# Patient Record
Sex: MALE | Race: WHITE
[De-identification: names, ages, dates, MRNs, and addresses within clinical notes are randomized per-mention and may not be internally consistent; named-entity substitution may affect disease eponyms.]

---

## 2020-03-13 NOTE — NUR
Pt admitted to room 109 from the ED via stretcher on bipap. PT is A/Ox3, he transferred self 
to the ICU bed. Placed on monitor, wife at the bedside.

## 2020-03-13 NOTE — CARD
MR#: [id number]

Account#: [id number]

Accession#: [id number]

Date of Study: 03/13/2020

Ordering Physician: PRIYANKA MCRAE, 

Referring Physician: PRIYANKA MCRAE, 

Tech: Merly Jacobs





--------------- APPROVED REPORT --------------





EXAM: Two-dimensional and M-mode echocardiogram with Doppler and color Doppler.



Other Information 

Quality : AverageHR: 70bpm

Technically limited study due to  body habitus & bypap



INDICATION

Dyspnea 

Congestive Heart Failure 



RISK FACTORS

Hypertension 

Hyperlipidemia

Diabetes



2D DIMENSIONS 

RVDd3.7 (2.9-3.5cm)Left Atrium(2D)5.4 (1.6-4.0cm)

IVSd1.4 (0.7-1.1cm)Aortic Root(2D)3.6 (2.0-3.7cm)

LVDd6.3 (3.9-5.9cm)LVOT Diameter2.2 (1.8-2.4cm)

PWd1.5 (0.7-1.1cm)LVDs3.8 (2.5-4.0cm)

FS (%) 40.3 %.7 ml



Aortic Valve

AoV Peak [place].170.4cm/sAoV VTI39.6cm

AO Peak GR.11.6mmHgLVOT  VTI 22.32cm

AO Mean GR.8mmHgAI P 1/2 [id number]



Mitral Valve

MV E Xlzezhwt10.4cm/sMV E Peak Gr.3mmHg

MV DECEL [id number] A Hjmszwze46.1cm/s

MV E Mean Gr.2mmHgE/A  Ratio0.9



TDI

Lateral E' P. V3.99cm/sMedial E' P. V5.28cm/s

E/Lateral E'15.9E/Medial E'12.0



Tricuspid Valve

TR P. Htclawzm820bo/sRAP [id number]

TR Peak Gr.10kcRlATAI94csGb



 LEFT VENTRICLE 

The left ventricle is normal size. There is mild concentric left ventricular hypertrophy. The left ve
ntricular systolic function is normal. The Ejection Fraction is 50-55%. There is normal LV segmental 
wall motion. Transmitral Doppler flow pattern is Grade I-abnormal relaxation pattern.



 RIGHT VENTRICLE 

The right ventricle is normal size. There is normal right ventricular wall thickness. The right ventr
icular systolic function is normal.



 ATRIA 

The left atrium is mildly dilated. The right atrium is mildly dilated. The interatrial septum is inta
ct with no evidence for an atrial septal defect or patent foramen ovale as noted on 2-D or Doppler im
aging.



 AORTIC VALVE 

The aortic valve is thickened but opens well. Doppler and Color Flow revealed trace aortic regurgitat
ion. There is no significant aortic valvular stenosis.



 MITRAL VALVE 

The mitral valve is normal in structure and function. There is no evidence of mitral valve prolapse. 
There is no mitral valve stenosis. Doppler and Color-flow revealed trace mitral regurgitation.



 TRICUSPID VALVE 

The tricuspid valve is normal in structure and function. Doppler and Color Flow revealed trace tricus
pid regurgitation with an estimated PAP of 28 mmHg. There is no tricuspid valve stenosis.



 PULMONIC VALVE 

The pulmonic valve is not well visualized. Doppler and Color Flow revealed no pulmonic valvular regur
gitation.



 GREAT VESSELS 

The aortic root is normal in size. The IVC was not visualized.



 PERICARDIAL EFFUSION 

There is no evidence of significant pericardial effusion.



Critical Notification

Critical Value: No



<Conclusion>

The left ventricle is normal size.

The left ventricular systolic function is normal.

The Ejection Fraction is 50-55%.

There is mild concentric left ventricular hypertrophy.

Doppler and Color Flow revealed trace aortic regurgitation.

There is no significant aortic valvular stenosis.

Doppler and Color-flow revealed trace mitral regurgitation.

Doppler and Color Flow revealed trace tricuspid regurgitation with an estimated PAP of 28 mmHg.



Signed by : Serge Silva MD

Electronically Approved : 03/13/2020 18:18:00

## 2020-03-13 NOTE — PDOC1
History and Physical


Date of Admission


Date of Admission


DATE: 3/13/20 


TIME: 13:58





Source


Source:  Caregiver, Chart review, Patient





History of Present Illness


History of Present Illness


MR. Vila   [facility] 75  [facility]  [facility] of 

breath. 


2 wweks of intermittent shortness of breath,    much worse today,   


he has not traveled, he has no sick contacts,     


marked hypoxia noted by EMS,  70% RA in the field reportec. 


  CPAP started in ER and he feels much improved,  


[facility],  [facility],  waiting for his wife to procure,





Past Medical History


Past Medical History


 hypertension, dyslipidemia, diabetes mellitus


Cardiovascular:  HTN


Endocrine:  Diabetes





Social History


Smoke:  No


ALCOHOL:  rare





Current Problem List


Problem List


Problems


Medical Problems:


(1) Acute respiratory distress


Status: Acute  





(2) CAP (community acquired pneumonia)


Status: Acute  





(3) CHF (congestive heart failure)


Status: Acute  





(4) Hypoxia


Status: Acute  





(5) SIRS (systemic inflammatory response syndrome)


Status: Acute  











Current Medications


Current Medications





Current Medications


Albuterol/ Ipratropium (Duoneb) 3 ml 1X  ONCE NEB  Last administered on 

3/13/20at 09:07;  Start 3/13/20 at 09:15;  Stop 3/13/20 at 09:16;  Status DC


Methylprednisolone Sodium Succinate (SOLU-Medrol 125MG VIAL) 125 mg 1X  ONCE IV 

Last administered on 3/13/20at 09:27;  Start 3/13/20 at 09:15;  Stop 3/13/20 at 

09:16;  Status DC


Labetalol HCl (Normodyne Iv Push) 10 mg 1X  ONCE IVP ;  Start 3/13/20 at 09:15; 

Stop 3/13/20 at 09:12;  Status DC


Piperacillin Sod/ Tazobactam Sod 3.375 gm/Sodium Chloride 50 ml @  100 mls/hr 1X

 ONCE IV  Last administered on 3/13/20at 10:23;  Start 3/13/20 at 09:30;  Stop 

3/13/20 at 09:59;  Status DC


Vancomycin HCl 250 ml @  250 mls/hr 1X  ONCE IV ;  Start 3/13/20 at 09:30;  Stop

3/13/20 at 10:29;  Status UNV


Vancomycin HCl 2 gm/Sodium Chloride 500 ml @  250 mls/hr 1X  ONCE IV  Last 

administered on 3/13/20at 09:56;  Start 3/13/20 at 09:45;  Stop 3/13/20 at 

11:44;  Status DC


Sodium Chloride 1,000 ml @  100 mls/hr Q10H IV  Last administered on 3/13/20at 

11:53;  Start 3/13/20 at 10:51;  Stop 3/14/20 at 10:50


Furosemide (Lasix) 40 mg 1X  ONCE IVP ;  Start 3/13/20 at 14:00;  Stop 3/13/20 

at 14:01


Piperacillin Sod/ Tazobactam Sod 3.375 gm/Sodium Chloride 50 ml @  100 mls/hr 

Q6HRS IV ;  Start 3/13/20 at 18:00





Allergies


Allergies:  


Coded Allergies:  


     chlorothiazide (Verified  Allergy, Intermediate, 3/13/20)


     metoprolol (Verified  Allergy, Intermediate, 3/13/20)





ROS


General:  No: Chills, Night Sweats, Fatigue, Malaise, Appetite, Other


PSYCHOLOGICAL ROS:  No: Anxiety, Behavioral Disorder, Concentration difficultie,

Decreased libido, Depression, Disorientation, Hallucinations, Hostility, 

Irritablity, Memory difficulties, Mood Swings, Obsessive thoughts, Physical 

abuse, Sexual abuse, Sleep disturbances, Suicidal ideation, Other


Eyes:  No Blurry vision, No Decreased vision, No Double vision, No Dry eyes, No 

Excessive tearing, No Eye Pain, No Itchy Eyes, No Loss of vision, No 

Photophobia, No Scotomata, No Uses contacts, No Uses glasses, No Other


HEENT:  No: Heacaches, Visual Changes, Hearing change, Nasal congestion, Nasal 

discharge, Oral lesions, Sinus pain, Sore Throat, Epistaxis, Sneezing, Snoring, 

Tinnitus, Vertigo, Vocal changes, Other


Respiratory:  YES: Orthopnea, SOB with excertion; 


   No: Cough, Hemoptysis, Pleuritic Pain, Shortness of breath, Sputum Changes, 

Stridor, Tachypnea, Wheezing, Other


Cardiovascular:  No Chest Pain, No Palpitations, No Orthopnea, No Paroxysmal 

Noc. Dyspnea, No Edema, No Lt Headedness, No Other


Gastrointestinal:  Yes Nausea, Yes Diarrhea; 


   No Vomiting, No Abdominal Pain, No Constipation, No Melena, No Hematochezia, 

No Other


Genitourinary:  No Dysuria, No Frequency, No Incontinence, No Hematuria, No 

Retention, No Discharge, No Urgency, No Pain, No Flank Pain, No Other, No , No ,

No , No , No , No , No 


Musculoskeletal:  No Gait Disturbance, No Joint Pain, No Joint Stiffness, No 

Joint Swelling, No Muscle Pain, No Muscular Weakness, No Pain In:, No Swelling 

In:, No Other


Neurological:  No Behavorial Changes, No Bowel/Bladder ControlChng, No 

Confusion, No Dizziness, No Gait Disturbance, No Headaches, No Impaired 

Coord/balance, No Memory Loss, No Numbness/Tingling, No Seizures, No Speech 

Problems, No Tremors, No Visual Changes, No Weakness, No Other


Skin:  No Dry Skin, No Eczema, No Hair Changes, No Lumps, No Mole Changes, No 

Mottling, No Nail Changes, No Pruritus, No Rash, No Skin Lesion Changes, No 

Other, No Acne





Physical Exam


General:  Alert, Oriented X3, Cooperative, mild distress, moderate distress (on 

admit, better, )


HEENT:  PERRLA, EOMI


Heart:  no gallops, no murmurs


Abdomen:  Normal bowel sounds, Soft


Rectal Exam:  not examined


Extremities:  No clubbing, No edema, Normal pulses


Skin:  No breakdown


Neuro:  Normal gait, Normal speech, Normal tone, Sensation intact


Psych/Mental Status:  Mood NL





Vitals


Vitals





Vital Signs








  Date Time  Temp Pulse Resp B/P (MAP) Pulse Ox O2 Delivery O2 Flow Rate FiO2


 


3/13/20 12:59     93 BiPAP/CPAP  


 


3/13/20 12:23  60  129/63 (85)    


 


3/13/20 08:51 98.4  28     





 98.4       











Labs


Labs





Laboratory Tests








Test


 3/13/20


08:56 3/13/20


09:09 3/13/20


09:10 3/13/20


09:30


 


Glucose (Fingerstick)


 228 mg/dL


(70-99) 


 


 





 


O2 Saturation  99 % (92-99)   


 


Arterial Blood pH


 


 7.27


(7.35-7.45) 


 





 


Arterial Blood pCO2 at


Patient Temp 


 65 mmHg


(35-46) 


 





 


Arterial Blood pO2 at Patient


Temp 


 420 mmHg


() 


 





 


Arterial Blood HCO3


 


 29 mmol/L


(21-28) 


 





 


Arterial Blood Base Excess


 


 0 mmol/L


(-3-3) 


 





 


FiO2  100   


 


White Blood Count


 


 


 21.3 x10^3/uL


(4.0-11.0) 





 


Red Blood Count


 


 


 6.22 x10^6/uL


(4.30-5.70) 





 


Hemoglobin


 


 


 17.4 g/dL


(13.0-17.5) 





 


Hematocrit


 


 


 53.1 %


(39.0-53.0) 





 


Mean Corpuscular Volume   85 fL ()  


 


Mean Corpuscular Hemoglobin   28 pg (25-35)  


 


Mean Corpuscular Hemoglobin


Concent 


 


 33 g/dL


(31-37) 





 


Red Cell Distribution Width


 


 


 14.9 %


(11.5-14.5) 





 


Platelet Count


 


 


 245 x10^3/uL


(140-400) 





 


Neutrophils (%) (Auto)   71 % (31-73)  


 


Lymphocytes (%) (Auto)   20 % (24-48)  


 


Monocytes (%) (Auto)   6 % (0-9)  


 


Eosinophils (%) (Auto)   2 % (0-3)  


 


Basophils (%) (Auto)   1 % (0-3)  


 


Neutrophils # (Auto)


 


 


 15.2 x10^3/uL


(1.8-7.7) 





 


Lymphocytes # (Auto)


 


 


 4.3 x10^3/uL


(1.0-4.8) 





 


Monocytes # (Auto)


 


 


 1.3 x10^3/uL


(0.0-1.1) 





 


Eosinophils # (Auto)


 


 


 0.3 x10^3/uL


(0.0-0.7) 





 


Basophils # (Auto)


 


 


 0.2 x10^3/uL


(0.0-0.2) 





 


Segmented Neutrophils %   62 % (35-66)  


 


Band Neutrophils %   12 % (0-9)  


 


Lymphocytes %   9 % (24-48)  


 


Atypical Lymphocytes %


(Manual) 


 


 6 % (0-0) 


 





 


Monocytes %   8 % (0-10)  


 


Eosinophils %   2 % (0-5)  


 


Myelocytes %   1 % (0-0)  


 


Platelet Estimate


 


 


 Adequate


(ADEQUATE) 





 


Prothrombin Time


 


 


 13.3 SEC


(11.7-14.0) 





 


Prothromb Time International


Ratio 


 


 1.1 (0.8-1.1) 


 





 


D-Dimer (Nory)


 


 


 0.48 ug/mlFEU


(0.00-0.50) 





 


Sodium Level


 


 


 143 mmol/L


(136-145) 





 


Potassium Level


 


 


 3.7 mmol/L


(3.5-5.1) 





 


Chloride Level


 


 


 103 mmol/L


() 





 


Carbon Dioxide Level


 


 


 30 mmol/L


(21-32) 





 


Anion Gap   10 (6-14)  


 


Blood Urea Nitrogen


 


 


 16 mg/dL


(8-26) 





 


Creatinine


 


 


 1.0 mg/dL


(0.7-1.3) 





 


Estimated GFR


(Cockcroft-Gault) 


 


 73.2 


 





 


BUN/Creatinine Ratio   16 (6-20)  


 


Glucose Level


 


 


 217 mg/dL


(70-99) 





 


Lactic Acid Level


 


 


 1.8 mmol/L


(0.4-2.0) 





 


Calcium Level


 


 


 9.1 mg/dL


(8.5-10.1) 





 


Total Bilirubin


 


 


 0.7 mg/dL


(0.2-1.0) 





 


Aspartate Amino Transf


(AST/SGOT) 


 


 25 U/L (15-37) 


 





 


Alanine Aminotransferase


(ALT/SGPT) 


 


 53 U/L (16-63) 


 





 


Alkaline Phosphatase


 


 


 101 U/L


() 





 


Creatine Kinase


 


 


 92 U/L


() 





 


Troponin I Quantitative


 


 


 < 0.017 ng/mL


(0.000-0.055) 





 


NT-Pro-B-Type Natriuretic


Peptide 


 


 2328 pg/mL


(0-124) 





 


Total Protein


 


 


 7.6 g/dL


(6.4-8.2) 





 


Albumin


 


 


 3.5 g/dL


(3.4-5.0) 





 


Albumin/Globulin Ratio   0.9 (1.0-1.7)  


 


Influenza Type A Antigen


 


 


 


 Negative


(NEGATIVE)


 


Influenza Type B Antigen


 


 


 


 Negative


(NEGATIVE)








Laboratory Tests








Test


 3/13/20


08:56 3/13/20


09:09 3/13/20


09:10 3/13/20


09:30


 


Glucose (Fingerstick)


 228 mg/dL


(70-99) 


 


 





 


O2 Saturation  99 % (92-99)   


 


Arterial Blood pH


 


 7.27


(7.35-7.45) 


 





 


Arterial Blood pCO2 at


Patient Temp 


 65 mmHg


(35-46) 


 





 


Arterial Blood pO2 at Patient


Temp 


 420 mmHg


() 


 





 


Arterial Blood HCO3


 


 29 mmol/L


(21-28) 


 





 


Arterial Blood Base Excess


 


 0 mmol/L


(-3-3) 


 





 


FiO2  100   


 


White Blood Count


 


 


 21.3 x10^3/uL


(4.0-11.0) 





 


Red Blood Count


 


 


 6.22 x10^6/uL


(4.30-5.70) 





 


Hemoglobin


 


 


 17.4 g/dL


(13.0-17.5) 





 


Hematocrit


 


 


 53.1 %


(39.0-53.0) 





 


Mean Corpuscular Volume   85 fL ()  


 


Mean Corpuscular Hemoglobin   28 pg (25-35)  


 


Mean Corpuscular Hemoglobin


Concent 


 


 33 g/dL


(31-37) 





 


Red Cell Distribution Width


 


 


 14.9 %


(11.5-14.5) 





 


Platelet Count


 


 


 245 x10^3/uL


(140-400) 





 


Neutrophils (%) (Auto)   71 % (31-73)  


 


Lymphocytes (%) (Auto)   20 % (24-48)  


 


Monocytes (%) (Auto)   6 % (0-9)  


 


Eosinophils (%) (Auto)   2 % (0-3)  


 


Basophils (%) (Auto)   1 % (0-3)  


 


Neutrophils # (Auto)


 


 


 15.2 x10^3/uL


(1.8-7.7) 





 


Lymphocytes # (Auto)


 


 


 4.3 x10^3/uL


(1.0-4.8) 





 


Monocytes # (Auto)


 


 


 1.3 x10^3/uL


(0.0-1.1) 





 


Eosinophils # (Auto)


 


 


 0.3 x10^3/uL


(0.0-0.7) 





 


Basophils # (Auto)


 


 


 0.2 x10^3/uL


(0.0-0.2) 





 


Segmented Neutrophils %   62 % (35-66)  


 


Band Neutrophils %   12 % (0-9)  


 


Lymphocytes %   9 % (24-48)  


 


Atypical Lymphocytes %


(Manual) 


 


 6 % (0-0) 


 





 


Monocytes %   8 % (0-10)  


 


Eosinophils %   2 % (0-5)  


 


Myelocytes %   1 % (0-0)  


 


Platelet Estimate


 


 


 Adequate


(ADEQUATE) 





 


Prothrombin Time


 


 


 13.3 SEC


(11.7-14.0) 





 


Prothromb Time International


Ratio 


 


 1.1 (0.8-1.1) 


 





 


D-Dimer (Nory)


 


 


 0.48 ug/mlFEU


(0.00-0.50) 





 


Sodium Level


 


 


 143 mmol/L


(136-145) 





 


Potassium Level


 


 


 3.7 mmol/L


(3.5-5.1) 





 


Chloride Level


 


 


 103 mmol/L


() 





 


Carbon Dioxide Level


 


 


 30 mmol/L


(21-32) 





 


Anion Gap   10 (6-14)  


 


Blood Urea Nitrogen


 


 


 16 mg/dL


(8-26) 





 


Creatinine


 


 


 1.0 mg/dL


(0.7-1.3) 





 


Estimated GFR


(Cockcroft-Gault) 


 


 73.2 


 





 


BUN/Creatinine Ratio   16 (6-20)  


 


Glucose Level


 


 


 217 mg/dL


(70-99) 





 


Lactic Acid Level


 


 


 1.8 mmol/L


(0.4-2.0) 





 


Calcium Level


 


 


 9.1 mg/dL


(8.5-10.1) 





 


Total Bilirubin


 


 


 0.7 mg/dL


(0.2-1.0) 





 


Aspartate Amino Transf


(AST/SGOT) 


 


 25 U/L (15-37) 


 





 


Alanine Aminotransferase


(ALT/SGPT) 


 


 53 U/L (16-63) 


 





 


Alkaline Phosphatase


 


 


 101 U/L


() 





 


Creatine Kinase


 


 


 92 U/L


() 





 


Troponin I Quantitative


 


 


 < 0.017 ng/mL


(0.000-0.055) 





 


NT-Pro-B-Type Natriuretic


Peptide 


 


 2328 pg/mL


(0-124) 





 


Total Protein


 


 


 7.6 g/dL


(6.4-8.2) 





 


Albumin


 


 


 3.5 g/dL


(3.4-5.0) 





 


Albumin/Globulin Ratio   0.9 (1.0-1.7)  


 


Influenza Type A Antigen


 


 


 


 Negative


(NEGATIVE)


 


Influenza Type B Antigen


 


 


 


 Negative


(NEGATIVE)











VTE Prophylaxis Ordered


VTE Prophylaxis Devices:  No


VTE Pharmacological Prophylaxi:  Yes





Assessment/Plan


Assessment/Plan


acute hypoxic respiratory failure


CHF, acute systolic exacerbation on chronic failure


obesity, BMI 39











CORRINA KAY MD                 Mar 13, 2020 14:03

## 2020-03-13 NOTE — CONS
DATE OF CONSULTATION:  03/13/2020



REQUESTING PHYSICIAN:  Malissa Doshi DO



REASON FOR CONSULTATION:  Leukocytosis and pneumonia.



HISTORY OF PRESENT ILLNESS:  This is a 75-year-old  gentleman with a

history of congestive heart failure, also has multiple medical problems,

hypertension, hyperlipidemia, who came in with shortness of breath.  The patient

states this progressively was getting worse in the last 2 weeks and he was [facility]

[facility] way [facility] and he just cannot breathe, hence called 911.  The patient was

hypoxic, brought in on a BiPAP.  The patient denies any fever, denies any

nausea, vomiting, diarrhea.  Denies any chest pain.  Denies any headache or

visual symptoms.



PAST MEDICAL HISTORY:  Positive for partial thyroidectomy, congestive heart

failure, hyperlipidemia, hypertension, gastroesophageal reflux disease,

abdominal surgery done in the past, ventral hernia repair done, obesity, kidney

cancer, had partial left nephrectomy, joint replacement, right adrenalectomy,

depression and anxiety.



SOCIAL HISTORY:  Negative for smoking, alcohol, illicit drug use.



ALLERGIES:  LISTED AS ALLERGIES TO CHLOROTHIAZIDE AND METOPROLOL.



REVIEW OF SYSTEMS:  As per HPI, all other systems reviewed and are negative.



CURRENT MEDICATIONS:  The patient is on vancomycin and Zosyn, was given one

dose.



PHYSICAL EXAMINATION:

VITAL SIGNS:  Stable, afebrile.

HEENT:  NAD.

NECK:  Supple, no JVP, no lymphadenopathy.

LUNGS:  Decreased breath sounds with crackles at the bases.

HEART:  S1, S2, regular.  No gallop or murmur.

ABDOMEN:  Soft, nontender, no organomegaly.

EXTREMITIES:  1-2+ pitting edema present.

NEUROLOGIC:  The patient is alert, awake and appropriate.  No focal neurologic

deficit.

SKIN:  Unremarkable.



LABORATORY DATA:  White count is 21,000.  BUN and creatinine is normal.  Lactic

acid 1.8.  BNP is 2328.  Influenza screen is negative.  Chest x-ray showed

perihilar and bibasilar airspace opacity with possible infiltrate.



IMPRESSION:

1.  Pulmonary infiltrate, most likely to be congestive heart failure, although

the patient has leukocytosis, could be respiratory infection like pneumonia.

2.  Leukocytosis.

3.  Congestive heart failure.

4.  Hypertension.

5.  Partial nephrectomy.



RECOMMENDATIONS:  We will not give vancomycin anymore.  He got one dose.  We

will continue Zosyn, supportive care and we will continue to follow.



Thank you very much, Dr. Fabian Hollingsworth, for giving me the opportunity to

participate in this patient's care.

 



______________________________

CHRISTOPHER RICHARDSON MD



DR:  EARLINE/marnie  JOB#:  [id number] / [id number]

DD:  03/13/2020 13:53  DT:  03/13/2020 21:16

## 2020-03-13 NOTE — CONS
DATE OF CONSULTATION:  



PULMONARY CONSULTATION



ATTENDING PHYSICIAN:  Jinny Crockett MD.



REASON FOR CONSULTATION:  Respiratory failure.



HISTORY OF PRESENT ILLNESS:  The patient is a 75-year-old male who has a history

of hypertension, hyperlipidemia, morbid obesity with a BMI of 38.  No

significant tobacco use.  He presented to the hospital with progressive

shortness of breath for the last 10 days and also has lower extremity edema.  No

fever, no cough, no chills, no headaches.  No nausea, vomiting or diarrhea.  His

saturation was 70% on room air and was started on CPAP and then later placed on

BiPAP.  ABGs showed a pH of 7.27, pCO2 of 65 and a pO2 of 420 on 100% FiO2.  He

is currently down to 40% FiO2.  Influenza screen was negative.



PAST MEDICAL HISTORY:  Significant for history of hypertension, dyslipidemia,

morbid obesity, diabetes.



PAST SURGICAL HISTORY:  No recent surgery.



ALLERGIES:  CHLOROTHIAZIDE AND METOPROLOL.



MEDICATIONS:  Reviewed as listed in the MRAD.



REVIEW OF SYSTEMS:  Twelve-point system obtained.  Pertinent positives discussed

in my history of present illness, otherwise noncontributory.  All systems that

were negative were reviewed as well.



FAMILY HISTORY:  Noncontributory to lungs.



SOCIAL HISTORY:  Nonsmoker.



PHYSICAL EXAMINATION:

VITAL SIGNS:  Reviewed, pulse ox 93% on 40% FiO2 with BiPAP.  Afebrile.

HEENT:  Sclerae nonicteric.

NECK:  Supple.

LUNGS:  With diminished breath sounds.

CARDIOVASCULAR:  With a regular rate.

ABDOMEN:  Soft, obese.

EXTREMITIES:  Bilateral 2+ pitting edema.



LABORATORY DATA:  Reviewed.  Influenza screen negative.  ABGs as discussed in my

history of present illness.  BUN 16, creatinine 1.0.  INR 1.1.  D-dimer 0.4. 

White cell count 21.3.



IMPRESSION:

1.  Acute hypercapnic and hypoxic respiratory failure secondary to likely

interstitial edema.

2.  Abnormal chest x-ray with bilateral interstitial infiltrates suggesting

interstitial edema.  Clinically, less likely pneumonia.

3.  Leukocytosis, could be reactive.  No fever.  Reasonable to cover for

atypical coverage and follow ID recommendation.

4.  No significant tobacco history.

5.  Suspected obesity hypoventilation syndrome and obstructive sleep apnea,

never been tested.



RECOMMENDATIONS:

1.  We will continue with BiPAP and follow ABGs and make necessary adjustment.

2.  Avoid hyperoxia.

3.  Diuresis.

4.  Empiric antibiotic per Infectious Disease.

5.  Obtain echocardiogram.

6.  We will eventually benefit from sleep study as an outpatient.

7.  Discussed with RN and RT and Dr. Nathna Stevens.



Critical care time 37 minutes.

 



______________________________

PRIYANKA MCRAE MD



DR:  FIDE/marnie  JOB#:  [id number] / [id number]

DD:  03/13/2020 13:53  DT:  03/13/2020 14:38

## 2020-03-13 NOTE — PDOC2
YANA CHA APRN 3/13/20 1408:


CARDIAC CONSULT


DATE OF CONSULT


Date of Consult


DATE: 3/13/20 


TIME: 13:59





REASON FOR CONSULT


Reason for Consult:


CHF





REFERRING PHYSICIAN


Referring Physician:


Dede





SOURCE


Source:  Chart review, Patient





HISTORY OF PRESENT ILLNESS


HISTORY OF PRESENT ILLNESS


This is a pleasant 74 yo male admitted for complains of shortness of breath.  

His wife was going to take him to [facility] but he could not 

breath well hence EMS was called and brought to [facility].  He has been having SOA in 

the last 2 weeks. His AUGUSTINE has been getting worse to a point that he could only 

walk 5 ft before getting SOA.  His breathing got worse this morning.  No chest 

pain but positive for nausea, increasing leg edema, urinary frequency, and 

positive orthopnea.  His lasix was increased from 20 to 40 mg daily 5 days ago 

but no effect.  His BP has been controlled for the most part at home so as his 

BG.  He does not follow any [facility] and has had sleep 

study a while back but inconclusive as he could not tolerate the test plus he 

had problems sleeping.  Denies any shoulder or jaw tightness or arm discomfort. 

Denies any CAD, CTE or arrhythmias.  No hx of falls or injury. He is still 

needing bipap. No significant hx of smoking or recreational drugs. No fever or 

chills.





PAST MEDICAL HISTORY


Cardiovascular:  HTN, Hyperlipidemia


Pulmonary:  No pertinent hx


CENTRAL NERVOUS SYSTEM:  Other (No pertinet history)


GI:  GERD


Heme/Onc:  Cancer (renal CA)


Hepatobiliary:  No pertinent hx


Psych:  Anxiety


Musculoskeletal:  Osteoarthritis


Rheumatologic:  No pertinent hx


Infectious disease:  No pertinent hx


ENT:  Other (cataract)


Renal/:  Chronic renal insuff


Endocrine:  Diabetes (2)


Dermatology:  No pertinent hx





PAST SURGICAL HISTORY


Past Surgical History:  Cataract Removal, Total knee replacement (left), Other 

(right adrenalectomy, partial left nephrectomy, partial thyroidectomy 

(noncancerous tumor))





FAMILY HISTORY


Family History:  Coronary Artery Disease (mother and father)





SOCIAL HISTORY


Smoke:  No


ALCOHOL:  none


Drugs:  None


Lives:  with Family





CURRENT MEDICATIONS


CURRENT MEDICATIONS





Current Medications








 Medications


  (Trade)  Dose


 Ordered  Sig/Elisa


 Route


 PRN Reason  Start Time


 Stop Time Status Last Admin


Dose Admin


 


 Albuterol/


 Ipratropium


  (Duoneb)  3 ml  1X  ONCE


 NEB


   3/13/20 09:15


 3/13/20 09:16 DC 3/13/20 09:07





 


 Methylprednisolone


 Sodium Succinate


  (SOLU-Medrol


 125MG VIAL)  125 mg  1X  ONCE


 IV


   3/13/20 09:15


 3/13/20 09:16 DC 3/13/20 09:27





 


 Piperacillin Sod/


 Tazobactam Sod


 3.375 gm/Sodium


 Chloride  50 ml @ 


 100 mls/hr  1X  ONCE


 IV


   3/13/20 09:30


 3/13/20 09:59 DC 3/13/20 10:23





 


 Vancomycin HCl 2


 gm/Sodium Chloride  500 ml @ 


 250 mls/hr  1X  ONCE


 IV


   3/13/20 09:45


 3/13/20 11:44 DC 3/13/20 09:56





 


 Sodium Chloride  1,000 ml @ 


 100 mls/hr  Q10H


 IV


   3/13/20 10:51


 3/14/20 10:50  3/13/20 11:53














ALLERGIES


ALLERGIES:  


Coded Allergies:  


     chlorothiazide (Verified  Allergy, Intermediate, 3/13/20)


     metoprolol (Verified  Allergy, Intermediate, 3/13/20)





ROS


Review of System


14 point ROS evaluated with pertient positives noted per HPI





PHYSICAL EXAM


General:  Alert, Oriented X3, Cooperative, mild distress


HEENT:  Atraumatic, Mucous membr. moist/pink


Lungs:  Other (basilar crackles; bipap)


Heart:  Regular rate (SR with PVCs), Other (distant heart sounds)


Abdomen:  Soft, No tenderness, Other (obese)


Extremities:  No cyanosis, Other (3+ bilateral LE pitting edema)


Skin:  No breakdown, No significant lesion


Neuro:  Normal speech, Sensation intact


Psych/Mental Status:  Mental status NL, Mood NL


MUSCULOSKELETAL:  Osteoarthritic changes both hands





VITALS/I&O


VITALS/I&O:





                                   Vital Signs








  Date Time  Temp Pulse Resp B/P (MAP) Pulse Ox O2 Delivery O2 Flow Rate FiO2


 


3/13/20 12:59     93 BiPAP/CPAP  


 


3/13/20 12:23  60  129/63 (85)    


 


3/13/20 08:51 98.4  28     





 98.4       











LABS


Lab:





                                Laboratory Tests








Test


 3/13/20


08:56 3/13/20


09:09 3/13/20


09:10 3/13/20


09:30


 


Glucose (Fingerstick)


 228 mg/dL


(70-99)  H 


 


 





 


O2 Saturation  99 % (92-99)    


 


Arterial Blood pH


 


 7.27


(7.35-7.45)  L 


 





 


Arterial Blood pCO2 at


Patient Temp 


 65 mmHg


(35-46)  *H 


 





 


Arterial Blood pO2 at Patient


Temp 


 420 mmHg


()  H 


 





 


Arterial Blood HCO3


 


 29 mmol/L


(21-28)  H 


 





 


Arterial Blood Base Excess


 


 0 mmol/L


(-3-3) 


 





 


FiO2  100    


 


White Blood Count


 


 


 21.3 x10^3/uL


(4.0-11.0)  H 





 


Red Blood Count


 


 


 6.22 x10^6/uL


(4.30-5.70)  H 





 


Hemoglobin


 


 


 17.4 g/dL


(13.0-17.5) 





 


Hematocrit


 


 


 53.1 %


(39.0-53.0)  H 





 


Mean Corpuscular Volume


 


 


 85 fL ()


 





 


Mean Corpuscular Hemoglobin   28 pg (25-35)   


 


Mean Corpuscular Hemoglobin


Concent 


 


 33 g/dL


(31-37) 





 


Red Cell Distribution Width


 


 


 14.9 %


(11.5-14.5)  H 





 


Platelet Count


 


 


 245 x10^3/uL


(140-400) 





 


Neutrophils (%) (Auto)   71 % (31-73)   


 


Lymphocytes (%) (Auto)   20 % (24-48)  L 


 


Monocytes (%) (Auto)   6 % (0-9)   


 


Eosinophils (%) (Auto)   2 % (0-3)   


 


Basophils (%) (Auto)   1 % (0-3)   


 


Neutrophils # (Auto)


 


 


 15.2 x10^3/uL


(1.8-7.7)  H 





 


Lymphocytes # (Auto)


 


 


 4.3 x10^3/uL


(1.0-4.8) 





 


Monocytes # (Auto)


 


 


 1.3 x10^3/uL


(0.0-1.1)  H 





 


Eosinophils # (Auto)


 


 


 0.3 x10^3/uL


(0.0-0.7) 





 


Basophils # (Auto)


 


 


 0.2 x10^3/uL


(0.0-0.2) 





 


Segmented Neutrophils %   62 % (35-66)   


 


Band Neutrophils %   12 % (0-9)  H 


 


Lymphocytes %   9 % (24-48)  L 


 


Atypical Lymphocytes %


(Manual) 


 


 6 % (0-0)  H


 





 


Monocytes %   8 % (0-10)   


 


Eosinophils %   2 % (0-5)   


 


Myelocytes %   1 % (0-0)  H 


 


Platelet Estimate


 


 


 Adequate


(ADEQUATE) 





 


Prothrombin Time


 


 


 13.3 SEC


(11.7-14.0) 





 


Prothrombin Time INR   1.1 (0.8-1.1)   


 


D-Dimer (Nory)


 


 


 0.48 ug/mlFEU


(0.00-0.50) 





 


Sodium Level


 


 


 143 mmol/L


(136-145) 





 


Potassium Level


 


 


 3.7 mmol/L


(3.5-5.1) 





 


Chloride Level


 


 


 103 mmol/L


() 





 


Carbon Dioxide Level


 


 


 30 mmol/L


(21-32) 





 


Anion Gap   10 (6-14)   


 


Blood Urea Nitrogen


 


 


 16 mg/dL


(8-26) 





 


Creatinine


 


 


 1.0 mg/dL


(0.7-1.3) 





 


Estimated GFR


(Cockcroft-Gault) 


 


 73.2  


 





 


BUN/Creatinine Ratio   16 (6-20)   


 


Glucose Level


 


 


 217 mg/dL


(70-99)  H 





 


Lactic Acid Level


 


 


 1.8 mmol/L


(0.4-2.0) 





 


Calcium Level


 


 


 9.1 mg/dL


(8.5-10.1) 





 


Total Bilirubin


 


 


 0.7 mg/dL


(0.2-1.0) 





 


Aspartate Amino Transferase


(AST) 


 


 25 U/L (15-37)


 





 


Alanine Aminotransferase (ALT)


 


 


 53 U/L (16-63)


 





 


Alkaline Phosphatase


 


 


 101 U/L


() 





 


Creatine Kinase


 


 


 92 U/L


() 





 


Troponin I Quantitative


 


 


 < 0.017 ng/mL


(0.000-0.055) 





 


NT-Pro-B-Type Natriuretic


Peptide 


 


 2328 pg/mL


(0-124)  H 





 


Total Protein


 


 


 7.6 g/dL


(6.4-8.2) 





 


Albumin


 


 


 3.5 g/dL


(3.4-5.0) 





 


Albumin/Globulin Ratio


 


 


 0.9 (1.0-1.7)


L 





 


Influenza Type A Antigen


 


 


 


 Negative


(NEGATIVE)


 


Influenza Type B Antigen


 


 


 


 Negative


(NEGATIVE)





                                Laboratory Tests


3/13/20 09:10








                                Laboratory Tests


3/13/20 09:10











ASSESSMENT/PLAN


ASSESSMENT/PLAN


1. Acute CHF with possible combined systolic/diastolic dysfunction


2. NSTEMI: no CP but suspecting ischemia as culprit. EKG LBBB no prior for 

comparison


2. Acute respiratory failure with CHF and possibly ongoing RAVINDER


3. DM2: insulin dependent


4. Accelerated HTN: better


5. Morbid obesity


6. Leukocytosis with UTI: on antibiotics per PCP





Recommendations


1. TTE TSH lipids, trend troponin, Mg. Avoid QT prolonging agents. 


2. ASA start on heparin drip


3. Will plan for LHC on Monday. Optimize CHF treatment over the weekend


4. Restart home statin


5. Hydralazine IV PRN. Metoprolol and lisinopril


6. Continue with bipap





BRANDIN GIBBONS MD 3/13/20 7504:


CARDIAC CONSULT


ASSESSMENT/PLAN


ASSESSMENT/PLAN


Patient seen and examined.  Agree with NP's assessment and plan.


Continue diuresis for acute on chr diastolic HF


Agree with cardiac cath on monday to evaluate his NSTEMI


2D echo showed normal LV systolic function


Agree with heparin gtt per protocol


Thank you for your consultation











YANA CHA          Mar 13, 2020 14:08


BRANDIN GIBBONS MD           Mar 13, 2020 18:54

## 2020-03-13 NOTE — PDOC
Infectious Disease Note


Vital Sign


Vital Signs





Vital Signs








  Date Time  Temp Pulse Resp B/P (MAP) Pulse Ox O2 Delivery O2 Flow Rate FiO2


 


3/13/20 12:59     93 BiPAP/CPAP  


 


3/13/20 12:23  60  129/63 (85)    


 


3/13/20 08:51 98.4  28     





 98.4       











Labs


Lab





Laboratory Tests








Test


 3/13/20


08:56 3/13/20


09:09 3/13/20


09:10 3/13/20


09:30


 


Glucose (Fingerstick)


 228 mg/dL


(70-99) 


 


 





 


O2 Saturation  99 % (92-99)   


 


Arterial Blood pH


 


 7.27


(7.35-7.45) 


 





 


Arterial Blood pCO2 at


Patient Temp 


 65 mmHg


(35-46) 


 





 


Arterial Blood pO2 at Patient


Temp 


 420 mmHg


() 


 





 


Arterial Blood HCO3


 


 29 mmol/L


(21-28) 


 





 


Arterial Blood Base Excess


 


 0 mmol/L


(-3-3) 


 





 


FiO2  100   


 


White Blood Count


 


 


 21.3 x10^3/uL


(4.0-11.0) 





 


Red Blood Count


 


 


 6.22 x10^6/uL


(4.30-5.70) 





 


Hemoglobin


 


 


 17.4 g/dL


(13.0-17.5) 





 


Hematocrit


 


 


 53.1 %


(39.0-53.0) 





 


Mean Corpuscular Volume   85 fL ()  


 


Mean Corpuscular Hemoglobin   28 pg (25-35)  


 


Mean Corpuscular Hemoglobin


Concent 


 


 33 g/dL


(31-37) 





 


Red Cell Distribution Width


 


 


 14.9 %


(11.5-14.5) 





 


Platelet Count


 


 


 245 x10^3/uL


(140-400) 





 


Neutrophils (%) (Auto)   71 % (31-73)  


 


Lymphocytes (%) (Auto)   20 % (24-48)  


 


Monocytes (%) (Auto)   6 % (0-9)  


 


Eosinophils (%) (Auto)   2 % (0-3)  


 


Basophils (%) (Auto)   1 % (0-3)  


 


Neutrophils # (Auto)


 


 


 15.2 x10^3/uL


(1.8-7.7) 





 


Lymphocytes # (Auto)


 


 


 4.3 x10^3/uL


(1.0-4.8) 





 


Monocytes # (Auto)


 


 


 1.3 x10^3/uL


(0.0-1.1) 





 


Eosinophils # (Auto)


 


 


 0.3 x10^3/uL


(0.0-0.7) 





 


Basophils # (Auto)


 


 


 0.2 x10^3/uL


(0.0-0.2) 





 


Segmented Neutrophils %   62 % (35-66)  


 


Band Neutrophils %   12 % (0-9)  


 


Lymphocytes %   9 % (24-48)  


 


Atypical Lymphocytes %


(Manual) 


 


 6 % (0-0) 


 





 


Monocytes %   8 % (0-10)  


 


Eosinophils %   2 % (0-5)  


 


Myelocytes %   1 % (0-0)  


 


Platelet Estimate


 


 


 Adequate


(ADEQUATE) 





 


Prothrombin Time


 


 


 13.3 SEC


(11.7-14.0) 





 


Prothromb Time International


Ratio 


 


 1.1 (0.8-1.1) 


 





 


D-Dimer (Nory)


 


 


 0.48 ug/mlFEU


(0.00-0.50) 





 


Sodium Level


 


 


 143 mmol/L


(136-145) 





 


Potassium Level


 


 


 3.7 mmol/L


(3.5-5.1) 





 


Chloride Level


 


 


 103 mmol/L


() 





 


Carbon Dioxide Level


 


 


 30 mmol/L


(21-32) 





 


Anion Gap   10 (6-14)  


 


Blood Urea Nitrogen


 


 


 16 mg/dL


(8-26) 





 


Creatinine


 


 


 1.0 mg/dL


(0.7-1.3) 





 


Estimated GFR


(Cockcroft-Gault) 


 


 73.2 


 





 


BUN/Creatinine Ratio   16 (6-20)  


 


Glucose Level


 


 


 217 mg/dL


(70-99) 





 


Lactic Acid Level


 


 


 1.8 mmol/L


(0.4-2.0) 





 


Calcium Level


 


 


 9.1 mg/dL


(8.5-10.1) 





 


Total Bilirubin


 


 


 0.7 mg/dL


(0.2-1.0) 





 


Aspartate Amino Transf


(AST/SGOT) 


 


 25 U/L (15-37) 


 





 


Alanine Aminotransferase


(ALT/SGPT) 


 


 53 U/L (16-63) 


 





 


Alkaline Phosphatase


 


 


 101 U/L


() 





 


Creatine Kinase


 


 


 92 U/L


() 





 


Troponin I Quantitative


 


 


 < 0.017 ng/mL


(0.000-0.055) 





 


NT-Pro-B-Type Natriuretic


Peptide 


 


 2328 pg/mL


(0-124) 





 


Total Protein


 


 


 7.6 g/dL


(6.4-8.2) 





 


Albumin


 


 


 3.5 g/dL


(3.4-5.0) 





 


Albumin/Globulin Ratio   0.9 (1.0-1.7)  


 


Influenza Type A Antigen


 


 


 


 Negative


(NEGATIVE)


 


Influenza Type B Antigen


 


 


 


 Negative


(NEGATIVE)











Objective


Assessment


pt seen, consult dictated





Plan


Plan of Care


/











CHRISTOPHER RICHARDSON MD               Mar 13, 2020 13:50

## 2020-03-13 NOTE — PHYS DOC
Adult General


Chief Complaint


Chief Complaint:  SHORTNESS OF BREATH





HPI


HPI





Patient is a 75  year old male with history of hypertension, dyslipidemia, 

diabetes mellitus who presents via EMS with complaint of shortness of breath.  

Patient complaining of intermittent episodes of exertional and supine shortness 

of breath for the last 2 weeks that getting worse today.  Patient denies cough 

and fever and chest pain and sick contact.  Patient was on his way to go to [facility] and because of severe shortness of breath has to call 911.  Patient had

O2 sat of 70s at room air and started on CPAP but did not tolerate  CPAP and 

brought in at room air with O2 sat of 62% that improved with starting BiPAP to 

100% in a very short time.





Review of Systems


Review of Systems





Constitutional: Denies fever or chills []


Eyes: Denies change in visual acuity, redness, or eye pain []


HENT: Denies nasal congestion or sore throat []


Respiratory: Denies cough, reports shortness of breath []


Cardiovascular: No additional information not addressed in HPI []


GI: Denies abdominal pain, nausea, vomiting, bloody stools or diarrhea []


: Denies dysuria or hematuria []


Musculoskeletal: Denies back pain or joint pain []


Integument: Denies rash or skin lesions []


Neurologic: Denies headache, focal weakness or sensory changes []


Endocrine: Denies polyuria or polydipsia []





All other systems were reviewed and found to be within normal limits, except as 

documented in this note.





Current Medications


Current Medications





Current Medications








 Medications


  (Trade)  Dose


 Ordered  Sig/Elisa  Start Time


 Stop Time Status Last Admin


Dose Admin


 


 Albuterol/


 Ipratropium


  (Duoneb)  3 ml  1X  ONCE  3/13/20 09:15


 3/13/20 09:16 DC 3/13/20 09:07


3 ML


 


 Labetalol HCl


  (Normodyne Iv


 Push)  10 mg  1X  ONCE  3/13/20 09:15


 3/13/20 09:12 DC  





 


 Methylprednisolone


 Sodium Succinate


  (SOLU-Medrol


 125MG VIAL)  125 mg  1X  ONCE  3/13/20 09:15


 3/13/20 09:16 DC 3/13/20 09:27


125 MG


 


 Piperacillin Sod/


 Tazobactam Sod


 3.375 gm/Sodium


 Chloride  50 ml @ 


 100 mls/hr  1X  ONCE  3/13/20 09:30


 3/13/20 09:59 DC 3/13/20 10:23


100 MLS/HR


 


 Sodium Chloride  1,000 ml @ 


 100 mls/hr  Q10H  3/13/20 10:51


 3/14/20 10:50  3/13/20 11:53


100 MLS/HR


 


 Vancomycin HCl  250 ml @ 


 250 mls/hr  1X  ONCE  3/13/20 09:30


 3/13/20 10:29 UNV  





 


 Vancomycin HCl 2


 gm/Sodium Chloride  500 ml @ 


 250 mls/hr  1X  ONCE  3/13/20 09:45


 3/13/20 11:44 DC 3/13/20 09:56


250 MLS/HR











Allergies


Allergies





Allergies








Coded Allergies Type Severity Reaction Last Updated Verified


 


  chlorothiazide Allergy Intermediate  3/13/20 Yes


 


  metoprolol Allergy Intermediate  3/13/20 Yes











Physical Exam


Physical Exam





Constitutional: Well developed, well nourished, moderate distress, non-toxic 

appearance. []


HENT: Normocephalic, atraumatic, bilateral external ears normal, oropharynx 

moist, no oral exudates, nose normal. []


Eyes: PERRLA, EOMI, conjunctiva normal, no discharge. [] 


Neck: Normal range of motion, no tenderness, supple, no stridor. [] 


Cardiovascular:Heart rate regular rhythm, no murmur []


Lungs & Thorax: Mild respiratory distress with bibasilar rales.


Abdomen: Bowel sounds normal, soft, no tenderness, no masses, no pulsatile 

masses. [] 


Skin: Warm, dry, no erythema, no rash. [] 


Back: No tenderness, no CVA tenderness. [] 


Extremities: No tenderness, no cyanosis, no clubbing, ROM intact, 2+ lower 

extremity edema. [] 


Neurologic: Alert and oriented X 3, normal motor function, normal sensory 

function, no focal deficits noted. []


Psychologic: Affect normal, judgement normal, mood normal. []





Current Patient Data


Vital Signs





                                   Vital Signs








  Date Time  Temp Pulse Resp B/P (MAP) Pulse Ox O2 Delivery O2 Flow Rate FiO2


 


3/13/20 11:04     95 BiPAP/CPAP  


 


3/13/20 09:59  74  134/66 (88)    


 


3/13/20 08:51 98.4  28     





 98.4       








Lab Values





                                Laboratory Tests








Test


 3/13/20


08:56 3/13/20


09:09 3/13/20


09:10 3/13/20


09:30


 


Glucose (Fingerstick)


 228 mg/dL


(70-99)  H 


 


 





 


O2 Saturation  99 % (92-99)    


 


Arterial Blood pH


 


 7.27


(7.35-7.45)  L 


 





 


Arterial Blood pCO2 at


Patient Temp 


 65 mmHg


(35-46)  *H 


 





 


Arterial Blood pO2 at Patient


Temp 


 420 mmHg


()  H 


 





 


Arterial Blood HCO3


 


 29 mmol/L


(21-28)  H 


 





 


Arterial Blood Base Excess


 


 0 mmol/L


(-3-3) 


 





 


FiO2  100    


 


White Blood Count


 


 


 21.3 x10^3/uL


(4.0-11.0)  H 





 


Red Blood Count


 


 


 6.22 x10^6/uL


(4.30-5.70)  H 





 


Hemoglobin


 


 


 17.4 g/dL


(13.0-17.5) 





 


Hematocrit


 


 


 53.1 %


(39.0-53.0)  H 





 


Mean Corpuscular Volume


 


 


 85 fL ()


 





 


Mean Corpuscular Hemoglobin   28 pg (25-35)   


 


Mean Corpuscular Hemoglobin


Concent 


 


 33 g/dL


(31-37) 





 


Red Cell Distribution Width


 


 


 14.9 %


(11.5-14.5)  H 





 


Platelet Count


 


 


 245 x10^3/uL


(140-400) 





 


Neutrophils (%) (Auto)   71 % (31-73)   


 


Lymphocytes (%) (Auto)   20 % (24-48)  L 


 


Monocytes (%) (Auto)   6 % (0-9)   


 


Eosinophils (%) (Auto)   2 % (0-3)   


 


Basophils (%) (Auto)   1 % (0-3)   


 


Neutrophils # (Auto)


 


 


 15.2 x10^3/uL


(1.8-7.7)  H 





 


Lymphocytes # (Auto)


 


 


 4.3 x10^3/uL


(1.0-4.8) 





 


Monocytes # (Auto)


 


 


 1.3 x10^3/uL


(0.0-1.1)  H 





 


Eosinophils # (Auto)


 


 


 0.3 x10^3/uL


(0.0-0.7) 





 


Basophils # (Auto)


 


 


 0.2 x10^3/uL


(0.0-0.2) 





 


Platelet Estimate   Pending   


 


Prothrombin Time


 


 


 13.3 SEC


(11.7-14.0) 





 


Prothrombin Time INR   1.1 (0.8-1.1)   


 


D-Dimer (Nory)


 


 


 0.48 ug/mlFEU


(0.00-0.50) 





 


Sodium Level


 


 


 143 mmol/L


(136-145) 





 


Potassium Level


 


 


 3.7 mmol/L


(3.5-5.1) 





 


Chloride Level


 


 


 103 mmol/L


() 





 


Carbon Dioxide Level


 


 


 30 mmol/L


(21-32) 





 


Anion Gap   10 (6-14)   


 


Blood Urea Nitrogen


 


 


 16 mg/dL


(8-26) 





 


Creatinine


 


 


 1.0 mg/dL


(0.7-1.3) 





 


Estimated GFR


(Cockcroft-Gault) 


 


 73.2  


 





 


BUN/Creatinine Ratio   16 (6-20)   


 


Glucose Level


 


 


 217 mg/dL


(70-99)  H 





 


Lactic Acid Level


 


 


 1.8 mmol/L


(0.4-2.0) 





 


Calcium Level


 


 


 9.1 mg/dL


(8.5-10.1) 





 


Total Bilirubin


 


 


 0.7 mg/dL


(0.2-1.0) 





 


Aspartate Amino Transferase


(AST) 


 


 25 U/L (15-37)


 





 


Alanine Aminotransferase (ALT)


 


 


 53 U/L (16-63)


 





 


Alkaline Phosphatase


 


 


 101 U/L


() 





 


Creatine Kinase


 


 


 92 U/L


() 





 


Troponin I Quantitative


 


 


 < 0.017 ng/mL


(0.000-0.055) 





 


NT-Pro-B-Type Natriuretic


Peptide 


 


 2328 pg/mL


(0-124)  H 





 


Total Protein


 


 


 7.6 g/dL


(6.4-8.2) 





 


Albumin


 


 


 3.5 g/dL


(3.4-5.0) 





 


Albumin/Globulin Ratio


 


 


 0.9 (1.0-1.7)


L 





 


Influenza Type A Antigen


 


 


 


 Negative


(NEGATIVE)


 


Influenza Type B Antigen


 


 


 


 Negative


(NEGATIVE)





                                Laboratory Tests


3/13/20 09:10








                                Laboratory Tests


3/13/20 09:10














EKG


EKG


EKG interpreted by me.  EKG at 0902 showed normal sinus rhythm at rate of 91, 

PVCs, leftward axis, nonspecific intraventricular block, no acute ST and T wave 

evaluation, Q waves in inferior leads.





Radiology/Procedures


Radiology/Procedures


                            [facility]


                    [address] [place] [address] [phone]


                                        


                                 IMAGING REPORT





                                     Signed





PATIENT: RYAN PADILLA   ACCOUNT: [id number]     MRN#: [id number]


: 1945           LOCATION: ER              AGE: 75


SEX: M                    EXAM DT: 20         ACCESSION#: [id number]


STATUS: REG ER            ORD. PHYSICIAN: HARRISON JULIO MD


REASON: Shortness of breath


PROCEDURE: PORTABLE CHEST 1V





PORTABLE CHEST 1V


 


History: Shortness of breath


 


Comparison: None.


 


Findings:


Single view of the chest is submitted. 


Pericardial cardiac silhouette is probably borderline enlarged, not fully 


evaluated inferiorly. There is perihilar airspace opacity bilaterally, 


also bibasilar airspace opacity. There is no dependent pleural fluid or 


pneumothorax. There is atherosclerotic calcification near aortic arch.


 


Impression: 


 


1.  There is perihilar and bibasilar airspace opacity which may be due to 


edema although infiltrates not excluded.


 


Electronically signed by: Darrick Watson MD (3/13/2020 9:24 AM) [id number]














DICTATED and SIGNED BY:     DARRICK WATSON MD


DATE:     20





Course & Med Decision Making


Course & Med Decision Making


Pertinent Labs and Imaging studies reviewed. (See chart for details)





Evaluation of patient in ER showed 75-year-old male patient without history of 

cardiac or pulmonary problem brought in by EMS because of hypoxia.  Patient had 

O2 sat of 62% that improved with BiPAP to 100% without problem.  Patient had 

lower extremity edema without history of CHF.  Patient was afebrile with 

elevation of blood pressure and heart rate that improved with oxygenation.  

Chest x-ray showed pulmonary edema versus infiltration.  Patient did not have 

hypotension or elevation of lactic acid and IV fluid was not given nor Lasix was

 given for CHF.  Patient treated with antibiotic.  Patient requested transfer to

 [facility] and Laila Bryan transfer nurse at [facility] was contacted at 

[phone] and  stated that patient has to pay for ambulance by himself but she put 

the patient on admission asked and patient decided to stay at this hospital.  

There was a concern for test for coronavirus for this patient and [facility] was 

contacted and waiting for respond and deciding about testing for coronavirus or 

not.





Charito from [facility] responded at [phone] and stated patient does not have criteria 

for testing for coronavirus.





Dragon Disclaimer


Dragon Disclaimer


This electronic medical record was generated, in whole or in part, using a voice

 recognition dictation system.





Departure


Departure


Impression:  


   Primary Impression:  


   Acute respiratory distress


   Additional Impressions:  


   Hypoxia


   CAP (community acquired pneumonia)


   CHF (congestive heart failure)


   SIRS (systemic inflammatory response syndrome)


Disposition:   ADMITTED AS INPATIENT (Dr. Crockett accepted admission at 1042)


Admitting Physician:  TERRANCE (Dr. crockett admitted)


Condition:  GUARDED





Critical Care Time


 Critical care time was 50 minutes exclusive of procedures.





Problem Qualifiers








   Additional Impressions:  


   CAP (community acquired pneumonia)


   Laterality:  unspecified laterality  Qualified Codes:  J18.9 - Pneumonia, 

   unspecified organism


   CHF (congestive heart failure)


   Heart failure type:  unspecified  Heart failure chronicity:  unspecified  

   Qualified Codes:  I50.9 - Heart failure, unspecified








HARRISON JULIO MD             Mar 13, 2020 10:34

## 2020-03-13 NOTE — RAD
PORTABLE CHEST 1V

 

History: Shortness of breath

 

Comparison: None.

 

Findings:

Single view of the chest is submitted. 

Pericardial cardiac silhouette is probably borderline enlarged, not fully 

evaluated inferiorly. There is perihilar airspace opacity bilaterally, 

also bibasilar airspace opacity. There is no dependent pleural fluid or 

pneumothorax. There is atherosclerotic calcification near aortic arch.

 

Impression: 

 

1.  There is perihilar and bibasilar airspace opacity which may be due to 

edema although infiltrates not excluded.

 

Electronically signed by: Oscar Watson MD (3/13/2020 9:24 AM) [id number]

## 2020-03-14 NOTE — EKG
[facility]

              [address] [place] [address]

Test Date:    2020               Test Time:    09:02:00

Pat Name:     RYAN PADILLA           Department:   

Patient ID:   [id number]           Room:          

Gender:       M                        Technician:   

:          [id number]               Requested By: HARRISON JULIO

Order Number: [id number]           Reading MD:     

                                 Measurements

Intervals                              Axis          

Rate:         91                       P:            116

MD:           190                      QRS:          -23

QRSD:         168                      T:            142

QT:           414                                    

QTc:          511                                    

                           Interpretive Statements

SINUS RHYTHM

VENTRICULAR PREMATURE COMPLEX(ES)

LEFTWARD AXIS

NON SPECIFIC INTRAVENTRICULAR BLOCK

QRS(T) CONTOUR ABNORMALITY

CONSISTENT WITH INFERIOR INFARCT

POSSIBLY RECENT

ABNORMAL ECG

RI6.01

No previous ECG available for comparison

## 2020-03-14 NOTE — PN
DATE:  03/14/2020



CHIEF COMPLAINT:  Respiratory failure, CHF, community-acquired pneumonia.



HISTORY OF PRESENT ILLNESS:  The patient is a pleasant 75-year-old male who has

been here for the past 2 days.  He is currently being examined on the telemetry

floor where he has just moved out from the ICU.  I discussed the case with the

nurse and my partner, Dr. Crockett and the patient's wife.



OBJECTIVE:

VITAL SIGNS:  Stable.

GENERAL:  He is up in the chair.  States he feels a little better.

HEART:  Distant S1, S2.

LUNGS:  Slight bibasilar crackles.

ABDOMEN:  Soft.  Decreased bowel sounds.

EXTREMITIES:  1+ edema.

PSYCHIATRIC:  He is little depressed.



ASSESSMENT AND PLAN:  Resolving acute on chronic systolic and diastolic heart

failure and pneumonia.  We have him on heparin drip for now until Cardiology

clears us to stop that.  Serial enzymes, serial EKGs, cardiac monitoring, home

meds, deep venous thrombosis prophylaxis.  Full code.  IV antibiotics, DuoNebs,

oxygen.



PROGNOSIS:  Guarded.

 



______________________________

MARY LARA DO



DR:  SEMAJ/marnie  JOB#:  [id number] / [id number]

DD:  03/14/2020 12:20  DT:  03/14/2020 13:24

## 2020-03-14 NOTE — PDOC
PULMONARY PROGRESS NOTES


Subjective


on 02, didnt use bipap, sob better, no cough, wants to go home, didnt sleep 

during his psg


Vitals





Vital Signs








  Date Time  Temp Pulse Resp B/P (MAP) Pulse Ox O2 Delivery O2 Flow Rate FiO2


 


3/14/20 11:00 97.5 62 18 174/86 (115) 98 Nasal Cannula 4.0 





 97.5       








ROS:  No Nausea, No Chest Pain


General:  Alert, Oriented X4


HEENT:  Other (nc at perrl )


Lungs:  Crackles


Cardiovascular:  S1, S2


Abdomen:  Soft, Non-tender


Neuro Exam:  Alert


Extremities:  No Edema


Skin:  Warm


Labs





Laboratory Tests








Test


 3/13/20


08:56 3/13/20


09:09 3/13/20


09:10 3/13/20


09:30


 


Glucose (Fingerstick)


 228 mg/dL


(70-99) 


 


 





 


O2 Saturation  99 % (92-99)   


 


Arterial Blood pH


 


 7.27


(7.35-7.45) 


 





 


Arterial Blood pCO2 at


Patient Temp 


 65 mmHg


(35-46) 


 





 


Arterial Blood pO2 at Patient


Temp 


 420 mmHg


() 


 





 


Arterial Blood HCO3


 


 29 mmol/L


(21-28) 


 





 


Arterial Blood Base Excess


 


 0 mmol/L


(-3-3) 


 





 


FiO2  100   


 


White Blood Count


 


 


 21.3 x10^3/uL


(4.0-11.0) 





 


Red Blood Count


 


 


 6.22 x10^6/uL


(4.30-5.70) 





 


Hemoglobin


 


 


 17.4 g/dL


(13.0-17.5) 





 


Hematocrit


 


 


 53.1 %


(39.0-53.0) 





 


Mean Corpuscular Volume   85 fL ()  


 


Mean Corpuscular Hemoglobin   28 pg (25-35)  


 


Mean Corpuscular Hemoglobin


Concent 


 


 33 g/dL


(31-37) 





 


Red Cell Distribution Width


 


 


 14.9 %


(11.5-14.5) 





 


Platelet Count


 


 


 245 x10^3/uL


(140-400) 





 


Neutrophils (%) (Auto)   71 % (31-73)  


 


Lymphocytes (%) (Auto)   20 % (24-48)  


 


Monocytes (%) (Auto)   6 % (0-9)  


 


Eosinophils (%) (Auto)   2 % (0-3)  


 


Basophils (%) (Auto)   1 % (0-3)  


 


Neutrophils # (Auto)


 


 


 15.2 x10^3/uL


(1.8-7.7) 





 


Lymphocytes # (Auto)


 


 


 4.3 x10^3/uL


(1.0-4.8) 





 


Monocytes # (Auto)


 


 


 1.3 x10^3/uL


(0.0-1.1) 





 


Eosinophils # (Auto)


 


 


 0.3 x10^3/uL


(0.0-0.7) 





 


Basophils # (Auto)


 


 


 0.2 x10^3/uL


(0.0-0.2) 





 


Segmented Neutrophils %   62 % (35-66)  


 


Band Neutrophils %   12 % (0-9)  


 


Lymphocytes %   9 % (24-48)  


 


Atypical Lymphocytes %


(Manual) 


 


 6 % (0-0) 


 





 


Monocytes %   8 % (0-10)  


 


Eosinophils %   2 % (0-5)  


 


Myelocytes %   1 % (0-0)  


 


Platelet Estimate


 


 


 Adequate


(ADEQUATE) 





 


Prothrombin Time


 


 


 13.3 SEC


(11.7-14.0) 





 


Prothromb Time International


Ratio 


 


 1.1 (0.8-1.1) 


 





 


D-Dimer (Nory)


 


 


 0.48 ug/mlFEU


(0.00-0.50) 





 


Sodium Level


 


 


 143 mmol/L


(136-145) 





 


Potassium Level


 


 


 3.7 mmol/L


(3.5-5.1) 





 


Chloride Level


 


 


 103 mmol/L


() 





 


Carbon Dioxide Level


 


 


 30 mmol/L


(21-32) 





 


Anion Gap   10 (6-14)  


 


Blood Urea Nitrogen


 


 


 16 mg/dL


(8-26) 





 


Creatinine


 


 


 1.0 mg/dL


(0.7-1.3) 





 


Estimated GFR


(Cockcroft-Gault) 


 


 73.2 


 





 


BUN/Creatinine Ratio   16 (6-20)  


 


Glucose Level


 


 


 217 mg/dL


(70-99) 





 


Lactic Acid Level


 


 


 1.8 mmol/L


(0.4-2.0) 





 


Calcium Level


 


 


 9.1 mg/dL


(8.5-10.1) 





 


Total Bilirubin


 


 


 0.7 mg/dL


(0.2-1.0) 





 


Aspartate Amino Transf


(AST/SGOT) 


 


 25 U/L (15-37) 


 





 


Alanine Aminotransferase


(ALT/SGPT) 


 


 53 U/L (16-63) 


 





 


Alkaline Phosphatase


 


 


 101 U/L


() 





 


Creatine Kinase


 


 


 92 U/L


() 





 


Troponin I Quantitative


 


 


 < 0.017 ng/mL


(0.000-0.055) 





 


NT-Pro-B-Type Natriuretic


Peptide 


 


 2328 pg/mL


(0-124) 





 


Total Protein


 


 


 7.6 g/dL


(6.4-8.2) 





 


Albumin


 


 


 3.5 g/dL


(3.4-5.0) 





 


Albumin/Globulin Ratio   0.9 (1.0-1.7)  


 


Triglycerides Level


 


 


 110 mg/dL


(0-150) 





 


Cholesterol Level


 


 


 181 mg/dL


(0-200) 





 


LDL Cholesterol, Calculated


 


 


 125 mg/dL


(0-100) 





 


VLDL Cholesterol, Calculated


 


 


 22 mg/dL


(0-40) 





 


Non-HDL Cholesterol Calculated


 


 


 147 mg/dL


(0-129) 





 


HDL Cholesterol


 


 


 34 mg/dL


(40-60) 





 


Cholesterol/HDL Ratio   5.3  


 


Thyroid Stimulating Hormone


(TSH) 


 


 1.599 uIU/mL


(0.358-3.74) 





 


Influenza Type A Antigen


 


 


 


 Negative


(NEGATIVE)


 


Influenza Type B Antigen


 


 


 


 Negative


(NEGATIVE)


 


Test


 3/13/20


14:22 3/13/20


14:30 3/13/20


16:55 3/13/20


17:05


 


Magnesium Level


 1.8 mg/dL


(1.8-2.4) 


 


 





 


Troponin I Quantitative


 0.564 ng/mL


(0.000-0.055) 


 0.729 ng/mL


(0.000-0.055) 





 


Urine Collection Type  Unknown   


 


Urine Color  Yellow   


 


Urine Clarity  Clear   


 


Urine pH  5.5 (<5.0-8.0)   


 


Urine Specific Gravity


 


 1.025


(1.000-1.030) 


 





 


Urine Protein


 


 >=300 mg/dL


(NEG-TRACE) 


 





 


Urine Glucose (UA)


 


 Negative mg/dL


(NEG) 


 





 


Urine Ketones (Stick)


 


 Negative mg/dL


(NEG) 


 





 


Urine Blood  Trace (NEG)   


 


Urine Nitrite  Negative (NEG)   


 


Urine Bilirubin  Negative (NEG)   


 


Urine Urobilinogen Dipstick


 


 0.2 mg/dL (0.2


mg/dL) 


 





 


Urine Leukocyte Esterase  Large (NEG)   


 


Urine RBC  1-2 /HPF (0-2)   


 


Urine WBC  >40 /HPF (0-4)   


 


Urine Bacteria


 


 Moderate /HPF


(0-FEW) 


 





 


Urine Hyaline Casts  Moderate /HPF   


 


Urine Mucus  Mod /LPF   


 


O2 Saturation    96 % (92-99) 


 


Arterial Blood pH


 


 


 


 7.41


(7.35-7.45)


 


Arterial Blood pCO2 at


Patient Temp 


 


 


 44 mmHg


(35-46)


 


Arterial Blood pO2 at Patient


Temp 


 


 


 79 mmHg


()


 


Arterial Blood HCO3


 


 


 


 27 mmol/L


(21-28)


 


Arterial Blood Base Excess


 


 


 


 2 mmol/L


(-3-3)


 


FiO2    40 


 


Test


 3/13/20


17:11 3/13/20


21:27 3/13/20


23:00 3/14/20


08:11


 


Glucose (Fingerstick)


 211 mg/dL


(70-99) 268 mg/dL


(70-99) 


 188 mg/dL


(70-99)


 


Heparin Anti-Xa Act,


Unfractionated 


 


 0.14 IU/mL


(0.30-0.70) 





 


Potassium Level


 


 


 3.7 mmol/L


(3.5-5.1) 





 


Troponin I Quantitative


 


 


 0.668 ng/mL


(0.000-0.055) 





 


Test


 3/14/20


11:56 


 


 





 


Glucose (Fingerstick)


 212 mg/dL


(70-99) 


 


 











Laboratory Tests








Test


 3/13/20


14:22 3/13/20


14:30 3/13/20


16:55 3/13/20


17:05


 


Magnesium Level


 1.8 mg/dL


(1.8-2.4) 


 


 





 


Troponin I Quantitative


 0.564 ng/mL


(0.000-0.055) 


 0.729 ng/mL


(0.000-0.055) 





 


Urine Collection Type  Unknown   


 


Urine Color  Yellow   


 


Urine Clarity  Clear   


 


Urine pH  5.5 (<5.0-8.0)   


 


Urine Specific Gravity


 


 1.025


(1.000-1.030) 


 





 


Urine Protein


 


 >=300 mg/dL


(NEG-TRACE) 


 





 


Urine Glucose (UA)


 


 Negative mg/dL


(NEG) 


 





 


Urine Ketones (Stick)


 


 Negative mg/dL


(NEG) 


 





 


Urine Blood  Trace (NEG)   


 


Urine Nitrite  Negative (NEG)   


 


Urine Bilirubin  Negative (NEG)   


 


Urine Urobilinogen Dipstick


 


 0.2 mg/dL (0.2


mg/dL) 


 





 


Urine Leukocyte Esterase  Large (NEG)   


 


Urine RBC  1-2 /HPF (0-2)   


 


Urine WBC  >40 /HPF (0-4)   


 


Urine Bacteria


 


 Moderate /HPF


(0-FEW) 


 





 


Urine Hyaline Casts  Moderate /HPF   


 


Urine Mucus  Mod /LPF   


 


O2 Saturation    96 % (92-99) 


 


Arterial Blood pH


 


 


 


 7.41


(7.35-7.45)


 


Arterial Blood pCO2 at


Patient Temp 


 


 


 44 mmHg


(35-46)


 


Arterial Blood pO2 at Patient


Temp 


 


 


 79 mmHg


()


 


Arterial Blood HCO3


 


 


 


 27 mmol/L


(21-28)


 


Arterial Blood Base Excess


 


 


 


 2 mmol/L


(-3-3)


 


FiO2    40 


 


Test


 3/13/20


17:11 3/13/20


21:27 3/13/20


23:00 3/14/20


08:11


 


Glucose (Fingerstick)


 211 mg/dL


(70-99) 268 mg/dL


(70-99) 


 188 mg/dL


(70-99)


 


Heparin Anti-Xa Act,


Unfractionated 


 


 0.14 IU/mL


(0.30-0.70) 





 


Potassium Level


 


 


 3.7 mmol/L


(3.5-5.1) 





 


Troponin I Quantitative


 


 


 0.668 ng/mL


(0.000-0.055) 





 


Test


 3/14/20


11:56 


 


 





 


Glucose (Fingerstick)


 212 mg/dL


(70-99) 


 


 











Medications





Active Scripts








 Medications  Dose


 Route/Sig


 Max Daily Dose Days Date Category


 


 Saw [place] (Saw


 Palmetto Fruit)


 450 Mg Capsule  450 Mg


 PO DAILY


   3/13/20 Reported


 


 D3-50


  (Cholecalciferol


  (Vitamin D3))


 50,000 Unit


 Capsule  1,000 Unit


 PO DAILY


   3/13/20 Reported


 


 Emergen-C 500 mg


 Chewable Tab (Vit


 C/Ascorb


 Sod/Multivit-Min)


 500 Mg Tab.chew  500 Mg


 PO DAILY


   3/13/20 Reported


 


 Zoloft


  (Sertraline Hcl)


 50 Mg Tablet  50 Mg


 PO DAILY


   3/13/20 Reported


 


 Trazodone Hcl 50


 Mg Tablet  1 Tab


 PO QHS


   3/13/20 Reported


 


 Trulicity


  (Dulaglutide)


 0.75 Mg/0.5 Ml


 Pen.injctr  0.75 Mg


 SQ WEEKLY


   3/13/20 Reported


 


 Insulin Aspart


 100 Unit/1 Ml Vial  25 Unit


 SQ TIDWMEALS


   3/13/20 Reported


 


 Levemir (Insulin


 Detemir) 100


 Unit/1 Ml Vial  80 Unit


 SQ DAILY


   3/13/20 Reported


 


 Lasix


  (Furosemide) 20


 Mg Tablet  20 Mg


 PO BID


   3/13/20 Reported


 


 Flomax


  (Tamsulosin Hcl)


 0.4 Mg Cap.er.24h  0.4 Mg


 PO HS


   3/13/20 Reported


 


 Omeprazole 20 Mg


 Capsule.dr  20 Mg


 PO BID


   3/13/20 Reported


 


 Lisinopril 40 Mg


 Tablet  1 Tab


 PO DAILY


   3/13/20 Reported


 


 Atenolol 100 Mg


 Tablet  100 Mg


 PO BID


   3/13/20 Reported











Impression


.


IMPRESSION:


1.  Acute hypercapnic and hypoxic respiratory failure secondary to likely


interstitial edema.


2.  Abnormal chest x-ray with bilateral interstitial infiltrates suggesting


interstitial edema.  Clinically, less likely pneumonia.


3.  Leukocytosis, could be reactive.  No fever.  Reasonable to cover for


atypical coverage and follow ID recommendation.


4.  No significant tobacco history.


5.  Suspected obesity hypoventilation syndrome and obstructive sleep apnea,


never been tested.





Plan


.





RECOMMENDATIONS:


1.  bipap prn during day, cont at night  the importance of use discussed


2.  Avoid hyperoxia.


3.  Diuresis. monitor k, cr


4.  Empiric antibiotic per Infectious Disease.


5.  Obtain echocardiogram.


6.  sleep study as an outpatient. meka the importance of diagnosis and tx 

discussed 


7.  Discussed with RN and RT and pt and his wife











JOEL KYLE MD               Mar 14, 2020 12:45

## 2020-03-15 NOTE — PDOC
PULMONARY PROGRESS NOTES


Subjective


on 02, didnt use bipap, sob better, no cough, no cp, is on hep gtt  [facility] on 

monday, didnt sleep during his psg


Vitals





Vital Signs








  Date Time  Temp Pulse Resp B/P (MAP) Pulse Ox O2 Delivery O2 Flow Rate FiO2


 


3/15/20 08:47  59  207/91    


 


3/15/20 07:00 97.3  20  95 Nasal Cannula 4.0 





 97.3       








ROS:  No Nausea, No Chest Pain


General:  Alert, Oriented X4


HEENT:  Other (nc at perrl )


Lungs:  Crackles


Cardiovascular:  S1, S2


Abdomen:  Soft, Non-tender


Neuro Exam:  Alert


Extremities:  No Edema


Skin:  Warm


Labs





Laboratory Tests








Test


 3/13/20


14:22 3/13/20


14:30 3/13/20


16:55 3/13/20


17:05


 


Magnesium Level


 1.8 mg/dL


(1.8-2.4) 


 


 





 


Troponin I Quantitative


 0.564 ng/mL


(0.000-0.055) 


 0.729 ng/mL


(0.000-0.055) 





 


Urine Collection Type  Unknown   


 


Urine Color  Yellow   


 


Urine Clarity  Clear   


 


Urine pH  5.5 (<5.0-8.0)   


 


Urine Specific Gravity


 


 1.025


(1.000-1.030) 


 





 


Urine Protein


 


 >=300 mg/dL


(NEG-TRACE) 


 





 


Urine Glucose (UA)


 


 Negative mg/dL


(NEG) 


 





 


Urine Ketones (Stick)


 


 Negative mg/dL


(NEG) 


 





 


Urine Blood  Trace (NEG)   


 


Urine Nitrite  Negative (NEG)   


 


Urine Bilirubin  Negative (NEG)   


 


Urine Urobilinogen Dipstick


 


 0.2 mg/dL (0.2


mg/dL) 


 





 


Urine Leukocyte Esterase  Large (NEG)   


 


Urine RBC  1-2 /HPF (0-2)   


 


Urine WBC  >40 /HPF (0-4)   


 


Urine Bacteria


 


 Moderate /HPF


(0-FEW) 


 





 


Urine Hyaline Casts  Moderate /HPF   


 


Urine Mucus  Mod /LPF   


 


O2 Saturation    96 % (92-99) 


 


Arterial Blood pH


 


 


 


 7.41


(7.35-7.45)


 


Arterial Blood pCO2 at


Patient Temp 


 


 


 44 mmHg


(35-46)


 


Arterial Blood pO2 at Patient


Temp 


 


 


 79 mmHg


()


 


Arterial Blood HCO3


 


 


 


 27 mmol/L


(21-28)


 


Arterial Blood Base Excess


 


 


 


 2 mmol/L


(-3-3)


 


FiO2    40 


 


Test


 3/13/20


17:11 3/13/20


21:27 3/13/20


23:00 3/14/20


05:00


 


Glucose (Fingerstick)


 211 mg/dL


(70-99) 268 mg/dL


(70-99) 


 





 


Heparin Anti-Xa Act,


Unfractionated 


 


 0.14 IU/mL


(0.30-0.70) 





 


Potassium Level


 


 


 3.7 mmol/L


(3.5-5.1) 





 


Troponin I Quantitative


 


 


 0.668 ng/mL


(0.000-0.055) 





 


Hemoglobin A1c


 


 


 


 7.4 %


(4.8-5.6)


 


Test


 3/14/20


07:10 3/14/20


08:11 3/14/20


09:00 3/14/20


11:56


 


White Blood Count


 15.9 x10^3/uL


(4.0-11.0) 


 


 





 


Red Blood Count


 5.21 x10^6/uL


(4.30-5.70) 


 


 





 


Hemoglobin


 14.7 g/dL


(13.0-17.5) 


 


 





 


Hematocrit


 44.7 %


(39.0-53.0) 


 


 





 


Mean Corpuscular Volume 86 fL ()    


 


Mean Corpuscular Hemoglobin 28 pg (25-35)    


 


Mean Corpuscular Hemoglobin


Concent 33 g/dL


(31-37) 


 


 





 


Red Cell Distribution Width


 14.7 %


(11.5-14.5) 


 


 





 


Platelet Count


 194 x10^3/uL


(140-400) 


 


 





 


Neutrophils (%) (Auto) 77 % (31-73)    


 


Lymphocytes (%) (Auto) 14 % (24-48)    


 


Monocytes (%) (Auto) 9 % (0-9)    


 


Eosinophils (%) (Auto) 0 % (0-3)    


 


Basophils (%) (Auto) 0 % (0-3)    


 


Neutrophils # (Auto)


 12.2 x10^3/uL


(1.8-7.7) 


 


 





 


Lymphocytes # (Auto)


 2.2 x10^3/uL


(1.0-4.8) 


 


 





 


Monocytes # (Auto)


 1.4 x10^3/uL


(0.0-1.1) 


 


 





 


Eosinophils # (Auto)


 0.0 x10^3/uL


(0.0-0.7) 


 


 





 


Basophils # (Auto)


 0.0 x10^3/uL


(0.0-0.2) 


 


 





 


Heparin Anti-Xa Act,


Unfractionated 0.54 IU/mL


(0.30-0.70) 


 


 





 


Glucose (Fingerstick)


 


 188 mg/dL


(70-99) 


 212 mg/dL


(70-99)


 


Sodium Level


 


 


 142 mmol/L


(136-145) 





 


Potassium Level


 


 


 3.9 mmol/L


(3.5-5.1) 





 


Chloride Level


 


 


 105 mmol/L


() 





 


Carbon Dioxide Level


 


 


 31 mmol/L


(21-32) 





 


Anion Gap   6 (6-14)  


 


Blood Urea Nitrogen


 


 


 28 mg/dL


(8-26) 





 


Creatinine


 


 


 1.0 mg/dL


(0.7-1.3) 





 


Estimated GFR


(Cockcroft-Gault) 


 


 73.2 


 





 


BUN/Creatinine Ratio   28 (6-20)  


 


Glucose Level


 


 


 189 mg/dL


(70-99) 





 


Calcium Level


 


 


 8.7 mg/dL


(8.5-10.1) 





 


Total Bilirubin


 


 


 0.6 mg/dL


(0.2-1.0) 





 


Aspartate Amino Transf


(AST/SGOT) 


 


 20 U/L (15-37) 


 





 


Alanine Aminotransferase


(ALT/SGPT) 


 


 34 U/L (16-63) 


 





 


Alkaline Phosphatase


 


 


 72 U/L


() 





 


Total Protein


 


 


 6.4 g/dL


(6.4-8.2) 





 


Albumin


 


 


 3.0 g/dL


(3.4-5.0) 





 


Albumin/Globulin Ratio   0.9 (1.0-1.7)  


 


Test


 3/14/20


13:10 3/14/20


16:50 3/14/20


20:15 3/15/20


04:20


 


Heparin Anti-Xa Act,


Unfractionated 0.43 IU/mL


(0.30-0.70) 


 


 0.54 IU/mL


(0.30-0.70)


 


Glucose (Fingerstick)


 


 180 mg/dL


(70-99) 171 mg/dL


(70-99) 





 


Test


 3/15/20


07:53 


 


 





 


Glucose (Fingerstick)


 168 mg/dL


(70-99) 


 


 











Laboratory Tests








Test


 3/14/20


11:56 3/14/20


13:10 3/14/20


16:50 3/14/20


20:15


 


Glucose (Fingerstick)


 212 mg/dL


(70-99) 


 180 mg/dL


(70-99) 171 mg/dL


(70-99)


 


Heparin Anti-Xa Act,


Unfractionated 


 0.43 IU/mL


(0.30-0.70) 


 





 


Test


 3/15/20


04:20 3/15/20


07:53 


 





 


Heparin Anti-Xa Act,


Unfractionated 0.54 IU/mL


(0.30-0.70) 


 


 





 


Glucose (Fingerstick)


 


 168 mg/dL


(70-99) 


 











Medications





Active Scripts








 Medications  Dose


 Route/Sig


 Max Daily Dose Days Date Category


 


 Saw [place] (Saw


 Palmetto Fruit)


 450 Mg Capsule  450 Mg


 PO DAILY


   3/13/20 Reported


 


 D3-50


  (Cholecalciferol


  (Vitamin D3))


 50,000 Unit


 Capsule  1,000 Unit


 PO DAILY


   3/13/20 Reported


 


 Emergen-C 500 mg


 Chewable Tab (Vit


 C/Ascorb


 Sod/Multivit-Min)


 500 Mg Tab.chew  500 Mg


 PO DAILY


   3/13/20 Reported


 


 Zoloft


  (Sertraline Hcl)


 50 Mg Tablet  50 Mg


 PO DAILY


   3/13/20 Reported


 


 Trazodone Hcl 50


 Mg Tablet  1 Tab


 PO QHS


   3/13/20 Reported


 


 Trulicity


  (Dulaglutide)


 0.75 Mg/0.5 Ml


 Pen.injctr  0.75 Mg


 SQ WEEKLY


   3/13/20 Reported


 


 Insulin Aspart


 100 Unit/1 Ml Vial  25 Unit


 SQ TIDWMEALS


   3/13/20 Reported


 


 Levemir (Insulin


 Detemir) 100


 Unit/1 Ml Vial  80 Unit


 SQ DAILY


   3/13/20 Reported


 


 Lasix


  (Furosemide) 20


 Mg Tablet  20 Mg


 PO BID


   3/13/20 Reported


 


 Flomax


  (Tamsulosin Hcl)


 0.4 Mg Cap.er.24h  0.4 Mg


 PO HS


   3/13/20 Reported


 


 Omeprazole 20 Mg


 Capsule.dr  20 Mg


 PO BID


   3/13/20 Reported


 


 Lisinopril 40 Mg


 Tablet  1 Tab


 PO DAILY


   3/13/20 Reported


 


 Atenolol 100 Mg


 Tablet  100 Mg


 PO BID


   3/13/20 Reported








Comments


echo





The left ventricle is normal size.


The left ventricular systolic function is normal.


The Ejection Fraction is 50-55%.


There is mild concentric left ventricular hypertrophy.


Doppler and Color Flow revealed trace aortic regurgitation.


There is no significant aortic valvular stenosis.


Doppler and Color-flow revealed trace mitral regurgitation.


Doppler and Color Flow revealed trace tricuspid regurgitation with an estimated 

PAP of 28 mmHg.





Impression


.


IMPRESSION:


1.  Acute hypercapnic and hypoxic respiratory failure secondary to likely


interstitial edema.


2.  Abnormal chest x-ray with bilateral interstitial infiltrates suggesting


interstitial edema.  Clinically, less likely pneumonia.


3.  Leukocytosis, could be reactive.  No fever.  Reasonable to cover for


atypical coverage and follow ID recommendation.


4.  No significant tobacco history.


5.  Suspected obesity hypoventilation syndrome and obstructive sleep apnea,


never been tested.





Plan


.





RECOMMENDATIONS:


1.  bipap prn during day, cont at night  the importance of use discussed


2.  Avoid hyperoxia.


3.  Diuresis. monitor k, cr. cxr in am


4.  Empiric antibiotic per Infectious Disease.


5.  echocardiogram reviewed.


6.  sleep study as an outpatient. meka the importance of diagnosis and tx 

discussed 


7.   on hep gtt  LHC on monday,


Discussed with RN and RT and pt and his wife











JOEL KYLE MD               Mar 15, 2020 10:51

## 2020-03-15 NOTE — PDOC
PROGRESS NOTES


Subjective


Subjective


Patient seen and examined





Objective


Objective





Vital Signs








  Date Time  Temp Pulse Resp B/P (MAP) Pulse Ox O2 Delivery O2 Flow Rate FiO2


 


3/15/20 08:47  59  207/91    


 


3/15/20 07:00 97.3  20  95 Nasal Cannula 4.0 





 97.3       














Intake and Output 


 


 3/15/20





 07:00


 


Intake Total 1400 ml


 


Output Total 3050 ml


 


Balance -1650 ml


 


 


 


Intake Oral 1300 ml


 


IV Total 100 ml


 


Output Urine Total 3050 ml











Physical Exam


Abdomen:  Normal bowel sounds


Heart:  Regular rate


General:  No acute distress


Lungs:  Other (mildly decreased breath sounds)





Assessment


Assessment


Problems


Medical Problems:


(1) Acute respiratory distress


Status: Acute  





(2) CAP (community acquired pneumonia)


Status: Acute  





(3) CHF (congestive heart failure)


Status: Acute  





(4) Hypoxia


Status: Acute  





(5) SIRS (systemic inflammatory response syndrome)


Status: Acute  





1. Acute CHF with possible combined systolic/diastolic dysfunction. 

Significantly improved. Continuing present treatment.


2. NSTEMI: no CP but suspecting ischemia as culprit. EKG LBBB no prior for 

comparison. Tentatively plan for heart catheterization tomorrow. Discussed with 

the patient.


2. Acute respiratory failure with CHF and possibly ongoing RAVINDER


3. DM2: insulin dependent


4. Accelerated HTN: better


5. Morbid obesity


6. Leukocytosis with UTI: on antibiotics per PCP





Comment


Review of Relevant


I have reviewed the following items nicole (where applicable) has been applied.


Labs





Laboratory Tests








Test


 3/13/20


14:22 3/13/20


14:30 3/13/20


16:55 3/13/20


17:05


 


Magnesium Level


 1.8 mg/dL


(1.8-2.4) 


 


 





 


Troponin I Quantitative


 0.564 ng/mL


(0.000-0.055) 


 0.729 ng/mL


(0.000-0.055) 





 


Urine Collection Type  Unknown   


 


Urine Color  Yellow   


 


Urine Clarity  Clear   


 


Urine pH  5.5 (<5.0-8.0)   


 


Urine Specific Gravity


 


 1.025


(1.000-1.030) 


 





 


Urine Protein


 


 >=300 mg/dL


(NEG-TRACE) 


 





 


Urine Glucose (UA)


 


 Negative mg/dL


(NEG) 


 





 


Urine Ketones (Stick)


 


 Negative mg/dL


(NEG) 


 





 


Urine Blood  Trace (NEG)   


 


Urine Nitrite  Negative (NEG)   


 


Urine Bilirubin  Negative (NEG)   


 


Urine Urobilinogen Dipstick


 


 0.2 mg/dL (0.2


mg/dL) 


 





 


Urine Leukocyte Esterase  Large (NEG)   


 


Urine RBC  1-2 /HPF (0-2)   


 


Urine WBC  >40 /HPF (0-4)   


 


Urine Bacteria


 


 Moderate /HPF


(0-FEW) 


 





 


Urine Hyaline Casts  Moderate /HPF   


 


Urine Mucus  Mod /LPF   


 


O2 Saturation    96 % (92-99) 


 


Arterial Blood pH


 


 


 


 7.41


(7.35-7.45)


 


Arterial Blood pCO2 at


Patient Temp 


 


 


 44 mmHg


(35-46)


 


Arterial Blood pO2 at Patient


Temp 


 


 


 79 mmHg


()


 


Arterial Blood HCO3


 


 


 


 27 mmol/L


(21-28)


 


Arterial Blood Base Excess


 


 


 


 2 mmol/L


(-3-3)


 


FiO2    40 


 


Test


 3/13/20


17:11 3/13/20


21:27 3/13/20


23:00 3/14/20


05:00


 


Glucose (Fingerstick)


 211 mg/dL


(70-99) 268 mg/dL


(70-99) 


 





 


Heparin Anti-Xa Act,


Unfractionated 


 


 0.14 IU/mL


(0.30-0.70) 





 


Potassium Level


 


 


 3.7 mmol/L


(3.5-5.1) 





 


Troponin I Quantitative


 


 


 0.668 ng/mL


(0.000-0.055) 





 


Hemoglobin A1c


 


 


 


 7.4 %


(4.8-5.6)


 


Test


 3/14/20


07:10 3/14/20


08:11 3/14/20


09:00 3/14/20


11:56


 


White Blood Count


 15.9 x10^3/uL


(4.0-11.0) 


 


 





 


Red Blood Count


 5.21 x10^6/uL


(4.30-5.70) 


 


 





 


Hemoglobin


 14.7 g/dL


(13.0-17.5) 


 


 





 


Hematocrit


 44.7 %


(39.0-53.0) 


 


 





 


Mean Corpuscular Volume 86 fL ()    


 


Mean Corpuscular Hemoglobin 28 pg (25-35)    


 


Mean Corpuscular Hemoglobin


Concent 33 g/dL


(31-37) 


 


 





 


Red Cell Distribution Width


 14.7 %


(11.5-14.5) 


 


 





 


Platelet Count


 194 x10^3/uL


(140-400) 


 


 





 


Neutrophils (%) (Auto) 77 % (31-73)    


 


Lymphocytes (%) (Auto) 14 % (24-48)    


 


Monocytes (%) (Auto) 9 % (0-9)    


 


Eosinophils (%) (Auto) 0 % (0-3)    


 


Basophils (%) (Auto) 0 % (0-3)    


 


Neutrophils # (Auto)


 12.2 x10^3/uL


(1.8-7.7) 


 


 





 


Lymphocytes # (Auto)


 2.2 x10^3/uL


(1.0-4.8) 


 


 





 


Monocytes # (Auto)


 1.4 x10^3/uL


(0.0-1.1) 


 


 





 


Eosinophils # (Auto)


 0.0 x10^3/uL


(0.0-0.7) 


 


 





 


Basophils # (Auto)


 0.0 x10^3/uL


(0.0-0.2) 


 


 





 


Heparin Anti-Xa Act,


Unfractionated 0.54 IU/mL


(0.30-0.70) 


 


 





 


Glucose (Fingerstick)


 


 188 mg/dL


(70-99) 


 212 mg/dL


(70-99)


 


Sodium Level


 


 


 142 mmol/L


(136-145) 





 


Potassium Level


 


 


 3.9 mmol/L


(3.5-5.1) 





 


Chloride Level


 


 


 105 mmol/L


() 





 


Carbon Dioxide Level


 


 


 31 mmol/L


(21-32) 





 


Anion Gap   6 (6-14)  


 


Blood Urea Nitrogen


 


 


 28 mg/dL


(8-26) 





 


Creatinine


 


 


 1.0 mg/dL


(0.7-1.3) 





 


Estimated GFR


(Cockcroft-Gault) 


 


 73.2 


 





 


BUN/Creatinine Ratio   28 (6-20)  


 


Glucose Level


 


 


 189 mg/dL


(70-99) 





 


Calcium Level


 


 


 8.7 mg/dL


(8.5-10.1) 





 


Total Bilirubin


 


 


 0.6 mg/dL


(0.2-1.0) 





 


Aspartate Amino Transf


(AST/SGOT) 


 


 20 U/L (15-37) 


 





 


Alanine Aminotransferase


(ALT/SGPT) 


 


 34 U/L (16-63) 


 





 


Alkaline Phosphatase


 


 


 72 U/L


() 





 


Total Protein


 


 


 6.4 g/dL


(6.4-8.2) 





 


Albumin


 


 


 3.0 g/dL


(3.4-5.0) 





 


Albumin/Globulin Ratio   0.9 (1.0-1.7)  


 


Test


 3/14/20


13:10 3/14/20


16:50 3/14/20


20:15 3/15/20


04:20


 


Heparin Anti-Xa Act,


Unfractionated 0.43 IU/mL


(0.30-0.70) 


 


 0.54 IU/mL


(0.30-0.70)


 


Glucose (Fingerstick)


 


 180 mg/dL


(70-99) 171 mg/dL


(70-99) 





 


Test


 3/15/20


07:53 3/15/20


12:07 


 





 


Glucose (Fingerstick)


 168 mg/dL


(70-99) 193 mg/dL


(70-99) 


 











Laboratory Tests








Test


 3/14/20


13:10 3/14/20


16:50 3/14/20


20:15 3/15/20


04:20


 


Heparin Anti-Xa Act,


Unfractionated 0.43 IU/mL


(0.30-0.70) 


 


 0.54 IU/mL


(0.30-0.70)


 


Glucose (Fingerstick)


 


 180 mg/dL


(70-99) 171 mg/dL


(70-99) 





 


Test


 3/15/20


07:53 3/15/20


12:07 


 





 


Glucose (Fingerstick)


 168 mg/dL


(70-99) 193 mg/dL


(70-99) 


 











Microbiology


3/13/20 Blood Culture - Preliminary, Resulted


          NO GROWTH AFTER 2 DAYS


Medications





Current Medications


Albuterol/ Ipratropium (Duoneb) 3 ml 1X  ONCE NEB  Last administered on 

3/13/20at 09:07;  Start 3/13/20 at 09:15;  Stop 3/13/20 at 09:16;  Status DC


Methylprednisolone Sodium Succinate (SOLU-Medrol 125MG VIAL) 125 mg 1X  ONCE IV 

Last administered on 3/13/20at 09:27;  Start 3/13/20 at 09:15;  Stop 3/13/20 at 

09:16;  Status DC


Labetalol HCl (Normodyne Iv Push) 10 mg 1X  ONCE IVP ;  Start 3/13/20 at 09:15; 

Stop 3/13/20 at 09:12;  Status DC


Piperacillin Sod/ Tazobactam Sod 3.375 gm/Sodium Chloride 50 ml @  100 mls/hr 1X

 ONCE IV  Last administered on 3/13/20at 10:23;  Start 3/13/20 at 09:30;  Stop 

3/13/20 at 09:59;  Status DC


Vancomycin HCl 250 ml @  250 mls/hr 1X  ONCE IV ;  Start 3/13/20 at 09:30;  Stop

3/13/20 at 10:29;  Status UNV


Vancomycin HCl 2 gm/Sodium Chloride 500 ml @  250 mls/hr 1X  ONCE IV  Last 

administered on 3/13/20at 09:56;  Start 3/13/20 at 09:45;  Stop 3/13/20 at 

11:44;  Status DC


Sodium Chloride 1,000 ml @  100 mls/hr Q10H IV  Last administered on 3/13/20at 

11:53;  Start 3/13/20 at 10:51;  Stop 3/14/20 at 10:50;  Status DC


Furosemide (Lasix) 40 mg 1X  ONCE IVP  Last administered on 3/13/20at 14:08;  

Start 3/13/20 at 14:00;  Stop 3/13/20 at 14:01;  Status DC


Piperacillin Sod/ Tazobactam Sod 3.375 gm/Sodium Chloride 50 ml @  100 mls/hr 

Q6HRS IV  Last administered on 3/15/20at 05:38;  Start 3/13/20 at 18:00


Insulin Human Lispro (HumaLOG) 0-9 UNITS TIDWMEALS SQ  Last administered on 

3/15/20at 09:00;  Start 3/13/20 at 17:00


Dextrose (Dextrose 50%-Water Syringe) 12.5 gm PRN Q15MIN  PRN IV SEE COMMENTS;  

Start 3/13/20 at 14:00


Atorvastatin Calcium (Lipitor) 40 mg QHS PO  Last administered on 3/14/20at 20:1

9;  Start 3/13/20 at 21:00


Aspirin (Ecotrin) 81 mg DAILYWBKFT PO  Last administered on 3/15/20at 08:47;  

Start 3/14/20 at 08:00


Aspirin (Ecotrin) 325 mg 1X  ONCE PO  Last administered on 3/13/20at 16:24;  

Start 3/13/20 at 16:00;  Stop 3/13/20 at 16:01;  Status DC


Furosemide (Lasix) 40 mg BID92 IVP  Last administered on 3/15/20at 08:47;  Start

3/14/20 at 09:00


Heparin Sodium/ Dextrose 250 ml @ 0 mls/hr CONT  PRN IV PER PROTOCOL Last 

administered on 3/15/20at 09:01;  Start 3/13/20 at 16:15


Heparin Sodium (Porcine) (Heparin Sodium) 3,450 unit PRN Q6HRS  PRN IV FOR UFH 

LEVEL LESS THAN 0.2 Last administered on 3/13/20at 23:59;  Start 3/13/20 at 

16:15


Info (Anti-Coagulation Monitoring By Pharmacy) 1 each PRN DAILY  PRN [facility] SEE 

COMMENTS;  Start 3/13/20 at 16:15


Lisinopril (Prinivil) 40 mg DAILY PO  Last administered on 3/15/20at 08:46;  

Start 3/14/20 at 09:00


Metoprolol Tartrate (Lopressor) 50 mg BID PO ;  Start 3/13/20 at 21:00;  Status 

UNV


Atenolol (Tenormin) 100 mg DAILY PO  Last administered on 3/15/20at 08:47;  

Start 3/14/20 at 09:00


Insulin Human Lispro (HumaLOG) 5 units 1X  ONCE SQ  Last administered on 

3/13/20at 21:46;  Start 3/13/20 at 22:00;  Stop 3/13/20 at 22:01;  Status DC


Insulin Glargine (Lantus Syringe) 80 unit QHS SQ ;  Start 3/13/20 at 22:00;  

Stop 3/13/20 at 21:56;  Status DC


Insulin Glargine (Lantus Syringe) 80 unit DAILY SQ ;  Start 3/14/20 at 09:00;  

Status Cancel


Insulin Glargine (Lantus Syringe) 80 unit DAILY08 SQ  Last administered on 

3/15/20at 09:00;  Start 3/14/20 at 08:00


Lactobacillus Rhamnosus (Culturelle) 1 cap BID PO  Last administered on 

3/15/20at 08:47;  Start 3/14/20 at 21:00





Active Scripts


Active


Reported


Saw Sheridan (Saw Sheridan Fruit) 450 Mg Capsule 450 Mg PO DAILY


D3-50 (Cholecalciferol (Vitamin D3)) 50,000 Unit Capsule 1,000 Unit PO DAILY


Emergen-C 500 mg Chewable Tab (Vit C/Ascorb Sod/Multivit-Min) 500 Mg Tab.chew 

500 Mg PO DAILY


Zoloft (Sertraline Hcl) 50 Mg Tablet 50 Mg PO DAILY


Trazodone Hcl 50 Mg Tablet 1 Tab PO QHS


Trulicity (Dulaglutide) 0.75 Mg/0.5 Ml Pen.injctr 0.75 Mg SQ WEEKLY


Insulin Aspart 100 Unit/1 Ml Vial 25 Unit SQ TIDWMEALS


Levemir (Insulin Detemir) 100 Unit/1 Ml Vial 80 Unit SQ DAILY


Lasix (Furosemide) 20 Mg Tablet 20 Mg PO BID


Flomax (Tamsulosin Hcl) 0.4 Mg Cap.er.24h 0.4 Mg PO HS


Omeprazole 20 Mg Capsule.dr 20 Mg PO BID


Lisinopril 40 Mg Tablet 1 Tab PO DAILY


Atenolol 100 Mg Tablet 100 Mg PO BID


Vitals/I & O





Vital Sign - Last 24 Hours








 3/14/20 3/14/20 3/14/20 3/14/20





 15:00 19:47 19:58 23:20


 


Temp 97.5  97.8 97.7





 97.5  97.8 97.7


 


Pulse 61  57 56


 


Resp 18  16 16


 


B/P (MAP) 165/79 (107)  154/77 (102) 179/88 (118)


 


Pulse Ox 97  97 100


 


O2 Delivery Nasal Cannula Nasal Cannula Nasal Cannula Nasal Cannula


 


O2 Flow Rate 4.0 4.0 4.0 4.0


 


    





    





 3/15/20 3/15/20 3/15/20 3/15/20





 03:59 07:00 08:46 08:47


 


Temp 97.7 97.3  





 97.7 97.3  


 


Pulse 58 57 59 59


 


Resp 16 20  


 


B/P (MAP) 139/74 (95) 207/91 (129) 207/91 207/91


 


Pulse Ox 97 95  


 


O2 Delivery Nasal Cannula Nasal Cannula  


 


O2 Flow Rate 4.0 4.0  














Intake and Output   


 


 3/14/20 3/14/20 3/15/20





 15:00 23:00 07:00


 


Intake Total 1100 ml  300 ml


 


Output Total 700 ml 1450 ml 900 ml


 


Balance 400 ml -1450 ml -600 ml

















MARCELA ZARCO MD            Mar 15, 2020 12:19

## 2020-03-15 NOTE — PDOC
Infectious Disease Note


Subjective


Subjective


Tired today


Says didn't sleep well last night d/t frequent interruptions


Otherwise doing alright


Denies CP/SOA/F/C/N/V





ROS


ROS


per HPI





Vital Sign


Vital Signs





Vital Signs








  Date Time  Temp Pulse Resp B/P (MAP) Pulse Ox O2 Delivery O2 Flow Rate FiO2


 


3/15/20 08:47  59  207/91    


 


3/15/20 07:00 97.3  20  95 Nasal Cannula 4.0 





 97.3       











Physical Exam


PHYSICAL EXAM


GENERAL: Sitting in the chair, alert in NAD 


LUNGS:  Decreased breath sounds bases, nonlabered 


HEART:  S1, S2, regular.  No gallop or murmur.


ABDOMEN: Obese, soft, nontender


EXTREMITIES:  2+ pitting edema present. No cyanosis 


SKIN: Warm to touch. No signs of rash


NEUROLOGIC:  Alert, awake and appropriate.  No focal neurologic deficit.





Labs


Lab





Laboratory Tests








Test


 3/14/20


11:56 3/14/20


13:10 3/14/20


16:50 3/14/20


20:15


 


Glucose (Fingerstick)


 212 mg/dL


(70-99) 


 180 mg/dL


(70-99) 171 mg/dL


(70-99)


 


Heparin Anti-Xa Act,


Unfractionated 


 0.43 IU/mL


(0.30-0.70) 


 





 


Test


 3/15/20


04:20 3/15/20


07:53 


 





 


Heparin Anti-Xa Act,


Unfractionated 0.54 IU/mL


(0.30-0.70) 


 


 





 


Glucose (Fingerstick)


 


 168 mg/dL


(70-99) 


 











Micro





Microbiology


3/13/20 Blood Culture - Preliminary, Resulted


          NO GROWTH AFTER 2 DAYS





Objective


Assessment


Pulmonary infiltrate, most likely to be congestive heart failure, although


   the patient has leukocytosis, could be respiratory infection like pneumonia.


Leukocytosis.


Congestive heart failure.


Hypertension.


Partial nephrectomy.





Plan


Plan of Care


Continue Zosyn


Repeat CBC in am


Heart cath planned for Monday per cardiology note 


Supportive care 





D/w wife at bedside





 


Patient seen and examined. Chart reviewed in detail. Case discussed with NP 

agree with above plan.





Patients case also reviewed in the same manner 3/14/2020- No computer access on 

that date











CAROLYN KIM        Mar 15, 2020 11:05


DAWOOD CADENA MD             Mar 15, 2020 21:41

## 2020-03-15 NOTE — RAD
Chest AP portable 3/15/2020.

 

Reason for exam: CHF. Comparison is made with a study of 3/13/2020.

 

Areas of infiltrate or edema have partly cleared. No new infiltrate is 

seen. There is probably a small amount of pleural fluid bilaterally. The 

heart remains enlarged.

 

IMPRESSION: There is been some clearing of the lungs. There is probably a 

small amount of pleural fluid now present.

 

Electronically signed by: Serge Grover Jr., MD (3/15/2020 1:41 PM) 

[id number]

## 2020-03-15 NOTE — PDOC
PROGRESS NOTES


Chief Complaint


Chief Complaint


1. Acute CHF with possible combined systolic/diastolic dysfunction. 

Significantly improved. Continuing present treatment.


2. NSTEMI: no CP but suspecting ischemia as culprit. EKG LBBB no prior for 

comparison. Tentatively plan for heart catheterization tomorrow. Discussed with 

the patient.


2. Acute respiratory failure with CHF and possibly ongoing RAVINDER


3. DM2: insulin dependent


4. Accelerated HTN: better


5. Morbid obesity


6. Leukocytosis with UTI: on antibiotics per PCP





History of Present Illness


History of Present Illness


chest xray today


taper oxygen as able





Vitals


Vitals





Vital Signs








  Date Time  Temp Pulse Resp B/P (MAP) Pulse Ox O2 Delivery O2 Flow Rate FiO2


 


3/15/20 11:00 97.8 55 20 182/88 (119) 96 Nasal Cannula 4.0 





 97.8       











Physical Exam


Physical Exam


GENERAL: Sitting in the chair, alert in NAD 


LUNGS:  Decreased breath sounds bases, nonlabered 


HEART:  S1, S2, regular.  No gallop or murmur.


ABDOMEN: Obese, soft, nontender


EXTREMITIES:  2+ pitting edema present. No cyanosis 


SKIN: Warm to touch. No signs of rash


NEUROLOGIC:  Alert, awake and appropriate.  No focal neurologic deficit.


General:  No acute distress


Heart:  Regular rate, No murmurs


Lungs:  Crackles


Abdomen:  Normal bowel sounds


Extremities:  No cyanosis, Other (3+ bilateral LE pitting edema)


Skin:  No breakdown, No significant lesion





Labs


LABS





Laboratory Tests








Test


 3/14/20


13:10 3/14/20


16:50 3/14/20


20:15 3/15/20


04:20


 


Heparin Anti-Xa Act,


Unfractionated 0.43 IU/mL


(0.30-0.70) 


 


 0.54 IU/mL


(0.30-0.70)


 


Glucose (Fingerstick)


 


 180 mg/dL


(70-99) 171 mg/dL


(70-99) 





 


Test


 3/15/20


07:53 3/15/20


12:07 


 





 


Glucose (Fingerstick)


 168 mg/dL


(70-99) 193 mg/dL


(70-99) 


 














Assessment and Plan


Assessmemt and Plan


Problems


Medical Problems:


(1) Acute respiratory distress


Status: Acute  





(2) CAP (community acquired pneumonia)


Status: Acute  





(3) CHF (congestive heart failure)


Status: Acute  





(4) Hypoxia


Status: Acute  





(5) SIRS (systemic inflammatory response syndrome)


Status: Acute  











Comment


Review of Relevant


I have reviewed the following items nicole (where applicable) has been applied.


Labs





Laboratory Tests








Test


 3/13/20


14:22 3/13/20


14:30 3/13/20


16:55 3/13/20


17:05


 


Magnesium Level


 1.8 mg/dL


(1.8-2.4) 


 


 





 


Troponin I Quantitative


 0.564 ng/mL


(0.000-0.055) 


 0.729 ng/mL


(0.000-0.055) 





 


Urine Collection Type  Unknown   


 


Urine Color  Yellow   


 


Urine Clarity  Clear   


 


Urine pH  5.5 (<5.0-8.0)   


 


Urine Specific Gravity


 


 1.025


(1.000-1.030) 


 





 


Urine Protein


 


 >=300 mg/dL


(NEG-TRACE) 


 





 


Urine Glucose (UA)


 


 Negative mg/dL


(NEG) 


 





 


Urine Ketones (Stick)


 


 Negative mg/dL


(NEG) 


 





 


Urine Blood  Trace (NEG)   


 


Urine Nitrite  Negative (NEG)   


 


Urine Bilirubin  Negative (NEG)   


 


Urine Urobilinogen Dipstick


 


 0.2 mg/dL (0.2


mg/dL) 


 





 


Urine Leukocyte Esterase  Large (NEG)   


 


Urine RBC  1-2 /HPF (0-2)   


 


Urine WBC  >40 /HPF (0-4)   


 


Urine Bacteria


 


 Moderate /HPF


(0-FEW) 


 





 


Urine Hyaline Casts  Moderate /HPF   


 


Urine Mucus  Mod /LPF   


 


O2 Saturation    96 % (92-99) 


 


Arterial Blood pH


 


 


 


 7.41


(7.35-7.45)


 


Arterial Blood pCO2 at


Patient Temp 


 


 


 44 mmHg


(35-46)


 


Arterial Blood pO2 at Patient


Temp 


 


 


 79 mmHg


()


 


Arterial Blood HCO3


 


 


 


 27 mmol/L


(21-28)


 


Arterial Blood Base Excess


 


 


 


 2 mmol/L


(-3-3)


 


FiO2    40 


 


Test


 3/13/20


17:11 3/13/20


21:27 3/13/20


23:00 3/14/20


05:00


 


Glucose (Fingerstick)


 211 mg/dL


(70-99) 268 mg/dL


(70-99) 


 





 


Heparin Anti-Xa Act,


Unfractionated 


 


 0.14 IU/mL


(0.30-0.70) 





 


Potassium Level


 


 


 3.7 mmol/L


(3.5-5.1) 





 


Troponin I Quantitative


 


 


 0.668 ng/mL


(0.000-0.055) 





 


Hemoglobin A1c


 


 


 


 7.4 %


(4.8-5.6)


 


Test


 3/14/20


07:10 3/14/20


08:11 3/14/20


09:00 3/14/20


11:56


 


White Blood Count


 15.9 x10^3/uL


(4.0-11.0) 


 


 





 


Red Blood Count


 5.21 x10^6/uL


(4.30-5.70) 


 


 





 


Hemoglobin


 14.7 g/dL


(13.0-17.5) 


 


 





 


Hematocrit


 44.7 %


(39.0-53.0) 


 


 





 


Mean Corpuscular Volume 86 fL ()    


 


Mean Corpuscular Hemoglobin 28 pg (25-35)    


 


Mean Corpuscular Hemoglobin


Concent 33 g/dL


(31-37) 


 


 





 


Red Cell Distribution Width


 14.7 %


(11.5-14.5) 


 


 





 


Platelet Count


 194 x10^3/uL


(140-400) 


 


 





 


Neutrophils (%) (Auto) 77 % (31-73)    


 


Lymphocytes (%) (Auto) 14 % (24-48)    


 


Monocytes (%) (Auto) 9 % (0-9)    


 


Eosinophils (%) (Auto) 0 % (0-3)    


 


Basophils (%) (Auto) 0 % (0-3)    


 


Neutrophils # (Auto)


 12.2 x10^3/uL


(1.8-7.7) 


 


 





 


Lymphocytes # (Auto)


 2.2 x10^3/uL


(1.0-4.8) 


 


 





 


Monocytes # (Auto)


 1.4 x10^3/uL


(0.0-1.1) 


 


 





 


Eosinophils # (Auto)


 0.0 x10^3/uL


(0.0-0.7) 


 


 





 


Basophils # (Auto)


 0.0 x10^3/uL


(0.0-0.2) 


 


 





 


Heparin Anti-Xa Act,


Unfractionated 0.54 IU/mL


(0.30-0.70) 


 


 





 


Glucose (Fingerstick)


 


 188 mg/dL


(70-99) 


 212 mg/dL


(70-99)


 


Sodium Level


 


 


 142 mmol/L


(136-145) 





 


Potassium Level


 


 


 3.9 mmol/L


(3.5-5.1) 





 


Chloride Level


 


 


 105 mmol/L


() 





 


Carbon Dioxide Level


 


 


 31 mmol/L


(21-32) 





 


Anion Gap   6 (6-14)  


 


Blood Urea Nitrogen


 


 


 28 mg/dL


(8-26) 





 


Creatinine


 


 


 1.0 mg/dL


(0.7-1.3) 





 


Estimated GFR


(Cockcroft-Gault) 


 


 73.2 


 





 


BUN/Creatinine Ratio   28 (6-20)  


 


Glucose Level


 


 


 189 mg/dL


(70-99) 





 


Calcium Level


 


 


 8.7 mg/dL


(8.5-10.1) 





 


Total Bilirubin


 


 


 0.6 mg/dL


(0.2-1.0) 





 


Aspartate Amino Transf


(AST/SGOT) 


 


 20 U/L (15-37) 


 





 


Alanine Aminotransferase


(ALT/SGPT) 


 


 34 U/L (16-63) 


 





 


Alkaline Phosphatase


 


 


 72 U/L


() 





 


Total Protein


 


 


 6.4 g/dL


(6.4-8.2) 





 


Albumin


 


 


 3.0 g/dL


(3.4-5.0) 





 


Albumin/Globulin Ratio   0.9 (1.0-1.7)  


 


Test


 3/14/20


13:10 3/14/20


16:50 3/14/20


20:15 3/15/20


04:20


 


Heparin Anti-Xa Act,


Unfractionated 0.43 IU/mL


(0.30-0.70) 


 


 0.54 IU/mL


(0.30-0.70)


 


Glucose (Fingerstick)


 


 180 mg/dL


(70-99) 171 mg/dL


(70-99) 





 


Test


 3/15/20


07:53 3/15/20


12:07 


 





 


Glucose (Fingerstick)


 168 mg/dL


(70-99) 193 mg/dL


(70-99) 


 











Laboratory Tests








Test


 3/14/20


13:10 3/14/20


16:50 3/14/20


20:15 3/15/20


04:20


 


Heparin Anti-Xa Act,


Unfractionated 0.43 IU/mL


(0.30-0.70) 


 


 0.54 IU/mL


(0.30-0.70)


 


Glucose (Fingerstick)


 


 180 mg/dL


(70-99) 171 mg/dL


(70-99) 





 


Test


 3/15/20


07:53 3/15/20


12:07 


 





 


Glucose (Fingerstick)


 168 mg/dL


(70-99) 193 mg/dL


(70-99) 


 











Microbiology


3/13/20 Blood Culture - Preliminary, Resulted


          NO GROWTH AFTER 2 DAYS


Medications





Current Medications


Albuterol/ Ipratropium (Duoneb) 3 ml 1X  ONCE NEB  Last administered on 

3/13/20at 09:07;  Start 3/13/20 at 09:15;  Stop 3/13/20 at 09:16;  Status DC


Methylprednisolone Sodium Succinate (SOLU-Medrol 125MG VIAL) 125 mg 1X  ONCE IV 

Last administered on 3/13/20at 09:27;  Start 3/13/20 at 09:15;  Stop 3/13/20 at 

09:16;  Status DC


Labetalol HCl (Normodyne Iv Push) 10 mg 1X  ONCE IVP ;  Start 3/13/20 at 09:15; 

Stop 3/13/20 at 09:12;  Status DC


Piperacillin Sod/ Tazobactam Sod 3.375 gm/Sodium Chloride 50 ml @  100 mls/hr 1X

 ONCE IV  Last administered on 3/13/20at 10:23;  Start 3/13/20 at 09:30;  Stop 

3/13/20 at 09:59;  Status DC


Vancomycin HCl 250 ml @  250 mls/hr 1X  ONCE IV ;  Start 3/13/20 at 09:30;  Stop

3/13/20 at 10:29;  Status UNV


Vancomycin HCl 2 gm/Sodium Chloride 500 ml @  250 mls/hr 1X  ONCE IV  Last 

administered on 3/13/20at 09:56;  Start 3/13/20 at 09:45;  Stop 3/13/20 at 

11:44;  Status DC


Sodium Chloride 1,000 ml @  100 mls/hr Q10H IV  Last administered on 3/13/20at 

11:53;  Start 3/13/20 at 10:51;  Stop 3/14/20 at 10:50;  Status DC


Furosemide (Lasix) 40 mg 1X  ONCE IVP  Last administered on 3/13/20at 14:08;  

Start 3/13/20 at 14:00;  Stop 3/13/20 at 14:01;  Status DC


Piperacillin Sod/ Tazobactam Sod 3.375 gm/Sodium Chloride 50 ml @  100 mls/hr 

Q6HRS IV  Last administered on 3/15/20at 05:38;  Start 3/13/20 at 18:00


Insulin Human Lispro (HumaLOG) 0-9 UNITS TIDWMEALS SQ  Last administered on 

3/15/20at 12:20;  Start 3/13/20 at 17:00


Dextrose (Dextrose 50%-Water Syringe) 12.5 gm PRN Q15MIN  PRN IV SEE COMMENTS;  

Start 3/13/20 at 14:00


Atorvastatin Calcium (Lipitor) 40 mg QHS PO  Last administered on 3/14/20at 

20:19;  Start 3/13/20 at 21:00


Aspirin (Ecotrin) 81 mg DAILYWBKFT PO  Last administered on 3/15/20at 08:47;  S

tart 3/14/20 at 08:00


Aspirin (Ecotrin) 325 mg 1X  ONCE PO  Last administered on 3/13/20at 16:24;  

Start 3/13/20 at 16:00;  Stop 3/13/20 at 16:01;  Status DC


Furosemide (Lasix) 40 mg [id number] IVP  Last administered on 3/15/20at 08:47;  Start

3/14/20 at 09:00


Heparin Sodium/ Dextrose 250 ml @ 0 mls/hr CONT  PRN IV PER PROTOCOL Last 

administered on 3/15/20at 09:01;  Start 3/13/20 at 16:15


Heparin Sodium (Porcine) (Heparin Sodium) 3,450 unit PRN Q6HRS  PRN IV FOR UFH 

LEVEL LESS THAN 0.2 Last administered on 3/13/20at 23:59;  Start 3/13/20 at 

16:15


Info (Anti-Coagulation Monitoring By Pharmacy) 1 each PRN DAILY  PRN [facility] SEE 

COMMENTS;  Start 3/13/20 at 16:15


Lisinopril (Prinivil) 40 mg DAILY PO  Last administered on 3/15/20at 08:46;  

Start 3/14/20 at 09:00


Metoprolol Tartrate (Lopressor) 50 mg BID PO ;  Start 3/13/20 at 21:00;  Status 

UNV


Atenolol (Tenormin) 100 mg DAILY PO  Last administered on 3/15/20at 08:47;  

Start 3/14/20 at 09:00


Insulin Human Lispro (HumaLOG) 5 units 1X  ONCE SQ  Last administered on 

3/13/20at 21:46;  Start 3/13/20 at 22:00;  Stop 3/13/20 at 22:01;  Status DC


Insulin Glargine (Lantus Syringe) 80 unit QHS SQ ;  Start 3/13/20 at 22:00;  

Stop 3/13/20 at 21:56;  Status DC


Insulin Glargine (Lantus Syringe) 80 unit DAILY SQ ;  Start 3/14/20 at 09:00;  

Status Cancel


Insulin Glargine (Lantus Syringe) 80 unit DAILY08 SQ  Last administered on 

3/15/20at 09:00;  Start 3/14/20 at 08:00


Lactobacillus Rhamnosus (Culturelle) 1 cap BID PO  Last administered on 

3/15/20at 08:47;  Start 3/14/20 at 21:00


Sodium Chloride 1,000 ml @  75 mls/hr [id number] IV ;  Start 3/16/20 at 06:00





Active Scripts


Active


Reported


[place] (Saw [place] Fruit) 450 Mg Capsule 450 Mg PO DAILY


D3-50 (Cholecalciferol (Vitamin D3)) 50,000 Unit Capsule 1,000 Unit PO DAILY


Emergen-C 500 mg Chewable Tab (Vit C/Ascorb Sod/Multivit-Min) 500 Mg Tab.chew 

500 Mg PO DAILY


Zoloft (Sertraline Hcl) 50 Mg Tablet 50 Mg PO DAILY


Trazodone Hcl 50 Mg Tablet 1 Tab PO QHS


Trulicity (Dulaglutide) 0.75 Mg/0.5 Ml Pen.injctr 0.75 Mg SQ WEEKLY


Insulin Aspart 100 Unit/1 Ml Vial 25 Unit SQ TIDWMEALS


Levemir (Insulin Detemir) 100 Unit/1 Ml Vial 80 Unit SQ DAILY


Lasix (Furosemide) 20 Mg Tablet 20 Mg PO BID


Flomax (Tamsulosin Hcl) 0.4 Mg Cap.er.24h 0.4 Mg PO HS


Omeprazole 20 Mg Capsule.dr 20 Mg PO BID


Lisinopril 40 Mg Tablet 1 Tab PO DAILY


Atenolol 100 Mg Tablet 100 Mg PO BID


Vitals/I & O





Vital Sign - Last 24 Hours








 3/14/20 3/14/20 3/14/20 3/14/20





 15:00 19:47 19:58 23:20


 


Temp 97.5  97.8 97.7





 97.5  97.8 97.7


 


Pulse 61  57 56


 


Resp 18  16 16


 


B/P (MAP) 165/79 (107)  154/77 (102) 179/88 (118)


 


Pulse Ox 97  97 100


 


O2 Delivery Nasal Cannula Nasal Cannula Nasal Cannula Nasal Cannula


 


O2 Flow Rate 4.0 4.0 4.0 4.0


 


    





    





 3/15/20 3/15/20 3/15/20 3/15/20





 03:59 07:00 08:46 08:47


 


Temp 97.7 97.3  





 97.7 97.3  


 


Pulse 58 57 59 59


 


Resp 16 20  


 


B/P (MAP) 139/74 (95) 207/91 (129) 207/91 207/91


 


Pulse Ox 97 95  


 


O2 Delivery Nasal Cannula Nasal Cannula  


 


O2 Flow Rate 4.0 4.0  


 


    





    





 3/15/20   





 11:00   


 


Temp 97.8   





 97.8   


 


Pulse 55   


 


Resp 20   


 


B/P (MAP) 182/88 (119)   


 


Pulse Ox 96   


 


O2 Delivery Nasal Cannula   


 


O2 Flow Rate 4.0   














Intake and Output   


 


 3/14/20 3/14/20 3/15/20





 15:00 23:00 07:00


 


Intake Total 1100 ml  300 ml


 


Output Total 700 ml 1450 ml 900 ml


 


Balance 400 ml -1450 ml -600 ml

















CORRINA KAY MD                 Mar 15, 2020 12:36

## 2020-03-16 NOTE — NUR
Cath lab code:



1012am Patient moved to ICU bed.



1016 Heart rate 121, Sat 80% while being bagged.



1018 Etomidate 20, Succinylcholine 120 given IVP, /134.



1020 Intubated 7.0 22 at right teeth, color change good.



1022 /123 02 sat 94%, heart rate 114.



1024 Patient transferred to ICU.

## 2020-03-16 NOTE — PDOC
Date and Time


Called to code blue Cath lab. When I arrived patient was awake with BP and 

pulse. SpO2 mid 80s with 100% oxygen by non rebreather.


N95 and gloves worn. Given 20mg Etomidate, 120mg succinylcholine. 7.5 oral ETT 

placed direct viz cords. +ETCO2. Breath sounds very distant.


In ICU sterile tech- R Rad arterial line. MAP was 25 more than cuff pressure on 

opposite arm.


3 lumen subclavian central line placed, 


All lines biopatched and sutured in,sterile dressing.


CXR pendin. I wore an N95 mask and protective gown for all procedures in ICU


Current Medications





Current Medications


Albuterol/ Ipratropium (Duoneb) 3 ml 1X  ONCE NEB  Last administered on 

3/13/20at 09:07;  Start 3/13/20 at 09:15;  Stop 3/13/20 at 09:16;  Status DC


Methylprednisolone Sodium Succinate (SOLU-Medrol 125MG VIAL) 125 mg 1X  ONCE IV 

Last administered on 3/13/20at 09:27;  Start 3/13/20 at 09:15;  Stop 3/13/20 at 

09:16;  Status DC


Labetalol HCl (Normodyne Iv Push) 10 mg 1X  ONCE IVP ;  Start 3/13/20 at 09:15; 

Stop 3/13/20 at 09:12;  Status DC


Piperacillin Sod/ Tazobactam Sod 3.375 gm/Sodium Chloride 50 ml @  100 mls/hr 1X

 ONCE IV  Last administered on 3/13/20at 10:23;  Start 3/13/20 at 09:30;  Stop 

3/13/20 at 09:59;  Status DC


Vancomycin HCl 250 ml @  250 mls/hr 1X  ONCE IV ;  Start 3/13/20 at 09:30;  Stop

3/13/20 at 10:29;  Status UNV


Vancomycin HCl 2 gm/Sodium Chloride 500 ml @  250 mls/hr 1X  ONCE IV  Last 

administered on 3/13/20at 09:56;  Start 3/13/20 at 09:45;  Stop 3/13/20 at 

11:44;  Status DC


Sodium Chloride 1,000 ml @  100 mls/hr Q10H IV  Last administered on 3/13/20at 

11:53;  Start 3/13/20 at 10:51;  Stop 3/14/20 at 10:50;  Status DC


Furosemide (Lasix) 40 mg 1X  ONCE IVP  Last administered on 3/13/20at 14:08;  St

art 3/13/20 at 14:00;  Stop 3/13/20 at 14:01;  Status DC


Piperacillin Sod/ Tazobactam Sod 3.375 gm/Sodium Chloride 50 ml @  100 mls/hr 

Q6HRS IV  Last administered on 3/16/20at 00:24;  Start 3/13/20 at 18:00


Insulin Human Lispro (HumaLOG) 0-9 UNITS TIDWMEALS SQ  Last administered on 

3/15/20at 17:51;  Start 3/13/20 at 17:00


Dextrose (Dextrose 50%-Water Syringe) 12.5 gm PRN Q15MIN  PRN IV SEE COMMENTS;  

Start 3/13/20 at 14:00


Atorvastatin Calcium (Lipitor) 40 mg QHS PO  Last administered on 3/15/20at 

20:23;  Start 3/13/20 at 21:00


Aspirin (Ecotrin) 81 mg DAILYWBKFT PO  Last administered on 3/16/20at 08:13;  

Start 3/14/20 at 08:00


Aspirin (Ecotrin) 325 mg 1X  ONCE PO  Last administered on 3/13/20at 16:24;  

Start 3/13/20 at 16:00;  Stop 3/13/20 at 16:01;  Status DC


Furosemide (Lasix) 40 mg BID92 IVP  Last administered on 3/15/20at 14:29;  Start

3/14/20 at 09:00


Heparin Sodium/ Dextrose 250 ml @ 0 mls/hr CONT  PRN IV PER PROTOCOL Last 

administered on 3/15/20at 20:31;  Start 3/13/20 at 16:15


Heparin Sodium (Porcine) (Heparin Sodium) 3,450 unit PRN Q6HRS  PRN IV FOR UFH 

LEVEL LESS THAN 0.2 Last administered on 3/13/20at 23:59;  Start 3/13/20 at 

16:15


Info (Anti-Coagulation Monitoring By Pharmacy) 1 each PRN DAILY  PRN MC SEE 

COMMENTS;  Start 3/13/20 at 16:15


Lisinopril (Prinivil) 40 mg DAILY PO  Last administered on 3/16/20at 08:12;  

Start 3/14/20 at 09:00


Metoprolol Tartrate (Lopressor) 50 mg BID PO ;  Start 3/13/20 at 21:00;  Status 

UNV


Atenolol (Tenormin) 100 mg DAILY PO  Last administered on 3/16/20at 08:15;  

Start 3/14/20 at 09:00


Insulin Human Lispro (HumaLOG) 5 units 1X  ONCE SQ  Last administered on 

3/13/20at 21:46;  Start 3/13/20 at 22:00;  Stop 3/13/20 at 22:01;  Status DC


Insulin Glargine (Lantus Syringe) 80 unit QHS SQ ;  Start 3/13/20 at 22:00;  

Stop 3/13/20 at 21:56;  Status DC


Insulin Glargine (Lantus Syringe) 80 unit DAILY SQ ;  Start 3/14/20 at 09:00;  

Status Cancel


Insulin Glargine (Lantus Syringe) 80 unit DAILY08 SQ  Last administered on 

3/15/20at 09:00;  Start 3/14/20 at 08:00


Lactobacillus Rhamnosus (Culturelle) 1 cap BID PO  Last administered on 

3/16/20at 08:13;  Start 3/14/20 at 21:00


Sodium Chloride 1,000 ml @  75 mls/hr [id number] IV ;  Start 3/16/20 at 06:00


Amlodipine Besylate (Norvasc) 5 mg DAILY PO  Last administered on 3/16/20at 

08:13;  Start 3/15/20 at 16:00


Iodixanol (Visipaque 320) 100 ml STK-MED ONCE .ROUTE ;  Start 3/16/20 at 07:56; 

Stop 3/16/20 at 07:56;  Status DC


Lidocaine HCl (Lidocaine 1% 20ml Vial) 20 ml STK-MED ONCE .ROUTE ;  Start 

3/16/20 at 07:56;  Stop 3/16/20 at 07:56;  Status DC


Heparin Sodium/ Sodium Chloride 1,500 ml @  As Directed STK-MED ONCE .ROUTE ;  

Start 3/16/20 at 07:56;  Stop 3/16/20 at 07:56;  Status DC


Fentanyl Citrate (Fentanyl 2ml Vial) 100 mcg STK-MED ONCE .ROUTE ;  Start 

3/16/20 at 09:03;  Stop 3/16/20 at 09:03;  Status DC


Midazolam HCl (Versed) 2 mg STK-MED ONCE .ROUTE ;  Start 3/16/20 at 09:03;  Stop

3/16/20 at 09:03;  Status DC


Heparin Sodium (Porcine) (Heparin Sodium) 10,000 unit STK-MED ONCE .ROUTE ;  

Start 3/16/20 at 09:39;  Stop 3/16/20 at 09:39;  Status DC


Verapamil HCl (Verapamil) 5 mg STK-MED ONCE .ROUTE ;  Start 3/16/20 at 09:39;  

Stop 3/16/20 at 09:39;  Status DC


Nitroglycerin/ Dextrose 250 ml @  As Directed STK-MED ONCE IV ;  Start 3/16/20 

at 09:39;  Stop 3/16/20 at 09:39;  Status DC


Nitroglycerin (Nitroglycerin) 200 mcg STK-MED ONCE .ROUTE ;  Start 3/16/20 at 

09:39;  Stop 3/16/20 at 09:39;  Status DC


Albuterol/ Ipratropium (Duoneb) 3 ml 1X  ONCE NEB  Last administered on 

3/16/20at 10:00;  Start 3/16/20 at 10:00;  Stop 3/16/20 at 10:01;  Status DC


Etomidate (Amidate) 20 mg STK-MED ONCE IV ;  Start 3/16/20 at 10:23;  Stop 

3/16/20 at 10:23;  Status DC


Succinylcholine Chloride (Anectine) 200 mg STK-MED ONCE .ROUTE ;  Start 3/16/20 

at 10:23;  Stop 3/16/20 at 10:23;  Status DC


Propofol 100 ml @  As Directed STK-MED ONCE IV ;  Start 3/16/20 at 10:35;  Stop 

3/16/20 at 10:35;  Status DC


Nitroglycerin (Nitroglycerin) 200 mcg 1X  ONCE IART  Last administered on 

3/16/20at 11:00;  Start 3/16/20 at 11:00;  Stop 3/16/20 at 11:01;  Status DC


Verapamil HCl (Verapamil) 2.5 mg 1X  ONCE IART ;  Start 3/16/20 at 11:00;  Stop 

3/16/20 at 11:01;  Status DC


Heparin Sodium (Porcine) (Heparin Sodium) 2,500 unit 1X  ONCE IART ;  Start 

3/16/20 at 11:00;  Stop 3/16/20 at 11:01;  Status DC


Heparin Sodium/ Sodium Chloride (HEPARIN for ARTERIAL LINE FLUSH) 1,000 unit 1X 

ONCE IART  Last administered on 3/16/20at 11:00;  Start 3/16/20 at 11:00;  Stop 

3/16/20 at 11:01;  Status DC


Heparin Sodium/ Sodium Chloride (HEPARIN for ARTERIAL LINE FLUSH) 1,000 unit 1X 

ONCE IART  Last administered on 3/16/20at 11:00;  Start 3/16/20 at 11:00;  Stop 

3/16/20 at 11:01;  Status DC


Midazolam HCl (Versed) 2 mg 1X  ONCE IV  Last administered on 3/16/20at 11:00;  

Start 3/16/20 at 11:00;  Stop 3/16/20 at 11:01;  Status DC


Fentanyl Citrate (Fentanyl 2ml Vial) 100 mcg 1X  ONCE IV  Last administered on 

3/16/20at 11:00;  Start 3/16/20 at 11:00;  Stop 3/16/20 at 11:01;  Status DC


Iodixanol (Visipaque 320) 100 ml 1X  ONCE IART ;  Start 3/16/20 at 11:00;  Stop 

3/16/20 at 11:01;  Status DC


Lidocaine HCl (Lidocaine 1% 20ml Vial) 20 ml 1X  ONCE INJ ;  Start 3/16/20 at 

11:00;  Stop 3/16/20 at 11:01;  Status DC


Norepinephrine Bitartrate 8 mg/ Dextrose 258 ml @  27.09 mls/ hr CONT  PRN IV 

PER PROTOCOL;  Start 3/16/20 at 11:15





Active Scripts


Active


Reported


[place] (Saw [place] Fruit) 450 Mg Capsule 450 Mg PO DAILY


D3-50 (Cholecalciferol (Vitamin D3)) 50,000 Unit Capsule 1,000 Unit PO DAILY


Emergen-C 500 mg Chewable Tab (Vit C/Ascorb Sod/Multivit-Min) 500 Mg Tab.chew 

500 Mg PO DAILY


Zoloft (Sertraline Hcl) 50 Mg Tablet 50 Mg PO DAILY


Trazodone Hcl 50 Mg Tablet 1 Tab PO QHS


Trulicity (Dulaglutide) 0.75 Mg/0.5 Ml Pen.injctr 0.75 Mg SQ WEEKLY


Insulin Aspart 100 Unit/1 Ml Vial 25 Unit SQ TIDWMEALS


Levemir (Insulin Detemir) 100 Unit/1 Ml Vial 80 Unit SQ DAILY


Lasix (Furosemide) 20 Mg Tablet 20 Mg PO BID


Flomax (Tamsulosin Hcl) 0.4 Mg Cap.er.24h 0.4 Mg PO HS


Omeprazole 20 Mg Capsule.dr 20 Mg PO BID


Lisinopril 40 Mg Tablet 1 Tab PO DAILY


Atenolol 100 Mg Tablet 100 Mg PO BID


Pertinent Labs/Test





Laboratory Tests








Test


 3/14/20


13:10 3/14/20


16:50 3/14/20


20:15 3/15/20


04:20


 


Heparin Anti-Xa Act,


Unfractionated 0.43 IU/mL


(0.30-0.70) 


 


 0.54 IU/mL


(0.30-0.70)


 


Glucose (Fingerstick)


 


 180 mg/dL


(70-99) 171 mg/dL


(70-99) 





 


Test


 3/15/20


07:53 3/15/20


12:07 3/15/20


17:07 3/15/20


20:18


 


Glucose (Fingerstick)


 168 mg/dL


(70-99) 193 mg/dL


(70-99) 168 mg/dL


(70-99) 190 mg/dL


(70-99)


 


Test


 3/16/20


04:55 


 


 





 


White Blood Count


 10.0 x10^3/uL


(4.0-11.0) 


 


 





 


Red Blood Count


 5.11 x10^6/uL


(4.30-5.70) 


 


 





 


Hemoglobin


 14.4 g/dL


(13.0-17.5) 


 


 





 


Hematocrit


 43.5 %


(39.0-53.0) 


 


 





 


Mean Corpuscular Volume 85 fL ()    


 


Mean Corpuscular Hemoglobin 28 pg (25-35)    


 


Mean Corpuscular Hemoglobin


Concent 33 g/dL


(31-37) 


 


 





 


Red Cell Distribution Width


 14.5 %


(11.5-14.5) 


 


 





 


Platelet Count


 181 x10^3/uL


(140-400) 


 


 





 


Neutrophils (%) (Auto) 62 % (31-73)    


 


Lymphocytes (%) (Auto) 23 % (24-48)    


 


Monocytes (%) (Auto) 10 % (0-9)    


 


Eosinophils (%) (Auto) 4 % (0-3)    


 


Basophils (%) (Auto) 1 % (0-3)    


 


Neutrophils # (Auto)


 6.2 x10^3/uL


(1.8-7.7) 


 


 





 


Lymphocytes # (Auto)


 2.3 x10^3/uL


(1.0-4.8) 


 


 





 


Monocytes # (Auto)


 1.0 x10^3/uL


(0.0-1.1) 


 


 





 


Eosinophils # (Auto)


 0.4 x10^3/uL


(0.0-0.7) 


 


 





 


Basophils # (Auto)


 0.1 x10^3/uL


(0.0-0.2) 


 


 





 


Heparin Anti-Xa Act,


Unfractionated 0.32 IU/mL


(0.30-0.70) 


 


 





 


Sodium Level


 146 mmol/L


(136-145) 


 


 





 


Potassium Level


 3.5 mmol/L


(3.5-5.1) 


 


 





 


Chloride Level


 104 mmol/L


() 


 


 





 


Carbon Dioxide Level


 36 mmol/L


(21-32) 


 


 





 


Anion Gap 6 (6-14)    


 


Blood Urea Nitrogen


 23 mg/dL


(8-26) 


 


 





 


Creatinine


 1.0 mg/dL


(0.7-1.3) 


 


 





 


Estimated GFR


(Cockcroft-Gault) 73.2 


 


 


 





 


Glucose Level


 169 mg/dL


(70-99) 


 


 





 


Calcium Level


 9.0 mg/dL


(8.5-10.1) 


 


 





 


Magnesium Level


 1.7 mg/dL


(1.8-2.4) 


 


 











Laboratory Tests








Test


 3/15/20


17:07 3/15/20


20:18 3/16/20


04:55


 


Glucose (Fingerstick)


 168 mg/dL


(70-99) 190 mg/dL


(70-99) 





 


White Blood Count


 


 


 10.0 x10^3/uL


(4.0-11.0)


 


Red Blood Count


 


 


 5.11 x10^6/uL


(4.30-5.70)


 


Hemoglobin


 


 


 14.4 g/dL


(13.0-17.5)


 


Hematocrit


 


 


 43.5 %


(39.0-53.0)


 


Mean Corpuscular Volume   85 fL () 


 


Mean Corpuscular Hemoglobin   28 pg (25-35) 


 


Mean Corpuscular Hemoglobin


Concent 


 


 33 g/dL


(31-37)


 


Red Cell Distribution Width


 


 


 14.5 %


(11.5-14.5)


 


Platelet Count


 


 


 181 x10^3/uL


(140-400)


 


Neutrophils (%) (Auto)   62 % (31-73) 


 


Lymphocytes (%) (Auto)   23 % (24-48) 


 


Monocytes (%) (Auto)   10 % (0-9) 


 


Eosinophils (%) (Auto)   4 % (0-3) 


 


Basophils (%) (Auto)   1 % (0-3) 


 


Neutrophils # (Auto)


 


 


 6.2 x10^3/uL


(1.8-7.7)


 


Lymphocytes # (Auto)


 


 


 2.3 x10^3/uL


(1.0-4.8)


 


Monocytes # (Auto)


 


 


 1.0 x10^3/uL


(0.0-1.1)


 


Eosinophils # (Auto)


 


 


 0.4 x10^3/uL


(0.0-0.7)


 


Basophils # (Auto)


 


 


 0.1 x10^3/uL


(0.0-0.2)


 


Heparin Anti-Xa Act,


Unfractionated 


 


 0.32 IU/mL


(0.30-0.70)


 


Sodium Level


 


 


 146 mmol/L


(136-145)


 


Potassium Level


 


 


 3.5 mmol/L


(3.5-5.1)


 


Chloride Level


 


 


 104 mmol/L


()


 


Carbon Dioxide Level


 


 


 36 mmol/L


(21-32)


 


Anion Gap   6 (6-14) 


 


Blood Urea Nitrogen


 


 


 23 mg/dL


(8-26)


 


Creatinine


 


 


 1.0 mg/dL


(0.7-1.3)


 


Estimated GFR


(Cockcroft-Gault) 


 


 73.2 





 


Glucose Level


 


 


 169 mg/dL


(70-99)


 


Calcium Level


 


 


 9.0 mg/dL


(8.5-10.1)


 


Magnesium Level


 


 


 1.7 mg/dL


(1.8-2.4)








LAST VITALS





Vital Signs








  Date Time  Temp Pulse Resp B/P (MAP) Pulse Ox O2 Delivery O2 Flow Rate FiO2


 


3/16/20 11:00   20   NonRebreather Mask  


 


3/16/20 10:30     93   


 


3/16/20 08:15  61  137/73    


 


3/16/20 08:00       4.0 


 


3/16/20 07:00 98.5       





 98.5       

















MARY GRACE HARRIS MD                Mar 16, 2020 12:15

## 2020-03-16 NOTE — PDOC
Infectious Disease Note


Subjective:


Subjective


Pt tx to ICU s/p code in cath lab


intubated


sedated


on FiO2 100%





Vital Signs:


Vital Signs





Vital Signs








  Date Time  Temp Pulse Resp B/P (MAP) Pulse Ox O2 Delivery O2 Flow Rate FiO2


 


3/16/20 08:15  61  137/73    


 


3/16/20 08:00       4.0 


 


3/16/20 08:00      Room Air  


 


3/16/20 07:00 98.5  18  92   





 98.5       











Physical Exam:


PHYSICAL EXAM


GENERAL: intubated


HEENT  intubated, ETT+,NGT +,no icterus


Neck supple , central line present


LUNGS:  Decreased breath sounds bases,  


HEART:  S1, S2 


ABDOMEN: Obese, soft, nontender


EXTREMITIES:  2+ pitting edema present. No cyanosis 


SKIN: Warm to touch. No signs of rash


NEURO:intubated





Medications:


Inpatient Meds:





Current Medications








 Medications


  (Trade)  Dose


 Ordered  Sig/Elisa  Start Time


 Stop Time Status Last Admin


Dose Admin


 


 Albuterol/


 Ipratropium


  (Duoneb)  3 ml  1X  ONCE  3/16/20 10:00


 3/16/20 10:01 DC  





 


 Amlodipine


 Besylate


  (Norvasc)  5 mg  DAILY  3/15/20 16:00


    3/16/20 08:13


5 MG


 


 Aspirin


  (Ecotrin)  325 mg  1X  ONCE  3/13/20 16:00


 3/13/20 16:01 DC 3/13/20 16:24


325 MG


 


 Atenolol


  (Tenormin)  100 mg  DAILY  3/14/20 09:00


    3/16/20 08:15


100 MG


 


 Atorvastatin


 Calcium


  (Lipitor)  40 mg  QHS  3/13/20 21:00


    3/15/20 20:23


40 MG


 


 Dextrose


  (Dextrose


 50%-Water Syringe)  12.5 gm  PRN Q15MIN  PRN  3/13/20 14:00


     





 


 Fentanyl Citrate


  (Fentanyl 2ml


 Vial)  100 mcg  STK-MED ONCE  3/16/20 09:03


 3/16/20 09:03 DC  





 


 Furosemide


  (Lasix)  40 mg  BID92  3/14/20 09:00


    3/15/20 14:29


40 MG


 


 Heparin Sodium


  (Porcine)


  (Heparin Sodium)  10,000 unit  STK-MED ONCE  3/16/20 09:39


 3/16/20 09:39 DC  





 


 Heparin Sodium/


 Dextrose  250 ml @ 0


 mls/hr  CONT  PRN  3/13/20 16:15


    3/15/20 20:31


20.6 MLS/HR


 


 Heparin Sodium/


 Sodium Chloride  1,500 ml @ 


 As Directed  STK-MED ONCE  3/16/20 07:56


 3/16/20 07:56 DC  





 


 Info


  (Anti-Coagulation


 Monitoring By


 Pharmacy)  1 each  PRN DAILY  PRN  3/13/20 16:15


     





 


 Insulin Glargine


  (Lantus Syringe)  80 unit  DAILY08  3/14/20 08:00


    3/15/20 09:00


80 UNIT


 


 Insulin Human


 Lispro


  (HumaLOG)  5 units  1X  ONCE  3/13/20 22:00


 3/13/20 22:01 DC 3/13/20 21:46


5 UNITS


 


 Iodixanol


  (Visipaque 320)  100 ml  STK-MED ONCE  3/16/20 07:56


 3/16/20 07:56 DC  





 


 Labetalol HCl


  (Normodyne Iv


 Push)  10 mg  1X  ONCE  3/13/20 09:15


 3/13/20 09:12 DC  





 


 Lactobacillus


 Rhamnosus


  (Culturelle)  1 cap  BID  3/14/20 21:00


    3/16/20 08:13


1 CAP


 


 Lidocaine HCl


  (Lidocaine 1%


 20ml Vial)  20 ml  STK-MED ONCE  3/16/20 07:56


 3/16/20 07:56 DC  





 


 Lisinopril


  (Prinivil)  40 mg  DAILY  3/14/20 09:00


    3/16/20 08:12


40 MG


 


 Methylprednisolone


 Sodium Succinate


  (SOLU-Medrol


 125MG VIAL)  125 mg  1X  ONCE  3/13/20 09:15


 3/13/20 09:16 DC 3/13/20 09:27


125 MG


 


 Metoprolol


 Tartrate


  (Lopressor)  50 mg  BID  3/13/20 21:00


   UNV  





 


 Midazolam HCl


  (Versed)  2 mg  STK-MED ONCE  3/16/20 09:03


 3/16/20 09:03 DC  





 


 Nitroglycerin


  (Nitroglycerin)  200 mcg  STK-MED ONCE  3/16/20 09:39


 3/16/20 09:39 DC  





 


 Nitroglycerin/


 Dextrose  250 ml @ 


 As Directed  STK-MED ONCE  3/16/20 09:39


 3/16/20 09:39 DC  





 


 Piperacillin Sod/


 Tazobactam Sod


 3.375 gm/Sodium


 Chloride  50 ml @ 


 100 mls/hr  Q6HRS  3/13/20 18:00


    3/16/20 00:24


100 MLS/HR


 


 Sodium Chloride  1,000 ml @ 


 75 mls/hr  [id number]  3/16/20 06:00


     





 


 Vancomycin HCl  250 ml @ 


 250 mls/hr  1X  ONCE  3/13/20 09:30


 3/13/20 10:29 UNV  





 


 Vancomycin HCl 2


 gm/Sodium Chloride  500 ml @ 


 250 mls/hr  1X  ONCE  3/13/20 09:45


 3/13/20 11:44 DC 3/13/20 09:56


250 MLS/HR


 


 Verapamil HCl


  (Verapamil)  5 mg  STK-MED ONCE  3/16/20 09:39


 3/16/20 09:39 DC  














Labs:


Lab





Laboratory Tests








Test


 3/15/20


12:07 3/15/20


17:07 3/15/20


20:18 3/16/20


04:55


 


Glucose (Fingerstick)


 193 mg/dL


(70-99) 168 mg/dL


(70-99) 190 mg/dL


(70-99) 





 


White Blood Count


 


 


 


 10.0 x10^3/uL


(4.0-11.0)


 


Red Blood Count


 


 


 


 5.11 x10^6/uL


(4.30-5.70)


 


Hemoglobin


 


 


 


 14.4 g/dL


(13.0-17.5)


 


Hematocrit


 


 


 


 43.5 %


(39.0-53.0)


 


Mean Corpuscular Volume    85 fL () 


 


Mean Corpuscular Hemoglobin    28 pg (25-35) 


 


Mean Corpuscular Hemoglobin


Concent 


 


 


 33 g/dL


(31-37)


 


Red Cell Distribution Width


 


 


 


 14.5 %


(11.5-14.5)


 


Platelet Count


 


 


 


 181 x10^3/uL


(140-400)


 


Neutrophils (%) (Auto)    62 % (31-73) 


 


Lymphocytes (%) (Auto)    23 % (24-48) 


 


Monocytes (%) (Auto)    10 % (0-9) 


 


Eosinophils (%) (Auto)    4 % (0-3) 


 


Basophils (%) (Auto)    1 % (0-3) 


 


Neutrophils # (Auto)


 


 


 


 6.2 x10^3/uL


(1.8-7.7)


 


Lymphocytes # (Auto)


 


 


 


 2.3 x10^3/uL


(1.0-4.8)


 


Monocytes # (Auto)


 


 


 


 1.0 x10^3/uL


(0.0-1.1)


 


Eosinophils # (Auto)


 


 


 


 0.4 x10^3/uL


(0.0-0.7)


 


Basophils # (Auto)


 


 


 


 0.1 x10^3/uL


(0.0-0.2)


 


Heparin Anti-Xa Act,


Unfractionated 


 


 


 0.32 IU/mL


(0.30-0.70)


 


Sodium Level


 


 


 


 146 mmol/L


(136-145)


 


Potassium Level


 


 


 


 3.5 mmol/L


(3.5-5.1)


 


Chloride Level


 


 


 


 104 mmol/L


()


 


Carbon Dioxide Level


 


 


 


 36 mmol/L


(21-32)


 


Anion Gap    6 (6-14) 


 


Blood Urea Nitrogen


 


 


 


 23 mg/dL


(8-26)


 


Creatinine


 


 


 


 1.0 mg/dL


(0.7-1.3)


 


Estimated GFR


(Cockcroft-Gault) 


 


 


 73.2 





 


Glucose Level


 


 


 


 169 mg/dL


(70-99)


 


Calcium Level


 


 


 


 9.0 mg/dL


(8.5-10.1)


 


Magnesium Level


 


 


 


 1.7 mg/dL


(1.8-2.4)











Objective:


Assessment:


S/P Code 3/16


acute resp failure s/p intubation


Pulmonary infiltrate likely chf 


Leukocytosis. 


Congestive heart failure.


Hypertension.


Partial nephrectomy.





Plan:


Plan of Care


Continue Zosyn


Supportive care 


f/u cult and labs


d/w TRISTAN AGUIRRE MD           Mar 16, 2020 10:17

## 2020-03-16 NOTE — PDOC
PULMONARY PROGRESS NOTES


Subjective


PT HAD A RESP DISTRESS IN CATH LAB INTUBATED DID NOT LOOSE PULSE


Vitals





Vital Signs








  Date Time  Temp Pulse Resp B/P (MAP) Pulse Ox O2 Delivery O2 Flow Rate FiO2


 


3/16/20 08:15  61  137/73    


 


3/16/20 07:00 98.5  18  92 Room Air  





 98.5       


 


3/15/20 22:13       4.0 








Lungs:  Crackles


Cardiovascular:  S1, S2


Abdomen:  Soft, Non-tender


Extremities:  No Edema


Skin:  Warm


Labs





Laboratory Tests








Test


 3/14/20


09:00 3/14/20


11:56 3/14/20


13:10 3/14/20


16:50


 


Sodium Level


 142 mmol/L


(136-145) 


 


 





 


Potassium Level


 3.9 mmol/L


(3.5-5.1) 


 


 





 


Chloride Level


 105 mmol/L


() 


 


 





 


Carbon Dioxide Level


 31 mmol/L


(21-32) 


 


 





 


Anion Gap 6 (6-14)    


 


Blood Urea Nitrogen


 28 mg/dL


(8-26) 


 


 





 


Creatinine


 1.0 mg/dL


(0.7-1.3) 


 


 





 


Estimated GFR


(Cockcroft-Gault) 73.2 


 


 


 





 


BUN/Creatinine Ratio 28 (6-20)    


 


Glucose Level


 189 mg/dL


(70-99) 


 


 





 


Calcium Level


 8.7 mg/dL


(8.5-10.1) 


 


 





 


Total Bilirubin


 0.6 mg/dL


(0.2-1.0) 


 


 





 


Aspartate Amino Transf


(AST/SGOT) 20 U/L (15-37) 


 


 


 





 


Alanine Aminotransferase


(ALT/SGPT) 34 U/L (16-63) 


 


 


 





 


Alkaline Phosphatase


 72 U/L


() 


 


 





 


Total Protein


 6.4 g/dL


(6.4-8.2) 


 


 





 


Albumin


 3.0 g/dL


(3.4-5.0) 


 


 





 


Albumin/Globulin Ratio 0.9 (1.0-1.7)    


 


Glucose (Fingerstick)


 


 212 mg/dL


(70-99) 


 180 mg/dL


(70-99)


 


Heparin Anti-Xa Act,


Unfractionated 


 


 0.43 IU/mL


(0.30-0.70) 





 


Test


 3/14/20


20:15 3/15/20


04:20 3/15/20


07:53 3/15/20


12:07


 


Glucose (Fingerstick)


 171 mg/dL


(70-99) 


 168 mg/dL


(70-99) 193 mg/dL


(70-99)


 


Heparin Anti-Xa Act,


Unfractionated 


 0.54 IU/mL


(0.30-0.70) 


 





 


Test


 3/15/20


17:07 3/15/20


20:18 3/16/20


04:55 





 


Glucose (Fingerstick)


 168 mg/dL


(70-99) 190 mg/dL


(70-99) 


 





 


White Blood Count


 


 


 10.0 x10^3/uL


(4.0-11.0) 





 


Red Blood Count


 


 


 5.11 x10^6/uL


(4.30-5.70) 





 


Hemoglobin


 


 


 14.4 g/dL


(13.0-17.5) 





 


Hematocrit


 


 


 43.5 %


(39.0-53.0) 





 


Mean Corpuscular Volume   85 fL ()  


 


Mean Corpuscular Hemoglobin   28 pg (25-35)  


 


Mean Corpuscular Hemoglobin


Concent 


 


 33 g/dL


(31-37) 





 


Red Cell Distribution Width


 


 


 14.5 %


(11.5-14.5) 





 


Platelet Count


 


 


 181 x10^3/uL


(140-400) 





 


Neutrophils (%) (Auto)   62 % (31-73)  


 


Lymphocytes (%) (Auto)   23 % (24-48)  


 


Monocytes (%) (Auto)   10 % (0-9)  


 


Eosinophils (%) (Auto)   4 % (0-3)  


 


Basophils (%) (Auto)   1 % (0-3)  


 


Neutrophils # (Auto)


 


 


 6.2 x10^3/uL


(1.8-7.7) 





 


Lymphocytes # (Auto)


 


 


 2.3 x10^3/uL


(1.0-4.8) 





 


Monocytes # (Auto)


 


 


 1.0 x10^3/uL


(0.0-1.1) 





 


Eosinophils # (Auto)


 


 


 0.4 x10^3/uL


(0.0-0.7) 





 


Basophils # (Auto)


 


 


 0.1 x10^3/uL


(0.0-0.2) 





 


Heparin Anti-Xa Act,


Unfractionated 


 


 0.32 IU/mL


(0.30-0.70) 





 


Sodium Level


 


 


 146 mmol/L


(136-145) 





 


Potassium Level


 


 


 3.5 mmol/L


(3.5-5.1) 





 


Chloride Level


 


 


 104 mmol/L


() 





 


Carbon Dioxide Level


 


 


 36 mmol/L


(21-32) 





 


Anion Gap   6 (6-14)  


 


Blood Urea Nitrogen


 


 


 23 mg/dL


(8-26) 





 


Creatinine


 


 


 1.0 mg/dL


(0.7-1.3) 





 


Estimated GFR


(Cockcroft-Gault) 


 


 73.2 


 





 


Glucose Level


 


 


 169 mg/dL


(70-99) 





 


Calcium Level


 


 


 9.0 mg/dL


(8.5-10.1) 





 


Magnesium Level


 


 


 1.7 mg/dL


(1.8-2.4) 











Laboratory Tests








Test


 3/15/20


12:07 3/15/20


17:07 3/15/20


20:18 3/16/20


04:55


 


Glucose (Fingerstick)


 193 mg/dL


(70-99) 168 mg/dL


(70-99) 190 mg/dL


(70-99) 





 


White Blood Count


 


 


 


 10.0 x10^3/uL


(4.0-11.0)


 


Red Blood Count


 


 


 


 5.11 x10^6/uL


(4.30-5.70)


 


Hemoglobin


 


 


 


 14.4 g/dL


(13.0-17.5)


 


Hematocrit


 


 


 


 43.5 %


(39.0-53.0)


 


Mean Corpuscular Volume    85 fL () 


 


Mean Corpuscular Hemoglobin    28 pg (25-35) 


 


Mean Corpuscular Hemoglobin


Concent 


 


 


 33 g/dL


(31-37)


 


Red Cell Distribution Width


 


 


 


 14.5 %


(11.5-14.5)


 


Platelet Count


 


 


 


 181 x10^3/uL


(140-400)


 


Neutrophils (%) (Auto)    62 % (31-73) 


 


Lymphocytes (%) (Auto)    23 % (24-48) 


 


Monocytes (%) (Auto)    10 % (0-9) 


 


Eosinophils (%) (Auto)    4 % (0-3) 


 


Basophils (%) (Auto)    1 % (0-3) 


 


Neutrophils # (Auto)


 


 


 


 6.2 x10^3/uL


(1.8-7.7)


 


Lymphocytes # (Auto)


 


 


 


 2.3 x10^3/uL


(1.0-4.8)


 


Monocytes # (Auto)


 


 


 


 1.0 x10^3/uL


(0.0-1.1)


 


Eosinophils # (Auto)


 


 


 


 0.4 x10^3/uL


(0.0-0.7)


 


Basophils # (Auto)


 


 


 


 0.1 x10^3/uL


(0.0-0.2)


 


Heparin Anti-Xa Act,


Unfractionated 


 


 


 0.32 IU/mL


(0.30-0.70)


 


Sodium Level


 


 


 


 146 mmol/L


(136-145)


 


Potassium Level


 


 


 


 3.5 mmol/L


(3.5-5.1)


 


Chloride Level


 


 


 


 104 mmol/L


()


 


Carbon Dioxide Level


 


 


 


 36 mmol/L


(21-32)


 


Anion Gap    6 (6-14) 


 


Blood Urea Nitrogen


 


 


 


 23 mg/dL


(8-26)


 


Creatinine


 


 


 


 1.0 mg/dL


(0.7-1.3)


 


Estimated GFR


(Cockcroft-Gault) 


 


 


 73.2 





 


Glucose Level


 


 


 


 169 mg/dL


(70-99)


 


Calcium Level


 


 


 


 9.0 mg/dL


(8.5-10.1)


 


Magnesium Level


 


 


 


 1.7 mg/dL


(1.8-2.4)








Medications





Active Scripts








 Medications  Dose


 Route/Sig


 Max Daily Dose Days Date Category


 


 Saw [place] (Saw


 Palmet[place] Fruit)


 450 Mg Capsule  450 Mg


 PO DAILY


   3/13/20 Reported


 


 D3-50


  (Cholecalciferol


  (Vitamin D3))


 50,000 Unit


 Capsule  1,000 Unit


 PO DAILY


   3/13/20 Reported


 


 Emergen-C 500 mg


 Chewable Tab (Vit


 C/Ascorb


 Sod/Multivit-Min)


 500 Mg Tab.chew  500 Mg


 PO DAILY


   3/13/20 Reported


 


 Zoloft


  (Sertraline Hcl)


 50 Mg Tablet  50 Mg


 PO DAILY


   3/13/20 Reported


 


 Trazodone Hcl 50


 Mg Tablet  1 Tab


 PO QHS


   3/13/20 Reported


 


 Trulicity


  (Dulaglutide)


 0.75 Mg/0.5 Ml


 Pen.injctr  0.75 Mg


 SQ WEEKLY


   3/13/20 Reported


 


 Insulin Aspart


 100 Unit/1 Ml Vial  25 Unit


 SQ TIDWMEALS


   3/13/20 Reported


 


 Levemir (Insulin


 Detemir) 100


 Unit/1 Ml Vial  80 Unit


 SQ DAILY


   3/13/20 Reported


 


 Lasix


  (Furosemide) 20


 Mg Tablet  20 Mg


 PO BID


   3/13/20 Reported


 


 Flomax


  (Tamsulosin Hcl)


 0.4 Mg Cap.er.24h  0.4 Mg


 PO HS


   3/13/20 Reported


 


 Omeprazole 20 Mg


 Capsule.dr  20 Mg


 PO BID


   3/13/20 Reported


 


 Lisinopril 40 Mg


 Tablet  1 Tab


 PO DAILY


   3/13/20 Reported


 


 Atenolol 100 Mg


 Tablet  100 Mg


 PO BID


   3/13/20 Reported








Comments


echo





The left ventricle is normal size.


The left ventricular systolic function is normal.


The Ejection Fraction is 50-55%.


There is mild concentric left ventricular hypertrophy.


Doppler and Color Flow revealed trace aortic regurgitation.


There is no significant aortic valvular stenosis.


Doppler and Color-flow revealed trace mitral regurgitation.


Doppler and Color Flow revealed trace tricuspid regurgitation with an estimated 

PAP of 28 mmHg.





Impression


.


IMPRESSION:


1.  Acute hypercapnic and hypoxic respiratory failure sec to flash pulmonary 

edema


2.  Abnormal chest x-ray with bilateral interstitial infiltrates suggesting


interstitial edema


3.  Leukocytosis, could be reactive.  No fever.  Reasonable to cover for


atypical coverage and follow ID recommendation.


4.  No significant tobacco history.


5.  Suspected obesity hypoventilation syndrome and obstructive sleep apnea,


never been tested.


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


7.  HYPOTENSION





Plan


.


AC MODE


REPEAT ABG


SPOKE WITH CARDIOLOGY


SULEMA DOWNEYPHED FOR HYPOTENSION


RECOMMENDATIONS:


ANTI ABX PER ID





CCT 30 MINUTES D/W CARDIOLOGY/ REVIEWING LABS/DATA/CXR











ADALBERTO GAXIOLA MD              Mar 16, 2020 08:36

## 2020-03-16 NOTE — PDOC
CASH DELAROSA APRN 3/16/20 1328:


CARDIO Progress Notes


Date and Time


Date of Service


3/16/20


Time of Evaluation


1315





Subjective


Subjective:  Other (intubated/sedated)





Vitals


Vitals





Vital Signs








  Date Time  Temp Pulse Resp B/P (MAP) Pulse Ox O2 Delivery O2 Flow Rate FiO2


 


3/16/20 11:00   20   NonRebreather Mask  


 


3/16/20 10:30     93   


 


3/16/20 08:15  61  137/73    


 


3/16/20 08:00       4.0 


 


3/16/20 07:00 98.5       





 98.5       








Weight


Weight [ ]





Input and Output


Intake and Output











Intake and Output 


 


 3/16/20





 07:00


 


Intake Total 1510 ml


 


Output Total 4530 ml


 


Balance -3020 ml


 


 


 


Intake Oral 1510 ml


 


Output Urine Total 4530 ml











Laboratory


Labs





Laboratory Tests








Test


 3/15/20


17:07 3/15/20


20:18 3/16/20


04:55 3/16/20


11:50


 


Glucose (Fingerstick)


 168 mg/dL


(70-99) 190 mg/dL


(70-99) 


 





 


White Blood Count


 


 


 10.0 x10^3/uL


(4.0-11.0) 





 


Red Blood Count


 


 


 5.11 x10^6/uL


(4.30-5.70) 





 


Hemoglobin


 


 


 14.4 g/dL


(13.0-17.5) 





 


Hematocrit


 


 


 43.5 %


(39.0-53.0) 





 


Mean Corpuscular Volume   85 fL ()  


 


Mean Corpuscular Hemoglobin   28 pg (25-35)  


 


Mean Corpuscular Hemoglobin


Concent 


 


 33 g/dL


(31-37) 





 


Red Cell Distribution Width


 


 


 14.5 %


(11.5-14.5) 





 


Platelet Count


 


 


 181 x10^3/uL


(140-400) 





 


Neutrophils (%) (Auto)   62 % (31-73)  


 


Lymphocytes (%) (Auto)   23 % (24-48)  


 


Monocytes (%) (Auto)   10 % (0-9)  


 


Eosinophils (%) (Auto)   4 % (0-3)  


 


Basophils (%) (Auto)   1 % (0-3)  


 


Neutrophils # (Auto)


 


 


 6.2 x10^3/uL


(1.8-7.7) 





 


Lymphocytes # (Auto)


 


 


 2.3 x10^3/uL


(1.0-4.8) 





 


Monocytes # (Auto)


 


 


 1.0 x10^3/uL


(0.0-1.1) 





 


Eosinophils # (Auto)


 


 


 0.4 x10^3/uL


(0.0-0.7) 





 


Basophils # (Auto)


 


 


 0.1 x10^3/uL


(0.0-0.2) 





 


Heparin Anti-Xa Act,


Unfractionated 


 


 0.32 IU/mL


(0.30-0.70) 





 


Sodium Level


 


 


 146 mmol/L


(136-145) 





 


Potassium Level


 


 


 3.5 mmol/L


(3.5-5.1) 





 


Chloride Level


 


 


 104 mmol/L


() 





 


Carbon Dioxide Level


 


 


 36 mmol/L


(21-32) 





 


Anion Gap   6 (6-14)  


 


Blood Urea Nitrogen


 


 


 23 mg/dL


(8-26) 





 


Creatinine


 


 


 1.0 mg/dL


(0.7-1.3) 





 


Estimated GFR


(Cockcroft-Gault) 


 


 73.2 


 





 


Glucose Level


 


 


 169 mg/dL


(70-99) 





 


Calcium Level


 


 


 9.0 mg/dL


(8.5-10.1) 





 


Magnesium Level


 


 


 1.7 mg/dL


(1.8-2.4) 





 


O2 Saturation    91 % (92-99) 


 


Arterial Blood pH


 


 


 


 7.29


(7.35-7.45)


 


Arterial Blood pCO2 at


Patient Temp 


 


 


 60 mmHg


(35-46)


 


Arterial Blood pO2 at Patient


Temp 


 


 


 67 mmHg


()


 


Arterial Blood HCO3


 


 


 


 28 mmol/L


(21-28)


 


Arterial Blood Base Excess


 


 


 


 0 mmol/L


(-3-3)


 


FiO2    100 


 


Test


 3/16/20


12:00 3/16/20


12:08 


 





 


Prothrombin Time


 13.2 SEC


(11.7-14.0) 


 


 





 


Prothromb Time International


Ratio 1.0 (0.8-1.1) 


 


 


 





 


Sodium Level


 142 mmol/L


(136-145) 


 


 





 


Potassium Level


 3.6 mmol/L


(3.5-5.1) 


 


 





 


Chloride Level


 104 mmol/L


() 


 


 





 


Carbon Dioxide Level


 35 mmol/L


(21-32) 


 


 





 


Anion Gap 3 (6-14)    


 


Blood Urea Nitrogen


 26 mg/dL


(8-26) 


 


 





 


Creatinine


 1.2 mg/dL


(0.7-1.3) 


 


 





 


Estimated GFR


(Cockcroft-Gault) 59.3 


 


 


 





 


Glucose Level


 276 mg/dL


(70-99) 


 


 





 


Lactic Acid Level


 1.5 mmol/L


(0.4-2.0) 


 


 





 


Calcium Level


 8.6 mg/dL


(8.5-10.1) 


 


 





 


Glucose (Fingerstick)


 


 282 mg/dL


(70-99) 


 














Microbiology


Micro





Microbiology


3/13/20 Blood Culture - Preliminary, Resulted


          NO GROWTH AFTER 3 DAYS





Physical Exam


HEENT:  Neck Supple W Full Motion


Chest:  Symmetric


LUNGS:  Other (mechanical ventilation )


Heart:  S1S2, RRR (SB/SR with PVCs), other (distant heart tones)


Abdomen:  Soft N/T


Extremities:  Other (1+ bilateral LE edema )


Neurology:  other (sedated)





Assessment


Assessment


1. Acute on chronic diastolic CHF; Echo with preserved LV systolic function .


2. NSTEMI: no CP but suspecting ischemia as culprit. EKG LBBB no prior for 

comparison. Patient was brought to the cath lab for LHC, but was unable to 

proceed due to respiratory distress. Was eventually taken to the unit and 

intubated. Pulmonary following 


2. Acute respiratory failure with CHF 


3. DM2: insulin dependent


4. Accelerated HTN: controlled


5. Morbid obesity


6. Leukocytosis with UTI: on antibiotics per PCP


7. Hypokalemia


8. Bradycardia, sinus. No pauses. Combination of Atenolol and propofol 








Recommendations





Ongoing diuresis


Replace Mg, K


Will decrease BB therapy. Hold as warranted for bradycardia


Supportive care


Further ischemic workup when stable. 


Supportive care





MARCELA ZARCO MD 3/16/20 6619:


CARDIO Progress Notes


Assessment


Assessment


Patient seen and examined


1. Acute on chronic diastolic CHF; Echo with preserved LV systolic function. 

Probable diastolic failure.


2. NSTEMI: no CP but suspecting ischemia as culprit. EKG LBBB no prior for comp

arison. The patient was brought to the cath lab this morning for a cath but was 

too SOB while on the table to proceed.  Cath cancelled but the patient had 

progressively increasing SOB despite BIPAP and pulmonary treatments.  He became 

less responsive and an urgent intubation was performed by the anesth. service.  

The patient was then taken to the ICU.  The patient's family was kept informed 

during this process.


2. Acute respiratory failure with CHF 


3. DM2: insulin dependent


4. Accelerated HTN: controlled


5. Morbid obesity


6. Leukocytosis with UTI: on antibiotics per PCP


7. Hypokalemia


8. Bradycardia, sinus. No pauses. Combination of Atenolol and propofol. Holding 

beta blockers at present.











CASH DELAROSA           Mar 16, 2020 13:28


MARCELA ZARCO MD            Mar 16, 2020 17:07

## 2020-03-16 NOTE — RAD
AP chest x-ray

 

HISTORY: Status post intubation and Cath Lab.

 

FINDINGS: The left lateral lung base and diaphragm are partially outside 

the field-of-view. Endotracheal tube tip 8 cm above the fredy at the 

upper thoracic trachea silhouette. Mild cardiomegaly. No pneumothorax. No 

pleural effusions. Dense perihilar bilateral upper lobe and right middle 

lobe alveolar opacities likely flash pulmonary edema. Sequela of 

aspiration in a supine position patient would be a secondary 

consideration. These are new from the x-rays earlier today.

 

IMPRESSION: Endotracheal intubation. Bilateral perihilar dense upper lobe 

and right middle lobe opacities likely flash pulmonary edema as described 

above.

 

Electronically signed by: Bryant Sewell MD (3/16/2020 11:30 AM) 

[id number]

## 2020-03-16 NOTE — EKG
[facility]

               [address] [place] [address]

Test Date:    2020               Test Time:    14:34:51

Pat Name:     RYAN SHARP            Department:   

Patient ID:   [id number]           Room:         106 1

Gender:       M                        Technician:   CA

:          1945               Requested By: CASH DELAROSA

Order Number: [id number]           Reading MD:     

                                 Measurements

Intervals                              Axis          

Rate:         57                       P:            59

TX:           198                      QRS:          -33

QRSD:         158                      T:            134

QT:           468                                    

QTc:          459                                    

                           Interpretive Statements

SINUS RHYTHM

COMPLEX(ES) WITH ABERRANT INTRAVENTRICULAR CONDUCTION

ABNORMAL LEFT AXIS DEVIATION

NON SPECIFIC INTRAVENTRICULAR BLOCK

QRS(T) CONTOUR ABNORMALITY

CONSIDER ANTEROSEPTAL MYOCARDIAL DAMAGE

ABNORMAL ECG

RI6.01

No previous ECG available for comparison

## 2020-03-16 NOTE — NUR
RN NOTE

patient taken down to cath lab for LHC. patient then transferred to ICU r/t respiratory 
status.

## 2020-03-16 NOTE — NUR
1130 Received pt from cath lab, Fi02 100% on ventilator, pink frothy sputum in ET tube. HR 
50, trigeminy, pvc, pacs, BP stable at time. sedated pt on versed, tolerating vent well, 
updated wife and family regarding isloation and plan of care. all tubes and lines in place. 

1220 McNitt at bedside placing A-line and R SCL TL, tolerated well. 

1300 Levophed started, BP 70/40's immediately /72 and climbing, able to wean Levophed 
completely off by 1730, BP stable 110/72.

## 2020-03-16 NOTE — NUR
Patient in cath lab for left heart cath, unable to complete. Patient was placed on cath lab 
table with wedge under head due to shortness of air, patient alert & oriented x4, talking, 
asking questions, patient states "I'm nervous about this procedure". Patient educated on 
procedure. Blood pressure elevated and 02 sat 99% on non-rebreather. Patient placed on 
non-rebreather due to patient complaining of nose stuffiness and being a mouth breather. 
After timeout completed patient given Versed 1mg and fentanyl 25mcg IVP X 1. Patient's 
respiration rate increased to 30's and became diaphoretic. Dr Solan decided to cancel 
procedure and proceed to a later date. Respiratory therapy called STAT for Bipap and Duoneb 
treatment. Patient heart rhythm changed to SVT while on Bipap, Dr. Sloan called to 
bedside. Dr Sloan requested patient be moved to ICU. Patient diaphoretic and became 
unresponsive with heart rate/rhythm, Code Blue called for intubation per Dr. Sloan. 
Patient intubated in cath lab and transferred to ICU . Report given to JEFFERY Rodriguez 
at bedside.

## 2020-03-16 NOTE — PDOC
[facility] PROGRESS NOTE


Chief Complaint


Chief Complaint


0. Attempted cardiac catheter this morning with status post CODE BLUE 


1. Acute CHF with possible combined systolic/diastolic dysfunction. 

Significantly improved. Continuing present treatment.


2. NSTEMI: no CP but suspecting ischemia as culprit. EKG LBBB no prior for 

comparison. Tentatively plan for heart catheterization tomorrow. Discussed with 

the patient.


2. Acute respiratory failure with CHF and possibly ongoing RAVINDER


3. DM2: insulin dependent


4. Accelerated HTN: better


5. Morbid obesity


6. Leukocytosis with UTI: on antibiotics per PCP





History of Present Illness


History of Present Illness


[id number]


Patient seen and examined


He is currently getting an central line with Dr. Wright


He is now on the ICU after having a CODE BLUE this morning while in the cardiac 

catheter lab


I discussed the case with cardiologist


Patient probably got very short of air and eventually had to be intubated in the

Cath Lab


He is critically ill


It should be noted that there was no actual cardiac catheter done due to the 

severe shortness of breath and CODE BLUE





Vitals/I&O


Vitals/I&O:





                                   Vital Signs








  Date Time  Temp Pulse Resp B/P (MAP) Pulse Ox O2 Delivery O2 Flow Rate FiO2


 


3/16/20 11:00   20   NonRebreather Mask  


 


3/16/20 10:30     93   


 


3/16/20 08:15  61  137/73    


 


3/16/20 08:00       4.0 


 


3/16/20 07:00 98.5       





 98.5       














                                    I & O   


 


 3/15/20 3/15/20 3/16/20





 15:00 23:00 07:00


 


Intake Total 1310 ml  200 ml


 


Output Total 1900 ml 1700 ml 930 ml


 


Balance -590 ml -1700 ml -730 ml











Physical Exam


General:  Other


Heart:  Regular rate, No murmurs


Lungs:  Crackles


Abdomen:  Normal bowel sounds


Extremities:  No cyanosis, Other (3+ bilateral LE pitting edema)


Skin:  No breakdown, No significant lesion





Labs


Labs:





Laboratory Tests








Test


 3/15/20


17:07 3/15/20


20:18 3/16/20


04:55 3/16/20


11:50


 


Glucose (Fingerstick)


 168 mg/dL


(70-99) 190 mg/dL


(70-99) 


 





 


White Blood Count


 


 


 10.0 x10^3/uL


(4.0-11.0) 





 


Red Blood Count


 


 


 5.11 x10^6/uL


(4.30-5.70) 





 


Hemoglobin


 


 


 14.4 g/dL


(13.0-17.5) 





 


Hematocrit


 


 


 43.5 %


(39.0-53.0) 





 


Mean Corpuscular Volume   85 fL ()  


 


Mean Corpuscular Hemoglobin   28 pg (25-35)  


 


Mean Corpuscular Hemoglobin


Concent 


 


 33 g/dL


(31-37) 





 


Red Cell Distribution Width


 


 


 14.5 %


(11.5-14.5) 





 


Platelet Count


 


 


 181 x10^3/uL


(140-400) 





 


Neutrophils (%) (Auto)   62 % (31-73)  


 


Lymphocytes (%) (Auto)   23 % (24-48)  


 


Monocytes (%) (Auto)   10 % (0-9)  


 


Eosinophils (%) (Auto)   4 % (0-3)  


 


Basophils (%) (Auto)   1 % (0-3)  


 


Neutrophils # (Auto)


 


 


 6.2 x10^3/uL


(1.8-7.7) 





 


Lymphocytes # (Auto)


 


 


 2.3 x10^3/uL


(1.0-4.8) 





 


Monocytes # (Auto)


 


 


 1.0 x10^3/uL


(0.0-1.1) 





 


Eosinophils # (Auto)


 


 


 0.4 x10^3/uL


(0.0-0.7) 





 


Basophils # (Auto)


 


 


 0.1 x10^3/uL


(0.0-0.2) 





 


Heparin Anti-Xa Act,


Unfractionated 


 


 0.32 IU/mL


(0.30-0.70) 





 


Sodium Level


 


 


 146 mmol/L


(136-145) 





 


Potassium Level


 


 


 3.5 mmol/L


(3.5-5.1) 





 


Chloride Level


 


 


 104 mmol/L


() 





 


Carbon Dioxide Level


 


 


 36 mmol/L


(21-32) 





 


Anion Gap   6 (6-14)  


 


Blood Urea Nitrogen


 


 


 23 mg/dL


(8-26) 





 


Creatinine


 


 


 1.0 mg/dL


(0.7-1.3) 





 


Estimated GFR


(Cockcroft-Gault) 


 


 73.2 


 





 


Glucose Level


 


 


 169 mg/dL


(70-99) 





 


Calcium Level


 


 


 9.0 mg/dL


(8.5-10.1) 





 


Magnesium Level


 


 


 1.7 mg/dL


(1.8-2.4) 





 


O2 Saturation    91 % (92-99) 


 


Arterial Blood pH


 


 


 


 7.29


(7.35-7.45)


 


Arterial Blood pCO2 at


Patient Temp 


 


 


 60 mmHg


(35-46)


 


Arterial Blood pO2 at Patient


Temp 


 


 


 67 mmHg


()


 


Arterial Blood HCO3


 


 


 


 28 mmol/L


(21-28)


 


Arterial Blood Base Excess


 


 


 


 0 mmol/L


(-3-3)


 


FiO2    100 


 


Test


 3/16/20


12:00 3/16/20


12:08 


 





 


Prothrombin Time


 13.2 SEC


(11.7-14.0) 


 


 





 


Prothromb Time International


Ratio 1.0 (0.8-1.1) 


 


 


 





 


Sodium Level


 142 mmol/L


(136-145) 


 


 





 


Potassium Level


 3.6 mmol/L


(3.5-5.1) 


 


 





 


Chloride Level


 104 mmol/L


() 


 


 





 


Carbon Dioxide Level


 35 mmol/L


(21-32) 


 


 





 


Anion Gap 3 (6-14)    


 


Blood Urea Nitrogen


 26 mg/dL


(8-26) 


 


 





 


Creatinine


 1.2 mg/dL


(0.7-1.3) 


 


 





 


Estimated GFR


(Cockcroft-Gault) 59.3 


 


 


 





 


Glucose Level


 276 mg/dL


(70-99) 


 


 





 


Lactic Acid Level


 1.5 mmol/L


(0.4-2.0) 


 


 





 


Calcium Level


 8.6 mg/dL


(8.5-10.1) 


 


 





 


Glucose (Fingerstick)


 


 282 mg/dL


(70-99) 


 














Review of Systems


Review of Systems:


Unable to obtain





Assessment and Plan


Assessmemt and Plan


Problems


Medical Problems:


(1) Acute respiratory distress


Status: Acute  





(2) CAP (community acquired pneumonia)


Status: Acute  





(3) CHF (congestive heart failure)


Status: Acute  





(4) Hypoxia


Status: Acute  





(5) SIRS (systemic inflammatory response syndrome)


Status: Acute  


0. Attempted cardiac catheter this morning with status post CODE BLUE 


1. Acute CHF with possible combined systolic/diastolic dysfunction. 

Significantly improved. Continuing present treatment.


2. NSTEMI: no CP but suspecting ischemia as culprit. EKG LBBB no prior for 

comparison. Tentatively plan for heart catheterization tomorrow. Discussed with 

the patient.


2. Acute respiratory failure with CHF and possibly ongoing RAVINDER


3. DM2: insulin dependent


4. Accelerated HTN: better


5. Morbid obesity


6. Leukocytosis with UTI: on antibiotics per PCP








Plan


ICU monitoring


Propofol for sedation


Vent weaning


IV antibiotics


Duo nebs


Home meds


DVT prophylaxis


Full code


Prognosis guarded


We consider checking for coronavirus but he was turned down by the [facility]


Discussed with RNs


Discussed with anesthesia


He is critically ill


Total time 32 minutes





Comment


Review of Relevant


I have reviewed the following items nicole (where applicable) has been applied.


Medications:





Current Medications








 Medications


  (Trade)  Dose


 Ordered  Sig/Elisa


 Route


 PRN Reason  Start Time


 Stop Time Status Last Admin


Dose Admin


 


 Amlodipine


 Besylate


  (Norvasc)  5 mg  DAILY


 PO


   3/15/20 16:00


    3/16/20 08:13





 


 Albuterol/


 Ipratropium


  (Duoneb)  3 ml  1X  ONCE


 NEB


   3/16/20 10:00


 3/16/20 10:01 DC 3/16/20 10:00





 


 Nitroglycerin


  (Nitroglycerin)  200 mcg  1X  ONCE


 IART


   3/16/20 11:00


 3/16/20 11:01 DC 3/16/20 11:00





 


 Heparin Sodium/


 Sodium Chloride


  (HEPARIN for


 ARTERIAL LINE


 FLUSH)  1,000 unit  1X  ONCE


 IART


   3/16/20 11:00


 3/16/20 11:01 DC 3/16/20 11:00





 


 Heparin Sodium/


 Sodium Chloride


  (HEPARIN for


 ARTERIAL LINE


 FLUSH)  1,000 unit  1X  ONCE


 IART


   3/16/20 11:00


 3/16/20 11:01 DC 3/16/20 11:00





 


 Midazolam HCl


  (Versed)  2 mg  1X  ONCE


 IV


   3/16/20 11:00


 3/16/20 11:01 DC 3/16/20 11:00





 


 Fentanyl Citrate


  (Fentanyl 2ml


 Vial)  100 mcg  1X  ONCE


 IV


   3/16/20 11:00


 3/16/20 11:01 DC 3/16/20 11:00




















MARY LARA III DO           Mar 16, 2020 12:56

## 2020-03-16 NOTE — RAD
INDICATION: Enteric tube placement

 

COMPARISON: None.

 

IMPRESSION: 

 

Abdomen: Single view obtained. Single enteric tube is identified coiled in

the left upper quadrant of the abdomen at expected location of the 

stomach. Surgical clips right side of the abdomen. Degenerative changes 

the spine with osteophyte formation. Partially visualized enlarged cardiac

silhouette.

 

Electronically signed by: Westley Benz MD (3/16/2020 1:08 PM) 

[id number]

## 2020-03-16 NOTE — RAD
PORTABLE CHEST 1V 12:16 PM

 

Clinical indications: Central line and ET tube positioning. 

 

COMPARISON: Same day performed at 10:32 AM

 

Findings: ET tube tip is located 5.5 cm above the level of the fredy. 

Right subclavian central line tip is located within the mid superior vena 

cava above the level of the right atrium. NG tube is in place and the tube

is seen extending into at least the proximal body of the stomach. 

Bilateral perihilar lung infiltrates or pulmonary edema are again evident 

without significant change. No pneumothorax or pleural effusion is seen. 

The heart size and mediastinum are stable.

 

Impression: No change in bilateral perihilar lung infiltrates or pulmonary

edema.

 

Electronically signed by: Damon Redding MD (3/16/2020 12:45 PM) [facility] [place]

## 2020-03-17 NOTE — PDOC
CASH DELAROSA APRN 3/17/20 1118:


CARDIO Progress Notes


Date and Time


Date of Service


3/17/20


Time of Evaluation


1110





Subjective


Subjective:  Other (intubated/sedated)





Vitals


Vitals





Vital Signs








  Date Time  Temp Pulse Resp B/P (MAP) Pulse Ox O2 Delivery O2 Flow Rate FiO2


 


3/17/20 11:00        


 


3/17/20 10:00  51 18  97 Ventilator  


 


3/17/20 08:00 98.9       





 98.9       


 


3/16/20 08:00       4.0 








Weight


Weight [ ]





Input and Output


Intake and Output











Intake and Output 


 


 3/17/20





 07:00


 


Intake Total 148 ml


 


Output Total 3210 ml


 


Balance -3062 ml


 


 


 


Intake Oral 50 ml


 


IV Total 98 ml


 


Output Urine Total 3010 ml


 


Gastric Drainage Total 200 ml











Laboratory


Labs





Laboratory Tests








Test


 3/16/20


11:50 3/16/20


12:00 3/16/20


12:08 3/16/20


13:45


 


O2 Saturation 91 % (92-99)    


 


Arterial Blood pH


 7.29


(7.35-7.45) 


 


 





 


Arterial Blood pCO2 at


Patient Temp 60 mmHg


(35-46) 


 


 





 


Arterial Blood pO2 at Patient


Temp 67 mmHg


() 


 


 





 


Arterial Blood HCO3


 28 mmol/L


(21-28) 


 


 





 


Arterial Blood Base Excess


 0 mmol/L


(-3-3) 


 


 





 


FiO2 100    


 


Prothrombin Time


 


 13.2 SEC


(11.7-14.0) 


 





 


Prothromb Time International


Ratio 


 1.0 (0.8-1.1) 


 


 





 


Sodium Level


 


 142 mmol/L


(136-145) 


 





 


Potassium Level


 


 3.6 mmol/L


(3.5-5.1) 


 





 


Chloride Level


 


 104 mmol/L


() 


 





 


Carbon Dioxide Level


 


 35 mmol/L


(21-32) 


 





 


Anion Gap  3 (6-14)   


 


Blood Urea Nitrogen


 


 26 mg/dL


(8-26) 


 





 


Creatinine


 


 1.2 mg/dL


(0.7-1.3) 


 





 


Estimated GFR


(Cockcroft-Gault) 


 59.3 


 


 





 


Glucose Level


 


 276 mg/dL


(70-99) 


 





 


Lactic Acid Level


 


 1.5 mmol/L


(0.4-2.0) 


 





 


Calcium Level


 


 8.6 mg/dL


(8.5-10.1) 


 





 


Glucose (Fingerstick)


 


 


 282 mg/dL


(70-99) 





 


Urine Collection Type    Unknown 


 


Urine Color    Yellow 


 


Urine Clarity    Clear 


 


Urine pH    6.0 (<5.0-8.0) 


 


Urine Specific Gravity


 


 


 


 1.020


(1.000-1.030)


 


Urine Protein


 


 


 


 >=300 mg/dL


(NEG-TRACE)


 


Urine Glucose (UA)


 


 


 


 Negative mg/dL


(NEG)


 


Urine Ketones (Stick)


 


 


 


 Negative mg/dL


(NEG)


 


Urine Blood    Negative (NEG) 


 


Urine Nitrite    Negative (NEG) 


 


Urine Bilirubin    Negative (NEG) 


 


Urine Urobilinogen Dipstick


 


 


 


 0.2 mg/dL (0.2


mg/dL)


 


Urine Leukocyte Esterase    Negative (NEG) 


 


Urine RBC    Occ /HPF (0-2) 


 


Urine WBC    1-4 /HPF (0-4) 


 


Urine Bacteria    0 /HPF (0-FEW) 


 


Urine Hyaline Casts    Few /HPF 


 


Urine Mucus    Marked /LPF 


 


Test


 3/16/20


16:19 3/16/20


20:55 3/16/20


23:42 3/17/20


03:50


 


Glucose (Fingerstick)


 210 mg/dL


(70-99) 


 126 mg/dL


(70-99) 





 


O2 Saturation  95 % (92-99)   93 % (92-99) 


 


Arterial Blood pH


 


 7.57


(7.35-7.45) 


 7.41


(7.35-7.45)


 


Arterial Blood pCO2 at


Patient Temp 


 33 mmHg


(35-46) 


 52 mmHg


(35-46)


 


Arterial Blood pO2 at Patient


Temp 


 67 mmHg


() 


 68 mmHg


()


 


Arterial Blood HCO3


 


 29 mmol/L


(21-28) 


 32 mmol/L


(21-28)


 


Arterial Blood Base Excess


 


 7 mmol/L


(-3-3) 


 6 mmol/L


(-3-3)


 


FiO2  80   100 


 


Test


 3/17/20


05:40 3/17/20


05:42 3/17/20


08:00 





 


White Blood Count


 13.9 x10^3/uL


(4.0-11.0) 


 


 





 


Red Blood Count


 5.03 x10^6/uL


(4.30-5.70) 


 


 





 


Hemoglobin


 14.1 g/dL


(13.0-17.5) 


 


 





 


Hematocrit


 42.4 %


(39.0-53.0) 


 


 





 


Mean Corpuscular Volume 84 fL ()    


 


Mean Corpuscular Hemoglobin 28 pg (25-35)    


 


Mean Corpuscular Hemoglobin


Concent 33 g/dL


(31-37) 


 


 





 


Red Cell Distribution Width


 14.8 %


(11.5-14.5) 


 


 





 


Platelet Count


 205 x10^3/uL


(140-400) 


 


 





 


Neutrophils (%) (Auto) 74 % (31-73)    


 


Lymphocytes (%) (Auto) 15 % (24-48)    


 


Monocytes (%) (Auto) 10 % (0-9)    


 


Eosinophils (%) (Auto) 1 % (0-3)    


 


Basophils (%) (Auto) 1 % (0-3)    


 


Neutrophils # (Auto)


 10.3 x10^3/uL


(1.8-7.7) 


 


 





 


Lymphocytes # (Auto)


 2.0 x10^3/uL


(1.0-4.8) 


 


 





 


Monocytes # (Auto)


 1.3 x10^3/uL


(0.0-1.1) 


 


 





 


Eosinophils # (Auto)


 0.2 x10^3/uL


(0.0-0.7) 


 


 





 


Basophils # (Auto)


 0.1 x10^3/uL


(0.0-0.2) 


 


 





 


Sodium Level


 146 mmol/L


(136-145) 


 


 





 


Potassium Level


 3.5 mmol/L


(3.5-5.1) 


 


 





 


Chloride Level


 106 mmol/L


() 


 


 





 


Carbon Dioxide Level


 34 mmol/L


(21-32) 


 


 





 


Anion Gap 6 (6-14)    


 


Blood Urea Nitrogen


 32 mg/dL


(8-26) 


 


 





 


Creatinine


 1.2 mg/dL


(0.7-1.3) 


 


 





 


Estimated GFR


(Cockcroft-Gault) 59.3 


 


 


 





 


Glucose Level


 122 mg/dL


(70-99) 


 


 





 


Calcium Level


 8.1 mg/dL


(8.5-10.1) 


 


 





 


Glucose (Fingerstick)


 


 124 mg/dL


(70-99) 


 





 


O2 Saturation   96 % (92-99)  


 


Arterial Blood pH


 


 


 7.43


(7.35-7.45) 





 


Arterial Blood pCO2 at


Patient Temp 


 


 50 mmHg


(35-46) 





 


Arterial Blood pO2 at Patient


Temp 


 


 89 mmHg


() 





 


Arterial Blood HCO3


 


 


 32 mmol/L


(21-28) 





 


Arterial Blood Base Excess


 


 


 7 mmol/L


(-3-3) 





 


FiO2   80  











Microbiology


Micro





Microbiology


3/13/20 Urine Culture - Preliminary, Resulted


          


3/13/20 Urine Culture Result 1 (CAR) - Preliminary, Resulted


          


3/13/20 Blood Culture - Preliminary, Resulted


          NO GROWTH AFTER 4 DAYS





Physical Exam


HEENT:  Neck Supple W Full Motion


Chest:  Symmetric


LUNGS:  Other (mechanical ventilation )


Heart:  S1S2, RRR (SB/SR with PVCs), other (distant heart tones)


Abdomen:  Other (soft, obese)


Extremities:  Other (1-2+ bilateral LE edema )


Neurology:  other (sedated)





Assessment


Assessment


1. Acute on chronic diastolic CHF; Echo with preserved LV systolic function .


2. NSTEMI: EKG LBBB no prior for comparison.


2. Acute respiratory failure with CHF; s/p intubation 


3. DM2: insulin dependent


4. Accelerated HTN: controlled


5. Morbid obesity


6. Leukocytosis with UTI: on antibiotics per PCP


7. Hypokalemia, hypomagnesemia 


8. Bradycardia, sinus. lowest 48 overnight. No pauses. 








Recommendations





Needs further diuresis; start Lasix gtt 


Limited echo to assess LV systolic function 


Will await covid 19 results; if negative, will plan for left heart cath prior to

extubation. Likely Thursday


Ongoing supportive care





MAURICIO FRANCISCO MD 3/17/20 1758:


CARDIO Progress Notes


Plan


Plan


Pt. seen and examined. Agree with above NP note. 


Plan for IV lasix gtt. Consider cath after diuresis and await COVID rule out. 


Thanks











CASH DELAROSA           Mar 17, 2020 11:18


MAURICIO FRANCISCO MD       Mar 17, 2020 17:58

## 2020-03-17 NOTE — NUR
Patient saturation slowly declined to 85% after being turned to right side. After 
repositioning patient back on his back, suctioning, and giving 100%FiO2 for 2mins-patient 
saturation stayed the same. RT paged and arrived- settings on ventilator adjusted with FiO2 
to 100% from 80% and ABG drawn-with no critical results. Patient saturation slowly improved 
with adjusted vent settings. Once patient reached 98%O2 sat this RN spoke with RT- vent 
setting FiO2 back to 80%. CXR for this morning.Patient tolerating and remained on back side 
during this time.

## 2020-03-17 NOTE — NUR
IP: Pt was denied COVID-19 testing by [facility],. Physicians still want pt tested so sample will 
be sent to [facility] today with a 3-4 day turn around for results. Pt to be in droplet 
precautions.

## 2020-03-17 NOTE — NUR
SS following for discharge planning. SS reviewed pt chart. Per pt's RN, pt is from home with 
spouse and is currently on the vent. Pt is post code. SS will continue to follow for 
discharge planning.

## 2020-03-17 NOTE — PDOC
Infectious Disease Note


Subjective:


Subjective





Pt intubated/sedated


on FiO2 100%


no fevers


weaning off pressors





Vital Signs:


Vital Signs





Vital Signs








  Date Time  Temp Pulse Resp B/P (MAP) Pulse Ox O2 Delivery O2 Flow Rate FiO2


 


3/17/20 07:00  50 18 118/43 (68) 97 Ventilator  


 


3/17/20 04:00 98.6       





 98.6       


 


3/16/20 08:00       4.0 











Physical Exam:


PHYSICAL EXAM


GENERAL: intubated/sedated


HEENT  intubated, ETT+,NGT +,no icterus


Neck supple , central line present


LUNGS:  Decreased breath sounds bases,  


HEART:  S1, S2 


ABDOMEN: Obese, soft, nontender


 mansfield in place 3/16


EXTREMITIES:  2+ pitting edema present. No cyanosis 


SKIN: Warm to touch. No signs of rash


NEURO:intubated





Medications:


Inpatient Meds:





Current Medications








 Medications


  (Trade)  Dose


 Ordered  Sig/Elisa  Start Time


 Stop Time Status Last Admin


Dose Admin


 


 Albuterol/


 Ipratropium


  (Duoneb)  3 ml  1X  ONCE  3/16/20 10:00


 3/16/20 10:01 DC 3/16/20 10:00


3 ML


 


 Amlodipine


 Besylate


  (Norvasc)  5 mg  DAILY  3/15/20 16:00


    3/16/20 08:13


5 MG


 


 Aspirin


  (Ecotrin)  325 mg  1X  ONCE  3/13/20 16:00


 3/13/20 16:01 DC 3/13/20 16:24


325 MG


 


 Atenolol


  (Tenormin)  25 mg  DAILY  3/17/20 09:00


     





 


 Atorvastatin


 Calcium


  (Lipitor)  40 mg  QHS  3/13/20 21:00


    3/16/20 21:04


40 MG


 


 Dextrose


  (Dextrose


 50%-Water Syringe)  12.5 gm  PRN Q15MIN  PRN  3/13/20 14:00


     





 


 Dobutamine HCl/


 Dextrose  250 ml @ 


 8.43 mls/hr  CONT  PRN  3/16/20 13:30


     





 


 Etomidate


  (Amidate)  20 mg  STK-MED ONCE  3/16/20 10:23


 3/16/20 10:23 DC  





 


 Fentanyl Citrate  30 ml @ 0


 mls/hr  CONT  PRN  3/16/20 22:30


    3/16/20 22:54


1.25 MLS/HR


 


 Fentanyl Citrate


  (Fentanyl 2ml


 Vial)  100 mcg  1X  ONCE  3/16/20 11:00


 3/16/20 11:01 DC 3/16/20 11:00


25 MCG


 


 Furosemide


  (Lasix)  40 mg  BID92  3/14/20 09:00


    3/16/20 14:28


40 MG


 


 Heparin Sodium


  (Porcine)


  (Heparin Sodium)  2,500 unit  1X  ONCE  3/16/20 11:00


 3/16/20 11:01 DC  





 


 Heparin Sodium/


 Dextrose  250 ml @ 0


 mls/hr  CONT  PRN  3/13/20 16:15


    3/15/20 20:31


20.6 MLS/HR


 


 Heparin Sodium/


 Sodium Chloride


  (HEPARIN for


 ARTERIAL LINE


 FLUSH)  1,000 unit  1X  ONCE  3/16/20 11:00


 3/16/20 11:01 DC 3/16/20 11:00


1,000 UNIT


 


 Info


  (Anti-Coagulation


 Monitoring By


 Pharmacy)  1 each  PRN DAILY  PRN  3/13/20 16:15


    3/16/20 13:49


1 EACH


 


 Insulin Glargine


  (Lantus Syringe)  80 unit  DAILY08  3/14/20 08:00


    3/16/20 08:00


80 UNIT


 


 Insulin Human


 Lispro


  (HumaLOG)  0-9 UNITS  Q6HRS  3/17/20 00:00


     





 


 Iodixanol


  (Visipaque 320)  100 ml  1X  ONCE  3/16/20 11:00


 3/16/20 11:01 DC  





 


 Labetalol HCl


  (Normodyne Iv


 Push)  10 mg  1X  ONCE  3/13/20 09:15


 3/13/20 09:12 DC  





 


 Lactobacillus


 Rhamnosus


  (Culturelle)  1 cap  BID  3/14/20 21:00


    3/16/20 21:03


1 CAP


 


 Lidocaine HCl


  (Lidocaine 1%


 20ml Vial)  20 ml  1X  ONCE  3/16/20 11:00


 3/16/20 11:01 DC  





 


 Lisinopril


  (Prinivil)  40 mg  DAILY  3/14/20 09:00


    3/16/20 08:12


40 MG


 


 Magnesium Sulfate  50 ml @ 25


 mls/hr  1X  ONCE  3/16/20 13:00


 3/16/20 14:59 DC 3/16/20 14:28


25 MLS/HR


 


 Methylprednisolone


 Sodium Succinate


  (SOLU-Medrol


 125MG VIAL)  125 mg  1X  ONCE  3/13/20 09:15


 3/13/20 09:16 DC 3/13/20 09:27


125 MG


 


 Metolazone


  (Zaroxolyn)  2.5 mg  DAILY  3/17/20 09:00


     





 


 Metoprolol


 Tartrate


  (Lopressor)  50 mg  BID  3/13/20 21:00


   UNV  





 


 Midazolam HCl


  (Versed)  2 mg  1X  ONCE  3/16/20 11:00


 3/16/20 11:01 DC 3/16/20 11:00


1 MG


 


 Midazolam HCl 50


 mg/Sodium Chloride  50 ml @ 1


 mls/hr  CONT  PRN  3/16/20 13:00


   UNV  





 


 Nitroglycerin


  (Nitroglycerin)  200 mcg  1X  ONCE  3/16/20 11:00


 3/16/20 11:01 DC 3/16/20 11:00


200 MCG


 


 Nitroglycerin/


 Dextrose  250 ml @ 


 As Directed  STK-MED ONCE  3/16/20 09:39


 3/16/20 09:39 DC  





 


 Norepinephrine


 Bitartrate 8 mg/


 Dextrose  258 ml @ 


 27.09 mls/


 hr  CONT  PRN  3/16/20 11:15


    3/16/20 12:55


27.09 MLS/HR


 


 Piperacillin Sod/


 Tazobactam Sod


 3.375 gm/Sodium


 Chloride  50 ml @ 


 100 mls/hr  Q6HRS  3/13/20 18:00


    3/17/20 05:43


100 MLS/HR


 


 Potassium


 Chloride/Water  100 ml @ 


 100 mls/hr  1X  ONCE  3/16/20 13:00


 3/16/20 13:59 DC 3/16/20 13:06


100 MLS/HR


 


 Propofol  100 ml @ 


 As Directed  STK-MED ONCE  3/16/20 12:39


 3/16/20 12:40 DC  





 


 Sodium Chloride  1,000 ml @ 


 75 mls/hr  [id number]  3/16/20 06:00


     





 


 Succinylcholine


 Chloride


  (Anectine)  200 mg  STK-MED ONCE  3/16/20 10:23


 3/16/20 10:23 DC  





 


 Vancomycin HCl  250 ml @ 


 250 mls/hr  1X  ONCE  3/13/20 09:30


 3/13/20 10:29 UNV  





 


 Vancomycin HCl 2


 gm/Sodium Chloride  500 ml @ 


 250 mls/hr  1X  ONCE  3/13/20 09:45


 3/13/20 11:44 DC 3/13/20 09:56


250 MLS/HR


 


 Verapamil HCl


  (Verapamil)  2.5 mg  1X  ONCE  3/16/20 11:00


 3/16/20 11:01 DC  














Labs:


Lab





Laboratory Tests








Test


 3/16/20


11:50 3/16/20


12:00 3/16/20


12:08 3/16/20


13:45


 


O2 Saturation 91 % (92-99)    


 


Arterial Blood pH


 7.29


(7.35-7.45) 


 


 





 


Arterial Blood pCO2 at


Patient Temp 60 mmHg


(35-46) 


 


 





 


Arterial Blood pO2 at Patient


Temp 67 mmHg


() 


 


 





 


Arterial Blood HCO3


 28 mmol/L


(21-28) 


 


 





 


Arterial Blood Base Excess


 0 mmol/L


(-3-3) 


 


 





 


FiO2 100    


 


Prothrombin Time


 


 13.2 SEC


(11.7-14.0) 


 





 


Prothromb Time International


Ratio 


 1.0 (0.8-1.1) 


 


 





 


Sodium Level


 


 142 mmol/L


(136-145) 


 





 


Potassium Level


 


 3.6 mmol/L


(3.5-5.1) 


 





 


Chloride Level


 


 104 mmol/L


() 


 





 


Carbon Dioxide Level


 


 35 mmol/L


(21-32) 


 





 


Anion Gap  3 (6-14)   


 


Blood Urea Nitrogen


 


 26 mg/dL


(8-26) 


 





 


Creatinine


 


 1.2 mg/dL


(0.7-1.3) 


 





 


Estimated GFR


(Cockcroft-Gault) 


 59.3 


 


 





 


Glucose Level


 


 276 mg/dL


(70-99) 


 





 


Lactic Acid Level


 


 1.5 mmol/L


(0.4-2.0) 


 





 


Calcium Level


 


 8.6 mg/dL


(8.5-10.1) 


 





 


Glucose (Fingerstick)


 


 


 282 mg/dL


(70-99) 





 


Urine Collection Type    Unknown 


 


Urine Color    Yellow 


 


Urine Clarity    Clear 


 


Urine pH    6.0 (<5.0-8.0) 


 


Urine Specific Gravity


 


 


 


 1.020


(1.000-1.030)


 


Urine Protein


 


 


 


 >=300 mg/dL


(NEG-TRACE)


 


Urine Glucose (UA)


 


 


 


 Negative mg/dL


(NEG)


 


Urine Ketones (Stick)


 


 


 


 Negative mg/dL


(NEG)


 


Urine Blood    Negative (NEG) 


 


Urine Nitrite    Negative (NEG) 


 


Urine Bilirubin    Negative (NEG) 


 


Urine Urobilinogen Dipstick


 


 


 


 0.2 mg/dL (0.2


mg/dL)


 


Urine Leukocyte Esterase    Negative (NEG) 


 


Urine RBC    Occ /HPF (0-2) 


 


Urine WBC    1-4 /HPF (0-4) 


 


Urine Bacteria    0 /HPF (0-FEW) 


 


Urine Hyaline Casts    Few /HPF 


 


Urine Mucus    Marked /LPF 


 


Test


 3/16/20


16:19 3/16/20


20:55 3/16/20


23:42 3/17/20


03:50


 


Glucose (Fingerstick)


 210 mg/dL


(70-99) 


 126 mg/dL


(70-99) 





 


O2 Saturation  95 % (92-99)   93 % (92-99) 


 


Arterial Blood pH


 


 7.57


(7.35-7.45) 


 7.41


(7.35-7.45)


 


Arterial Blood pCO2 at


Patient Temp 


 33 mmHg


(35-46) 


 52 mmHg


(35-46)


 


Arterial Blood pO2 at Patient


Temp 


 67 mmHg


() 


 68 mmHg


()


 


Arterial Blood HCO3


 


 29 mmol/L


(21-28) 


 32 mmol/L


(21-28)


 


Arterial Blood Base Excess


 


 7 mmol/L


(-3-3) 


 6 mmol/L


(-3-3)


 


FiO2  80   100 


 


Test


 3/17/20


05:40 3/17/20


05:42 


 





 


White Blood Count


 13.9 x10^3/uL


(4.0-11.0) 


 


 





 


Red Blood Count


 5.03 x10^6/uL


(4.30-5.70) 


 


 





 


Hemoglobin


 14.1 g/dL


(13.0-17.5) 


 


 





 


Hematocrit


 42.4 %


(39.0-53.0) 


 


 





 


Mean Corpuscular Volume 84 fL ()    


 


Mean Corpuscular Hemoglobin 28 pg (25-35)    


 


Mean Corpuscular Hemoglobin


Concent 33 g/dL


(31-37) 


 


 





 


Red Cell Distribution Width


 14.8 %


(11.5-14.5) 


 


 





 


Platelet Count


 205 x10^3/uL


(140-400) 


 


 





 


Neutrophils (%) (Auto) 74 % (31-73)    


 


Lymphocytes (%) (Auto) 15 % (24-48)    


 


Monocytes (%) (Auto) 10 % (0-9)    


 


Eosinophils (%) (Auto) 1 % (0-3)    


 


Basophils (%) (Auto) 1 % (0-3)    


 


Neutrophils # (Auto)


 10.3 x10^3/uL


(1.8-7.7) 


 


 





 


Lymphocytes # (Auto)


 2.0 x10^3/uL


(1.0-4.8) 


 


 





 


Monocytes # (Auto)


 1.3 x10^3/uL


(0.0-1.1) 


 


 





 


Eosinophils # (Auto)


 0.2 x10^3/uL


(0.0-0.7) 


 


 





 


Basophils # (Auto)


 0.1 x10^3/uL


(0.0-0.2) 


 


 





 


Sodium Level


 146 mmol/L


(136-145) 


 


 





 


Potassium Level


 3.5 mmol/L


(3.5-5.1) 


 


 





 


Chloride Level


 106 mmol/L


() 


 


 





 


Carbon Dioxide Level


 34 mmol/L


(21-32) 


 


 





 


Anion Gap 6 (6-14)    


 


Blood Urea Nitrogen


 32 mg/dL


(8-26) 


 


 





 


Creatinine


 1.2 mg/dL


(0.7-1.3) 


 


 





 


Estimated GFR


(Cockcroft-Gault) 59.3 


 


 


 





 


Glucose Level


 122 mg/dL


(70-99) 


 


 





 


Calcium Level


 8.1 mg/dL


(8.5-10.1) 


 


 





 


Glucose (Fingerstick)


 


 124 mg/dL


(70-99) 


 














Objective:


Assessment:





Acute resp failure s/p intubation


NSTEMI:


Leucocytosis 


CHF


DM2


Morbid obesity


Hypertension.


Partial nephrectomy.


Pyuria UC 50-100K streptococcus 3/13





Plan:


Plan of Care


Continue Zosyn


Supportive care 


f/u cult and labs


d/w TRISTAN AGUIRRE MD           Mar 17, 2020 08:56

## 2020-03-17 NOTE — PDOC
PULMONARY PROGRESS NOTES


Subjective


AC MODE SEDATED OFF PRESSORS


Vitals





Vital Signs








  Date Time  Temp Pulse Resp B/P (MAP) Pulse Ox O2 Delivery O2 Flow Rate FiO2


 


3/17/20 07:00  50 18 118/43 (68) 97 Ventilator  


 


3/17/20 04:00 98.6       





 98.6       


 


3/16/20 08:00       4.0 








ROS:  No Chest Pain


Lungs:  Crackles


Cardiovascular:  S1, S2


Abdomen:  Soft, Non-tender


Extremities:  No Edema


Skin:  Warm


Labs





Laboratory Tests








Test


 3/15/20


12:07 3/15/20


17:07 3/15/20


20:18 3/16/20


04:55


 


Glucose (Fingerstick)


 193 mg/dL


(70-99) 168 mg/dL


(70-99) 190 mg/dL


(70-99) 





 


White Blood Count


 


 


 


 10.0 x10^3/uL


(4.0-11.0)


 


Red Blood Count


 


 


 


 5.11 x10^6/uL


(4.30-5.70)


 


Hemoglobin


 


 


 


 14.4 g/dL


(13.0-17.5)


 


Hematocrit


 


 


 


 43.5 %


(39.0-53.0)


 


Mean Corpuscular Volume    85 fL () 


 


Mean Corpuscular Hemoglobin    28 pg (25-35) 


 


Mean Corpuscular Hemoglobin


Concent 


 


 


 33 g/dL


(31-37)


 


Red Cell Distribution Width


 


 


 


 14.5 %


(11.5-14.5)


 


Platelet Count


 


 


 


 181 x10^3/uL


(140-400)


 


Neutrophils (%) (Auto)    62 % (31-73) 


 


Lymphocytes (%) (Auto)    23 % (24-48) 


 


Monocytes (%) (Auto)    10 % (0-9) 


 


Eosinophils (%) (Auto)    4 % (0-3) 


 


Basophils (%) (Auto)    1 % (0-3) 


 


Neutrophils # (Auto)


 


 


 


 6.2 x10^3/uL


(1.8-7.7)


 


Lymphocytes # (Auto)


 


 


 


 2.3 x10^3/uL


(1.0-4.8)


 


Monocytes # (Auto)


 


 


 


 1.0 x10^3/uL


(0.0-1.1)


 


Eosinophils # (Auto)


 


 


 


 0.4 x10^3/uL


(0.0-0.7)


 


Basophils # (Auto)


 


 


 


 0.1 x10^3/uL


(0.0-0.2)


 


Heparin Anti-Xa Act,


Unfractionated 


 


 


 0.32 IU/mL


(0.30-0.70)


 


Sodium Level


 


 


 


 146 mmol/L


(136-145)


 


Potassium Level


 


 


 


 3.5 mmol/L


(3.5-5.1)


 


Chloride Level


 


 


 


 104 mmol/L


()


 


Carbon Dioxide Level


 


 


 


 36 mmol/L


(21-32)


 


Anion Gap    6 (6-14) 


 


Blood Urea Nitrogen


 


 


 


 23 mg/dL


(8-26)


 


Creatinine


 


 


 


 1.0 mg/dL


(0.7-1.3)


 


Estimated GFR


(Cockcroft-Gault) 


 


 


 73.2 





 


Glucose Level


 


 


 


 169 mg/dL


(70-99)


 


Calcium Level


 


 


 


 9.0 mg/dL


(8.5-10.1)


 


Magnesium Level


 


 


 


 1.7 mg/dL


(1.8-2.4)


 


Test


 3/16/20


11:50 3/16/20


12:00 3/16/20


12:08 3/16/20


13:45


 


O2 Saturation 91 % (92-99)    


 


Arterial Blood pH


 7.29


(7.35-7.45) 


 


 





 


Arterial Blood pCO2 at


Patient Temp 60 mmHg


(35-46) 


 


 





 


Arterial Blood pO2 at Patient


Temp 67 mmHg


() 


 


 





 


Arterial Blood HCO3


 28 mmol/L


(21-28) 


 


 





 


Arterial Blood Base Excess


 0 mmol/L


(-3-3) 


 


 





 


FiO2 100    


 


Prothrombin Time


 


 13.2 SEC


(11.7-14.0) 


 





 


Prothromb Time International


Ratio 


 1.0 (0.8-1.1) 


 


 





 


Sodium Level


 


 142 mmol/L


(136-145) 


 





 


Potassium Level


 


 3.6 mmol/L


(3.5-5.1) 


 





 


Chloride Level


 


 104 mmol/L


() 


 





 


Carbon Dioxide Level


 


 35 mmol/L


(21-32) 


 





 


Anion Gap  3 (6-14)   


 


Blood Urea Nitrogen


 


 26 mg/dL


(8-26) 


 





 


Creatinine


 


 1.2 mg/dL


(0.7-1.3) 


 





 


Estimated GFR


(Cockcroft-Gault) 


 59.3 


 


 





 


Glucose Level


 


 276 mg/dL


(70-99) 


 





 


Lactic Acid Level


 


 1.5 mmol/L


(0.4-2.0) 


 





 


Calcium Level


 


 8.6 mg/dL


(8.5-10.1) 


 





 


Glucose (Fingerstick)


 


 


 282 mg/dL


(70-99) 





 


Urine Collection Type    Unknown 


 


Urine Color    Yellow 


 


Urine Clarity    Clear 


 


Urine pH    6.0 (<5.0-8.0) 


 


Urine Specific Gravity


 


 


 


 1.020


(1.000-1.030)


 


Urine Protein


 


 


 


 >=300 mg/dL


(NEG-TRACE)


 


Urine Glucose (UA)


 


 


 


 Negative mg/dL


(NEG)


 


Urine Ketones (Stick)


 


 


 


 Negative mg/dL


(NEG)


 


Urine Blood    Negative (NEG) 


 


Urine Nitrite    Negative (NEG) 


 


Urine Bilirubin    Negative (NEG) 


 


Urine Urobilinogen Dipstick


 


 


 


 0.2 mg/dL (0.2


mg/dL)


 


Urine Leukocyte Esterase    Negative (NEG) 


 


Urine RBC    Occ /HPF (0-2) 


 


Urine WBC    1-4 /HPF (0-4) 


 


Urine Bacteria    0 /HPF (0-FEW) 


 


Urine Hyaline Casts    Few /HPF 


 


Urine Mucus    Marked /LPF 


 


Test


 3/16/20


16:19 3/16/20


20:55 3/16/20


23:42 3/17/20


03:50


 


Glucose (Fingerstick)


 210 mg/dL


(70-99) 


 126 mg/dL


(70-99) 





 


O2 Saturation  95 % (92-99)   93 % (92-99) 


 


Arterial Blood pH


 


 7.57


(7.35-7.45) 


 7.41


(7.35-7.45)


 


Arterial Blood pCO2 at


Patient Temp 


 33 mmHg


(35-46) 


 52 mmHg


(35-46)


 


Arterial Blood pO2 at Patient


Temp 


 67 mmHg


() 


 68 mmHg


()


 


Arterial Blood HCO3


 


 29 mmol/L


(21-28) 


 32 mmol/L


(21-28)


 


Arterial Blood Base Excess


 


 7 mmol/L


(-3-3) 


 6 mmol/L


(-3-3)


 


FiO2  80   100 


 


Test


 3/17/20


05:40 3/17/20


05:42 


 





 


White Blood Count


 13.9 x10^3/uL


(4.0-11.0) 


 


 





 


Red Blood Count


 5.03 x10^6/uL


(4.30-5.70) 


 


 





 


Hemoglobin


 14.1 g/dL


(13.0-17.5) 


 


 





 


Hematocrit


 42.4 %


(39.0-53.0) 


 


 





 


Mean Corpuscular Volume 84 fL ()    


 


Mean Corpuscular Hemoglobin 28 pg (25-35)    


 


Mean Corpuscular Hemoglobin


Concent 33 g/dL


(31-37) 


 


 





 


Red Cell Distribution Width


 14.8 %


(11.5-14.5) 


 


 





 


Platelet Count


 205 x10^3/uL


(140-400) 


 


 





 


Neutrophils (%) (Auto) 74 % (31-73)    


 


Lymphocytes (%) (Auto) 15 % (24-48)    


 


Monocytes (%) (Auto) 10 % (0-9)    


 


Eosinophils (%) (Auto) 1 % (0-3)    


 


Basophils (%) (Auto) 1 % (0-3)    


 


Neutrophils # (Auto)


 10.3 x10^3/uL


(1.8-7.7) 


 


 





 


Lymphocytes # (Auto)


 2.0 x10^3/uL


(1.0-4.8) 


 


 





 


Monocytes # (Auto)


 1.3 x10^3/uL


(0.0-1.1) 


 


 





 


Eosinophils # (Auto)


 0.2 x10^3/uL


(0.0-0.7) 


 


 





 


Basophils # (Auto)


 0.1 x10^3/uL


(0.0-0.2) 


 


 





 


Sodium Level


 146 mmol/L


(136-145) 


 


 





 


Potassium Level


 3.5 mmol/L


(3.5-5.1) 


 


 





 


Chloride Level


 106 mmol/L


() 


 


 





 


Carbon Dioxide Level


 34 mmol/L


(21-32) 


 


 





 


Anion Gap 6 (6-14)    


 


Blood Urea Nitrogen


 32 mg/dL


(8-26) 


 


 





 


Creatinine


 1.2 mg/dL


(0.7-1.3) 


 


 





 


Estimated GFR


(Cockcroft-Gault) 59.3 


 


 


 





 


Glucose Level


 122 mg/dL


(70-99) 


 


 





 


Calcium Level


 8.1 mg/dL


(8.5-10.1) 


 


 





 


Glucose (Fingerstick)


 


 124 mg/dL


(70-99) 


 











Laboratory Tests








Test


 3/16/20


11:50 3/16/20


12:00 3/16/20


12:08 3/16/20


13:45


 


O2 Saturation 91 % (92-99)    


 


Arterial Blood pH


 7.29


(7.35-7.45) 


 


 





 


Arterial Blood pCO2 at


Patient Temp 60 mmHg


(35-46) 


 


 





 


Arterial Blood pO2 at Patient


Temp 67 mmHg


() 


 


 





 


Arterial Blood HCO3


 28 mmol/L


(21-28) 


 


 





 


Arterial Blood Base Excess


 0 mmol/L


(-3-3) 


 


 





 


FiO2 100    


 


Prothrombin Time


 


 13.2 SEC


(11.7-14.0) 


 





 


Prothromb Time International


Ratio 


 1.0 (0.8-1.1) 


 


 





 


Sodium Level


 


 142 mmol/L


(136-145) 


 





 


Potassium Level


 


 3.6 mmol/L


(3.5-5.1) 


 





 


Chloride Level


 


 104 mmol/L


() 


 





 


Carbon Dioxide Level


 


 35 mmol/L


(21-32) 


 





 


Anion Gap  3 (6-14)   


 


Blood Urea Nitrogen


 


 26 mg/dL


(8-26) 


 





 


Creatinine


 


 1.2 mg/dL


(0.7-1.3) 


 





 


Estimated GFR


(Cockcroft-Gault) 


 59.3 


 


 





 


Glucose Level


 


 276 mg/dL


(70-99) 


 





 


Lactic Acid Level


 


 1.5 mmol/L


(0.4-2.0) 


 





 


Calcium Level


 


 8.6 mg/dL


(8.5-10.1) 


 





 


Glucose (Fingerstick)


 


 


 282 mg/dL


(70-99) 





 


Urine Collection Type    Unknown 


 


Urine Color    Yellow 


 


Urine Clarity    Clear 


 


Urine pH    6.0 (<5.0-8.0) 


 


Urine Specific Gravity


 


 


 


 1.020


(1.000-1.030)


 


Urine Protein


 


 


 


 >=300 mg/dL


(NEG-TRACE)


 


Urine Glucose (UA)


 


 


 


 Negative mg/dL


(NEG)


 


Urine Ketones (Stick)


 


 


 


 Negative mg/dL


(NEG)


 


Urine Blood    Negative (NEG) 


 


Urine Nitrite    Negative (NEG) 


 


Urine Bilirubin    Negative (NEG) 


 


Urine Urobilinogen Dipstick


 


 


 


 0.2 mg/dL (0.2


mg/dL)


 


Urine Leukocyte Esterase    Negative (NEG) 


 


Urine RBC    Occ /HPF (0-2) 


 


Urine WBC    1-4 /HPF (0-4) 


 


Urine Bacteria    0 /HPF (0-FEW) 


 


Urine Hyaline Casts    Few /HPF 


 


Urine Mucus    Marked /LPF 


 


Test


 3/16/20


16:19 3/16/20


20:55 3/16/20


23:42 3/17/20


03:50


 


Glucose (Fingerstick)


 210 mg/dL


(70-99) 


 126 mg/dL


(70-99) 





 


O2 Saturation  95 % (92-99)   93 % (92-99) 


 


Arterial Blood pH


 


 7.57


(7.35-7.45) 


 7.41


(7.35-7.45)


 


Arterial Blood pCO2 at


Patient Temp 


 33 mmHg


(35-46) 


 52 mmHg


(35-46)


 


Arterial Blood pO2 at Patient


Temp 


 67 mmHg


() 


 68 mmHg


()


 


Arterial Blood HCO3


 


 29 mmol/L


(21-28) 


 32 mmol/L


(21-28)


 


Arterial Blood Base Excess


 


 7 mmol/L


(-3-3) 


 6 mmol/L


(-3-3)


 


FiO2  80   100 


 


Test


 3/17/20


05:40 3/17/20


05:42 


 





 


White Blood Count


 13.9 x10^3/uL


(4.0-11.0) 


 


 





 


Red Blood Count


 5.03 x10^6/uL


(4.30-5.70) 


 


 





 


Hemoglobin


 14.1 g/dL


(13.0-17.5) 


 


 





 


Hematocrit


 42.4 %


(39.0-53.0) 


 


 





 


Mean Corpuscular Volume 84 fL ()    


 


Mean Corpuscular Hemoglobin 28 pg (25-35)    


 


Mean Corpuscular Hemoglobin


Concent 33 g/dL


(31-37) 


 


 





 


Red Cell Distribution Width


 14.8 %


(11.5-14.5) 


 


 





 


Platelet Count


 205 x10^3/uL


(140-400) 


 


 





 


Neutrophils (%) (Auto) 74 % (31-73)    


 


Lymphocytes (%) (Auto) 15 % (24-48)    


 


Monocytes (%) (Auto) 10 % (0-9)    


 


Eosinophils (%) (Auto) 1 % (0-3)    


 


Basophils (%) (Auto) 1 % (0-3)    


 


Neutrophils # (Auto)


 10.3 x10^3/uL


(1.8-7.7) 


 


 





 


Lymphocytes # (Auto)


 2.0 x10^3/uL


(1.0-4.8) 


 


 





 


Monocytes # (Auto)


 1.3 x10^3/uL


(0.0-1.1) 


 


 





 


Eosinophils # (Auto)


 0.2 x10^3/uL


(0.0-0.7) 


 


 





 


Basophils # (Auto)


 0.1 x10^3/uL


(0.0-0.2) 


 


 





 


Sodium Level


 146 mmol/L


(136-145) 


 


 





 


Potassium Level


 3.5 mmol/L


(3.5-5.1) 


 


 





 


Chloride Level


 106 mmol/L


() 


 


 





 


Carbon Dioxide Level


 34 mmol/L


(21-32) 


 


 





 


Anion Gap 6 (6-14)    


 


Blood Urea Nitrogen


 32 mg/dL


(8-26) 


 


 





 


Creatinine


 1.2 mg/dL


(0.7-1.3) 


 


 





 


Estimated GFR


(Cockcroft-Gault) 59.3 


 


 


 





 


Glucose Level


 122 mg/dL


(70-99) 


 


 





 


Calcium Level


 8.1 mg/dL


(8.5-10.1) 


 


 





 


Glucose (Fingerstick)


 


 124 mg/dL


(70-99) 


 











Medications





Active Scripts








 Medications  Dose


 Route/Sig


 Max Daily Dose Days Date Category


 


 Saw [place] (Saw


 [place])


 450 Mg Capsule  450 Mg


 PO DAILY


   3/13/20 Reported


 


 D3-50


  (Cholecalciferol


  (Vitamin D3))


 50,000 Unit


 Capsule  1,000 Unit


 PO DAILY


   3/13/20 Reported


 


 Emergen-C 500 mg


 Chewable Tab (Vit


 C/Ascorb


 Sod/Multivit-Min)


 500 Mg Tab.chew  500 Mg


 PO DAILY


   3/13/20 Reported


 


 Zoloft


  (Sertraline Hcl)


 50 Mg Tablet  50 Mg


 PO DAILY


   3/13/20 Reported


 


 Trazodone Hcl 50


 Mg Tablet  1 Tab


 PO QHS


   3/13/20 Reported


 


 Trulicity


  (Dulaglutide)


 0.75 Mg/0.5 Ml


 Pen.injctr  0.75 Mg


 SQ WEEKLY


   3/13/20 Reported


 


 Insulin Aspart


 100 Unit/1 Ml Vial  25 Unit


 SQ TIDWMEALS


   3/13/20 Reported


 


 Levemir (Insulin


 Detemir) 100


 Unit/1 Ml Vial  80 Unit


 SQ DAILY


   3/13/20 Reported


 


 Lasix


  (Furosemide) 20


 Mg Tablet  20 Mg


 PO BID


   3/13/20 Reported


 


 Flomax


  (Tamsulosin Hcl)


 0.4 Mg Cap.er.24h  0.4 Mg


 PO HS


   3/13/20 Reported


 


 Omeprazole 20 Mg


 Capsule.dr  20 Mg


 PO BID


   3/13/20 Reported


 


 Lisinopril 40 Mg


 Tablet  1 Tab


 PO DAILY


   3/13/20 Reported


 


 Atenolol 100 Mg


 Tablet  100 Mg


 PO BID


   3/13/20 Reported








Comments

















ECHO


The left ventricle is normal size.


The left ventricular systolic function is normal.


The Ejection Fraction is 50-55%.


There is mild concentric left ventricular hypertrophy.


Doppler and Color Flow revealed trace aortic regurgitation.


There is no significant aortic valvular stenosis.


Doppler and Color-flow revealed trace mitral regurgitation.


Doppler and Color Flow revealed trace tricuspid regurgitation with an estimated 

PAP of 28 mmHg.





Impression


.


IMPRESSION:


1.  Acute hypercapnic and hypoxic respiratory failure sec to flash pulmonary 

edema


2.  Abnormal chest x-ray with bilateral interstitial infiltrates suggesting 

interstitial edema


3.  Leukocytosis


4.  No significant tobacco history.


5.  Suspected obesity hypoventilation syndrome and obstructive sleep apnea,


never been tested.


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


7.  HYPOTENSION IMPROVED








3/17


CXR


IMPRESSION:  


1. Stable life support devices.


2. Improving but persistent bilateral perihilar and basilar opacities.





Plan


.


IV LASIX DRIP


AC MODE


REPEAT ABG NOTED MADE ADJUSTMENTS





LEVOPHED FOR HYPOTENSION NOW OFF


RECOMMENDATIONS:


ANTI ABX PER ID





CCT 30 MINUTES D/W CARDIOLOGY/ REVIEWING LABS/DATA/CXR











ADALBERTO GAXIOLA MD              Mar 17, 2020 08:16

## 2020-03-17 NOTE — PDOC
[facility] PROGRESS NOTE


Chief Complaint


Chief Complaint


0. Status post CODE BLUE


1. Acute CHF with possible combined systolic/diastolic dysfunction. 

Significantly improved. Continuing present treatment.


2. NSTEMI: no CP but suspecting ischemia as culprit. EKG LBBB no prior for 

comparison. Tentatively plan for heart catheterization tomorrow. Discussed with 

the patient.


2. Acute respiratory failure with CHF and possibly ongoing RAVINDER


3. DM2: insulin dependent


4. Accelerated HTN: better


5. Morbid obesity


6. Leukocytosis with UTI: on antibiotics per PCP





History of Present Illness


History of Present Illness


[id number]


Chart reviewed


Patient seen and examined in the ICU


Discussed with RN





[id number]


Patient seen and examined


He is currently getting an central line with Dr. Wright


He is now on the ICU after having a CODE BLUE this morning while in the cardiac 

catheter lab


I discussed the case with cardiologist


Patient probably got very short of air and eventually had to be intubated in the

Cath Lab


He is critically ill


It should be noted that there was no actual cardiac catheter done due to the 

severe shortness of breath and CODE BLUE





Vitals/I&O


Vitals/I&O:





                                   Vital Signs








  Date Time  Temp Pulse Resp B/P (MAP) Pulse Ox O2 Delivery O2 Flow Rate FiO2


 


3/17/20 13:30   18  99   


 


3/17/20 13:00        


 


3/17/20 13:00  49    Ventilator  


 


3/17/20 11:00 98.4       





 98.4       


 


3/16/20 08:00       4.0 














                                    I & O   


 


 3/16/20 3/16/20 3/17/20





 15:00 23:00 07:00


 


Intake Total 50 ml  98 ml


 


Output Total 500 ml 2350 ml 360 ml


 


Balance -450 ml -2350 ml -262 ml











Physical Exam


Physical Exam:


GENERAL: intubated/sedated


HEENT  intubated, ETT+,NGT +,no icterus


Neck supple , central line present


LUNGS:  Decreased breath sounds bases,  


HEART:  S1, S2 


ABDOMEN: Obese, soft, nontender


 mansfield in place 3/16


EXTREMITIES:  2+ pitting edema present. No cyanosis 


SKIN: Warm to touch. No signs of rash


NEURO:intubated


General:  Other


Heart:  Regular rate, No murmurs


Lungs:  Crackles


Abdomen:  Normal bowel sounds


Extremities:  No cyanosis, Other (3+ bilateral LE pitting edema)


Skin:  No breakdown, No significant lesion





Labs


Labs:





Laboratory Tests








Test


 3/16/20


13:45 3/16/20


16:19 3/16/20


20:55 3/16/20


23:42


 


Urine Collection Type Unknown    


 


Urine Color Yellow    


 


Urine Clarity Clear    


 


Urine pH 6.0 (<5.0-8.0)    


 


Urine Specific Gravity


 1.020


(1.000-1.030) 


 


 





 


Urine Protein


 >=300 mg/dL


(NEG-TRACE) 


 


 





 


Urine Glucose (UA)


 Negative mg/dL


(NEG) 


 


 





 


Urine Ketones (Stick)


 Negative mg/dL


(NEG) 


 


 





 


Urine Blood Negative (NEG)    


 


Urine Nitrite Negative (NEG)    


 


Urine Bilirubin Negative (NEG)    


 


Urine Urobilinogen Dipstick


 0.2 mg/dL (0.2


mg/dL) 


 


 





 


Urine Leukocyte Esterase Negative (NEG)    


 


Urine RBC Occ /HPF (0-2)    


 


Urine WBC 1-4 /HPF (0-4)    


 


Urine Bacteria 0 /HPF (0-FEW)    


 


Urine Hyaline Casts Few /HPF    


 


Urine Mucus Marked /LPF    


 


Glucose (Fingerstick)


 


 210 mg/dL


(70-99) 


 126 mg/dL


(70-99)


 


O2 Saturation   95 % (92-99)  


 


Arterial Blood pH


 


 


 7.57


(7.35-7.45) 





 


Arterial Blood pCO2 at


Patient Temp 


 


 33 mmHg


(35-46) 





 


Arterial Blood pO2 at Patient


Temp 


 


 67 mmHg


() 





 


Arterial Blood HCO3


 


 


 29 mmol/L


(21-28) 





 


Arterial Blood Base Excess


 


 


 7 mmol/L


(-3-3) 





 


FiO2   80  


 


Test


 3/17/20


03:50 3/17/20


05:40 3/17/20


05:42 3/17/20


08:00


 


O2 Saturation 93 % (92-99)    96 % (92-99) 


 


Arterial Blood pH


 7.41


(7.35-7.45) 


 


 7.43


(7.35-7.45)


 


Arterial Blood pCO2 at


Patient Temp 52 mmHg


(35-46) 


 


 50 mmHg


(35-46)


 


Arterial Blood pO2 at Patient


Temp 68 mmHg


() 


 


 89 mmHg


()


 


Arterial Blood HCO3


 32 mmol/L


(21-28) 


 


 32 mmol/L


(21-28)


 


Arterial Blood Base Excess


 6 mmol/L


(-3-3) 


 


 7 mmol/L


(-3-3)


 


FiO2 100    80 


 


White Blood Count


 


 13.9 x10^3/uL


(4.0-11.0) 


 





 


Red Blood Count


 


 5.03 x10^6/uL


(4.30-5.70) 


 





 


Hemoglobin


 


 14.1 g/dL


(13.0-17.5) 


 





 


Hematocrit


 


 42.4 %


(39.0-53.0) 


 





 


Mean Corpuscular Volume  84 fL ()   


 


Mean Corpuscular Hemoglobin  28 pg (25-35)   


 


Mean Corpuscular Hemoglobin


Concent 


 33 g/dL


(31-37) 


 





 


Red Cell Distribution Width


 


 14.8 %


(11.5-14.5) 


 





 


Platelet Count


 


 205 x10^3/uL


(140-400) 


 





 


Neutrophils (%) (Auto)  74 % (31-73)   


 


Lymphocytes (%) (Auto)  15 % (24-48)   


 


Monocytes (%) (Auto)  10 % (0-9)   


 


Eosinophils (%) (Auto)  1 % (0-3)   


 


Basophils (%) (Auto)  1 % (0-3)   


 


Neutrophils # (Auto)


 


 10.3 x10^3/uL


(1.8-7.7) 


 





 


Lymphocytes # (Auto)


 


 2.0 x10^3/uL


(1.0-4.8) 


 





 


Monocytes # (Auto)


 


 1.3 x10^3/uL


(0.0-1.1) 


 





 


Eosinophils # (Auto)


 


 0.2 x10^3/uL


(0.0-0.7) 


 





 


Basophils # (Auto)


 


 0.1 x10^3/uL


(0.0-0.2) 


 





 


Sodium Level


 


 146 mmol/L


(136-145) 


 





 


Potassium Level


 


 3.5 mmol/L


(3.5-5.1) 


 





 


Chloride Level


 


 106 mmol/L


() 


 





 


Carbon Dioxide Level


 


 34 mmol/L


(21-32) 


 





 


Anion Gap  6 (6-14)   


 


Blood Urea Nitrogen


 


 32 mg/dL


(8-26) 


 





 


Creatinine


 


 1.2 mg/dL


(0.7-1.3) 


 





 


Estimated GFR


(Cockcroft-Gault) 


 59.3 


 


 





 


Glucose Level


 


 122 mg/dL


(70-99) 


 





 


Calcium Level


 


 8.1 mg/dL


(8.5-10.1) 


 





 


Glucose (Fingerstick)


 


 


 124 mg/dL


(70-99) 





 


Test


 3/17/20


12:23 


 


 





 


Glucose (Fingerstick)


 129 mg/dL


(70-99) 


 


 














Assessment and Plan


Assessmemt and Plan


Problems


Medical Problems:


(1) Acute respiratory distress


Status: Acute  





(2) CAP (community acquired pneumonia)


Status: Acute  





(3) CHF (congestive heart failure)


Status: Acute  





(4) Hypoxia


Status: Acute  





(5) SIRS (systemic inflammatory response syndrome)


Status: Acute  





0. Status post CODE BLUE


1. Acute CHF with possible combined systolic/diastolic dysfunction. 

Significantly improved. Continuing present treatment.


2. NSTEMI: no CP but suspecting ischemia as culprit. EKG LBBB no prior for 

comparison. Tentatively plan for heart catheterization tomorrow. Discussed with 

the patient.


2. Acute respiratory failure with CHF and possibly ongoing RAVINDER


3. DM2: insulin dependent


4. Accelerated HTN: better


5. Morbid obesity


6. Leukocytosis with UTI: on antibiotics per PCP








Plan


ICU monitoring


Propofol for sedation


Vent weaning


IV antibiotics


Duo nebs


Home meds


DVT prophylaxis


Full code


Prognosis guarded


We consider checking for coronavirus but he was turned down by the [facility] and environmental services


Discussed with RNs


Discussed with anesthesia


He is critically ill


Total time 31 minutes





Comment


Review of Relevant


I have reviewed the following items nicole (where applicable) has been applied.


Medications:





Current Medications








 Medications


  (Trade)  Dose


 Ordered  Sig/Elisa


 Route


 PRN Reason  Start Time


 Stop Time Status Last Admin


Dose Admin


 


 Metolazone


  (Zaroxolyn)  2.5 mg  DAILY


 PO


   3/17/20 09:00


    3/17/20 10:12





 


 Fentanyl Citrate  30 ml @ 0


 mls/hr  CONT  PRN


 IV


 SEE PROTOCOL  3/16/20 22:30


    3/17/20 13:30





 


 Furosemide 100 mg/


 Sodium Chloride  100 ml @ 5


 mls/hr  CONT  PRN


 IV


 SEE I/O RECORD  3/17/20 11:00


    3/17/20 12:26




















MARY LARA III DO           Mar 17, 2020 13:42

## 2020-03-17 NOTE — RAD
PORTABLE CHEST 1V 

 

INDICATION: Respiratory failure. 

 

COMPARISON STUDY: 3/16/2020.

 

FINDINGS:

 

Life Support Devices: Stable endotracheal tube, enteric tube, and right 

subclavian central venous catheter.

 

Lungs: Low lung volume. Improving but persistent bilateral perihilar and 

basilar opacities. Indistinct pulmonary vasculature.

 

Pleura: Stable pleural spaces.

 

Heart and Mediastinum: Stable cardiomediastinal silhouette and great 

vessels. 

 

IMPRESSION:  

1. Stable life support devices.

2. Improving but persistent bilateral perihilar and basilar opacities.

 

Electronically signed by: Oscar Munoz MD (3/17/2020 8:12 AM) [id number]

## 2020-03-18 NOTE — PDOC
[facility] [facility] PROGRESS NOTE


Chief Complaint


Chief Complaint


0. Status post CODE BLUE in Cath Lab


1. Acute CHF with possible combined systolic/diastolic dysfunction. 

Significantly improved. Continuing present treatment.


2. NSTEMI: no CP but suspecting ischemia as culprit. EKG LBBB no prior for 

comparison. Tentatively plan for heart catheterization tomorrow. Discussed with 

the patient.


2. Acute respiratory failure with CHF and possibly ongoing RAVINDER


3. DM2: insulin dependent


4. Accelerated HTN: better


5. Morbid obesity


6. Leukocytosis with UTI: on antibiotics per PCP





History of Present Illness


History of Present Illness


[id number]


Patient seen and examined in the ICU


He remains critically ill and on mechanical ventilation


Assist-control/18/500/50% with 8 of PEEP


Chart reviewed


Discussed with RN








[id number]


Chart reviewed


Patient seen and examined in the ICU


Discussed with RN





[id number]


Patient seen and examined


He is currently getting an central line with Dr. Wright


He is now on the ICU after having a CODE BLUE this morning while in the cardiac 

catheter lab


I discussed the case with cardiologist


Patient probably got very short of air and eventually had to be intubated in the

Cath Lab


He is critically ill


It should be noted that there was no actual cardiac catheter done due to the 

severe shortness of breath and CODE BLUE





Vitals/I&O


Vitals/I&O:





                                   Vital Signs








  Date Time  Temp Pulse Resp B/P (MAP) Pulse Ox O2 Delivery O2 Flow Rate FiO2


 


3/18/20 11:00  57 19 158/60 (92) 96 Ventilator  


 


3/18/20 08:00 99.6       





 99.6       














                                    I & O   


 


 3/17/20 3/17/20 3/18/20





 15:00 23:00 07:00


 


Intake Total 150 ml 103.47 ml 124 ml


 


Output Total 465 ml 1900 ml 935 ml


 


Balance -315 ml -1796.53 ml -811 ml











Physical Exam


Physical Exam:


GENERAL: intubated/sedated


HEENT  intubated, ETT+,NGT +,no icterus


Neck supple , central line present


LUNGS:  Decreased breath sounds bases,  


HEART:  S1, S2 


ABDOMEN: Obese, soft, nontender


 mansfield in place 3/16


EXTREMITIES:  2+ pitting edema present. No cyanosis 


SKIN: Warm to touch. No signs of rash


NEURO:intubated


General:  Other


Heart:  Regular rate, No murmurs


Lungs:  Crackles


Abdomen:  Normal bowel sounds


Extremities:  No cyanosis, Other (3+ bilateral LE pitting edema)


Skin:  No breakdown, No significant lesion





Labs


Labs:





Laboratory Tests








Test


 3/17/20


12:23 3/17/20


18:24 3/18/20


00:25 3/18/20


05:30


 


Glucose (Fingerstick)


 129 mg/dL


(70-99) 123 mg/dL


(70-99) 113 mg/dL


(70-99) 





 


White Blood Count


 


 


 


 13.4 x10^3/uL


(4.0-11.0)


 


Red Blood Count


 


 


 


 4.98 x10^6/uL


(4.30-5.70)


 


Hemoglobin


 


 


 


 14.0 g/dL


(13.0-17.5)


 


Hematocrit


 


 


 


 42.6 %


(39.0-53.0)


 


Mean Corpuscular Volume    86 fL () 


 


Mean Corpuscular Hemoglobin    28 pg (25-35) 


 


Mean Corpuscular Hemoglobin


Concent 


 


 


 33 g/dL


(31-37)


 


Red Cell Distribution Width


 


 


 


 14.7 %


(11.5-14.5)


 


Platelet Count


 


 


 


 176 x10^3/uL


(140-400)


 


Neutrophils (%) (Auto)    81 % (31-73) 


 


Lymphocytes (%) (Auto)    10 % (24-48) 


 


Monocytes (%) (Auto)    8 % (0-9) 


 


Eosinophils (%) (Auto)    0 % (0-3) 


 


Basophils (%) (Auto)    1 % (0-3) 


 


Neutrophils # (Auto)


 


 


 


 10.8 x10^3/uL


(1.8-7.7)


 


Lymphocytes # (Auto)


 


 


 


 1.4 x10^3/uL


(1.0-4.8)


 


Monocytes # (Auto)


 


 


 


 1.0 x10^3/uL


(0.0-1.1)


 


Eosinophils # (Auto)


 


 


 


 0.0 x10^3/uL


(0.0-0.7)


 


Basophils # (Auto)


 


 


 


 0.1 x10^3/uL


(0.0-0.2)


 


Sodium Level


 


 


 


 148 mmol/L


(136-145)


 


Potassium Level


 


 


 


 3.0 mmol/L


(3.5-5.1)


 


Chloride Level


 


 


 


 106 mmol/L


()


 


Carbon Dioxide Level


 


 


 


 37 mmol/L


(21-32)


 


Anion Gap    5 (6-14) 


 


Blood Urea Nitrogen


 


 


 


 34 mg/dL


(8-26)


 


Creatinine


 


 


 


 1.3 mg/dL


(0.7-1.3)


 


Estimated GFR


(Cockcroft-Gault) 


 


 


 54.1 





 


Glucose Level


 


 


 


 129 mg/dL


(70-99)


 


Calcium Level


 


 


 


 8.4 mg/dL


(8.5-10.1)


 


Magnesium Level


 


 


 


 2.1 mg/dL


(1.8-2.4)


 


Test


 3/18/20


05:38 3/18/20


08:45 


 





 


Glucose (Fingerstick)


 113 mg/dL


(70-99) 


 


 





 


O2 Saturation  97 % (92-99)   


 


Arterial Blood pH


 


 7.46


(7.35-7.45) 


 





 


Arterial Blood pCO2 at


Patient Temp 


 46 mmHg


(35-46) 


 





 


Arterial Blood pO2 at Patient


Temp 


 93 mmHg


() 


 





 


Arterial Blood HCO3


 


 32 mmol/L


(21-28) 


 





 


Arterial Blood Base Excess


 


 7 mmol/L


(-3-3) 


 





 


FiO2  70   











Assessment and Plan


Assessmemt and Plan


Problems


Medical Problems:


(1) Acute respiratory distress


Status: Acute  





(2) CAP (community acquired pneumonia)


Status: Acute  





(3) CHF (congestive heart failure)


Status: Acute  





(4) Hypoxia


Status: Acute  





(5) SIRS (systemic inflammatory response syndrome)


Status: Acute  





0. Status post CODE BLUE in the Cath Lab


1. Acute CHF with possible combined systolic/diastolic dysfunction. Sign

ificantly improved. Continuing present treatment.


2. NSTEMI: no CP but suspecting ischemia as culprit. EKG LBBB no prior for com

parison. Tentatively plan for heart catheterization tomorrow. Discussed with the

patient.


2. Acute respiratory failure with CHF and possibly ongoing RAVINDER


3. DM2: insulin dependent


4. Accelerated HTN: better


5. Morbid obesity


6. Leukocytosis with UTI: on antibiotics per PCP








Plan


ICU monitoring


Propofol for sedation


Vent weaning


IV antibiotics


Duo nebs


Home meds


DVT prophylaxis


Full code


Prognosis guarded


We consider checking for coronavirus but he was turned down by the [facility] and environmental services


Discussed with RNs


Discussed with anesthesia


He is critically ill


Total time 32 minutes





Comment


Review of Relevant


I have reviewed the following items nicole (where applicable) has been applied.


Medications:





Current Medications








 Medications


  (Trade)  Dose


 Ordered  Sig/Elisa


 Route


 PRN Reason  Start Time


 Stop Time Status Last Admin


Dose Admin


 


 Famotidine


  (Pepcid Vial)  20 mg  BID


 IVP


   3/17/20 21:00


    3/18/20 08:17





 


 Linezolid/Dextrose  300 ml @ 


 300 mls/hr  Q12HR


 IV


   3/18/20 09:00


    3/18/20 09:27





 


 Potassium


 Bicarbonate


  (Potassium


 Effervescent


 Tablet)  40 meq  1X  ONCE


 PO


   3/18/20 09:00


 3/18/20 09:02 DC 3/18/20 09:27




















MARY LARA III DO           Mar 18, 2020 11:47

## 2020-03-18 NOTE — PDOC
CARDIO Progress Notes


Date and Time


Date of Service


3/18/20


Time of Evaluation


0854





Subjective


Subjective:  Other (intubated/sedated)





Vitals


Vitals





Vital Signs








  Date Time  Temp Pulse Resp B/P (MAP) Pulse Ox O2 Delivery O2 Flow Rate FiO2


 


3/18/20 08:28     97 Ventilator  


 


3/18/20 08:00  54  141/52 (81)    


 


3/18/20 08:00 99.6  18     





 99.6       








Weight


Weight [ ]





Input and Output


Intake and Output











Intake and Output 


 


 3/18/20





 07:00


 


Intake Total 377.47 ml


 


Output Total 3300 ml


 


Balance -2922.53 ml


 


 


 


IV Total 327.47 ml


 


Other 50 ml


 


Output Urine Total 2800 ml


 


Gastric Drainage Total 500 ml











Laboratory


Labs





Laboratory Tests








Test


 3/17/20


12:23 3/17/20


18:24 3/18/20


00:25 3/18/20


05:30


 


Glucose (Fingerstick)


 129 mg/dL


(70-99) 123 mg/dL


(70-99) 113 mg/dL


(70-99) 





 


White Blood Count


 


 


 


 13.4 x10^3/uL


(4.0-11.0)


 


Red Blood Count


 


 


 


 4.98 x10^6/uL


(4.30-5.70)


 


Hemoglobin


 


 


 


 14.0 g/dL


(13.0-17.5)


 


Hematocrit


 


 


 


 42.6 %


(39.0-53.0)


 


Mean Corpuscular Volume    86 fL () 


 


Mean Corpuscular Hemoglobin    28 pg (25-35) 


 


Mean Corpuscular Hemoglobin


Concent 


 


 


 33 g/dL


(31-37)


 


Red Cell Distribution Width


 


 


 


 14.7 %


(11.5-14.5)


 


Platelet Count


 


 


 


 176 x10^3/uL


(140-400)


 


Neutrophils (%) (Auto)    81 % (31-73) 


 


Lymphocytes (%) (Auto)    10 % (24-48) 


 


Monocytes (%) (Auto)    8 % (0-9) 


 


Eosinophils (%) (Auto)    0 % (0-3) 


 


Basophils (%) (Auto)    1 % (0-3) 


 


Neutrophils # (Auto)


 


 


 


 10.8 x10^3/uL


(1.8-7.7)


 


Lymphocytes # (Auto)


 


 


 


 1.4 x10^3/uL


(1.0-4.8)


 


Monocytes # (Auto)


 


 


 


 1.0 x10^3/uL


(0.0-1.1)


 


Eosinophils # (Auto)


 


 


 


 0.0 x10^3/uL


(0.0-0.7)


 


Basophils # (Auto)


 


 


 


 0.1 x10^3/uL


(0.0-0.2)


 


Sodium Level


 


 


 


 148 mmol/L


(136-145)


 


Potassium Level


 


 


 


 3.0 mmol/L


(3.5-5.1)


 


Chloride Level


 


 


 


 106 mmol/L


()


 


Carbon Dioxide Level


 


 


 


 37 mmol/L


(21-32)


 


Anion Gap    5 (6-14) 


 


Blood Urea Nitrogen


 


 


 


 34 mg/dL


(8-26)


 


Creatinine


 


 


 


 1.3 mg/dL


(0.7-1.3)


 


Estimated GFR


(Cockcroft-Gault) 


 


 


 54.1 





 


Glucose Level


 


 


 


 129 mg/dL


(70-99)


 


Calcium Level


 


 


 


 8.4 mg/dL


(8.5-10.1)


 


Magnesium Level


 


 


 


 2.1 mg/dL


(1.8-2.4)


 


Test


 3/18/20


05:38 


 


 





 


Glucose (Fingerstick)


 113 mg/dL


(70-99) 


 


 














Microbiology


Micro





Microbiology


3/16/20 Blood Culture - Preliminary, Resulted


          NO GROWTH AFTER 1 DAY


3/13/20 Urine Culture - Preliminary, Resulted


          


3/13/20 Urine Culture Result 1 (CAR) - Preliminary, Resulted





Physical Exam


HEENT:  Neck Supple W Full Motion


Chest:  Symmetric


LUNGS:  Other (mechanical ventilation )


Heart:  S1S2, RRR (SB/SR with PVCs), other (distant heart tones)


Abdomen:  Other (soft, obese)


Extremities:  Other (1+ bilateral LE edema )


Neurology:  other (sedated)





Assessment


Assessment


1. Acute on chronic diastolic CHF; Echo with preserved LV systolic function. 

Good UOP with Lasix gtt


2. NSTEMI: EKG LBBB no prior for comparison.


2. Acute respiratory failure with CHF; s/p intubation 


3. DM2: insulin dependent


4. Accelerated HTN: controlled


5. Morbid obesity


6. Leukocytosis with UTI: on antibiotics per PCP


7. Hypokalemia


8. Bradycardia, sinus. lowest 48. Mean 55. No pauses. 








Recommendations





Continue Lasix gtt


Replace potassium 


ASA, statin 


No BB with bradycardia 


Hydralazine IV PRN for BP control


Awaiting COVID results; if negative, will consider cath following diuresis  


Limit echo prior to discharge to evaluation LV systolic function


Ongoing supportive care











CASH DELAROSA           Mar 18, 2020 09:10

## 2020-03-18 NOTE — RAD
CHEST AP ONLY 

 

INDICATION: Respiratory failure. 

 

COMPARISON STUDY: 3/17/2020.

 

FINDINGS: 

 

Life Support Devices: Stable endotracheal tube, enteric tube, and right 

subclavian central venous catheter.

 

Lungs: Low lung volume. Improving bilateral basilar predominant opacities.

Stable pulmonary vasculature.

 

Pleura: Stable small bilateral pleural effusions.

 

Heart and Mediastinum: Stable cardiomediastinal silhouette and great 

vessels. 

 

IMPRESSION:  

1. Stable life support devices.

2. Improving bilateral basilar predominant opacities.

3. Stable small bilateral effusions.

 

Electronically signed by: Oscar Munoz MD (3/18/2020 7:44 AM) [id number]

## 2020-03-18 NOTE — PDOC
Infectious Disease Note


Subjective:


Subjective





Pt intubated/sedated


on dobutamine 


on FiO2 70%


no fevers





Vital Signs:


Vital Signs





Vital Signs








  Date Time  Temp Pulse Resp B/P (MAP) Pulse Ox O2 Delivery O2 Flow Rate FiO2


 


3/18/20 07:00  57 18 140/54 (82) 97 Ventilator  


 


3/18/20 04:00 99.3       





 99.3       











Physical Exam:


PHYSICAL EXAM


GENERAL: intubated/sedated


HEENT  intubated, ETT+,NGT +,no icterus


Neck supple , central line present


LUNGS:  Decreased breath sounds bases,  


HEART:  S1, S2 


ABDOMEN: Obese, soft, nontender


 mansfield in place 3/16


EXTREMITIES:  2+ pitting edema present. No cyanosis 


SKIN: Warm to touch. No signs of rash


NEURO:intubated





Medications:


Inpatient Meds:





Current Medications








 Medications


  (Trade)  Dose


 Ordered  Sig/Elisa  Start Time


 Stop Time Status Last Admin


Dose Admin


 


 Albuterol/


 Ipratropium


  (Duoneb)  3 ml  1X  ONCE  3/16/20 10:00


 3/16/20 10:01 DC 3/16/20 10:00


3 ML


 


 Amlodipine


 Besylate


  (Norvasc)  5 mg  DAILY  3/15/20 16:00


 3/17/20 10:38 DC 3/16/20 08:13


5 MG


 


 Aspirin


  (Ecotrin)  325 mg  1X  ONCE  3/13/20 16:00


 3/13/20 16:01 DC 3/13/20 16:24


325 MG


 


 Atenolol


  (Tenormin)  25 mg  DAILY  3/17/20 09:00


 3/17/20 10:38 DC  





 


 Atorvastatin


 Calcium


  (Lipitor)  40 mg  QHS  3/13/20 21:00


    3/17/20 20:43


40 MG


 


 Dextrose


  (Dextrose


 50%-Water Syringe)  12.5 gm  PRN Q15MIN  PRN  3/13/20 14:00


     





 


 Dobutamine HCl/


 Dextrose  250 ml @ 


 8.43 mls/hr  CONT  PRN  3/16/20 13:30


     





 


 Etomidate


  (Amidate)  20 mg  STK-MED ONCE  3/16/20 10:23


 3/16/20 10:23 DC  





 


 Famotidine


  (Pepcid Vial)  20 mg  BID  3/17/20 21:00


    3/17/20 20:43


20 MG


 


 Fentanyl Citrate  30 ml @ 0


 mls/hr  CONT  PRN  3/16/20 22:30


    3/18/20 04:08


1.25 MLS/HR


 


 Fentanyl Citrate


  (Fentanyl 2ml


 Vial)  100 mcg  1X  ONCE  3/16/20 11:00


 3/16/20 11:01 DC 3/16/20 11:00


25 MCG


 


 Furosemide


  (Lasix)  40 mg  BID92  3/14/20 09:00


 3/17/20 10:38 DC 3/17/20 10:11


40 MG


 


 Furosemide 100 mg/


 Sodium Chloride  100 ml @ 5


 mls/hr  CONT  PRN  3/17/20 11:00


    3/18/20 05:40


5 MLS/HR


 


 Heparin Sodium


  (Porcine)


  (Heparin Sodium)  2,500 unit  1X  ONCE  3/16/20 11:00


 3/16/20 11:01 DC  





 


 Heparin Sodium/


 Dextrose  250 ml @ 0


 mls/hr  CONT  PRN  3/13/20 16:15


 3/17/20 13:51 DC 3/15/20 20:31


20.6 MLS/HR


 


 Heparin Sodium/


 Sodium Chloride


  (HEPARIN for


 ARTERIAL LINE


 FLUSH)  1,000 unit  1X  ONCE  3/16/20 11:00


 3/16/20 11:01 DC 3/16/20 11:00


1,000 UNIT


 


 Hydralazine HCl


  (Apresoline Inj)  10 mg  PRN Q4HRS  PRN  3/17/20 10:45


     





 


 Info


  (Anti-Coagulation


 Monitoring By


 Pharmacy)  1 each  PRN DAILY  PRN  3/13/20 16:15


 3/17/20 13:52 DC 3/16/20 13:49


1 EACH


 


 Insulin Glargine


  (Lantus Syringe)  80 unit  DAILY08  3/14/20 08:00


    3/17/20 10:13


80 UNIT


 


 Insulin Human


 Lispro


  (HumaLOG)  0-9 UNITS  Q6HRS  3/17/20 00:00


     





 


 Iodixanol


  (Visipaque 320)  100 ml  1X  ONCE  3/16/20 11:00


 3/16/20 11:01 DC  





 


 Labetalol HCl


  (Normodyne Iv


 Push)  10 mg  1X  ONCE  3/13/20 09:15


 3/13/20 09:12 DC  





 


 Lactobacillus


 Rhamnosus


  (Culturelle)  1 cap  BID  3/14/20 21:00


    3/17/20 20:43


1 CAP


 


 Lidocaine HCl


  (Lidocaine 1%


 20ml Vial)  20 ml  1X  ONCE  3/16/20 11:00


 3/16/20 11:01 DC  





 


 Lisinopril


  (Prinivil)  40 mg  DAILY  3/14/20 09:00


 3/17/20 10:38 DC 3/16/20 08:12


40 MG


 


 Magnesium Sulfate  50 ml @ 25


 mls/hr  1X  ONCE  3/16/20 13:00


 3/16/20 14:59 DC 3/16/20 14:28


25 MLS/HR


 


 Methylprednisolone


 Sodium Succinate


  (SOLU-Medrol


 125MG VIAL)  125 mg  1X  ONCE  3/13/20 09:15


 3/13/20 09:16 DC 3/13/20 09:27


125 MG


 


 Metolazone


  (Zaroxolyn)  2.5 mg  DAILY  3/17/20 09:00


    3/17/20 10:12


2.5 MG


 


 Metoprolol


 Tartrate


  (Lopressor)  50 mg  BID  3/13/20 21:00


   UNV  





 


 Midazolam HCl


  (Versed)  2 mg  1X  ONCE  3/16/20 11:00


 3/16/20 11:01 DC 3/16/20 11:00


1 MG


 


 Midazolam HCl 50


 mg/Sodium Chloride  50 ml @ 1


 mls/hr  CONT  PRN  3/16/20 13:00


   UNV  





 


 Nitroglycerin


  (Nitroglycerin)  200 mcg  1X  ONCE  3/16/20 11:00


 3/16/20 11:01 DC 3/16/20 11:00


200 MCG


 


 Nitroglycerin/


 Dextrose  250 ml @ 


 As Directed  STK-MED ONCE  3/16/20 09:39


 3/16/20 09:39 DC  





 


 Norepinephrine


 Bitartrate 8 mg/


 Dextrose  258 ml @ 


 27.09 mls/


 hr  CONT  PRN  3/16/20 11:15


    3/16/20 12:55


27.09 MLS/HR


 


 Piperacillin Sod/


 Tazobactam Sod


 3.375 gm/Sodium


 Chloride  50 ml @ 


 100 mls/hr  Q6HRS  3/13/20 18:00


    3/18/20 05:39


100 MLS/HR


 


 Potassium


 Chloride/Water  100 ml @ 


 100 mls/hr  1X  ONCE  3/16/20 13:00


 3/16/20 13:59 DC 3/16/20 13:06


100 MLS/HR


 


 Propofol  100 ml @ 


 As Directed  STK-MED ONCE  3/16/20 12:39


 3/16/20 12:40 DC  





 


 Sodium Chloride  1,000 ml @ 


 75 mls/hr  [id number]  3/16/20 06:00


 3/17/20 12:28 DC  





 


 Succinylcholine


 Chloride


  (Anectine)  200 mg  STK-MED ONCE  3/16/20 10:23


 3/16/20 10:23 DC  





 


 Vancomycin HCl  250 ml @ 


 250 mls/hr  1X  ONCE  3/13/20 09:30


 3/13/20 10:29 UNV  





 


 Vancomycin HCl 2


 gm/Sodium Chloride  500 ml @ 


 250 mls/hr  1X  ONCE  3/13/20 09:45


 3/13/20 11:44 DC 3/13/20 09:56


250 MLS/HR


 


 Verapamil HCl


  (Verapamil)  5 mg  STK-MED ONCE  3/16/20 10:00


 3/17/20 13:11 DC  














Labs:


Lab





Laboratory Tests








Test


 3/17/20


08:00 3/17/20


12:23 3/17/20


18:24 3/18/20


00:25


 


O2 Saturation 96 % (92-99)    


 


Arterial Blood pH


 7.43


(7.35-7.45) 


 


 





 


Arterial Blood pCO2 at


Patient Temp 50 mmHg


(35-46) 


 


 





 


Arterial Blood pO2 at Patient


Temp 89 mmHg


() 


 


 





 


Arterial Blood HCO3


 32 mmol/L


(21-28) 


 


 





 


Arterial Blood Base Excess


 7 mmol/L


(-3-3) 


 


 





 


FiO2 80    


 


Glucose (Fingerstick)


 


 129 mg/dL


(70-99) 123 mg/dL


(70-99) 113 mg/dL


(70-99)


 


Test


 3/18/20


05:30 3/18/20


05:38 


 





 


White Blood Count


 13.4 x10^3/uL


(4.0-11.0) 


 


 





 


Red Blood Count


 4.98 x10^6/uL


(4.30-5.70) 


 


 





 


Hemoglobin


 14.0 g/dL


(13.0-17.5) 


 


 





 


Hematocrit


 42.6 %


(39.0-53.0) 


 


 





 


Mean Corpuscular Volume 86 fL ()    


 


Mean Corpuscular Hemoglobin 28 pg (25-35)    


 


Mean Corpuscular Hemoglobin


Concent 33 g/dL


(31-37) 


 


 





 


Red Cell Distribution Width


 14.7 %


(11.5-14.5) 


 


 





 


Platelet Count


 176 x10^3/uL


(140-400) 


 


 





 


Neutrophils (%) (Auto) 81 % (31-73)    


 


Lymphocytes (%) (Auto) 10 % (24-48)    


 


Monocytes (%) (Auto) 8 % (0-9)    


 


Eosinophils (%) (Auto) 0 % (0-3)    


 


Basophils (%) (Auto) 1 % (0-3)    


 


Neutrophils # (Auto)


 10.8 x10^3/uL


(1.8-7.7) 


 


 





 


Lymphocytes # (Auto)


 1.4 x10^3/uL


(1.0-4.8) 


 


 





 


Monocytes # (Auto)


 1.0 x10^3/uL


(0.0-1.1) 


 


 





 


Eosinophils # (Auto)


 0.0 x10^3/uL


(0.0-0.7) 


 


 





 


Basophils # (Auto)


 0.1 x10^3/uL


(0.0-0.2) 


 


 





 


Sodium Level


 148 mmol/L


(136-145) 


 


 





 


Potassium Level


 3.0 mmol/L


(3.5-5.1) 


 


 





 


Chloride Level


 106 mmol/L


() 


 


 





 


Carbon Dioxide Level


 37 mmol/L


(21-32) 


 


 





 


Anion Gap 5 (6-14)    


 


Blood Urea Nitrogen


 34 mg/dL


(8-26) 


 


 





 


Creatinine


 1.3 mg/dL


(0.7-1.3) 


 


 





 


Estimated GFR


(Cockcroft-Gault) 54.1 


 


 


 





 


Glucose Level


 129 mg/dL


(70-99) 


 


 





 


Calcium Level


 8.4 mg/dL


(8.5-10.1) 


 


 





 


Magnesium Level


 2.1 mg/dL


(1.8-2.4) 


 


 





 


Glucose (Fingerstick)


 


 113 mg/dL


(70-99) 


 














Objective:


Assessment:





Acute resp failure s/p intubation


NSTEMI:


Leucocytosis 


CHF


DM2


Morbid obesity


Hypertension.


Partial nephrectomy.


Pyuria UC 50-100K enterococcus 3/13, ? colonization





Plan:


Plan of Care


Cont zosyn/ zyvox


Supportive care 


f/u cult and labs











TRISTAN RICHARDSON MD           Mar 18, 2020 07:53

## 2020-03-18 NOTE — PDOC
PULMONARY PROGRESS NOTES


Subjective


AC MODE SEDATED OFF PRESSORS


NOW ON 40 8 PEEP


Vitals





Vital Signs








  Date Time  Temp Pulse Resp B/P (MAP) Pulse Ox O2 Delivery O2 Flow Rate FiO2


 


3/18/20 07:00  57 18 140/54 (82) 97 Ventilator  


 


3/18/20 04:00 99.3       





 99.3       








Lungs:  Crackles


Cardiovascular:  S1, S2


Abdomen:  Soft, Non-tender


Extremities:  No Edema


Skin:  Warm


Labs





Laboratory Tests








Test


 3/16/20


11:50 3/16/20


12:00 3/16/20


12:08 3/16/20


13:45


 


O2 Saturation 91 % (92-99)    


 


Arterial Blood pH


 7.29


(7.35-7.45) 


 


 





 


Arterial Blood pCO2 at


Patient Temp 60 mmHg


(35-46) 


 


 





 


Arterial Blood pO2 at Patient


Temp 67 mmHg


() 


 


 





 


Arterial Blood HCO3


 28 mmol/L


(21-28) 


 


 





 


Arterial Blood Base Excess


 0 mmol/L


(-3-3) 


 


 





 


FiO2 100    


 


Prothrombin Time


 


 13.2 SEC


(11.7-14.0) 


 





 


Prothromb Time International


Ratio 


 1.0 (0.8-1.1) 


 


 





 


Sodium Level


 


 142 mmol/L


(136-145) 


 





 


Potassium Level


 


 3.6 mmol/L


(3.5-5.1) 


 





 


Chloride Level


 


 104 mmol/L


() 


 





 


Carbon Dioxide Level


 


 35 mmol/L


(21-32) 


 





 


Anion Gap  3 (6-14)   


 


Blood Urea Nitrogen


 


 26 mg/dL


(8-26) 


 





 


Creatinine


 


 1.2 mg/dL


(0.7-1.3) 


 





 


Estimated GFR


(Cockcroft-Gault) 


 59.3 


 


 





 


Glucose Level


 


 276 mg/dL


(70-99) 


 





 


Lactic Acid Level


 


 1.5 mmol/L


(0.4-2.0) 


 





 


Calcium Level


 


 8.6 mg/dL


(8.5-10.1) 


 





 


Glucose (Fingerstick)


 


 


 282 mg/dL


(70-99) 





 


Urine Collection Type    Unknown 


 


Urine Color    Yellow 


 


Urine Clarity    Clear 


 


Urine pH    6.0 (<5.0-8.0) 


 


Urine Specific Gravity


 


 


 


 1.020


(1.000-1.030)


 


Urine Protein


 


 


 


 >=300 mg/dL


(NEG-TRACE)


 


Urine Glucose (UA)


 


 


 


 Negative mg/dL


(NEG)


 


Urine Ketones (Stick)


 


 


 


 Negative mg/dL


(NEG)


 


Urine Blood    Negative (NEG) 


 


Urine Nitrite    Negative (NEG) 


 


Urine Bilirubin    Negative (NEG) 


 


Urine Urobilinogen Dipstick


 


 


 


 0.2 mg/dL (0.2


mg/dL)


 


Urine Leukocyte Esterase    Negative (NEG) 


 


Urine RBC    Occ /HPF (0-2) 


 


Urine WBC    1-4 /HPF (0-4) 


 


Urine Bacteria    0 /HPF (0-FEW) 


 


Urine Hyaline Casts    Few /HPF 


 


Urine Mucus    Marked /LPF 


 


Test


 3/16/20


16:19 3/16/20


20:55 3/16/20


23:42 3/17/20


03:50


 


Glucose (Fingerstick)


 210 mg/dL


(70-99) 


 126 mg/dL


(70-99) 





 


O2 Saturation  95 % (92-99)   93 % (92-99) 


 


Arterial Blood pH


 


 7.57


(7.35-7.45) 


 7.41


(7.35-7.45)


 


Arterial Blood pCO2 at


Patient Temp 


 33 mmHg


(35-46) 


 52 mmHg


(35-46)


 


Arterial Blood pO2 at Patient


Temp 


 67 mmHg


() 


 68 mmHg


()


 


Arterial Blood HCO3


 


 29 mmol/L


(21-28) 


 32 mmol/L


(21-28)


 


Arterial Blood Base Excess


 


 7 mmol/L


(-3-3) 


 6 mmol/L


(-3-3)


 


FiO2  80   100 


 


Test


 3/17/20


05:40 3/17/20


05:42 3/17/20


08:00 3/17/20


12:23


 


White Blood Count


 13.9 x10^3/uL


(4.0-11.0) 


 


 





 


Red Blood Count


 5.03 x10^6/uL


(4.30-5.70) 


 


 





 


Hemoglobin


 14.1 g/dL


(13.0-17.5) 


 


 





 


Hematocrit


 42.4 %


(39.0-53.0) 


 


 





 


Mean Corpuscular Volume 84 fL ()    


 


Mean Corpuscular Hemoglobin 28 pg (25-35)    


 


Mean Corpuscular Hemoglobin


Concent 33 g/dL


(31-37) 


 


 





 


Red Cell Distribution Width


 14.8 %


(11.5-14.5) 


 


 





 


Platelet Count


 205 x10^3/uL


(140-400) 


 


 





 


Neutrophils (%) (Auto) 74 % (31-73)    


 


Lymphocytes (%) (Auto) 15 % (24-48)    


 


Monocytes (%) (Auto) 10 % (0-9)    


 


Eosinophils (%) (Auto) 1 % (0-3)    


 


Basophils (%) (Auto) 1 % (0-3)    


 


Neutrophils # (Auto)


 10.3 x10^3/uL


(1.8-7.7) 


 


 





 


Lymphocytes # (Auto)


 2.0 x10^3/uL


(1.0-4.8) 


 


 





 


Monocytes # (Auto)


 1.3 x10^3/uL


(0.0-1.1) 


 


 





 


Eosinophils # (Auto)


 0.2 x10^3/uL


(0.0-0.7) 


 


 





 


Basophils # (Auto)


 0.1 x10^3/uL


(0.0-0.2) 


 


 





 


Sodium Level


 146 mmol/L


(136-145) 


 


 





 


Potassium Level


 3.5 mmol/L


(3.5-5.1) 


 


 





 


Chloride Level


 106 mmol/L


() 


 


 





 


Carbon Dioxide Level


 34 mmol/L


(21-32) 


 


 





 


Anion Gap 6 (6-14)    


 


Blood Urea Nitrogen


 32 mg/dL


(8-26) 


 


 





 


Creatinine


 1.2 mg/dL


(0.7-1.3) 


 


 





 


Estimated GFR


(Cockcroft-Gault) 59.3 


 


 


 





 


Glucose Level


 122 mg/dL


(70-99) 


 


 





 


Calcium Level


 8.1 mg/dL


(8.5-10.1) 


 


 





 


Magnesium Level


 2.2 mg/dL


(1.8-2.4) 


 


 





 


Glucose (Fingerstick)


 


 124 mg/dL


(70-99) 


 129 mg/dL


(70-99)


 


O2 Saturation   96 % (92-99)  


 


Arterial Blood pH


 


 


 7.43


(7.35-7.45) 





 


Arterial Blood pCO2 at


Patient Temp 


 


 50 mmHg


(35-46) 





 


Arterial Blood pO2 at Patient


Temp 


 


 89 mmHg


() 





 


Arterial Blood HCO3


 


 


 32 mmol/L


(21-28) 





 


Arterial Blood Base Excess


 


 


 7 mmol/L


(-3-3) 





 


FiO2   80  


 


Test


 3/17/20


18:24 3/18/20


00:25 3/18/20


05:30 3/18/20


05:38


 


Glucose (Fingerstick)


 123 mg/dL


(70-99) 113 mg/dL


(70-99) 


 113 mg/dL


(70-99)


 


White Blood Count


 


 


 13.4 x10^3/uL


(4.0-11.0) 





 


Red Blood Count


 


 


 4.98 x10^6/uL


(4.30-5.70) 





 


Hemoglobin


 


 


 14.0 g/dL


(13.0-17.5) 





 


Hematocrit


 


 


 42.6 %


(39.0-53.0) 





 


Mean Corpuscular Volume   86 fL ()  


 


Mean Corpuscular Hemoglobin   28 pg (25-35)  


 


Mean Corpuscular Hemoglobin


Concent 


 


 33 g/dL


(31-37) 





 


Red Cell Distribution Width


 


 


 14.7 %


(11.5-14.5) 





 


Platelet Count


 


 


 176 x10^3/uL


(140-400) 





 


Neutrophils (%) (Auto)   81 % (31-73)  


 


Lymphocytes (%) (Auto)   10 % (24-48)  


 


Monocytes (%) (Auto)   8 % (0-9)  


 


Eosinophils (%) (Auto)   0 % (0-3)  


 


Basophils (%) (Auto)   1 % (0-3)  


 


Neutrophils # (Auto)


 


 


 10.8 x10^3/uL


(1.8-7.7) 





 


Lymphocytes # (Auto)


 


 


 1.4 x10^3/uL


(1.0-4.8) 





 


Monocytes # (Auto)


 


 


 1.0 x10^3/uL


(0.0-1.1) 





 


Eosinophils # (Auto)


 


 


 0.0 x10^3/uL


(0.0-0.7) 





 


Basophils # (Auto)


 


 


 0.1 x10^3/uL


(0.0-0.2) 





 


Sodium Level


 


 


 148 mmol/L


(136-145) 





 


Potassium Level


 


 


 3.0 mmol/L


(3.5-5.1) 





 


Chloride Level


 


 


 106 mmol/L


() 





 


Carbon Dioxide Level


 


 


 37 mmol/L


(21-32) 





 


Anion Gap   5 (6-14)  


 


Blood Urea Nitrogen


 


 


 34 mg/dL


(8-26) 





 


Creatinine


 


 


 1.3 mg/dL


(0.7-1.3) 





 


Estimated GFR


(Cockcroft-Gault) 


 


 54.1 


 





 


Glucose Level


 


 


 129 mg/dL


(70-99) 





 


Calcium Level


 


 


 8.4 mg/dL


(8.5-10.1) 





 


Magnesium Level


 


 


 2.1 mg/dL


(1.8-2.4) 











Laboratory Tests








Test


 3/17/20


12:23 3/17/20


18:24 3/18/20


00:25 3/18/20


05:30


 


Glucose (Fingerstick)


 129 mg/dL


(70-99) 123 mg/dL


(70-99) 113 mg/dL


(70-99) 





 


White Blood Count


 


 


 


 13.4 x10^3/uL


(4.0-11.0)


 


Red Blood Count


 


 


 


 4.98 x10^6/uL


(4.30-5.70)


 


Hemoglobin


 


 


 


 14.0 g/dL


(13.0-17.5)


 


Hematocrit


 


 


 


 42.6 %


(39.0-53.0)


 


Mean Corpuscular Volume    86 fL () 


 


Mean Corpuscular Hemoglobin    28 pg (25-35) 


 


Mean Corpuscular Hemoglobin


Concent 


 


 


 33 g/dL


(31-37)


 


Red Cell Distribution Width


 


 


 


 14.7 %


(11.5-14.5)


 


Platelet Count


 


 


 


 176 x10^3/uL


(140-400)


 


Neutrophils (%) (Auto)    81 % (31-73) 


 


Lymphocytes (%) (Auto)    10 % (24-48) 


 


Monocytes (%) (Auto)    8 % (0-9) 


 


Eosinophils (%) (Auto)    0 % (0-3) 


 


Basophils (%) (Auto)    1 % (0-3) 


 


Neutrophils # (Auto)


 


 


 


 10.8 x10^3/uL


(1.8-7.7)


 


Lymphocytes # (Auto)


 


 


 


 1.4 x10^3/uL


(1.0-4.8)


 


Monocytes # (Auto)


 


 


 


 1.0 x10^3/uL


(0.0-1.1)


 


Eosinophils # (Auto)


 


 


 


 0.0 x10^3/uL


(0.0-0.7)


 


Basophils # (Auto)


 


 


 


 0.1 x10^3/uL


(0.0-0.2)


 


Sodium Level


 


 


 


 148 mmol/L


(136-145)


 


Potassium Level


 


 


 


 3.0 mmol/L


(3.5-5.1)


 


Chloride Level


 


 


 


 106 mmol/L


()


 


Carbon Dioxide Level


 


 


 


 37 mmol/L


(21-32)


 


Anion Gap    5 (6-14) 


 


Blood Urea Nitrogen


 


 


 


 34 mg/dL


(8-26)


 


Creatinine


 


 


 


 1.3 mg/dL


(0.7-1.3)


 


Estimated GFR


(Cockcroft-Gault) 


 


 


 54.1 





 


Glucose Level


 


 


 


 129 mg/dL


(70-99)


 


Calcium Level


 


 


 


 8.4 mg/dL


(8.5-10.1)


 


Magnesium Level


 


 


 


 2.1 mg/dL


(1.8-2.4)


 


Test


 3/18/20


05:38 


 


 





 


Glucose (Fingerstick)


 113 mg/dL


(70-99) 


 


 











Medications





Active Scripts








 Medications  Dose


 Route/Sig


 Max Daily Dose Days Date Category


 


 Saw [place] (Saw


 [place])


 450 Mg Capsule  450 Mg


 PO DAILY


   3/13/20 Reported


 


 D3-50


  (Cholecalciferol


  (Vitamin D3))


 50,000 Unit


 Capsule  1,000 Unit


 PO DAILY


   3/13/20 Reported


 


 Emergen-C 500 mg


 Chewable Tab (Vit


 C/Ascorb


 Sod/Multivit-Min)


 500 Mg Tab.chew  500 Mg


 PO DAILY


   3/13/20 Reported


 


 Zoloft


  (Sertraline Hcl)


 50 Mg Tablet  50 Mg


 PO DAILY


   3/13/20 Reported


 


 Trazodone Hcl 50


 Mg Tablet  1 Tab


 PO QHS


   3/13/20 Reported


 


 Trulicity


  (Dulaglutide)


 0.75 Mg/0.5 Ml


 Pen.injctr  0.75 Mg


 SQ WEEKLY


   3/13/20 Reported


 


 Insulin Aspart


 100 Unit/1 Ml Vial  25 Unit


 SQ TIDWMEALS


   3/13/20 Reported


 


 Levemir (Insulin


 Detemir) 100


 Unit/1 Ml Vial  80 Unit


 SQ DAILY


   3/13/20 Reported


 


 Lasix


  (Furosemide) 20


 Mg Tablet  20 Mg


 PO BID


   3/13/20 Reported


 


 Flomax


  (Tamsulosin Hcl)


 0.4 Mg Cap.er.24h  0.4 Mg


 PO HS


   3/13/20 Reported


 


 Omeprazole 20 Mg


 Capsule.dr  20 Mg


 PO BID


   3/13/20 Reported


 


 Lisinopril 40 Mg


 Tablet  1 Tab


 PO DAILY


   3/13/20 Reported


 


 Atenolol 100 Mg


 Tablet  100 Mg


 PO BID


   3/13/20 Reported








Comments

















ECHO


The left ventricle is normal size.


The left ventricular systolic function is normal.


The Ejection Fraction is 50-55%.


There is mild concentric left ventricular hypertrophy.


Doppler and Color Flow revealed trace aortic regurgitation.


There is no significant aortic valvular stenosis.


Doppler and Color-flow revealed trace mitral regurgitation.


Doppler and Color Flow revealed trace tricuspid regurgitation with an estimated 

PAP of 28 mmHg.





Impression


.


IMPRESSION:


1.  Acute hypercapnic and hypoxic respiratory failure sec to flash pulmonary 

edema


2.  Abnormal chest x-ray with bilateral interstitial infiltrates suggesting 

interstitial edema


3.  Leukocytosis


4.  No significant tobacco history.


5.  Suspected obesity hypoventilation syndrome and obstructive sleep apnea,


never been tested.


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


7.  HYPOTENSION IMPROVED


8.  COVID SUSPECT 





3/17


CXR


IMPRESSION:  


1. Stable life support devices.


2. Improving but persistent bilateral perihilar and basilar opacities.





Plan


.


CXR SEEM WILL CONTINUE IV LASIX





AC MODE DECREASE PEEP AS TOLERATED 


REPEAT ABG NOTED MADE ADJUSTMENTS DECREASE RATE DECREASE PEEP





LEVOPHED FOR HYPOTENSION NOW OFF


RECOMMENDATIONS:


ANTI ABX PER ID





CCT 30 MINUTES D/W CARDIOLOGY/ REVIEWING LABS/DATA/CXR











ADALBERTO GAXIOLA MD              Mar 18, 2020 08:24

## 2020-03-19 NOTE — NUR
SS following up with discharge planning. Per pt's RN, pt remains on the vent at this time. 
COVID19 test currently pending. SS will continue to follow for discharge planning.

## 2020-03-19 NOTE — PDOC
PULMONARY PROGRESS NOTES


Subjective


AC MODE SEDATED OFF PRESSORS


NOW ON 6 PEEP


Vitals





Vital Signs








  Date Time  Temp Pulse Resp B/P (MAP) Pulse Ox O2 Delivery O2 Flow Rate FiO2


 


3/19/20 08:29  54  176/68    


 


3/19/20 08:00 97.7  14  98 Ventilator  





 97.7       








Lungs:  Crackles


Cardiovascular:  S1, S2


Abdomen:  Soft, Non-tender


Extremities:  No Edema


Skin:  Warm


Labs





Laboratory Tests








Test


 3/17/20


12:23 3/17/20


18:24 3/18/20


00:25 3/18/20


05:30


 


Glucose (Fingerstick)


 129 mg/dL


(70-99) 123 mg/dL


(70-99) 113 mg/dL


(70-99) 





 


White Blood Count


 


 


 


 13.4 x10^3/uL


(4.0-11.0)


 


Red Blood Count


 


 


 


 4.98 x10^6/uL


(4.30-5.70)


 


Hemoglobin


 


 


 


 14.0 g/dL


(13.0-17.5)


 


Hematocrit


 


 


 


 42.6 %


(39.0-53.0)


 


Mean Corpuscular Volume    86 fL () 


 


Mean Corpuscular Hemoglobin    28 pg (25-35) 


 


Mean Corpuscular Hemoglobin


Concent 


 


 


 33 g/dL


(31-37)


 


Red Cell Distribution Width


 


 


 


 14.7 %


(11.5-14.5)


 


Platelet Count


 


 


 


 176 x10^3/uL


(140-400)


 


Neutrophils (%) (Auto)    81 % (31-73) 


 


Lymphocytes (%) (Auto)    10 % (24-48) 


 


Monocytes (%) (Auto)    8 % (0-9) 


 


Eosinophils (%) (Auto)    0 % (0-3) 


 


Basophils (%) (Auto)    1 % (0-3) 


 


Neutrophils # (Auto)


 


 


 


 10.8 x10^3/uL


(1.8-7.7)


 


Lymphocytes # (Auto)


 


 


 


 1.4 x10^3/uL


(1.0-4.8)


 


Monocytes # (Auto)


 


 


 


 1.0 x10^3/uL


(0.0-1.1)


 


Eosinophils # (Auto)


 


 


 


 0.0 x10^3/uL


(0.0-0.7)


 


Basophils # (Auto)


 


 


 


 0.1 x10^3/uL


(0.0-0.2)


 


Sodium Level


 


 


 


 148 mmol/L


(136-145)


 


Potassium Level


 


 


 


 3.0 mmol/L


(3.5-5.1)


 


Chloride Level


 


 


 


 106 mmol/L


()


 


Carbon Dioxide Level


 


 


 


 37 mmol/L


(21-32)


 


Anion Gap    5 (6-14) 


 


Blood Urea Nitrogen


 


 


 


 34 mg/dL


(8-26)


 


Creatinine


 


 


 


 1.3 mg/dL


(0.7-1.3)


 


Estimated GFR


(Cockcroft-Gault) 


 


 


 54.1 





 


Glucose Level


 


 


 


 129 mg/dL


(70-99)


 


Calcium Level


 


 


 


 8.4 mg/dL


(8.5-10.1)


 


Magnesium Level


 


 


 


 2.1 mg/dL


(1.8-2.4)


 


Test


 3/18/20


05:38 3/18/20


08:45 3/18/20


12:36 3/18/20


18:18


 


Glucose (Fingerstick)


 113 mg/dL


(70-99) 


 154 mg/dL


(70-99) 160 mg/dL


(70-99)


 


O2 Saturation  97 % (92-99)   


 


Arterial Blood pH


 


 7.46


(7.35-7.45) 


 





 


Arterial Blood pCO2 at


Patient Temp 


 46 mmHg


(35-46) 


 





 


Arterial Blood pO2 at Patient


Temp 


 93 mmHg


() 


 





 


Arterial Blood HCO3


 


 32 mmol/L


(21-28) 


 





 


Arterial Blood Base Excess


 


 7 mmol/L


(-3-3) 


 





 


FiO2  70   


 


Test


 3/19/20


00:15 3/19/20


05:40 3/19/20


05:50 3/19/20


08:05


 


Glucose (Fingerstick)


 170 mg/dL


(70-99) 176 mg/dL


(70-99) 


 





 


White Blood Count


 


 


 15.4 x10^3/uL


(4.0-11.0) 





 


Red Blood Count


 


 


 5.12 x10^6/uL


(4.30-5.70) 





 


Hemoglobin


 


 


 14.3 g/dL


(13.0-17.5) 





 


Hematocrit


 


 


 44.1 %


(39.0-53.0) 





 


Mean Corpuscular Volume   86 fL ()  


 


Mean Corpuscular Hemoglobin   28 pg (25-35)  


 


Mean Corpuscular Hemoglobin


Concent 


 


 32 g/dL


(31-37) 





 


Red Cell Distribution Width


 


 


 14.5 %


(11.5-14.5) 





 


Platelet Count


 


 


 187 x10^3/uL


(140-400) 





 


Neutrophils (%) (Auto)   83 % (31-73)  


 


Lymphocytes (%) (Auto)   9 % (24-48)  


 


Monocytes (%) (Auto)   8 % (0-9)  


 


Eosinophils (%) (Auto)   0 % (0-3)  


 


Basophils (%) (Auto)   0 % (0-3)  


 


Neutrophils # (Auto)


 


 


 12.7 x10^3/uL


(1.8-7.7) 





 


Lymphocytes # (Auto)


 


 


 1.4 x10^3/uL


(1.0-4.8) 





 


Monocytes # (Auto)


 


 


 1.3 x10^3/uL


(0.0-1.1) 





 


Eosinophils # (Auto)


 


 


 0.0 x10^3/uL


(0.0-0.7) 





 


Basophils # (Auto)


 


 


 0.0 x10^3/uL


(0.0-0.2) 





 


Sodium Level


 


 


 151 mmol/L


(136-145) 





 


Potassium Level


 


 


 2.4 mmol/L


(3.5-5.1) 





 


Chloride Level


 


 


 105 mmol/L


() 





 


Carbon Dioxide Level


 


 


 41 mmol/L


(21-32) 





 


Anion Gap   5 (6-14)  


 


Blood Urea Nitrogen


 


 


 37 mg/dL


(8-26) 





 


Creatinine


 


 


 1.3 mg/dL


(0.7-1.3) 





 


Estimated GFR


(Cockcroft-Gault) 


 


 54.1 


 





 


Glucose Level


 


 


 192 mg/dL


(70-99) 





 


Calcium Level


 


 


 8.6 mg/dL


(8.5-10.1) 





 


Magnesium Level


 


 


 2.1 mg/dL


(1.8-2.4) 





 


O2 Saturation    93 % (92-99) 


 


Arterial Blood pH


 


 


 


 7.45


(7.35-7.45)


 


Arterial Blood pCO2 at


Patient Temp 


 


 


 58 mmHg


(35-46)


 


Arterial Blood pO2 at Patient


Temp 


 


 


 68 mmHg


()


 


Arterial Blood HCO3


 


 


 


 39 mmol/L


(21-28)


 


Arterial Blood Base Excess


 


 


 


 13 mmol/L


(-3-3)


 


FiO2    40 








Laboratory Tests








Test


 3/18/20


12:36 3/18/20


18:18 3/19/20


00:15 3/19/20


05:40


 


Glucose (Fingerstick)


 154 mg/dL


(70-99) 160 mg/dL


(70-99) 170 mg/dL


(70-99) 176 mg/dL


(70-99)


 


Test


 3/19/20


05:50 3/19/20


08:05 


 





 


White Blood Count


 15.4 x10^3/uL


(4.0-11.0) 


 


 





 


Red Blood Count


 5.12 x10^6/uL


(4.30-5.70) 


 


 





 


Hemoglobin


 14.3 g/dL


(13.0-17.5) 


 


 





 


Hematocrit


 44.1 %


(39.0-53.0) 


 


 





 


Mean Corpuscular Volume 86 fL ()    


 


Mean Corpuscular Hemoglobin 28 pg (25-35)    


 


Mean Corpuscular Hemoglobin


Concent 32 g/dL


(31-37) 


 


 





 


Red Cell Distribution Width


 14.5 %


(11.5-14.5) 


 


 





 


Platelet Count


 187 x10^3/uL


(140-400) 


 


 





 


Neutrophils (%) (Auto) 83 % (31-73)    


 


Lymphocytes (%) (Auto) 9 % (24-48)    


 


Monocytes (%) (Auto) 8 % (0-9)    


 


Eosinophils (%) (Auto) 0 % (0-3)    


 


Basophils (%) (Auto) 0 % (0-3)    


 


Neutrophils # (Auto)


 12.7 x10^3/uL


(1.8-7.7) 


 


 





 


Lymphocytes # (Auto)


 1.4 x10^3/uL


(1.0-4.8) 


 


 





 


Monocytes # (Auto)


 1.3 x10^3/uL


(0.0-1.1) 


 


 





 


Eosinophils # (Auto)


 0.0 x10^3/uL


(0.0-0.7) 


 


 





 


Basophils # (Auto)


 0.0 x10^3/uL


(0.0-0.2) 


 


 





 


Sodium Level


 151 mmol/L


(136-145) 


 


 





 


Potassium Level


 2.4 mmol/L


(3.5-5.1) 


 


 





 


Chloride Level


 105 mmol/L


() 


 


 





 


Carbon Dioxide Level


 41 mmol/L


(21-32) 


 


 





 


Anion Gap 5 (6-14)    


 


Blood Urea Nitrogen


 37 mg/dL


(8-26) 


 


 





 


Creatinine


 1.3 mg/dL


(0.7-1.3) 


 


 





 


Estimated GFR


(Cockcroft-Gault) 54.1 


 


 


 





 


Glucose Level


 192 mg/dL


(70-99) 


 


 





 


Calcium Level


 8.6 mg/dL


(8.5-10.1) 


 


 





 


Magnesium Level


 2.1 mg/dL


(1.8-2.4) 


 


 





 


O2 Saturation  93 % (92-99)   


 


Arterial Blood pH


 


 7.45


(7.35-7.45) 


 





 


Arterial Blood pCO2 at


Patient Temp 


 58 mmHg


(35-46) 


 





 


Arterial Blood pO2 at Patient


Temp 


 68 mmHg


() 


 





 


Arterial Blood HCO3


 


 39 mmol/L


(21-28) 


 





 


Arterial Blood Base Excess


 


 13 mmol/L


(-3-3) 


 





 


FiO2  40   








Medications





Active Scripts








 Medications  Dose


 Route/Sig


 Max Daily Dose Days Date Category


 


 Saw [place] (Saw


 Palmetto Fruit)


 450 Mg Capsule  450 Mg


 PO DAILY


   3/13/20 Reported


 


 D3-50


  (Cholecalciferol


  (Vitamin D3))


 50,000 Unit


 Capsule  1,000 Unit


 PO DAILY


   3/13/20 Reported


 


 Emergen-C 500 mg


 Chewable Tab (Vit


 C/Ascorb


 Sod/Multivit-Min)


 500 Mg Tab.chew  500 Mg


 PO DAILY


   3/13/20 Reported


 


 Zoloft


  (Sertraline Hcl)


 50 Mg Tablet  50 Mg


 PO DAILY


   3/13/20 Reported


 


 Trazodone Hcl 50


 Mg Tablet  1 Tab


 PO QHS


   3/13/20 Reported


 


 Trulicity


  (Dulaglutide)


 0.75 Mg/0.5 Ml


 Pen.injctr  0.75 Mg


 SQ WEEKLY


   3/13/20 Reported


 


 Insulin Aspart


 100 Unit/1 Ml Vial  25 Unit


 SQ TIDWMEALS


   3/13/20 Reported


 


 Levemir (Insulin


 Detemir) 100


 Unit/1 Ml Vial  80 Unit


 SQ DAILY


   3/13/20 Reported


 


 Lasix


  (Furosemide) 20


 Mg Tablet  20 Mg


 PO BID


   3/13/20 Reported


 


 Flomax


  (Tamsulosin Hcl)


 0.4 Mg Cap.er.24h  0.4 Mg


 PO HS


   3/13/20 Reported


 


 Omeprazole 20 Mg


 Capsule.dr  20 Mg


 PO BID


   3/13/20 Reported


 


 Lisinopril 40 Mg


 Tablet  1 Tab


 PO DAILY


   3/13/20 Reported


 


 Atenolol 100 Mg


 Tablet  100 Mg


 PO BID


   3/13/20 Reported








Comments

















ECHO


The left ventricle is normal size.


The left ventricular systolic function is normal.


The Ejection Fraction is 50-55%.


There is mild concentric left ventricular hypertrophy.


Doppler and Color Flow revealed trace aortic regurgitation.


There is no significant aortic valvular stenosis.


Doppler and Color-flow revealed trace mitral regurgitation.


Doppler and Color Flow revealed trace tricuspid regurgitation with an estimated 

PAP of 28 mmHg.





Impression


.


IMPRESSION:


1.  Acute hypercapnic and hypoxic respiratory failure sec to flash pulmonary 

edema


2.  Abnormal chest x-ray with bilateral interstitial infiltrates suggesting 

interstitial edema


3.  Leukocytosis


4.  No significant tobacco history.


5.  Suspected obesity hypoventilation syndrome and obstructive sleep apnea,


never been tested.


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


7.  HYPOTENSION IMPROVED


8.  COVID SUSPECT 





3/19


CXR


IMPRESSION: 


1. Stable diffuse interstitial infiltrate with small pleural effusions.


2. Stable support lines and tubes.





Plan


.


CXR ABOUT THE SAME


WILL CONTINUE PRN LASIX


NOT READY FOR TRIAL


PEEP NOW 6


ANTI ABX PER ID





CCT 30 MINUTES D/W CARDIOLOGY/ REVIEWING LABS/DATA/CXR











ADALBERTO GAXIOLA MD              Mar 19, 2020 09:02

## 2020-03-19 NOTE — RAD
EXAM: Chest, single view.

 

HISTORY: Respiratory failure.

 

COMPARISON: 3/18/2020.

 

FINDINGS: A frontal view of the chest obtained. There is stable diffuse 

interstitial infiltrate with small pleural effusions. The heart is normal 

in size. There is a right subclavian catheter with the tip in the superior

vena cava. There is a nasogastric tube within the stomach. There is an 

endotracheal tube within the mid trachea.

 

IMPRESSION: 

1. Stable diffuse interstitial infiltrate with small pleural effusions.

2. Stable support lines and tubes.

 

Electronically signed by: Miroslava Mendosa MD (3/19/2020 7:28 AM) [id number]

## 2020-03-19 NOTE — PDOC
[facility] PROGRESS NOTE


Chief Complaint


Chief Complaint


0. Status post CODE BLUE in Cath Lab


1. Acute CHF with possible combined systolic/diastolic dysfunction. 

Significantly improved. Continuing present treatment.


2. NSTEMI: no CP but suspecting ischemia as culprit. EKG LBBB no prior for 

comparison. Tentatively plan for heart catheterization tomorrow. Discussed with 

the patient.


2. Acute respiratory failure with CHF and possibly ongoing RAVINDER


3. DM2: insulin dependent


4. Accelerated HTN: better


5. Morbid obesity


6. Leukocytosis with UTI: on antibiotics per PCP





History of Present Illness


History of Present Illness


[id number]


Patient seen and examined in the ICU


Blood pressures running high


I started some by mouth Norvasc (per OG)


Also ordered some IV Vasotec


Discussed with RN


Chart reviewed


He remains critically ill


Intubated with assist control 18/500/50%








[id number]


Patient seen and examined in the ICU


He remains critically ill and on mechanical ventilation


Assist-control/18/500/50% with 8 of PEEP


Chart reviewed


Discussed with RN








[id number]


Chart reviewed


Patient seen and examined in the ICU


Discussed with RN





[id number]


Patient seen and examined


He is currently getting an central line with Dr. Wright


He is now on the ICU after having a CODE BLUE this morning while in the cardiac 

catheter lab


I discussed the case with cardiologist


Patient probably got very short of air and eventually had to be intubated in the

Cath Lab


He is critically ill


It should be noted that there was no actual cardiac catheter done due to the 

severe shortness of breath and CODE BLUE





Vitals/I&O


Vitals/I&O:





                                   Vital Signs








  Date Time  Temp Pulse Resp B/P (MAP) Pulse Ox O2 Delivery O2 Flow Rate FiO2


 


3/19/20 11:00  52 17 166/56 (92) 97 Ventilator  


 


3/19/20 08:00 97.7       





 97.7       














                                    I & O   


 


 3/18/20 3/18/20 3/19/20





 15:00 23:00 07:00


 


Intake Total 530 ml 832 ml 1215 ml


 


Output Total 1485 ml 1600 ml 1025 ml


 


Balance -955 ml -768 ml 190 ml











Physical Exam


Physical Exam:


GENERAL: intubated,,opens eyes to verbal stimuli


HEENT  intubated, ETT+,NGT +,no icterus


Neck supple , central line present


LUNGS:  Decreased breath sounds bases,  


HEART:  S1, S2 


ABDOMEN: Obese, soft, nontender


 mansfield in place 3/16


EXTREMITIES:  2+ pitting edema present. No cyanosis 


MSK bilateral knee scars intact


SKIN: Warm to touch. No signs of rash


NEURO:intubated


General:  Other


Heart:  Regular rate, No murmurs


Lungs:  Crackles


Abdomen:  Normal bowel sounds


Extremities:  No cyanosis, Other (3+ bilateral LE pitting edema)


Skin:  No breakdown, No significant lesion





Labs


Labs:





Laboratory Tests








Test


 3/18/20


12:36 3/18/20


18:18 3/19/20


00:15 3/19/20


05:40


 


Glucose (Fingerstick)


 154 mg/dL


(70-99) 160 mg/dL


(70-99) 170 mg/dL


(70-99) 176 mg/dL


(70-99)


 


Test


 3/19/20


05:50 3/19/20


08:05 


 





 


White Blood Count


 15.4 x10^3/uL


(4.0-11.0) 


 


 





 


Red Blood Count


 5.12 x10^6/uL


(4.30-5.70) 


 


 





 


Hemoglobin


 14.3 g/dL


(13.0-17.5) 


 


 





 


Hematocrit


 44.1 %


(39.0-53.0) 


 


 





 


Mean Corpuscular Volume 86 fL ()    


 


Mean Corpuscular Hemoglobin 28 pg (25-35)    


 


Mean Corpuscular Hemoglobin


Concent 32 g/dL


(31-37) 


 


 





 


Red Cell Distribution Width


 14.5 %


(11.5-14.5) 


 


 





 


Platelet Count


 187 x10^3/uL


(140-400) 


 


 





 


Neutrophils (%) (Auto) 83 % (31-73)    


 


Lymphocytes (%) (Auto) 9 % (24-48)    


 


Monocytes (%) (Auto) 8 % (0-9)    


 


Eosinophils (%) (Auto) 0 % (0-3)    


 


Basophils (%) (Auto) 0 % (0-3)    


 


Neutrophils # (Auto)


 12.7 x10^3/uL


(1.8-7.7) 


 


 





 


Lymphocytes # (Auto)


 1.4 x10^3/uL


(1.0-4.8) 


 


 





 


Monocytes # (Auto)


 1.3 x10^3/uL


(0.0-1.1) 


 


 





 


Eosinophils # (Auto)


 0.0 x10^3/uL


(0.0-0.7) 


 


 





 


Basophils # (Auto)


 0.0 x10^3/uL


(0.0-0.2) 


 


 





 


Sodium Level


 151 mmol/L


(136-145) 


 


 





 


Potassium Level


 2.4 mmol/L


(3.5-5.1) 


 


 





 


Chloride Level


 105 mmol/L


() 


 


 





 


Carbon Dioxide Level


 41 mmol/L


(21-32) 


 


 





 


Anion Gap 5 (6-14)    


 


Blood Urea Nitrogen


 37 mg/dL


(8-26) 


 


 





 


Creatinine


 1.3 mg/dL


(0.7-1.3) 


 


 





 


Estimated GFR


(Cockcroft-Gault) 54.1 


 


 


 





 


Glucose Level


 192 mg/dL


(70-99) 


 


 





 


Calcium Level


 8.6 mg/dL


(8.5-10.1) 


 


 





 


Magnesium Level


 2.1 mg/dL


(1.8-2.4) 


 


 





 


O2 Saturation  93 % (92-99)   


 


Arterial Blood pH


 


 7.45


(7.35-7.45) 


 





 


Arterial Blood pCO2 at


Patient Temp 


 58 mmHg


(35-46) 


 





 


Arterial Blood pO2 at Patient


Temp 


 68 mmHg


() 


 





 


Arterial Blood HCO3


 


 39 mmol/L


(21-28) 


 





 


Arterial Blood Base Excess


 


 13 mmol/L


(-3-3) 


 





 


FiO2  40   











Assessment and Plan


Assessmemt and Plan


Problems


Medical Problems:


(1) Acute respiratory distress


Status: Acute  





(2) CAP (community acquired pneumonia)


Status: Acute  





(3) CHF (congestive heart failure)


Status: Acute  





(4) Hypoxia


Status: Acute  





(5) SIRS (systemic inflammatory response syndrome)


Status: Acute  


0. Status post CODE BLUE in the Cath Lab


1. Acute CHF with possible combined systolic/diastolic dysfunction. 

Significantly improved. Continuing present treatment.


2. NSTEMI: no CP but suspecting ischemia as culprit. EKG LBBB no prior for 

comparison. Tentatively plan for heart catheterization tomorrow. Discussed with 

the patient.


2. Acute respiratory failure with CHF and possibly ongoing RAVINDER


3. DM2: insulin dependent


4. Accelerated HTN: better


5. Morbid obesity


6. Leukocytosis with UTI: on antibiotics per PCP








Plan


ICU monitoring


Propofol for sedation


Vent weaning


IV Vasotec


Norvasc per OG tube


IV antibiotics


Duo nebs


Home meds


DVT prophylaxis


Full code


Prognosis guarded


We consider checking for coronavirus but he was turned down by the [facility]


Discussed with RNs


Discussed with anesthesia


He is critically ill


Total time 31 minutes








Comment


Review of Relevant


I have reviewed the following items nicole (where applicable) has been applied.


Medications:





Current Medications








 Medications


  (Trade)  Dose


 Ordered  Sig/Elisa


 Route


 PRN Reason  Start Time


 Stop Time Status Last Admin


Dose Admin


 


 Potassium


 Chloride/Water  100 ml @ 


 100 mls/hr  1X  ONCE


 IV


   3/19/20 06:45


 3/19/20 07:44 DC 3/19/20 07:07





 


 Potassium


 Chloride/Water  100 ml @ 


 100 mls/hr  1X  ONCE


 IV


   3/19/20 09:00


 3/19/20 09:59 DC 3/19/20 08:31





 


 Enalaprilat


  (Vasotec Inj)  2.5 mg  PRN Q6HRS  PRN


 IVP


 HYPERTENSION  3/19/20 10:15


    3/19/20 10:31




















MARY LARA III DO           Mar 19, 2020 11:45

## 2020-03-19 NOTE — PDOC
PROGRESS NOTES


Subjective


Subjective


Patient seen and examined





Objective


Objective





Vital Signs








  Date Time  Temp Pulse Resp B/P (MAP) Pulse Ox O2 Delivery O2 Flow Rate FiO2


 


3/19/20 14:07     93 Ventilator  


 


3/19/20 13:24  50  186/74    


 


3/19/20 13:00   14     


 


3/19/20 12:00 98.1       





 98.1       


 


3/16/20 08:00       4.0 














Intake and Output 


 


 3/19/20





 07:00


 


Intake Total 2577 ml


 


Output Total 4110 ml


 


Balance -1533 ml


 


 


 


IV Total 958 ml


 


Tube Feeding 1259 ml


 


Other 360 ml


 


Output Urine Total 4110 ml











Physical Exam


Abdomen:  Normal bowel sounds


Heart:  Regular rate


General:  Other (intubated on a ventilator.)


Lungs:  Other





Assessment


Assessment


Problems


Medical Problems:


(1) Acute respiratory distress


Status: Acute  





(2) CAP (community acquired pneumonia)


Status: Acute  





(3) CHF (congestive heart failure)


Status: Acute  





(4) Hypoxia


Status: Acute  





(5) SIRS (systemic inflammatory response syndrome)


Status: Acute  





Acute on chronic diastolic heart failure. Intact LV systolic function. Continue 

treatment with Lasix a tolerated.


Non-ST elevated myocardial infarction. Unable to cath yet due to SOB.  

Continuing present treatment.


Acute respiratory failure. Remains on a ventilator. We'll continue diuresis. 

Pulmonary following.


Hypertension. Elevated.  Will adjust medications.


Bradycardia. Mildly improved. Continue to monitor.


Diabetes mellitus. As per the primary service.





Comment


Review of Relevant


I have reviewed the following items nicole (where applicable) has been applied.


Labs





Laboratory Tests








Test


 3/17/20


18:24 3/18/20


00:25 3/18/20


05:30 3/18/20


05:38


 


Glucose (Fingerstick)


 123 mg/dL


(70-99) 113 mg/dL


(70-99) 


 113 mg/dL


(70-99)


 


White Blood Count


 


 


 13.4 x10^3/uL


(4.0-11.0) 





 


Red Blood Count


 


 


 4.98 x10^6/uL


(4.30-5.70) 





 


Hemoglobin


 


 


 14.0 g/dL


(13.0-17.5) 





 


Hematocrit


 


 


 42.6 %


(39.0-53.0) 





 


Mean Corpuscular Volume   86 fL ()  


 


Mean Corpuscular Hemoglobin   28 pg (25-35)  


 


Mean Corpuscular Hemoglobin


Concent 


 


 33 g/dL


(31-37) 





 


Red Cell Distribution Width


 


 


 14.7 %


(11.5-14.5) 





 


Platelet Count


 


 


 176 x10^3/uL


(140-400) 





 


Neutrophils (%) (Auto)   81 % (31-73)  


 


Lymphocytes (%) (Auto)   10 % (24-48)  


 


Monocytes (%) (Auto)   8 % (0-9)  


 


Eosinophils (%) (Auto)   0 % (0-3)  


 


Basophils (%) (Auto)   1 % (0-3)  


 


Neutrophils # (Auto)


 


 


 10.8 x10^3/uL


(1.8-7.7) 





 


Lymphocytes # (Auto)


 


 


 1.4 x10^3/uL


(1.0-4.8) 





 


Monocytes # (Auto)


 


 


 1.0 x10^3/uL


(0.0-1.1) 





 


Eosinophils # (Auto)


 


 


 0.0 x10^3/uL


(0.0-0.7) 





 


Basophils # (Auto)


 


 


 0.1 x10^3/uL


(0.0-0.2) 





 


Sodium Level


 


 


 148 mmol/L


(136-145) 





 


Potassium Level


 


 


 3.0 mmol/L


(3.5-5.1) 





 


Chloride Level


 


 


 106 mmol/L


() 





 


Carbon Dioxide Level


 


 


 37 mmol/L


(21-32) 





 


Anion Gap   5 (6-14)  


 


Blood Urea Nitrogen


 


 


 34 mg/dL


(8-26) 





 


Creatinine


 


 


 1.3 mg/dL


(0.7-1.3) 





 


Estimated GFR


(Cockcroft-Gault) 


 


 54.1 


 





 


Glucose Level


 


 


 129 mg/dL


(70-99) 





 


Calcium Level


 


 


 8.4 mg/dL


(8.5-10.1) 





 


Magnesium Level


 


 


 2.1 mg/dL


(1.8-2.4) 





 


Test


 3/18/20


08:45 3/18/20


12:36 3/18/20


18:18 3/19/20


00:15


 


O2 Saturation 97 % (92-99)    


 


Arterial Blood pH


 7.46


(7.35-7.45) 


 


 





 


Arterial Blood pCO2 at


Patient Temp 46 mmHg


(35-46) 


 


 





 


Arterial Blood pO2 at Patient


Temp 93 mmHg


() 


 


 





 


Arterial Blood HCO3


 32 mmol/L


(21-28) 


 


 





 


Arterial Blood Base Excess


 7 mmol/L


(-3-3) 


 


 





 


FiO2 70    


 


Glucose (Fingerstick)


 


 154 mg/dL


(70-99) 160 mg/dL


(70-99) 170 mg/dL


(70-99)


 


Test


 3/19/20


05:40 3/19/20


05:50 3/19/20


08:05 3/19/20


11:48


 


Glucose (Fingerstick)


 176 mg/dL


(70-99) 


 


 183 mg/dL


(70-99)


 


White Blood Count


 


 15.4 x10^3/uL


(4.0-11.0) 


 





 


Red Blood Count


 


 5.12 x10^6/uL


(4.30-5.70) 


 





 


Hemoglobin


 


 14.3 g/dL


(13.0-17.5) 


 





 


Hematocrit


 


 44.1 %


(39.0-53.0) 


 





 


Mean Corpuscular Volume  86 fL ()   


 


Mean Corpuscular Hemoglobin  28 pg (25-35)   


 


Mean Corpuscular Hemoglobin


Concent 


 32 g/dL


(31-37) 


 





 


Red Cell Distribution Width


 


 14.5 %


(11.5-14.5) 


 





 


Platelet Count


 


 187 x10^3/uL


(140-400) 


 





 


Neutrophils (%) (Auto)  83 % (31-73)   


 


Lymphocytes (%) (Auto)  9 % (24-48)   


 


Monocytes (%) (Auto)  8 % (0-9)   


 


Eosinophils (%) (Auto)  0 % (0-3)   


 


Basophils (%) (Auto)  0 % (0-3)   


 


Neutrophils # (Auto)


 


 12.7 x10^3/uL


(1.8-7.7) 


 





 


Lymphocytes # (Auto)


 


 1.4 x10^3/uL


(1.0-4.8) 


 





 


Monocytes # (Auto)


 


 1.3 x10^3/uL


(0.0-1.1) 


 





 


Eosinophils # (Auto)


 


 0.0 x10^3/uL


(0.0-0.7) 


 





 


Basophils # (Auto)


 


 0.0 x10^3/uL


(0.0-0.2) 


 





 


Sodium Level


 


 151 mmol/L


(136-145) 


 





 


Potassium Level


 


 2.4 mmol/L


(3.5-5.1) 


 





 


Chloride Level


 


 105 mmol/L


() 


 





 


Carbon Dioxide Level


 


 41 mmol/L


(21-32) 


 





 


Anion Gap  5 (6-14)   


 


Blood Urea Nitrogen


 


 37 mg/dL


(8-26) 


 





 


Creatinine


 


 1.3 mg/dL


(0.7-1.3) 


 





 


Estimated GFR


(Cockcroft-Gault) 


 54.1 


 


 





 


Glucose Level


 


 192 mg/dL


(70-99) 


 





 


Calcium Level


 


 8.6 mg/dL


(8.5-10.1) 


 





 


Magnesium Level


 


 2.1 mg/dL


(1.8-2.4) 


 





 


O2 Saturation   93 % (92-99)  


 


Arterial Blood pH


 


 


 7.45


(7.35-7.45) 





 


Arterial Blood pCO2 at


Patient Temp 


 


 58 mmHg


(35-46) 





 


Arterial Blood pO2 at Patient


Temp 


 


 68 mmHg


() 





 


Arterial Blood HCO3


 


 


 39 mmol/L


(21-28) 





 


Arterial Blood Base Excess


 


 


 13 mmol/L


(-3-3) 





 


FiO2   40  


 


Test


 3/19/20


14:50 


 


 





 


Sodium Level


 152 mmol/L


(136-145) 


 


 





 


Potassium Level


 2.6 mmol/L


(3.5-5.1) 


 


 





 


Chloride Level


 107 mmol/L


() 


 


 





 


Carbon Dioxide Level


 41 mmol/L


(21-32) 


 


 





 


Anion Gap 4 (6-14)    


 


Blood Urea Nitrogen


 39 mg/dL


(8-26) 


 


 





 


Creatinine


 1.3 mg/dL


(0.7-1.3) 


 


 





 


Estimated GFR


(Cockcroft-Gault) 54.1 


 


 


 





 


Glucose Level


 181 mg/dL


(70-99) 


 


 





 


Calcium Level


 8.6 mg/dL


(8.5-10.1) 


 


 











Laboratory Tests








Test


 3/18/20


18:18 3/19/20


00:15 3/19/20


05:40 3/19/20


05:50


 


Glucose (Fingerstick)


 160 mg/dL


(70-99) 170 mg/dL


(70-99) 176 mg/dL


(70-99) 





 


White Blood Count


 


 


 


 15.4 x10^3/uL


(4.0-11.0)


 


Red Blood Count


 


 


 


 5.12 x10^6/uL


(4.30-5.70)


 


Hemoglobin


 


 


 


 14.3 g/dL


(13.0-17.5)


 


Hematocrit


 


 


 


 44.1 %


(39.0-53.0)


 


Mean Corpuscular Volume    86 fL () 


 


Mean Corpuscular Hemoglobin    28 pg (25-35) 


 


Mean Corpuscular Hemoglobin


Concent 


 


 


 32 g/dL


(31-37)


 


Red Cell Distribution Width


 


 


 


 14.5 %


(11.5-14.5)


 


Platelet Count


 


 


 


 187 x10^3/uL


(140-400)


 


Neutrophils (%) (Auto)    83 % (31-73) 


 


Lymphocytes (%) (Auto)    9 % (24-48) 


 


Monocytes (%) (Auto)    8 % (0-9) 


 


Eosinophils (%) (Auto)    0 % (0-3) 


 


Basophils (%) (Auto)    0 % (0-3) 


 


Neutrophils # (Auto)


 


 


 


 12.7 x10^3/uL


(1.8-7.7)


 


Lymphocytes # (Auto)


 


 


 


 1.4 x10^3/uL


(1.0-4.8)


 


Monocytes # (Auto)


 


 


 


 1.3 x10^3/uL


(0.0-1.1)


 


Eosinophils # (Auto)


 


 


 


 0.0 x10^3/uL


(0.0-0.7)


 


Basophils # (Auto)


 


 


 


 0.0 x10^3/uL


(0.0-0.2)


 


Sodium Level


 


 


 


 151 mmol/L


(136-145)


 


Potassium Level


 


 


 


 2.4 mmol/L


(3.5-5.1)


 


Chloride Level


 


 


 


 105 mmol/L


()


 


Carbon Dioxide Level


 


 


 


 41 mmol/L


(21-32)


 


Anion Gap    5 (6-14) 


 


Blood Urea Nitrogen


 


 


 


 37 mg/dL


(8-26)


 


Creatinine


 


 


 


 1.3 mg/dL


(0.7-1.3)


 


Estimated GFR


(Cockcroft-Gault) 


 


 


 54.1 





 


Glucose Level


 


 


 


 192 mg/dL


(70-99)


 


Calcium Level


 


 


 


 8.6 mg/dL


(8.5-10.1)


 


Magnesium Level


 


 


 


 2.1 mg/dL


(1.8-2.4)


 


Test


 3/19/20


08:05 3/19/20


11:48 3/19/20


14:50 





 


O2 Saturation 93 % (92-99)    


 


Arterial Blood pH


 7.45


(7.35-7.45) 


 


 





 


Arterial Blood pCO2 at


Patient Temp 58 mmHg


(35-46) 


 


 





 


Arterial Blood pO2 at Patient


Temp 68 mmHg


() 


 


 





 


Arterial Blood HCO3


 39 mmol/L


(21-28) 


 


 





 


Arterial Blood Base Excess


 13 mmol/L


(-3-3) 


 


 





 


FiO2 40    


 


Glucose (Fingerstick)


 


 183 mg/dL


(70-99) 


 





 


Sodium Level


 


 


 152 mmol/L


(136-145) 





 


Potassium Level


 


 


 2.6 mmol/L


(3.5-5.1) 





 


Chloride Level


 


 


 107 mmol/L


() 





 


Carbon Dioxide Level


 


 


 41 mmol/L


(21-32) 





 


Anion Gap   4 (6-14)  


 


Blood Urea Nitrogen


 


 


 39 mg/dL


(8-26) 





 


Creatinine


 


 


 1.3 mg/dL


(0.7-1.3) 





 


Estimated GFR


(Cockcroft-Gault) 


 


 54.1 


 





 


Glucose Level


 


 


 181 mg/dL


(70-99) 





 


Calcium Level


 


 


 8.6 mg/dL


(8.5-10.1) 











Microbiology


3/16/20 Blood Culture - Preliminary, Resulted


          NO GROWTH AFTER 3 DAYS


3/13/20 Urine Culture - Final, Complete


          


3/13/20 Urine Culture Result 1 (CAR) - Final, Complete


          


3/13/20 Antimicrobic Susceptibility - Final, Complete


Medications





Current Medications


Albuterol/ Ipratropium (Duoneb) 3 ml 1X  ONCE NEB  Last administered on 

3/13/20at 09:07;  Start 3/13/20 at 09:15;  Stop 3/13/20 at 09:16;  Status DC


Methylprednisolone Sodium Succinate (SOLU-Medrol 125MG VIAL) 125 mg 1X  ONCE IV 

Last administered on 3/13/20at 09:27;  Start 3/13/20 at 09:15;  Stop 3/13/20 at 

09:16;  Status DC


Labetalol HCl (Normodyne Iv Push) 10 mg 1X  ONCE IVP ;  Start 3/13/20 at 09:15; 

Stop 3/13/20 at 09:12;  Status DC


Piperacillin Sod/ Tazobactam Sod 3.375 gm/Sodium Chloride 50 ml @  100 mls/hr 1X

 ONCE IV  Last administered on 3/13/20at 10:23;  Start 3/13/20 at 09:30;  Stop 

3/13/20 at 09:59;  Status DC


Vancomycin HCl 250 ml @  250 mls/hr 1X  ONCE IV ;  Start 3/13/20 at 09:30;  Stop

3/13/20 at 10:29;  Status UNV


Vancomycin HCl 2 gm/Sodium Chloride 500 ml @  250 mls/hr 1X  ONCE IV  Last 

administered on 3/13/20at 09:56;  Start 3/13/20 at 09:45;  Stop 3/13/20 at 

11:44;  Status DC


Sodium Chloride 1,000 ml @  100 mls/hr Q10H IV  Last administered on 3/13/20at 

11:53;  Start 3/13/20 at 10:51;  Stop 3/14/20 at 10:50;  Status DC


Furosemide (Lasix) 40 mg 1X  ONCE IVP  Last administered on 3/13/20at 14:08;  

Start 3/13/20 at 14:00;  Stop 3/13/20 at 14:01;  Status DC


Piperacillin Sod/ Tazobactam Sod 3.375 gm/Sodium Chloride 50 ml @  100 mls/hr 

Q6HRS IV  Last administered on 3/19/20at 11:49;  Start 3/13/20 at 18:00


Insulin Human Lispro (HumaLOG) 0-9 UNITS TIDWMEALS SQ  Last administered on 

3/16/20at 17:00;  Start 3/13/20 at 17:00;  Stop 3/16/20 at 22:21;  Status DC


Dextrose (Dextrose 50%-Water Syringe) 12.5 gm PRN Q15MIN  PRN IV SEE COMMENTS;  

Start 3/13/20 at 14:00


Atorvastatin Calcium (Lipitor) 40 mg QHS PO  Last administered on 3/18/20at 

20:59;  Start 3/13/20 at 21:00


Aspirin (Ecotrin) 81 mg DAILYWBKFT PO  Last administered on 3/19/20at 08:30;  

Start 3/14/20 at 08:00


Aspirin (Ecotrin) 325 mg 1X  ONCE PO  Last administered on 3/13/20at 16:24;  

Start 3/13/20 at 16:00;  Stop 3/13/20 at 16:01;  Status DC


Furosemide (Lasix) 40 mg BID92 IVP  Last administered on 3/17/20at 10:11;  Start

3/14/20 at 09:00;  Stop 3/17/20 at 10:38;  Status DC


Heparin Sodium/ Dextrose 250 ml @ 0 mls/hr CONT  PRN IV PER PROTOCOL Last 

administered on 3/15/20at 20:31;  Start 3/13/20 at 16:15;  Stop 3/17/20 at 

13:51;  Status DC


Heparin Sodium (Porcine) (Heparin Sodium) 3,450 unit PRN Q6HRS  PRN IV FOR UFH 

LEVEL LESS THAN 0.2 Last administered on 3/13/20at 23:59;  Start 3/13/20 at 

16:15;  Stop 3/17/20 at 13:52;  Status DC


Info (Anti-Coagulation Monitoring By Pharmacy) 1 each PRN DAILY  PRN MC SEE 

COMMENTS Last administered on 3/16/20at 13:49;  Start 3/13/20 at 16:15;  Stop 

3/17/20 at 13:52;  Status DC


Lisinopril (Prinivil) 40 mg DAILY PO  Last administered on 3/16/20at 08:12;  

Start 3/14/20 at 09:00;  Stop 3/17/20 at 10:38;  Status DC


Metoprolol Tartrate (Lopressor) 50 mg BID PO ;  Start 3/13/20 at 21:00;  Status 

UNV


Atenolol (Tenormin) 100 mg DAILY PO  Last administered on 3/16/20at 08:15;  

Start 3/14/20 at 09:00;  Stop 3/16/20 at 13:45;  Status DC


Insulin Human Lispro (HumaLOG) 5 units 1X  ONCE SQ  Last administered on 

3/13/20at 21:46;  Start 3/13/20 at 22:00;  Stop 3/13/20 at 22:01;  Status DC


Insulin Glargine (Lantus Syringe) 80 unit QHS SQ ;  Start 3/13/20 at 22:00;  

Stop 3/13/20 at 21:56;  Status DC


Insulin Glargine (Lantus Syringe) 80 unit DAILY SQ ;  Start 3/14/20 at 09:00;  

Status Cancel


Insulin Glargine (Lantus Syringe) 80 unit DAILY08 SQ  Last administered on 

3/19/20at 08:23;  Start 3/14/20 at 08:00


Lactobacillus Rhamnosus (Culturelle) 1 cap BID PO  Last administered on 3/

19/20at 08:29;  Start 3/14/20 at 21:00


Sodium Chloride 1,000 ml @  75 mls/hr [id number] IV ;  Start 3/16/20 at 06:00;  

Stop 3/17/20 at 12:28;  Status DC


Amlodipine Besylate (Norvasc) 5 mg DAILY PO  Last administered on 3/16/20at 

08:13;  Start 3/15/20 at 16:00;  Stop 3/17/20 at 10:38;  Status DC


Iodixanol (Visipaque 320) 100 ml STK-MED ONCE .ROUTE ;  Start 3/16/20 at 07:56; 

Stop 3/16/20 at 07:56;  Status DC


Lidocaine HCl (Lidocaine 1% 20ml Vial) 20 ml STK-MED ONCE .ROUTE ;  Start 

3/16/20 at 07:56;  Stop 3/16/20 at 07:56;  Status DC


Heparin Sodium/ Sodium Chloride 1,500 ml @  As Directed STK-MED ONCE .ROUTE ;  

Start 3/16/20 at 07:56;  Stop 3/16/20 at 07:56;  Status DC


Fentanyl Citrate (Fentanyl 2ml Vial) 100 mcg STK-MED ONCE .ROUTE ;  Start 

3/16/20 at 09:03;  Stop 3/16/20 at 09:03;  Status DC


Midazolam HCl (Versed) 2 mg STK-MED ONCE .ROUTE ;  Start 3/16/20 at 09:03;  Stop

3/16/20 at 09:03;  Status DC


Heparin Sodium (Porcine) (Heparin Sodium) 10,000 unit STK-MED ONCE .ROUTE ;  

Start 3/16/20 at 09:39;  Stop 3/16/20 at 09:39;  Status DC


Verapamil HCl (Verapamil) 5 mg STK-MED ONCE .ROUTE ;  Start 3/16/20 at 09:39;  

Stop 3/16/20 at 09:39;  Status DC


Nitroglycerin/ Dextrose 250 ml @  As Directed STK-MED ONCE IV ;  Start 3/16/20 

at 09:39;  Stop 3/16/20 at 09:39;  Status DC


Nitroglycerin (Nitroglycerin) 200 mcg STK-MED ONCE .ROUTE ;  Start 3/16/20 at 

09:39;  Stop 3/16/20 at 09:39;  Status DC


Albuterol/ Ipratropium (Duoneb) 3 ml 1X  ONCE NEB  Last administered on 

3/16/20at 10:00;  Start 3/16/20 at 10:00;  Stop 3/16/20 at 10:01;  Status DC


Etomidate (Amidate) 20 mg STK-MED ONCE IV ;  Start 3/16/20 at 10:23;  Stop 

3/16/20 at 10:23;  Status DC


Succinylcholine Chloride (Anectine) 200 mg STK-MED ONCE .ROUTE ;  Start 3/16/20 

at 10:23;  Stop 3/16/20 at 10:23;  Status DC


Propofol 100 ml @  As Directed STK-MED ONCE IV ;  Start 3/16/20 at 10:35;  Stop 

3/16/20 at 10:35;  Status DC


Nitroglycerin (Nitroglycerin) 200 mcg 1X  ONCE IART  Last administered on 

3/16/20at 11:00;  Start 3/16/20 at 11:00;  Stop 3/16/20 at 11:01;  Status DC


Verapamil HCl (Verapamil) 2.5 mg 1X  ONCE IART ;  Start 3/16/20 at 11:00;  Stop 

3/16/20 at 11:01;  Status DC


Heparin Sodium (Porcine) (Heparin Sodium) 2,500 unit 1X  ONCE IART ;  Start 

3/16/20 at 11:00;  Stop 3/16/20 at 11:01;  Status DC


Heparin Sodium/ Sodium Chloride (HEPARIN for ARTERIAL LINE FLUSH) 1,000 unit 1X 

ONCE IART  Last administered on 3/16/20at 11:00;  Start 3/16/20 at 11:00;  Stop 

3/16/20 at 11:01;  Status DC


Heparin Sodium/ Sodium Chloride (HEPARIN for ARTERIAL LINE FLUSH) 1,000 unit 1X 

ONCE IART  Last administered on 3/16/20at 11:00;  Start 3/16/20 at 11:00;  Stop 

3/16/20 at 11:01;  Status DC


Midazolam HCl (Versed) 2 mg 1X  ONCE IV  Last administered on 3/16/20at 11:00;  

Start 3/16/20 at 11:00;  Stop 3/16/20 at 11:01;  Status DC


Fentanyl Citrate (Fentanyl 2ml Vial) 100 mcg 1X  ONCE IV  Last administered on 

3/16/20at 11:00;  Start 3/16/20 at 11:00;  Stop 3/16/20 at 11:01;  Status DC


Iodixanol (Visipaque 320) 100 ml 1X  ONCE IART ;  Start 3/16/20 at 11:00;  Stop 

3/16/20 at 11:01;  Status DC


Lidocaine HCl (Lidocaine 1% 20ml Vial) 20 ml 1X  ONCE INJ ;  Start 3/16/20 at 

11:00;  Stop 3/16/20 at 11:01;  Status DC


Norepinephrine Bitartrate 8 mg/ Dextrose 258 ml @  27.09 mls/ hr CONT  PRN IV 

PER PROTOCOL Last administered on 3/16/20at 12:55;  Start 3/16/20 at 11:15


Propofol 100 ml @  As Directed STK-MED ONCE IV ;  Start 3/16/20 at 12:39;  Stop 

3/16/20 at 12:40;  Status DC


Potassium Chloride/Water 100 ml @  100 mls/hr 1X  ONCE IV  Last administered on 

3/16/20at 13:06;  Start 3/16/20 at 13:00;  Stop 3/16/20 at 13:59;  Status DC


Midazolam HCl 50 mg/Sodium Chloride 50 ml @ 1 mls/hr CONT  PRN IV SEE I/O RECORD

Last administered on 3/19/20at 16:06;  Start 3/16/20 at 13:00


Magnesium Sulfate 50 ml @ 25 mls/hr 1X  ONCE IV  Last administered on 3/16/20at 

14:28;  Start 3/16/20 at 13:00;  Stop 3/16/20 at 14:59;  Status DC


Midazolam HCl 50 mg/Sodium Chloride 50 ml @ 1 mls/hr CONT  PRN IV SEE I/O RE

CORD;  Start 3/16/20 at 13:00;  Status UNV


Dobutamine HCl/ Dextrose 250 ml @  8.43 mls/hr CONT  PRN IV SEE I/O RECORD;  

Start 3/16/20 at 13:30


Atenolol (Tenormin) 25 mg DAILY PO ;  Start 3/17/20 at 09:00;  Stop 3/17/20 at 

10:38;  Status DC


Metolazone (Zaroxolyn) 2.5 mg DAILY PO  Last administered on 3/19/20at 08:28;  

Start 3/17/20 at 09:00


Fentanyl Citrate 30 ml @ 0 mls/hr CONT  PRN IV SEE PROTOCOL Last administered on

3/19/20at 16:45;  Start 3/16/20 at 22:30


Insulin Human Lispro (HumaLOG) 0-9 UNITS Q6HRS SQ  Last administered on 

3/19/20at 11:54;  Start 3/17/20 at 00:00


Furosemide 100 mg/ Sodium Chloride 100 ml @ 5 mls/hr CONT  PRN IV SEE I/O RECORD

Last administered on 3/18/20at 23:14;  Start 3/17/20 at 11:00


Hydralazine HCl (Apresoline Inj) 10 mg PRN Q4HRS  PRN IVP ELEVATED BP, SEE 

COMMENTS Last administered on 3/19/20at 08:29;  Start 3/17/20 at 10:45


Verapamil HCl (Verapamil) 5 mg STK-MED ONCE .ROUTE ;  Start 3/16/20 at 10:00;  

Stop 3/17/20 at 13:11;  Status DC


Famotidine (Pepcid Vial) 20 mg BID IVP  Last administered on 3/19/20at 08:28;  

Start 3/17/20 at 21:00


Linezolid/Dextrose 300 ml @  300 mls/hr Q12HR IV  Last administered on 3/18/20at

20:59;  Start 3/18/20 at 09:00;  Stop 3/19/20 at 08:46;  Status DC


Potassium Bicarbonate (Potassium Effervescent Tablet) 40 meq 1X  ONCE PO  Last 

administered on 3/18/20at 09:27;  Start 3/18/20 at 09:00;  Stop 3/18/20 at 

09:02;  Status DC


Potassium Chloride/Water 100 ml @  100 mls/hr 1X  ONCE IV  Last administered on 

3/19/20at 07:07;  Start 3/19/20 at 06:45;  Stop 3/19/20 at 07:44;  Status DC


Potassium Chloride/Water 100 ml @  100 mls/hr 1X  ONCE IV  Last administered on 

3/19/20at 08:31;  Start 3/19/20 at 09:00;  Stop 3/19/20 at 09:59;  Status DC


Potassium Chloride/Water 100 ml @  100 mls/hr 1X  ONCE IV  Last administered on 

3/19/20at 13:24;  Start 3/19/20 at 13:00;  Stop 3/19/20 at 13:59;  Status DC


Enalaprilat (Vasotec Inj) 2.5 mg PRN Q6HRS  PRN IVP HYPERTENSION Last 

administered on 3/19/20at 10:31;  Start 3/19/20 at 10:15


Amlodipine Besylate (Norvasc) 10 mg DAILY PO  Last administered on 3/19/20at 

13:24;  Start 3/19/20 at 12:00


Heparin Sodium (Porcine) (Heparin Sodium) 5,000 unit Q12HR SQ  Last administered

on 3/19/20at 14:00;  Start 3/19/20 at 14:00


Potassium Chloride/Water 100 ml @  100 mls/hr Q1H IV ;  Start 3/19/20 at 16:00; 

Stop 3/19/20 at 17:59





Active Scripts


Active


Reported


Saw [place] (Saw [place] Fruit) 450 Mg Capsule 450 Mg PO DAILY


D3-50 (Cholecalciferol (Vitamin D3)) 50,000 Unit Capsule 1,000 Unit PO DAILY


Emergen-C 500 mg Chewable Tab (Vit C/Ascorb Sod/Multivit-Min) 500 Mg Tab.chew 

500 Mg PO DAILY


Zoloft (Sertraline Hcl) 50 Mg Tablet 50 Mg PO DAILY


Trazodone Hcl 50 Mg Tablet 1 Tab PO QHS


Trulicity (Dulaglutide) 0.75 Mg/0.5 Ml Pen.injctr 0.75 Mg SQ WEEKLY


Insulin Aspart 100 Unit/1 Ml Vial 25 Unit SQ TIDWMEALS


Levemir (Insulin Detemir) 100 Unit/1 Ml Vial 80 Unit SQ DAILY


Lasix (Furosemide) 20 Mg Tablet 20 Mg PO BID


Flomax (Tamsulosin Hcl) 0.4 Mg Cap.er.24h 0.4 Mg PO HS


Omeprazole 20 Mg Capsule.dr 20 Mg PO BID


Lisinopril 40 Mg Tablet 1 Tab PO DAILY


Atenolol 100 Mg Tablet 100 Mg PO BID


Vitals/I & O





Vital Sign - Last 24 Hours








 3/18/20 3/18/20 3/18/20 3/18/20





 17:00 17:30 18:00 18:15


 


Pulse 52  56 66


 


Resp 17  14 


 


B/P (MAP) 168/62 (97)  182/64 (103) 180/66


 


Pulse Ox 96 95 95 


 


O2 Delivery Ventilator Ventilator Ventilator 





 3/18/20 3/18/20 3/18/20 3/18/20





 19:00 19:30 19:30 20:00


 


Pulse 62 62  57


 


Resp 17   15


 


B/P (MAP) 152/53 (86) 152/53 (86)  156/63 (94)


 


Pulse Ox 92   94


 


O2 Delivery Ventilator  Mechanical Ventilator Ventilator





 3/18/20 3/18/20 3/18/20 3/18/20





 20:00 20:57 21:00 21:00


 


Temp    98.3





    98.3


 


Pulse 57  54 56


 


Resp    14


 


B/P (MAP) 156/63 (94)  170/56 159/51 (87)


 


Pulse Ox  96  96


 


O2 Delivery  Ventilator  Ventilator


 


    





    





 3/18/20 3/18/20 3/18/20 3/19/20





 22:00 23:00 23:41 00:00


 


Pulse 58 54  53


 


Resp 15 14  


 


B/P (MAP) 148/51 (83) 156/49 (84)  160/53 (88)


 


Pulse Ox 92 93 95 


 


O2 Delivery Ventilator Ventilator Ventilator 





 3/19/20 3/19/20 3/19/20 3/19/20





 00:00 00:00 00:14 01:00


 


Temp  98.0  





  98.0  


 


Pulse  53  53


 


Resp  14  14


 


B/P (MAP)  160/53 (88)  145/53 (83)


 


Pulse Ox  95  95


 


O2 Delivery Mechanical Ventilator Ventilator Ventilator Ventilator


 


    





    





 3/19/20 3/19/20 3/19/20 3/19/20





 01:14 01:34 02:00 03:00


 


Pulse   50 48


 


Resp   14 14


 


B/P (MAP)   167/60 (95) 172/58 (96)


 


Pulse Ox  96 95 93


 


O2 Delivery Ventilator Ventilator Ventilator Ventilator





 3/19/20 3/19/20 3/19/20 3/19/20





 03:30 03:30 04:00 04:27


 


Temp   97.9 





   97.9 


 


Pulse  48 47 


 


Resp   14 


 


B/P (MAP)  163/56 (91) 162/44 (83) 


 


Pulse Ox   92 94


 


O2 Delivery Mechanical Ventilator  Ventilator Ventilator


 


    





    





 3/19/20 3/19/20 3/19/20 3/19/20





 05:00 06:00 06:06 07:00


 


Pulse 47 46  44


 


Resp 14 14  14


 


B/P (MAP) 179/58 (98) 160/57 (91)  158/56 (90)


 


Pulse Ox 94 94 94 95


 


O2 Delivery Ventilator Ventilator Ventilator Ventilator





 3/19/20 3/19/20 3/19/20 3/19/20





 07:40 07:53 08:00 08:00


 


Temp   97.7 





   97.7 


 


Pulse   50 50


 


Resp   14 


 


B/P (MAP)   180/68 (105) 180/68 (105)


 


Pulse Ox  95 98 


 


O2 Delivery Mechanical Ventilator Ventilator Ventilator 


 


    





    





 3/19/20 3/19/20 3/19/20 3/19/20





 08:29 09:00 10:00 10:31


 


Pulse 54 66 77 57


 


Resp  14 17 


 


B/P (MAP) 176/68 162/61 (94) 186/66 (106) 182/64


 


Pulse Ox  97 96 


 


O2 Delivery  Ventilator Ventilator 





 3/19/20 3/19/20 3/19/20 3/19/20





 11:00 11:42 12:00 12:00


 


Temp   98.1 





   98.1 


 


Pulse 52  70 50


 


Resp 17  16 


 


B/P (MAP) 166/56 (92)  186/74 (111) 186/74 (111)


 


Pulse Ox 97 93 93 


 


O2 Delivery Ventilator Ventilator Ventilator 


 


    





    





 3/19/20 3/19/20 3/19/20 3/19/20





 12:00 13:00 13:24 14:07


 


Pulse  52 50 


 


Resp  14  


 


B/P (MAP)  146/54 (84) 186/74 


 


Pulse Ox  97  93


 


O2 Delivery Mechanical Ventilator Ventilator  Ventilator














Intake and Output   


 


 3/18/20 3/18/20 3/19/20





 15:00 23:00 07:00


 


Intake Total 530 ml 832 ml 1215 ml


 


Output Total 1485 ml 1600 ml 1025 ml


 


Balance -955 ml -768 ml 190 ml

















MARCELA ZARCO MD            Mar 19, 2020 17:00

## 2020-03-19 NOTE — PDOC
Infectious Disease Note


Subjective:


Subjective





Pt intubated 


opens eyes to verbal stimuli


on FiO2 40%


no fevers





Vital Signs:


Vital Signs





Vital Signs








  Date Time  Temp Pulse Resp B/P (MAP) Pulse Ox O2 Delivery O2 Flow Rate FiO2


 


3/19/20 07:40      Mechanical Ventilator  


 


3/19/20 07:00  44 14 158/56 (90) 95   


 


3/19/20 04:00 97.9       





 97.9       











Physical Exam:


PHYSICAL EXAM


GENERAL: intubated,,opens eyes to verbal stimuli


HEENT  intubated, ETT+,NGT +,no icterus


Neck supple , central line present


LUNGS:  Decreased breath sounds bases,  


HEART:  S1, S2 


ABDOMEN: Obese, soft, nontender


 mansfield in place 3/16


EXTREMITIES:  2+ pitting edema present. No cyanosis 


MSK bilateral knee scars intact


SKIN: Warm to touch. No signs of rash


NEURO:intubated





Medications:


Inpatient Meds:





Current Medications








 Medications


  (Trade)  Dose


 Ordered  Sig/Elisa  Start Time


 Stop Time Status Last Admin


Dose Admin


 


 Albuterol/


 Ipratropium


  (Duoneb)  3 ml  1X  ONCE  3/16/20 10:00


 3/16/20 10:01 DC 3/16/20 10:00





 


 Amlodipine


 Besylate


  (Norvasc)  5 mg  DAILY  3/15/20 16:00


 3/17/20 10:38 DC 3/16/20 08:13





 


 Aspirin


  (Ecotrin)  325 mg  1X  ONCE  3/13/20 16:00


 3/13/20 16:01 DC 3/13/20 16:24





 


 Atenolol


  (Tenormin)  25 mg  DAILY  3/17/20 09:00


 3/17/20 10:38 DC  





 


 Atorvastatin


 Calcium


  (Lipitor)  40 mg  QHS  3/13/20 21:00


    3/18/20 20:59





 


 Dextrose


  (Dextrose


 50%-Water Syringe)  12.5 gm  PRN Q15MIN  PRN  3/13/20 14:00


     





 


 Dobutamine HCl/


 Dextrose  250 ml @ 


 8.43 mls/hr  CONT  PRN  3/16/20 13:30


     





 


 Etomidate


  (Amidate)  20 mg  STK-MED ONCE  3/16/20 10:23


 3/16/20 10:23 DC  





 


 Famotidine


  (Pepcid Vial)  20 mg  BID  3/17/20 21:00


    3/18/20 20:59





 


 Fentanyl Citrate  30 ml @ 0


 mls/hr  CONT  PRN  3/16/20 22:30


    3/19/20 07:24





 


 Fentanyl Citrate


  (Fentanyl 2ml


 Vial)  100 mcg  1X  ONCE  3/16/20 11:00


 3/16/20 11:01 DC 3/16/20 11:00





 


 Furosemide


  (Lasix)  40 mg  BID92  3/14/20 09:00


 3/17/20 10:38 DC 3/17/20 10:11





 


 Furosemide 100 mg/


 Sodium Chloride  100 ml @ 5


 mls/hr  CONT  PRN  3/17/20 11:00


    3/18/20 23:14





 


 Heparin Sodium


  (Porcine)


  (Heparin Sodium)  2,500 unit  1X  ONCE  3/16/20 11:00


 3/16/20 11:01 DC  





 


 Heparin Sodium/


 Dextrose  250 ml @ 0


 mls/hr  CONT  PRN  3/13/20 16:15


 3/17/20 13:51 DC 3/15/20 20:31





 


 Heparin Sodium/


 Sodium Chloride


  (HEPARIN for


 ARTERIAL LINE


 FLUSH)  1,000 unit  1X  ONCE  3/16/20 11:00


 3/16/20 11:01 DC 3/16/20 11:00





 


 Hydralazine HCl


  (Apresoline Inj)  10 mg  PRN Q4HRS  PRN  3/17/20 10:45


    3/18/20 21:00





 


 Info


  (Anti-Coagulation


 Monitoring By


 Pharmacy)  1 each  PRN DAILY  PRN  3/13/20 16:15


 3/17/20 13:52 DC 3/16/20 13:49





 


 Insulin Glargine


  (Lantus Syringe)  80 unit  DAILY08  3/14/20 08:00


    3/17/20 10:13





 


 Insulin Human


 Lispro


  (HumaLOG)  0-9 UNITS  Q6HRS  3/17/20 00:00


    3/19/20 05:44





 


 Iodixanol


  (Visipaque 320)  100 ml  1X  ONCE  3/16/20 11:00


 3/16/20 11:01 DC  





 


 Labetalol HCl


  (Normodyne Iv


 Push)  10 mg  1X  ONCE  3/13/20 09:15


 3/13/20 09:12 DC  





 


 Lactobacillus


 Rhamnosus


  (Culturelle)  1 cap  BID  3/14/20 21:00


    3/18/20 20:59





 


 Lidocaine HCl


  (Lidocaine 1%


 20ml Vial)  20 ml  1X  ONCE  3/16/20 11:00


 3/16/20 11:01 DC  





 


 Linezolid/Dextrose  300 ml @ 


 300 mls/hr  Q12HR  3/18/20 09:00


    3/18/20 20:59





 


 Lisinopril


  (Prinivil)  40 mg  DAILY  3/14/20 09:00


 3/17/20 10:38 DC 3/16/20 08:12





 


 Magnesium Sulfate  50 ml @ 25


 mls/hr  1X  ONCE  3/16/20 13:00


 3/16/20 14:59 DC 3/16/20 14:28





 


 Methylprednisolone


 Sodium Succinate


  (SOLU-Medrol


 125MG VIAL)  125 mg  1X  ONCE  3/13/20 09:15


 3/13/20 09:16 DC 3/13/20 09:27





 


 Metolazone


  (Zaroxolyn)  2.5 mg  DAILY  3/17/20 09:00


    3/18/20 08:17





 


 Metoprolol


 Tartrate


  (Lopressor)  50 mg  BID  3/13/20 21:00


   UNV  





 


 Midazolam HCl


  (Versed)  2 mg  1X  ONCE  3/16/20 11:00


 3/16/20 11:01 DC 3/16/20 11:00





 


 Midazolam HCl 50


 mg/Sodium Chloride  50 ml @ 1


 mls/hr  CONT  PRN  3/16/20 13:00


   UNV  





 


 Nitroglycerin


  (Nitroglycerin)  200 mcg  1X  ONCE  3/16/20 11:00


 3/16/20 11:01 DC 3/16/20 11:00





 


 Nitroglycerin/


 Dextrose  250 ml @ 


 As Directed  STK-MED ONCE  3/16/20 09:39


 3/16/20 09:39 DC  





 


 Norepinephrine


 Bitartrate 8 mg/


 Dextrose  258 ml @ 


 27.09 mls/


 hr  CONT  PRN  3/16/20 11:15


    3/16/20 12:55





 


 Piperacillin Sod/


 Tazobactam Sod


 3.375 gm/Sodium


 Chloride  50 ml @ 


 100 mls/hr  Q6HRS  3/13/20 18:00


    3/19/20 05:41





 


 Potassium


 Bicarbonate


  (Potassium


 Effervescent


 Tablet)  40 meq  1X  ONCE  3/18/20 09:00


 3/18/20 09:02 DC 3/18/20 09:27





 


 Potassium


 Chloride/Water  100 ml @ 


 100 mls/hr  1X  ONCE  3/19/20 13:00


 3/19/20 13:59   





 


 Propofol  100 ml @ 


 As Directed  STK-MED ONCE  3/16/20 12:39


 3/16/20 12:40 DC  





 


 Sodium Chloride  1,000 ml @ 


 75 mls/hr  [id number]  3/16/20 06:00


 3/17/20 12:28 DC  





 


 Succinylcholine


 Chloride


  (Anectine)  200 mg  STK-MED ONCE  3/16/20 10:23


 3/16/20 10:23 DC  





 


 Vancomycin HCl  250 ml @ 


 250 mls/hr  1X  ONCE  3/13/20 09:30


 3/13/20 10:29 UNV  





 


 Vancomycin HCl 2


 gm/Sodium Chloride  500 ml @ 


 250 mls/hr  1X  ONCE  3/13/20 09:45


 3/13/20 11:44 DC 3/13/20 09:56





 


 Verapamil HCl


  (Verapamil)  5 mg  STK-MED ONCE  3/16/20 10:00


 3/17/20 13:11 DC  














Labs:


Lab





Laboratory Tests








Test


 3/18/20


08:45 3/18/20


12:36 3/18/20


18:18 3/19/20


00:15


 


O2 Saturation 97 % (92-99)    


 


Arterial Blood pH


 7.46


(7.35-7.45) 


 


 





 


Arterial Blood pCO2 at


Patient Temp 46 mmHg


(35-46) 


 


 





 


Arterial Blood pO2 at Patient


Temp 93 mmHg


() 


 


 





 


Arterial Blood HCO3


 32 mmol/L


(21-28) 


 


 





 


Arterial Blood Base Excess


 7 mmol/L


(-3-3) 


 


 





 


FiO2 70    


 


Glucose (Fingerstick)


 


 154 mg/dL


(70-99) 160 mg/dL


(70-99) 170 mg/dL


(70-99)


 


Test


 3/19/20


05:40 3/19/20


05:50 


 





 


Glucose (Fingerstick)


 176 mg/dL


(70-99) 


 


 





 


White Blood Count


 


 15.4 x10^3/uL


(4.0-11.0) 


 





 


Red Blood Count


 


 5.12 x10^6/uL


(4.30-5.70) 


 





 


Hemoglobin


 


 14.3 g/dL


(13.0-17.5) 


 





 


Hematocrit


 


 44.1 %


(39.0-53.0) 


 





 


Mean Corpuscular Volume  86 fL ()   


 


Mean Corpuscular Hemoglobin  28 pg (25-35)   


 


Mean Corpuscular Hemoglobin


Concent 


 32 g/dL


(31-37) 


 





 


Red Cell Distribution Width


 


 14.5 %


(11.5-14.5) 


 





 


Platelet Count


 


 187 x10^3/uL


(140-400) 


 





 


Neutrophils (%) (Auto)  83 % (31-73)   


 


Lymphocytes (%) (Auto)  9 % (24-48)   


 


Monocytes (%) (Auto)  8 % (0-9)   


 


Eosinophils (%) (Auto)  0 % (0-3)   


 


Basophils (%) (Auto)  0 % (0-3)   


 


Neutrophils # (Auto)


 


 12.7 x10^3/uL


(1.8-7.7) 


 





 


Lymphocytes # (Auto)


 


 1.4 x10^3/uL


(1.0-4.8) 


 





 


Monocytes # (Auto)


 


 1.3 x10^3/uL


(0.0-1.1) 


 





 


Eosinophils # (Auto)


 


 0.0 x10^3/uL


(0.0-0.7) 


 





 


Basophils # (Auto)


 


 0.0 x10^3/uL


(0.0-0.2) 


 





 


Sodium Level


 


 151 mmol/L


(136-145) 


 





 


Potassium Level


 


 2.4 mmol/L


(3.5-5.1) 


 





 


Chloride Level


 


 105 mmol/L


() 


 





 


Carbon Dioxide Level


 


 41 mmol/L


(21-32) 


 





 


Anion Gap  5 (6-14)   


 


Blood Urea Nitrogen


 


 37 mg/dL


(8-26) 


 





 


Creatinine


 


 1.3 mg/dL


(0.7-1.3) 


 





 


Estimated GFR


(Cockcroft-Gault) 


 54.1 


 


 





 


Glucose Level


 


 192 mg/dL


(70-99) 


 





 


Calcium Level


 


 8.6 mg/dL


(8.5-10.1) 


 





 


Magnesium Level


 


 2.1 mg/dL


(1.8-2.4) 


 














Objective:


Assessment:





Acute resp failure s/p intubation


NSTEMI:


Leucocytosis 


CHF


DM2


Morbid obesity


Hypertension.


Partial nephrectomy.


Pyuria UC 50-100K enterococcus 3/13, ? colonization





Plan:


Plan of Care


Cont zosyn


DC Zyvox


Supportive care 


f/u cult and labs











TRISTAN RICHARDSON MD           Mar 19, 2020 07:57

## 2020-03-20 NOTE — CARD
MR#: [id number]

Account#: [id number]

Accession#: [id number]

Date of Study: 03/20/2020

Ordering Physician: MARCELA SILVA, 

Referring Physician: MARCELA SILVA, 

Devaughn: Veena Robbins ELIAN





--------------- APPROVED REPORT --------------





EXAM: LIMITED Two-dimensional echocardiogram



Other Information 

Quality : Good



INDICATION

LV Function:Systolic



 LEFT VENTRICLE 

Limited study for LV function. The left ventricle is normal size. Left ventricle systolic function is
 low normal. The Ejection Fraction is 50-55%. Septal motion consistent with conduction abnormality bu
t otherwise normall wall motion.



 PERICARDIAL EFFUSION 

There is no evidence of significant pericardial effusion.



Critical Notification

Critical Value: No



<Conclusion>

Limited study for LV function.

The left ventricle is normal size.

Left ventricle systolic function is low normal.

The Ejection Fraction is 50-55%.

Septal motion consistent with conduction abnormality but otherwise normall wall motion.



Signed by : Marcela Silva MD

Electronically Approved : 03/20/2020 13:23:26

## 2020-03-20 NOTE — RAD
CHEST AP ONLY

 

Clinical History: Respiratory failure

 

Technique:  AP view of the chest was obtained at 3/20/2020 5:00 AM.

 

Comparison: March 19, 2020.

 

Findings:

 

The heart is normal size. The pulmonary vessels appear slightly congested.

There is patchy opacity lung bases and obscuration left hemidiaphragm. The

endotracheal tube NG tube and right subclavian line are again seen 

unchanged. 

 

Impression:  

 

Pleural effusions small on the right and moderate on the left with 

adjacent infiltrates. This is likely secondary to CHF and appears slightly

improved.

 

Electronically signed by: Rios Jacobs III, MD (3/20/2020 8:22 AM) [id number]

## 2020-03-20 NOTE — PDOC
[facility] PROGRESS NOTE


Chief Complaint


Chief Complaint


0. Status post CODE BLUE in Cath Lab (he never actually made it on to the Cath 

Lab table before he coded, eventual cardiac catheter still pending)


1. Acute CHF with possible combined systolic/diastolic dysfunction. 

Significantly improved. Continuing present treatment.


2. NSTEMI: no CP but suspecting ischemia as culprit. EKG LBBB no prior for 

comparison. Tentatively plan for heart catheterization tomorrow. Discussed with 

the patient.


2. Acute respiratory failure with CHF and possibly ongoing RAVINDER


3. DM2: insulin dependent


4. Accelerated HTN: better


5. Morbid obesity


6. Leukocytosis with UTI: on antibiotics per PCP





History of Present Illness


History of Present Illness


[id number]


Patient seen and examined in ICU


Intubated and sedated


His wife Ella is at his bedside his good support for him


Chart reviewed


Discussed with RN


Discussed with cardiologist


Plan is a ventral cardiac catheter when he is more stable








[id number]


Patient seen and examined in the ICU


Blood pressures running high


I started some by mouth Norvasc (per OG)


Also ordered some IV Vasotec


Discussed with RN


Chart reviewed


He remains critically ill


Intubated with assist control 18/500/50%








[id number]


Patient seen and examined in the ICU


He remains critically ill and on mechanical ventilation


Assist-control/18/500/50% with 8 of PEEP


Chart reviewed


Discussed with RN








[id number]


Chart reviewed


Patient seen and examined in the ICU


Discussed with RN





[id number]


Patient seen and examined


He is currently getting an central line with Dr. Wright


He is now on the ICU after having a CODE BLUE this morning while in the cardiac 

catheter lab


I discussed the case with cardiologist


Patient probably got very short of air and eventually had to be intubated in the

Cath Lab


He is critically ill


It should be noted that there was no actual cardiac catheter done due to the 

severe shortness of breath and CODE BLUE





Vitals/I&O


Vitals/I&O:





                                   Vital Signs








  Date Time  Temp Pulse Resp B/P (MAP) Pulse Ox O2 Delivery O2 Flow Rate FiO2


 


3/20/20 11:00  69 16 151/52 (85) 97 Ventilator  


 


3/20/20 08:00 98.8       





 98.8       














                                    I & O   


 


 3/19/20 3/19/20 3/20/20





 15:00 23:00 07:00


 


Intake Total 710 ml 1856 ml 1171.80 ml


 


Output Total 1275 ml 1260 ml 1010 ml


 


Balance -565 ml 596 ml 161.80 ml











Physical Exam


Physical Exam:


GENERAL: intubated,,opens eyes to verbal stimuli


HEENT  intubated, ETT+,NGT +,no icterus


Neck supple , central line present


LUNGS:  Decreased breath sounds bases,  


HEART:  S1, S2 


ABDOMEN: Obese, soft, nontender


 mansfield in place 3/16


EXTREMITIES:  2+ pitting edema present. No cyanosis 


MSK bilateral knee scars intact


SKIN: Warm to touch. No signs of rash


NEURO:intubated


General:  Other (intubated on a ventilator.)


Heart:  Regular rate


Lungs:  Crackles


Abdomen:  Normal bowel sounds


Extremities:  No cyanosis, Other (3+ bilateral LE pitting edema)


Skin:  No breakdown, No significant lesion





Labs


Labs:





Laboratory Tests








Test


 3/19/20


14:50 3/19/20


18:21 3/20/20


00:26 3/20/20


05:00


 


Sodium Level


 152 mmol/L


(136-145) 


 


 





 


Potassium Level


 2.6 mmol/L


(3.5-5.1) 


 


 





 


Chloride Level


 107 mmol/L


() 


 


 





 


Carbon Dioxide Level


 41 mmol/L


(21-32) 


 


 





 


Anion Gap 4 (6-14)    


 


Blood Urea Nitrogen


 39 mg/dL


(8-26) 


 


 





 


Creatinine


 1.3 mg/dL


(0.7-1.3) 


 


 





 


Estimated GFR


(Cockcroft-Gault) 54.1 


 


 


 





 


Glucose Level


 181 mg/dL


(70-99) 


 


 





 


Calcium Level


 8.6 mg/dL


(8.5-10.1) 


 


 





 


Glucose (Fingerstick)


 


 159 mg/dL


(70-99) 193 mg/dL


(70-99) 





 


White Blood Count


 


 


 


 15.3 x10^3/uL


(4.0-11.0)


 


Red Blood Count


 


 


 


 5.11 x10^6/uL


(4.30-5.70)


 


Hemoglobin


 


 


 


 14.2 g/dL


(13.0-17.5)


 


Hematocrit


 


 


 


 44.2 %


(39.0-53.0)


 


Mean Corpuscular Volume    87 fL () 


 


Mean Corpuscular Hemoglobin    28 pg (25-35) 


 


Mean Corpuscular Hemoglobin


Concent 


 


 


 32 g/dL


(31-37)


 


Red Cell Distribution Width


 


 


 


 14.8 %


(11.5-14.5)


 


Platelet Count


 


 


 


 226 x10^3/uL


(140-400)


 


Neutrophils (%) (Auto)    79 % (31-73) 


 


Lymphocytes (%) (Auto)    11 % (24-48) 


 


Monocytes (%) (Auto)    10 % (0-9) 


 


Eosinophils (%) (Auto)    0 % (0-3) 


 


Basophils (%) (Auto)    1 % (0-3) 


 


Neutrophils # (Auto)


 


 


 


 12.0 x10^3/uL


(1.8-7.7)


 


Lymphocytes # (Auto)


 


 


 


 1.6 x10^3/uL


(1.0-4.8)


 


Monocytes # (Auto)


 


 


 


 1.5 x10^3/uL


(0.0-1.1)


 


Eosinophils # (Auto)


 


 


 


 0.0 x10^3/uL


(0.0-0.7)


 


Basophils # (Auto)


 


 


 


 0.1 x10^3/uL


(0.0-0.2)


 


Test


 3/20/20


05:48 3/20/20


05:50 3/20/20


08:00 





 


Glucose (Fingerstick)


 181 mg/dL


(70-99) 


 


 





 


Sodium Level


 


 151 mmol/L


(136-145) 


 





 


Potassium Level


 


 2.4 mmol/L


(3.5-5.1) 


 





 


Chloride Level


 


 105 mmol/L


() 


 





 


Carbon Dioxide Level


 


 > 45 mmol/L


(21-32) 


 





 


Anion Gap  1 (6-14)   


 


Blood Urea Nitrogen


 


 48 mg/dL


(8-26) 


 





 


Creatinine


 


 1.5 mg/dL


(0.7-1.3) 


 





 


Estimated GFR


(Cockcroft-Gault) 


 45.9 


 


 





 


Glucose Level


 


 179 mg/dL


(70-99) 


 





 


Calcium Level


 


 8.9 mg/dL


(8.5-10.1) 


 





 


O2 Saturation   94 % (92-99)  


 


Arterial Blood pH


 


 


 7.46


(7.35-7.45) 





 


Arterial Blood pCO2 at


Patient Temp 


 


 55 mmHg


(35-46) 





 


Arterial Blood pO2 at Patient


Temp 


 


 69 mmHg


() 





 


Arterial Blood HCO3


 


 


 38 mmol/L


(21-28) 





 


Arterial Blood Base Excess


 


 


 12 mmol/L


(-3-3) 





 


FiO2   40  











Review of Systems


Review of Systems:


Unable to obtain





Assessment and Plan


Assessmemt and Plan


Problems


Medical Problems:


(1) Acute respiratory distress


Status: Acute  





(2) CAP (community acquired pneumonia)


Status: Acute  





(3) CHF (congestive heart failure)


Status: Acute  





(4) Hypoxia


Status: Acute  





(5) SIRS (systemic inflammatory response syndrome)


Status: Acute  





0. Status post CODE BLUE in the Cath Lab (he never actually made it on to the 

Cath Lab table before he coded, eventual cardiac catheter still pending)


1. Acute CHF with possible combined systolic/diastolic dysfunction. 

Significantly improved. Continuing present treatment.


2. NSTEMI: no CP but suspecting ischemia as culprit. EKG LBBB no prior for 

comparison. Tentatively plan for heart catheterization tomorrow. Discussed with 

the patient.


2. Acute respiratory failure with CHF and possibly ongoing RAVINDER


3. DM2: insulin dependent


4. Accelerated HTN: better


5. Morbid obesity


6. Leukocytosis with UTI: on antibiotics per PCP








Plan


Eventually Dr. Sloan would like to attempt a cardiac catheter again but he 

would like the patient more stable first (agree)


For now:


ICU monitoring


Propofol for sedation


Vent weaning


IV Vasotec


Norvasc per OG tube


IV antibiotics


Duo nebs


Home meds


DVT prophylaxis


Full code


Prognosis guarded


We considered checking for coronavirus but he was turned down by the [facility]


Discussed with RNs


Discussed with anesthesia


He remains very critically ill


Total time 38 minutes





Comment


Review of Relevant


I have reviewed the following items nicole (where applicable) has been applied.


Medications:





Current Medications








 Medications


  (Trade)  Dose


 Ordered  Sig/Elisa


 Route


 PRN Reason  Start Time


 Stop Time Status Last Admin


Dose Admin


 


 Potassium


 Chloride/Water  100 ml @ 


 100 mls/hr  1X  ONCE


 IV


   3/19/20 13:00


 3/19/20 13:59 DC 3/19/20 13:24





 


 Heparin Sodium


  (Porcine)


  (Heparin Sodium)  5,000 unit  Q12HR


 SQ


   3/19/20 14:00


    3/20/20 08:59





 


 Potassium


 Chloride/Water  100 ml @ 


 100 mls/hr  Q1H


 IV


   3/19/20 16:00


 3/19/20 17:59 DC 3/19/20 19:26





 


 Potassium


 Chloride/Water  100 ml @ 


 100 mls/hr  Q1HR


 IV


   3/20/20 12:00


 3/20/20 17:59  3/20/20 12:29




















MARY LARA III DO           Mar 20, 2020 12:34

## 2020-03-20 NOTE — PDOC
Infectious Disease Note


Subjective:


Subjective





Pt intubated 


opens eyes to verbal stimuli


on FiO2 40%


no fevers


on lasix drip





Vital Signs:


Vital Signs





Vital Signs








  Date Time  Temp Pulse Resp B/P (MAP) Pulse Ox O2 Delivery O2 Flow Rate FiO2


 


3/20/20 05:59  74 16 145/58 (87) 96 Ventilator  


 


3/20/20 04:00 98.5       





 98.5       











Physical Exam:


PHYSICAL EXAM


GENERAL: intubated,,opens eyes to verbal stimuli


HEENT  intubated, ETT+,NGT +,no icterus


Neck supple , central line present


LUNGS:  Decreased breath sounds bases,  


HEART:  S1, S2 


ABDOMEN: Obese, soft, nontender


 mansfield in place 3/16


EXTREMITIES:  2+ pitting edema present. No cyanosis 


MSK bilateral knee scars intact


SKIN: Warm to touch. No signs of rash


NEURO:intubated





Medications:


Inpatient Meds:





Current Medications








 Medications


  (Trade)  Dose


 Ordered  Sig/Elisa  Start Time


 Stop Time Status Last Admin


Dose Admin


 


 Albuterol/


 Ipratropium


  (Duoneb)  3 ml  1X  ONCE  3/16/20 10:00


 3/16/20 10:01 DC 3/16/20 10:00


3 ML


 


 Amlodipine


 Besylate


  (Norvasc)  10 mg  DAILY  3/19/20 12:00


    3/19/20 13:24


10 MG


 


 Aspirin


  (Ecotrin)  325 mg  1X  ONCE  3/13/20 16:00


 3/13/20 16:01 DC 3/13/20 16:24


325 MG


 


 Atenolol


  (Tenormin)  25 mg  DAILY  3/17/20 09:00


 3/17/20 10:38 DC  





 


 Atorvastatin


 Calcium


  (Lipitor)  40 mg  QHS  3/13/20 21:00


    3/19/20 20:25


40 MG


 


 Dextrose


  (Dextrose


 50%-Water Syringe)  12.5 gm  PRN Q15MIN  PRN  3/13/20 14:00


     





 


 Dobutamine HCl/


 Dextrose  250 ml @ 


 8.43 mls/hr  CONT  PRN  3/16/20 13:30


     





 


 Enalaprilat


  (Vasotec Inj)  2.5 mg  PRN Q6HRS  PRN  3/19/20 10:15


    3/19/20 10:31


2.5 MG


 


 Etomidate


  (Amidate)  20 mg  STK-MED ONCE  3/16/20 10:23


 3/16/20 10:23 DC  





 


 Famotidine


  (Pepcid Vial)  20 mg  BID  3/17/20 21:00


    3/19/20 20:25


20 MG


 


 Fentanyl Citrate  30 ml @ 0


 mls/hr  CONT  PRN  3/16/20 22:30


    3/20/20 02:09


2.5 MLS/HR


 


 Fentanyl Citrate


  (Fentanyl 2ml


 Vial)  100 mcg  1X  ONCE  3/16/20 11:00


 3/16/20 11:01 DC 3/16/20 11:00


25 MCG


 


 Furosemide


  (Lasix)  40 mg  BID92  3/14/20 09:00


 3/17/20 10:38 DC 3/17/20 10:11


40 MG


 


 Furosemide 100 mg/


 Sodium Chloride  100 ml @ 5


 mls/hr  CONT  PRN  3/17/20 11:00


    3/19/20 20:26


5 MLS/HR


 


 Heparin Sodium


  (Porcine)


  (Heparin Sodium)  5,000 unit  Q12HR  3/19/20 14:00


    3/19/20 20:25


5,000 UNIT


 


 Heparin Sodium/


 Dextrose  250 ml @ 0


 mls/hr  CONT  PRN  3/13/20 16:15


 3/17/20 13:51 DC 3/15/20 20:31


20.6 MLS/HR


 


 Heparin Sodium/


 Sodium Chloride


  (HEPARIN for


 ARTERIAL LINE


 FLUSH)  1,000 unit  1X  ONCE  3/16/20 11:00


 3/16/20 11:01 DC 3/16/20 11:00


1,000 UNIT


 


 Hydralazine HCl


  (Apresoline Inj)  10 mg  PRN Q4HRS  PRN  3/17/20 10:45


    3/19/20 20:29


10 MG


 


 Info


  (Anti-Coagulation


 Monitoring By


 Pharmacy)  1 each  PRN DAILY  PRN  3/13/20 16:15


 3/17/20 13:52 DC 3/16/20 13:49


1 EACH


 


 Insulin Glargine


  (Lantus Syringe)  80 unit  DAILY08  3/14/20 08:00


    3/19/20 08:23


80 UNIT


 


 Insulin Human


 Lispro


  (HumaLOG)  0-9 UNITS  Q6HRS  3/17/20 00:00


    3/20/20 05:52


4 UNITS


 


 Iodixanol


  (Visipaque 320)  100 ml  1X  ONCE  3/16/20 11:00


 3/16/20 11:01 DC  





 


 Labetalol HCl


  (Normodyne Iv


 Push)  10 mg  1X  ONCE  3/13/20 09:15


 3/13/20 09:12 DC  





 


 Lactobacillus


 Rhamnosus


  (Culturelle)  1 cap  BID  3/14/20 21:00


    3/19/20 20:25


1 CAP


 


 Lidocaine HCl


  (Lidocaine 1%


 20ml Vial)  20 ml  1X  ONCE  3/16/20 11:00


 3/16/20 11:01 DC  





 


 Linezolid/Dextrose  300 ml @ 


 300 mls/hr  Q12HR  3/18/20 09:00


 3/19/20 08:46 DC 3/18/20 20:59


300 MLS/HR


 


 Lisinopril


  (Prinivil)  40 mg  DAILY  3/14/20 09:00


 3/17/20 10:38 DC 3/16/20 08:12


40 MG


 


 Magnesium Sulfate  50 ml @ 25


 mls/hr  1X  ONCE  3/16/20 13:00


 3/16/20 14:59 DC 3/16/20 14:28


25 MLS/HR


 


 Methylprednisolone


 Sodium Succinate


  (SOLU-Medrol


 125MG VIAL)  125 mg  1X  ONCE  3/13/20 09:15


 3/13/20 09:16 DC 3/13/20 09:27


125 MG


 


 Metolazone


  (Zaroxolyn)  2.5 mg  DAILY  3/17/20 09:00


    3/19/20 08:28


2.5 MG


 


 Metoprolol


 Tartrate


  (Lopressor)  50 mg  BID  3/13/20 21:00


   UNV  





 


 Midazolam HCl


  (Versed)  2 mg  1X  ONCE  3/16/20 11:00


 3/16/20 11:01 DC 3/16/20 11:00


1 MG


 


 Midazolam HCl 50


 mg/Sodium Chloride  50 ml @ 1


 mls/hr  CONT  PRN  3/16/20 13:00


   UNV  





 


 Nitroglycerin


  (Nitroglycerin)  200 mcg  1X  ONCE  3/16/20 11:00


 3/16/20 11:01 DC 3/16/20 11:00


200 MCG


 


 Nitroglycerin/


 Dextrose  250 ml @ 


 As Directed  STK-MED ONCE  3/16/20 09:39


 3/16/20 09:39 DC  





 


 Norepinephrine


 Bitartrate 8 mg/


 Dextrose  258 ml @ 


 27.09 mls/


 hr  CONT  PRN  3/16/20 11:15


    3/16/20 12:55


27.09 MLS/HR


 


 Piperacillin Sod/


 Tazobactam Sod


 3.375 gm/Sodium


 Chloride  50 ml @ 


 100 mls/hr  Q6HRS  3/13/20 18:00


    3/20/20 05:39


100 MLS/HR


 


 Potassium


 Bicarbonate


  (Potassium


 Effervescent


 Tablet)  40 meq  1X  ONCE  3/18/20 09:00


 3/18/20 09:02 DC 3/18/20 09:27


40 MEQ


 


 Potassium


 Chloride/Water  100 ml @ 


 100 mls/hr  Q1H  3/19/20 16:00


 3/19/20 17:59 DC 3/19/20 19:26


100 MLS/HR


 


 Propofol  100 ml @ 


 As Directed  STK-MED ONCE  3/16/20 12:39


 3/16/20 12:40 DC  





 


 Sodium Chloride  1,000 ml @ 


 75 mls/hr  [id number]  3/16/20 06:00


 3/17/20 12:28 DC  





 


 Succinylcholine


 Chloride


  (Anectine)  200 mg  STK-MED ONCE  3/16/20 10:23


 3/16/20 10:23 DC  





 


 Vancomycin HCl  250 ml @ 


 250 mls/hr  1X  ONCE  3/13/20 09:30


 3/13/20 10:29 UNV  





 


 Vancomycin HCl 2


 gm/Sodium Chloride  500 ml @ 


 250 mls/hr  1X  ONCE  3/13/20 09:45


 3/13/20 11:44 DC 3/13/20 09:56


250 MLS/HR


 


 Verapamil HCl


  (Verapamil)  5 mg  STK-MED ONCE  3/16/20 10:00


 3/17/20 13:11 DC  














Labs:


Lab





Laboratory Tests








Test


 3/19/20


08:05 3/19/20


11:48 3/19/20


14:50 3/19/20


18:21


 


O2 Saturation 93 % (92-99)    


 


Arterial Blood pH


 7.45


(7.35-7.45) 


 


 





 


Arterial Blood pCO2 at


Patient Temp 58 mmHg


(35-46) 


 


 





 


Arterial Blood pO2 at Patient


Temp 68 mmHg


() 


 


 





 


Arterial Blood HCO3


 39 mmol/L


(21-28) 


 


 





 


Arterial Blood Base Excess


 13 mmol/L


(-3-3) 


 


 





 


FiO2 40    


 


Glucose (Fingerstick)


 


 183 mg/dL


(70-99) 


 159 mg/dL


(70-99)


 


Sodium Level


 


 


 152 mmol/L


(136-145) 





 


Potassium Level


 


 


 2.6 mmol/L


(3.5-5.1) 





 


Chloride Level


 


 


 107 mmol/L


() 





 


Carbon Dioxide Level


 


 


 41 mmol/L


(21-32) 





 


Anion Gap   4 (6-14)  


 


Blood Urea Nitrogen


 


 


 39 mg/dL


(8-26) 





 


Creatinine


 


 


 1.3 mg/dL


(0.7-1.3) 





 


Estimated GFR


(Cockcroft-Gault) 


 


 54.1 


 





 


Glucose Level


 


 


 181 mg/dL


(70-99) 





 


Calcium Level


 


 


 8.6 mg/dL


(8.5-10.1) 





 


Test


 3/20/20


00:26 3/20/20


05:00 3/20/20


05:48 





 


Glucose (Fingerstick)


 193 mg/dL


(70-99) 


 181 mg/dL


(70-99) 





 


White Blood Count


 


 15.3 x10^3/uL


(4.0-11.0) 


 





 


Red Blood Count


 


 5.11 x10^6/uL


(4.30-5.70) 


 





 


Hemoglobin


 


 14.2 g/dL


(13.0-17.5) 


 





 


Hematocrit


 


 44.2 %


(39.0-53.0) 


 





 


Mean Corpuscular Volume  87 fL ()   


 


Mean Corpuscular Hemoglobin  28 pg (25-35)   


 


Mean Corpuscular Hemoglobin


Concent 


 32 g/dL


(31-37) 


 





 


Red Cell Distribution Width


 


 14.8 %


(11.5-14.5) 


 





 


Platelet Count


 


 226 x10^3/uL


(140-400) 


 





 


Neutrophils (%) (Auto)  79 % (31-73)   


 


Lymphocytes (%) (Auto)  11 % (24-48)   


 


Monocytes (%) (Auto)  10 % (0-9)   


 


Eosinophils (%) (Auto)  0 % (0-3)   


 


Basophils (%) (Auto)  1 % (0-3)   


 


Neutrophils # (Auto)


 


 12.0 x10^3/uL


(1.8-7.7) 


 





 


Lymphocytes # (Auto)


 


 1.6 x10^3/uL


(1.0-4.8) 


 





 


Monocytes # (Auto)


 


 1.5 x10^3/uL


(0.0-1.1) 


 





 


Eosinophils # (Auto)


 


 0.0 x10^3/uL


(0.0-0.7) 


 





 


Basophils # (Auto)


 


 0.1 x10^3/uL


(0.0-0.2) 


 














Objective:


Assessment:





Acute resp failure s/p intubation


NSTEMI:


Leucocytosis 


CHF


DM2


Morbid obesity


Hypertension.


Partial nephrectomy.


Pyuria UC 50-100K enterococcus 3/13, ? colonization


hyponatremia


COVID-19 NEG





Plan:


Plan of Care


Cont zosyn


f/u cult and labs


supportive care


d/w TRISTAN Beckford MD           Mar 20, 2020 07:26

## 2020-03-20 NOTE — PDOC
PULMONARY PROGRESS NOTES


Subjective


AC MODE SEDATED OFF PRESSORS


IV LASIX


NOW ON 6 PEEP


Vitals





Vital Signs








  Date Time  Temp Pulse Resp B/P (MAP) Pulse Ox O2 Delivery O2 Flow Rate FiO2


 


3/20/20 16:00        


 


3/20/20 16:00      Mechanical Ventilator  


 


3/20/20 15:27     95   


 


3/20/20 15:00  56 16     


 


3/20/20 12:00 98.4       





 98.4       








Lungs:  Crackles


Cardiovascular:  S1, S2


Abdomen:  Soft, Non-tender


Extremities:  No Edema


Skin:  Warm


Labs





Laboratory Tests








Test


 3/18/20


18:18 3/19/20


00:15 3/19/20


05:40 3/19/20


05:50


 


Glucose (Fingerstick)


 160 mg/dL


(70-99) 170 mg/dL


(70-99) 176 mg/dL


(70-99) 





 


White Blood Count


 


 


 


 15.4 x10^3/uL


(4.0-11.0)


 


Red Blood Count


 


 


 


 5.12 x10^6/uL


(4.30-5.70)


 


Hemoglobin


 


 


 


 14.3 g/dL


(13.0-17.5)


 


Hematocrit


 


 


 


 44.1 %


(39.0-53.0)


 


Mean Corpuscular Volume    86 fL () 


 


Mean Corpuscular Hemoglobin    28 pg (25-35) 


 


Mean Corpuscular Hemoglobin


Concent 


 


 


 32 g/dL


(31-37)


 


Red Cell Distribution Width


 


 


 


 14.5 %


(11.5-14.5)


 


Platelet Count


 


 


 


 187 x10^3/uL


(140-400)


 


Neutrophils (%) (Auto)    83 % (31-73) 


 


Lymphocytes (%) (Auto)    9 % (24-48) 


 


Monocytes (%) (Auto)    8 % (0-9) 


 


Eosinophils (%) (Auto)    0 % (0-3) 


 


Basophils (%) (Auto)    0 % (0-3) 


 


Neutrophils # (Auto)


 


 


 


 12.7 x10^3/uL


(1.8-7.7)


 


Lymphocytes # (Auto)


 


 


 


 1.4 x10^3/uL


(1.0-4.8)


 


Monocytes # (Auto)


 


 


 


 1.3 x10^3/uL


(0.0-1.1)


 


Eosinophils # (Auto)


 


 


 


 0.0 x10^3/uL


(0.0-0.7)


 


Basophils # (Auto)


 


 


 


 0.0 x10^3/uL


(0.0-0.2)


 


Sodium Level


 


 


 


 151 mmol/L


(136-145)


 


Potassium Level


 


 


 


 2.4 mmol/L


(3.5-5.1)


 


Chloride Level


 


 


 


 105 mmol/L


()


 


Carbon Dioxide Level


 


 


 


 41 mmol/L


(21-32)


 


Anion Gap    5 (6-14) 


 


Blood Urea Nitrogen


 


 


 


 37 mg/dL


(8-26)


 


Creatinine


 


 


 


 1.3 mg/dL


(0.7-1.3)


 


Estimated GFR


(Cockcroft-Gault) 


 


 


 54.1 





 


Glucose Level


 


 


 


 192 mg/dL


(70-99)


 


Calcium Level


 


 


 


 8.6 mg/dL


(8.5-10.1)


 


Magnesium Level


 


 


 


 2.1 mg/dL


(1.8-2.4)


 


Test


 3/19/20


08:05 3/19/20


11:48 3/19/20


14:50 3/19/20


18:21


 


O2 Saturation 93 % (92-99)    


 


Arterial Blood pH


 7.45


(7.35-7.45) 


 


 





 


Arterial Blood pCO2 at


Patient Temp 58 mmHg


(35-46) 


 


 





 


Arterial Blood pO2 at Patient


Temp 68 mmHg


() 


 


 





 


Arterial Blood HCO3


 39 mmol/L


(21-28) 


 


 





 


Arterial Blood Base Excess


 13 mmol/L


(-3-3) 


 


 





 


FiO2 40    


 


Glucose (Fingerstick)


 


 183 mg/dL


(70-99) 


 159 mg/dL


(70-99)


 


Sodium Level


 


 


 152 mmol/L


(136-145) 





 


Potassium Level


 


 


 2.6 mmol/L


(3.5-5.1) 





 


Chloride Level


 


 


 107 mmol/L


() 





 


Carbon Dioxide Level


 


 


 41 mmol/L


(21-32) 





 


Anion Gap   4 (6-14)  


 


Blood Urea Nitrogen


 


 


 39 mg/dL


(8-26) 





 


Creatinine


 


 


 1.3 mg/dL


(0.7-1.3) 





 


Estimated GFR


(Cockcroft-Gault) 


 


 54.1 


 





 


Glucose Level


 


 


 181 mg/dL


(70-99) 





 


Calcium Level


 


 


 8.6 mg/dL


(8.5-10.1) 





 


Test


 3/20/20


00:26 3/20/20


05:00 3/20/20


05:48 3/20/20


05:50


 


Glucose (Fingerstick)


 193 mg/dL


(70-99) 


 181 mg/dL


(70-99) 





 


White Blood Count


 


 15.3 x10^3/uL


(4.0-11.0) 


 





 


Red Blood Count


 


 5.11 x10^6/uL


(4.30-5.70) 


 





 


Hemoglobin


 


 14.2 g/dL


(13.0-17.5) 


 





 


Hematocrit


 


 44.2 %


(39.0-53.0) 


 





 


Mean Corpuscular Volume  87 fL ()   


 


Mean Corpuscular Hemoglobin  28 pg (25-35)   


 


Mean Corpuscular Hemoglobin


Concent 


 32 g/dL


(31-37) 


 





 


Red Cell Distribution Width


 


 14.8 %


(11.5-14.5) 


 





 


Platelet Count


 


 226 x10^3/uL


(140-400) 


 





 


Neutrophils (%) (Auto)  79 % (31-73)   


 


Lymphocytes (%) (Auto)  11 % (24-48)   


 


Monocytes (%) (Auto)  10 % (0-9)   


 


Eosinophils (%) (Auto)  0 % (0-3)   


 


Basophils (%) (Auto)  1 % (0-3)   


 


Neutrophils # (Auto)


 


 12.0 x10^3/uL


(1.8-7.7) 


 





 


Lymphocytes # (Auto)


 


 1.6 x10^3/uL


(1.0-4.8) 


 





 


Monocytes # (Auto)


 


 1.5 x10^3/uL


(0.0-1.1) 


 





 


Eosinophils # (Auto)


 


 0.0 x10^3/uL


(0.0-0.7) 


 





 


Basophils # (Auto)


 


 0.1 x10^3/uL


(0.0-0.2) 


 





 


Sodium Level


 


 


 


 151 mmol/L


(136-145)


 


Potassium Level


 


 


 


 2.4 mmol/L


(3.5-5.1)


 


Chloride Level


 


 


 


 105 mmol/L


()


 


Carbon Dioxide Level


 


 


 


 > 45 mmol/L


(21-32)


 


Anion Gap    1 (6-14) 


 


Blood Urea Nitrogen


 


 


 


 48 mg/dL


(8-26)


 


Creatinine


 


 


 


 1.5 mg/dL


(0.7-1.3)


 


Estimated GFR


(Cockcroft-Gault) 


 


 


 45.9 





 


Glucose Level


 


 


 


 179 mg/dL


(70-99)


 


Calcium Level


 


 


 


 8.9 mg/dL


(8.5-10.1)


 


Test


 3/20/20


08:00 3/20/20


13:20 


 





 


O2 Saturation 94 % (92-99)    


 


Arterial Blood pH


 7.46


(7.35-7.45) 


 


 





 


Arterial Blood pCO2 at


Patient Temp 55 mmHg


(35-46) 


 


 





 


Arterial Blood pO2 at Patient


Temp 69 mmHg


() 


 


 





 


Arterial Blood HCO3


 38 mmol/L


(21-28) 


 


 





 


Arterial Blood Base Excess


 12 mmol/L


(-3-3) 


 


 





 


FiO2 40    


 


Glucose (Fingerstick)


 


 133 mg/dL


(70-99) 


 











Laboratory Tests








Test


 3/19/20


18:21 3/20/20


00:26 3/20/20


05:00 3/20/20


05:48


 


Glucose (Fingerstick)


 159 mg/dL


(70-99) 193 mg/dL


(70-99) 


 181 mg/dL


(70-99)


 


White Blood Count


 


 


 15.3 x10^3/uL


(4.0-11.0) 





 


Red Blood Count


 


 


 5.11 x10^6/uL


(4.30-5.70) 





 


Hemoglobin


 


 


 14.2 g/dL


(13.0-17.5) 





 


Hematocrit


 


 


 44.2 %


(39.0-53.0) 





 


Mean Corpuscular Volume   87 fL ()  


 


Mean Corpuscular Hemoglobin   28 pg (25-35)  


 


Mean Corpuscular Hemoglobin


Concent 


 


 32 g/dL


(31-37) 





 


Red Cell Distribution Width


 


 


 14.8 %


(11.5-14.5) 





 


Platelet Count


 


 


 226 x10^3/uL


(140-400) 





 


Neutrophils (%) (Auto)   79 % (31-73)  


 


Lymphocytes (%) (Auto)   11 % (24-48)  


 


Monocytes (%) (Auto)   10 % (0-9)  


 


Eosinophils (%) (Auto)   0 % (0-3)  


 


Basophils (%) (Auto)   1 % (0-3)  


 


Neutrophils # (Auto)


 


 


 12.0 x10^3/uL


(1.8-7.7) 





 


Lymphocytes # (Auto)


 


 


 1.6 x10^3/uL


(1.0-4.8) 





 


Monocytes # (Auto)


 


 


 1.5 x10^3/uL


(0.0-1.1) 





 


Eosinophils # (Auto)


 


 


 0.0 x10^3/uL


(0.0-0.7) 





 


Basophils # (Auto)


 


 


 0.1 x10^3/uL


(0.0-0.2) 





 


Test


 3/20/20


05:50 3/20/20


08:00 3/20/20


13:20 





 


Sodium Level


 151 mmol/L


(136-145) 


 


 





 


Potassium Level


 2.4 mmol/L


(3.5-5.1) 


 


 





 


Chloride Level


 105 mmol/L


() 


 


 





 


Carbon Dioxide Level


 > 45 mmol/L


(21-32) 


 


 





 


Anion Gap 1 (6-14)    


 


Blood Urea Nitrogen


 48 mg/dL


(8-26) 


 


 





 


Creatinine


 1.5 mg/dL


(0.7-1.3) 


 


 





 


Estimated GFR


(Cockcroft-Gault) 45.9 


 


 


 





 


Glucose Level


 179 mg/dL


(70-99) 


 


 





 


Calcium Level


 8.9 mg/dL


(8.5-10.1) 


 


 





 


O2 Saturation  94 % (92-99)   


 


Arterial Blood pH


 


 7.46


(7.35-7.45) 


 





 


Arterial Blood pCO2 at


Patient Temp 


 55 mmHg


(35-46) 


 





 


Arterial Blood pO2 at Patient


Temp 


 69 mmHg


() 


 





 


Arterial Blood HCO3


 


 38 mmol/L


(21-28) 


 





 


Arterial Blood Base Excess


 


 12 mmol/L


(-3-3) 


 





 


FiO2  40   


 


Glucose (Fingerstick)


 


 


 133 mg/dL


(70-99) 











Medications





Active Scripts








 Medications  Dose


 Route/Sig


 Max Daily Dose Days Date Category


 


 Saw [place] (Saw


 Palmetto Fruit)


 450 Mg Capsule  450 Mg


 PO DAILY


   3/13/20 Reported


 


 D3-50


  (Cholecalciferol


  (Vitamin D3))


 50,000 Unit


 Capsule  1,000 Unit


 PO DAILY


   3/13/20 Reported


 


 Emergen-C 500 mg


 Chewable Tab (Vit


 C/Ascorb


 Sod/Multivit-Min)


 500 Mg Tab.chew  500 Mg


 PO DAILY


   3/13/20 Reported


 


 Zoloft


  (Sertraline Hcl)


 50 Mg Tablet  50 Mg


 PO DAILY


   3/13/20 Reported


 


 Trazodone Hcl 50


 Mg Tablet  1 Tab


 PO QHS


   3/13/20 Reported


 


 Trulicity


  (Dulaglutide)


 0.75 Mg/0.5 Ml


 Pen.injctr  0.75 Mg


 SQ WEEKLY


   3/13/20 Reported


 


 Insulin Aspart


 100 Unit/1 Ml Vial  25 Unit


 SQ TIDWMEALS


   3/13/20 Reported


 


 Levemir (Insulin


 Detemir) 100


 Unit/1 Ml Vial  80 Unit


 SQ DAILY


   3/13/20 Reported


 


 Lasix


  (Furosemide) 20


 Mg Tablet  20 Mg


 PO BID


   3/13/20 Reported


 


 Flomax


  (Tamsulosin Hcl)


 0.4 Mg Cap.er.24h  0.4 Mg


 PO HS


   3/13/20 Reported


 


 Omeprazole 20 Mg


 Capsule.dr  20 Mg


 PO BID


   3/13/20 Reported


 


 Lisinopril 40 Mg


 Tablet  1 Tab


 PO DAILY


   3/13/20 Reported


 


 Atenolol 100 Mg


 Tablet  100 Mg


 PO BID


   3/13/20 Reported








Comments

















ECHO


The left ventricle is normal size.


The left ventricular systolic function is normal.


The Ejection Fraction is 50-55%.


There is mild concentric left ventricular hypertrophy.


Doppler and Color Flow revealed trace aortic regurgitation.


There is no significant aortic valvular stenosis.


Doppler and Color-flow revealed trace mitral regurgitation.


Doppler and Color Flow revealed trace tricuspid regurgitation with an estimated 

PAP of 28 mmHg.





Impression


.


IMPRESSION:


1.  Acute hypercapnic and hypoxic respiratory failure sec to flash pulmonary 

edema


2.  Abnormal chest x-ray with bilateral interstitial infiltrates suggesting 

interstitial edema


3.  Leukocytosis


4.  No significant tobacco history.


5.  Suspected obesity hypoventilation syndrome and obstructive sleep apnea,


never been tested.


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


7.  HYPOTENSION IMPROVED


8.  COVID NEGATIVE





3/19


CXR


IMPRESSION: 


1. Stable diffuse interstitial infiltrate with small pleural effusions.


2. Stable support lines and tubes.





Plan


.


D/W CARD ECHO REPEAT IS PENDING


D/W WIFE HOPEFULLY EXTUBATE IN NEXT 72 HOURS


WILL CONTINUE PRN LASIX


NOT READY FOR TRIAL


PEEP NOW 6


ANTI ABX PER ID





CCT 30 MINUTES D/W CARDIOLOGY/ REVIEWING LABS/DATA/CXR











ADALBERTO GAXIOLA MD              Mar 20, 2020 17:01

## 2020-03-21 NOTE — PDOC
Infectious Disease Note


Subjective


Subjective


Orally intubated and sedated


GyQ761%


Nitro gtt


Tube feedings via OGT


No fevers last 24 hours





ROS


ROS


unobtainable





Vital Sign


Vital Signs





Vital Signs








  Date Time  Temp Pulse Resp B/P (MAP) Pulse Ox O2 Delivery O2 Flow Rate FiO2


 


3/21/20 09:24  95  167/64    


 


3/21/20 09:06     95 Ventilator  


 


3/21/20 07:25 97.9       





 97.9       


 


3/21/20 06:00   16     


 


3/21/20 01:30       4.0 











Physical Exam


PHYSICAL EXAM


GENERAL: Orally intubated,,opens eyes to verbal stimuli


HEENT:  ETT, OGT 


NECK: Supple 


LUNGS:  Decreased breath sounds bases,  


HEART:  S1, S2 


ABDOMEN: Obese, soft, no guarding 


: Younger in place 3/16


EXTREMITIES:  2+ pitting edema present. No cyanosis 


MSK bilateral knee scars intact


SKIN: Warm to touch. No signs of rash


NEURO: Noncommunicative, sedated 


RIJ clean 


Rt art-line





Labs


Lab





Laboratory Tests








Test


 3/20/20


13:20 3/20/20


17:52 3/20/20


19:55 3/20/20


23:53


 


Glucose (Fingerstick)


 133 mg/dL


(70-99) 170 mg/dL


(70-99) 


 136 mg/dL


(70-99)


 


Potassium Level


 


 


 3.0 mmol/L


(3.5-5.1) 





 


Test


 3/21/20


05:54 3/21/20


06:00 3/21/20


07:15 3/21/20


09:16


 


Glucose (Fingerstick)


 176 mg/dL


(70-99) 


 


 166 mg/dL


(70-99)


 


White Blood Count


 


 14.7 x10^3/uL


(4.0-11.0) 


 





 


Red Blood Count


 


 5.07 x10^6/uL


(4.30-5.70) 


 





 


Hemoglobin


 


 14.1 g/dL


(13.0-17.5) 


 





 


Hematocrit


 


 43.8 %


(39.0-53.0) 


 





 


Mean Corpuscular Volume  86 fL ()   


 


Mean Corpuscular Hemoglobin  28 pg (25-35)   


 


Mean Corpuscular Hemoglobin


Concent 


 32 g/dL


(31-37) 


 





 


Red Cell Distribution Width


 


 15.1 %


(11.5-14.5) 


 





 


Platelet Count


 


 210 x10^3/uL


(140-400) 


 





 


Neutrophils (%) (Auto)  78 % (31-73)   


 


Lymphocytes (%) (Auto)  8 % (24-48)   


 


Monocytes (%) (Auto)  11 % (0-9)   


 


Eosinophils (%) (Auto)  1 % (0-3)   


 


Basophils (%) (Auto)  1 % (0-3)   


 


Neutrophils # (Auto)


 


 11.5 x10^3/uL


(1.8-7.7) 


 





 


Lymphocytes # (Auto)


 


 1.2 x10^3/uL


(1.0-4.8) 


 





 


Monocytes # (Auto)


 


 1.7 x10^3/uL


(0.0-1.1) 


 





 


Eosinophils # (Auto)


 


 0.2 x10^3/uL


(0.0-0.7) 


 





 


Basophils # (Auto)


 


 0.1 x10^3/uL


(0.0-0.2) 


 





 


Sodium Level


 


 156 mmol/L


(136-145) 


 





 


Potassium Level


 


 3.1 mmol/L


(3.5-5.1) 


 





 


Chloride Level


 


 106 mmol/L


() 


 





 


Carbon Dioxide Level


 


 43 mmol/L


(21-32) 


 





 


Anion Gap  7 (6-14)   


 


Blood Urea Nitrogen


 


 49 mg/dL


(8-26) 


 





 


Creatinine


 


 1.4 mg/dL


(0.7-1.3) 


 





 


Estimated GFR


(Cockcroft-Gault) 


 49.7 


 


 





 


Glucose Level


 


 181 mg/dL


(70-99) 


 





 


Calcium Level


 


 9.0 mg/dL


(8.5-10.1) 


 





 


Magnesium Level


 


 2.3 mg/dL


(1.8-2.4) 


 





 


O2 Saturation   94 % (92-99)  


 


Arterial Blood pH


 


 


 7.46


(7.35-7.45) 





 


Arterial Blood pCO2 at


Patient Temp 


 


 66 mmHg


(35-46) 





 


Arterial Blood pO2 at Patient


Temp 


 


 69 mmHg


() 





 


Arterial Blood HCO3


 


 


 46 mmol/L


(21-28) 





 


Arterial Blood Base Excess


 


 


 18 mmol/L


(-3-3) 





 


FiO2   40%  








Micro





Microbiology


3/16/20 Blood Culture - Preliminary, Resulted


          NO GROWTH AFTER 4 DAYS





Objective


Assessment


Acute resp failure s/p intubation


    -COVID-19 NEG


NSTEMI


Leucocytosis 


CHF


DM2


Morbid obesity


Hypertension.


Partial nephrectomy.


Pyuria UC 50-100K enterococcus 3/13, ? colonization


Hyponatremia





Plan


Plan of Care


Cont zosyn (3/13)


Probiotics


Repeat BC (3/16) neg to date 


Maintain aspiration precautions


Supportive care





D/w wife at bedside


D/w nursing 





IMPRESSION:


 


Continued endotracheal intubation with slight improvement in findings of 


pulmonary vascular congestion with continued cardiomegaly and pleural 


effusions suggesting CHF








Went to Cath lab 3/16 but unable to do sec to intubation. Card following


Labs in am - leukocytosis - stable 


LUE trace warmth ? positioned on Left side with arm in sheets between body and 

bed


Discussed art line correlation with manual d/w nursing has been within 20 

[address] 3/16 - may need to d/c





D/w nursing





D/w wife





Attending Co-Sign


Attending Co-Sign


The patient was seen and interviewed as well as examined at the bedside. The 

chart was reviewed. The case was discussed. Agree with the plan of care.











CAROLYN KIM        Mar 21, 2020 10:32


JOSIAH MANNING MD              Mar 21, 2020 14:32

## 2020-03-21 NOTE — NUR
Dr Nova in to see pt early this morning, told night shift to ask respiratory to trial pt. 
After morning chest xray and ABG respiratory came around and was ready to proceed with 
trial. This RN paused propofol and fentanyl at 0845. Pt blood pressure was already in the 
140s-170s prior to pausing these meds. Pt blood pressure began to spike in the 190s-210s 
systolic as he became more alert, pt  heart rate also was increased from 80s to 110s. Pt was 
given prn hydralazine to see if it would help with increase in blood pressure. Pt was not 
moving around or fighting ETT, SPO2 and respiratory rate did not change. Paged Dr Quezada 
for blood pressure control medications, Nitro gtt was ordered. 

Nitro gtt titrated to keep SBP <140. Pt sedation paused again, pt blood pressure once again 
maxi as well as heart rate. Pt SPO2 and respiratory rate did not change. Pt was placed back 
on sedation. RT notified. 

-------------------------------------------------------------------------------

Addendum: 03/21/20 at 1939 by RANDY RAYA RN

-------------------------------------------------------------------------------

Tried again around 1100 to turn off sedation, pt again became hypertensive and tachycardic 
when trying to wake pt up. Pt placed back on sedation. Spoke with Dr Nova around 1500, she 
ordered to change to precedex from propofol and try to wake pt again. Precedex started and 
trial started. Pt did well on trial respiratory rate never above 24 SPO2 consistent but pt 
developed fever. Called physician, orders received. Pt placed back on sedation per dr Nova. Dr Quezada then at bedside said he will cath pt on Monday. Lasix gtt dc'd, free 
water flushes increased

## 2020-03-21 NOTE — PDOC
CARDIOLOGY PROGRESS NOTE


SUBJECTIVE:


Had fever overnight. Unable to extubate. 


No pain





OBJECTIVE:


Vital Signs/I&O:





                                   Vital Signs








  Date Time  Temp Pulse Resp B/P (MAP) Pulse Ox O2 Delivery O2 Flow Rate FiO2


 


3/21/20 16:10     94   


 


3/21/20 15:38      Ventilator  


 


3/21/20 14:00  94 18 142/51 (81)    


 


3/21/20 12:00 99.7       





 99.7       


 


3/21/20 01:30       4.0 














                                    I & O   


 


 3/20/20 3/20/20 3/21/20





 15:00 23:00 07:00


 


Intake Total 360 ml 360 ml 2694 ml


 


Output Total 790 ml 1035 ml 1545 ml


 


Balance -430 ml -675 ml 1149 ml








Objective:


sleepy


no edema. 


2+ radial pulses


obese abdomen





CURRENT MEDICATIONS:





Current Medications








 Medications


  (Trade)  Dose


 Ordered  Sig/Elisa


 Route


 PRN Reason  Start Time


 Stop Time Status Last Admin


Dose Admin


 


 Potassium


 Chloride/Water  100 ml @ 


 100 mls/hr  Q1H


 IV


   3/20/20 22:00


 3/21/20 01:59 DC 3/21/20 00:53





 


 Potassium


 Chloride/Water  100 ml @ 


 100 mls/hr  Q1H


 IV


   3/21/20 07:00


 3/21/20 08:59 DC 3/21/20 09:34





 


 Nitroglycerin/


 Dextrose  250 ml @ 


 1.5 mls/hr  CONT  PRN


 IV


 SEE I/O RECORD  3/21/20 10:00


    3/21/20 10:18





 


 Dexmedetomidine


 HCl 400 mcg/


 Sodium Chloride  100 ml @ 0


 mls/hr  CONT  PRN


 IV


 SEDATION  3/21/20 14:45


    3/21/20 15:08





 


 Potassium


 Chloride/Water  100 ml @ 


 100 mls/hr  Q1H


 IV


   3/21/20 15:30


 3/21/20 17:29  3/21/20 16:02














DIAGNOSTIC TESTING:


labs reviewed. Na 156


Labs:


                                Laboratory Tests


3/21/20 06:00








3/21/20 14:41











Laboratory Tests








Test


 3/20/20


17:52 3/20/20


19:55 3/20/20


23:53 3/21/20


05:54


 


Glucose (Fingerstick)


 170 mg/dL


(70-99)  H 


 136 mg/dL


(70-99)  H 176 mg/dL


(70-99)  H


 


Potassium Level


 


 3.0 mmol/L


(3.5-5.1)  L 


 





 


Test


 3/21/20


06:00 3/21/20


07:15 3/21/20


09:16 3/21/20


14:41


 


White Blood Count


 14.7 x10^3/uL


(4.0-11.0)  H 


 


 





 


Red Blood Count


 5.07 x10^6/uL


(4.30-5.70) 


 


 





 


Hemoglobin


 14.1 g/dL


(13.0-17.5) 


 


 





 


Hematocrit


 43.8 %


(39.0-53.0) 


 


 





 


Mean Corpuscular Volume


 86 fL ()


 


 


 





 


Mean Corpuscular Hemoglobin 28 pg (25-35)     


 


Mean Corpuscular Hemoglobin


Concent 32 g/dL


(31-37) 


 


 





 


Red Cell Distribution Width


 15.1 %


(11.5-14.5)  H 


 


 





 


Platelet Count


 210 x10^3/uL


(140-400) 


 


 





 


Neutrophils (%) (Auto) 78 % (31-73)  H   


 


Lymphocytes (%) (Auto) 8 % (24-48)  L   


 


Monocytes (%) (Auto) 11 % (0-9)  H   


 


Eosinophils (%) (Auto) 1 % (0-3)     


 


Basophils (%) (Auto) 1 % (0-3)     


 


Neutrophils # (Auto)


 11.5 x10^3/uL


(1.8-7.7)  H 


 


 





 


Lymphocytes # (Auto)


 1.2 x10^3/uL


(1.0-4.8) 


 


 





 


Monocytes # (Auto)


 1.7 x10^3/uL


(0.0-1.1)  H 


 


 





 


Eosinophils # (Auto)


 0.2 x10^3/uL


(0.0-0.7) 


 


 





 


Basophils # (Auto)


 0.1 x10^3/uL


(0.0-0.2) 


 


 





 


Sodium Level


 156 mmol/L


(136-145)  H 


 


 





 


Potassium Level


 3.1 mmol/L


(3.5-5.1)  L 


 


 3.1 mmol/L


(3.5-5.1)  L


 


Chloride Level


 106 mmol/L


() 


 


 





 


Carbon Dioxide Level


 43 mmol/L


(21-32)  H 


 


 





 


Anion Gap 7 (6-14)     


 


Blood Urea Nitrogen


 49 mg/dL


(8-26)  H 


 


 





 


Creatinine


 1.4 mg/dL


(0.7-1.3)  H 


 


 





 


Estimated GFR


(Cockcroft-Gault) 49.7  


 


 


 





 


Glucose Level


 181 mg/dL


(70-99)  H 


 


 





 


Calcium Level


 9.0 mg/dL


(8.5-10.1) 


 


 





 


O2 Saturation  94 % (92-99)    


 


Arterial Blood pH


 


 7.46


(7.35-7.45)  H 


 





 


Arterial Blood pCO2 at


Patient Temp 


 66 mmHg


(35-46)  *H 


 





 


Arterial Blood pO2 at Patient


Temp 


 69 mmHg


() 


 





 


Arterial Blood HCO3


 


 46 mmol/L


(21-28)  H 


 





 


Arterial Blood Base Excess


 


 18 mmol/L


(-3-3)  H 


 





 


FiO2  40%    


 


Glucose (Fingerstick)


 


 


 166 mg/dL


(70-99)  H 





 


Test


 3/21/20


14:47 3/21/20


16:15 


 





 


Glucose (Fingerstick)


 128 mg/dL


(70-99)  H 


 


 





 


O2 Saturation  93 % (92-99)    


 


Arterial Blood pH


 


 7.51


(7.35-7.45)  H 


 





 


Arterial Blood pH (Temp


corrected) 


 7.48  


 


 





 


Arterial Blood pCO2 at


Patient Temp 


 56 mmHg


(35-46)  H 


 





 


Arterial Blood pCO2 (Temp


correct) 


 60 mmHg  


 


 





 


Arterial Blood pO2 at Patient


Temp 


 63 mmHg


()  L 


 





 


Arterial Blood pO2 (Temp


corrected) 


 70 mmHg  


 


 





 


Arterial Blood HCO3


 


 44 mmol/L


(21-28)  H 


 





 


Arterial Blood Base Excess


 


 17 mmol/L


(-3-3)  H 


 





 


FiO2  40% tc    











ASSESSMENT:


1. NSTEMI


2. acute resp failure





PLAN:


1. PLan for right and left heart cath on monday. Until then, will keep 

intubated. discussed with wife. 


Supportive care. Thanks











MAURICIO FRANCISCO MD       Mar 21, 2020 16:49

## 2020-03-21 NOTE — PDOC
PULMONARY PROGRESS NOTES


Subjective


on vent, sedated on prop, fentanyl, small ett secretion, no pressors, on lasix 

gtt


Vitals





Vital Signs








  Date Time  Temp Pulse Resp B/P (MAP) Pulse Ox O2 Delivery O2 Flow Rate FiO2


 


3/21/20 06:00  68 16 142/54 (83) 95 Ventilator  


 


3/21/20 04:00 98.8       





 98.8       


 


3/21/20 01:30       4.0 








Comments


ros  unable to obtain





sedated on vent


HEENT:  Other (nc at perrl  nose clear   orally intubated   neck no lad  no 

thyromegaly)


Lungs:  Crackles


Cardiovascular:  S1, S2


Abdomen:  Soft, Non-tender, Other (no mass)


Extremities:  No Edema


Skin:  Warm


Labs





Laboratory Tests








Test


 3/19/20


08:05 3/19/20


11:48 3/19/20


14:50 3/19/20


18:21


 


O2 Saturation 93 % (92-99)    


 


Arterial Blood pH


 7.45


(7.35-7.45) 


 


 





 


Arterial Blood pCO2 at


Patient Temp 58 mmHg


(35-46) 


 


 





 


Arterial Blood pO2 at Patient


Temp 68 mmHg


() 


 


 





 


Arterial Blood HCO3


 39 mmol/L


(21-28) 


 


 





 


Arterial Blood Base Excess


 13 mmol/L


(-3-3) 


 


 





 


FiO2 40    


 


Glucose (Fingerstick)


 


 183 mg/dL


(70-99) 


 159 mg/dL


(70-99)


 


Sodium Level


 


 


 152 mmol/L


(136-145) 





 


Potassium Level


 


 


 2.6 mmol/L


(3.5-5.1) 





 


Chloride Level


 


 


 107 mmol/L


() 





 


Carbon Dioxide Level


 


 


 41 mmol/L


(21-32) 





 


Anion Gap   4 (6-14)  


 


Blood Urea Nitrogen


 


 


 39 mg/dL


(8-26) 





 


Creatinine


 


 


 1.3 mg/dL


(0.7-1.3) 





 


Estimated GFR


(Cockcroft-Gault) 


 


 54.1 


 





 


Glucose Level


 


 


 181 mg/dL


(70-99) 





 


Calcium Level


 


 


 8.6 mg/dL


(8.5-10.1) 





 


Test


 3/20/20


00:26 3/20/20


05:00 3/20/20


05:48 3/20/20


05:50


 


Glucose (Fingerstick)


 193 mg/dL


(70-99) 


 181 mg/dL


(70-99) 





 


White Blood Count


 


 15.3 x10^3/uL


(4.0-11.0) 


 





 


Red Blood Count


 


 5.11 x10^6/uL


(4.30-5.70) 


 





 


Hemoglobin


 


 14.2 g/dL


(13.0-17.5) 


 





 


Hematocrit


 


 44.2 %


(39.0-53.0) 


 





 


Mean Corpuscular Volume  87 fL ()   


 


Mean Corpuscular Hemoglobin  28 pg (25-35)   


 


Mean Corpuscular Hemoglobin


Concent 


 32 g/dL


(31-37) 


 





 


Red Cell Distribution Width


 


 14.8 %


(11.5-14.5) 


 





 


Platelet Count


 


 226 x10^3/uL


(140-400) 


 





 


Neutrophils (%) (Auto)  79 % (31-73)   


 


Lymphocytes (%) (Auto)  11 % (24-48)   


 


Monocytes (%) (Auto)  10 % (0-9)   


 


Eosinophils (%) (Auto)  0 % (0-3)   


 


Basophils (%) (Auto)  1 % (0-3)   


 


Neutrophils # (Auto)


 


 12.0 x10^3/uL


(1.8-7.7) 


 





 


Lymphocytes # (Auto)


 


 1.6 x10^3/uL


(1.0-4.8) 


 





 


Monocytes # (Auto)


 


 1.5 x10^3/uL


(0.0-1.1) 


 





 


Eosinophils # (Auto)


 


 0.0 x10^3/uL


(0.0-0.7) 


 





 


Basophils # (Auto)


 


 0.1 x10^3/uL


(0.0-0.2) 


 





 


Sodium Level


 


 


 


 151 mmol/L


(136-145)


 


Potassium Level


 


 


 


 2.4 mmol/L


(3.5-5.1)


 


Chloride Level


 


 


 


 105 mmol/L


()


 


Carbon Dioxide Level


 


 


 


 > 45 mmol/L


(21-32)


 


Anion Gap    1 (6-14) 


 


Blood Urea Nitrogen


 


 


 


 48 mg/dL


(8-26)


 


Creatinine


 


 


 


 1.5 mg/dL


(0.7-1.3)


 


Estimated GFR


(Cockcroft-Gault) 


 


 


 45.9 





 


Glucose Level


 


 


 


 179 mg/dL


(70-99)


 


Calcium Level


 


 


 


 8.9 mg/dL


(8.5-10.1)


 


Test


 3/20/20


08:00 3/20/20


13:20 3/20/20


17:52 3/20/20


19:55


 


O2 Saturation 94 % (92-99)    


 


Arterial Blood pH


 7.46


(7.35-7.45) 


 


 





 


Arterial Blood pCO2 at


Patient Temp 55 mmHg


(35-46) 


 


 





 


Arterial Blood pO2 at Patient


Temp 69 mmHg


() 


 


 





 


Arterial Blood HCO3


 38 mmol/L


(21-28) 


 


 





 


Arterial Blood Base Excess


 12 mmol/L


(-3-3) 


 


 





 


FiO2 40    


 


Glucose (Fingerstick)


 


 133 mg/dL


(70-99) 170 mg/dL


(70-99) 





 


Potassium Level


 


 


 


 3.0 mmol/L


(3.5-5.1)


 


Test


 3/20/20


23:53 3/21/20


05:54 3/21/20


06:00 





 


Glucose (Fingerstick)


 136 mg/dL


(70-99) 176 mg/dL


(70-99) 


 





 


White Blood Count


 


 


 14.7 x10^3/uL


(4.0-11.0) 





 


Red Blood Count


 


 


 5.07 x10^6/uL


(4.30-5.70) 





 


Hemoglobin


 


 


 14.1 g/dL


(13.0-17.5) 





 


Hematocrit


 


 


 43.8 %


(39.0-53.0) 





 


Mean Corpuscular Volume   86 fL ()  


 


Mean Corpuscular Hemoglobin   28 pg (25-35)  


 


Mean Corpuscular Hemoglobin


Concent 


 


 32 g/dL


(31-37) 





 


Red Cell Distribution Width


 


 


 15.1 %


(11.5-14.5) 





 


Platelet Count


 


 


 210 x10^3/uL


(140-400) 





 


Neutrophils (%) (Auto)   78 % (31-73)  


 


Lymphocytes (%) (Auto)   8 % (24-48)  


 


Monocytes (%) (Auto)   11 % (0-9)  


 


Eosinophils (%) (Auto)   1 % (0-3)  


 


Basophils (%) (Auto)   1 % (0-3)  


 


Neutrophils # (Auto)


 


 


 11.5 x10^3/uL


(1.8-7.7) 





 


Lymphocytes # (Auto)


 


 


 1.2 x10^3/uL


(1.0-4.8) 





 


Monocytes # (Auto)


 


 


 1.7 x10^3/uL


(0.0-1.1) 





 


Eosinophils # (Auto)


 


 


 0.2 x10^3/uL


(0.0-0.7) 





 


Basophils # (Auto)


 


 


 0.1 x10^3/uL


(0.0-0.2) 





 


Sodium Level


 


 


 156 mmol/L


(136-145) 





 


Potassium Level


 


 


 3.1 mmol/L


(3.5-5.1) 





 


Chloride Level


 


 


 106 mmol/L


() 





 


Carbon Dioxide Level


 


 


 43 mmol/L


(21-32) 





 


Anion Gap   7 (6-14)  


 


Blood Urea Nitrogen


 


 


 49 mg/dL


(8-26) 





 


Creatinine


 


 


 1.4 mg/dL


(0.7-1.3) 





 


Estimated GFR


(Cockcroft-Gault) 


 


 49.7 


 





 


Glucose Level


 


 


 181 mg/dL


(70-99) 





 


Calcium Level


 


 


 9.0 mg/dL


(8.5-10.1) 





 


Magnesium Level


 


 


 2.3 mg/dL


(1.8-2.4) 











Laboratory Tests








Test


 3/20/20


08:00 3/20/20


13:20 3/20/20


17:52 3/20/20


19:55


 


O2 Saturation 94 % (92-99)    


 


Arterial Blood pH


 7.46


(7.35-7.45) 


 


 





 


Arterial Blood pCO2 at


Patient Temp 55 mmHg


(35-46) 


 


 





 


Arterial Blood pO2 at Patient


Temp 69 mmHg


() 


 


 





 


Arterial Blood HCO3


 38 mmol/L


(21-28) 


 


 





 


Arterial Blood Base Excess


 12 mmol/L


(-3-3) 


 


 





 


FiO2 40    


 


Glucose (Fingerstick)


 


 133 mg/dL


(70-99) 170 mg/dL


(70-99) 





 


Potassium Level


 


 


 


 3.0 mmol/L


(3.5-5.1)


 


Test


 3/20/20


23:53 3/21/20


05:54 3/21/20


06:00 





 


Glucose (Fingerstick)


 136 mg/dL


(70-99) 176 mg/dL


(70-99) 


 





 


White Blood Count


 


 


 14.7 x10^3/uL


(4.0-11.0) 





 


Red Blood Count


 


 


 5.07 x10^6/uL


(4.30-5.70) 





 


Hemoglobin


 


 


 14.1 g/dL


(13.0-17.5) 





 


Hematocrit


 


 


 43.8 %


(39.0-53.0) 





 


Mean Corpuscular Volume   86 fL ()  


 


Mean Corpuscular Hemoglobin   28 pg (25-35)  


 


Mean Corpuscular Hemoglobin


Concent 


 


 32 g/dL


(31-37) 





 


Red Cell Distribution Width


 


 


 15.1 %


(11.5-14.5) 





 


Platelet Count


 


 


 210 x10^3/uL


(140-400) 





 


Neutrophils (%) (Auto)   78 % (31-73)  


 


Lymphocytes (%) (Auto)   8 % (24-48)  


 


Monocytes (%) (Auto)   11 % (0-9)  


 


Eosinophils (%) (Auto)   1 % (0-3)  


 


Basophils (%) (Auto)   1 % (0-3)  


 


Neutrophils # (Auto)


 


 


 11.5 x10^3/uL


(1.8-7.7) 





 


Lymphocytes # (Auto)


 


 


 1.2 x10^3/uL


(1.0-4.8) 





 


Monocytes # (Auto)


 


 


 1.7 x10^3/uL


(0.0-1.1) 





 


Eosinophils # (Auto)


 


 


 0.2 x10^3/uL


(0.0-0.7) 





 


Basophils # (Auto)


 


 


 0.1 x10^3/uL


(0.0-0.2) 





 


Sodium Level


 


 


 156 mmol/L


(136-145) 





 


Potassium Level


 


 


 3.1 mmol/L


(3.5-5.1) 





 


Chloride Level


 


 


 106 mmol/L


() 





 


Carbon Dioxide Level


 


 


 43 mmol/L


(21-32) 





 


Anion Gap   7 (6-14)  


 


Blood Urea Nitrogen


 


 


 49 mg/dL


(8-26) 





 


Creatinine


 


 


 1.4 mg/dL


(0.7-1.3) 





 


Estimated GFR


(Cockcroft-Gault) 


 


 49.7 


 





 


Glucose Level


 


 


 181 mg/dL


(70-99) 





 


Calcium Level


 


 


 9.0 mg/dL


(8.5-10.1) 





 


Magnesium Level


 


 


 2.3 mg/dL


(1.8-2.4) 











Medications





Active Scripts








 Medications  Dose


 Route/Sig


 Max Daily Dose Days Date Category


 


 Saw [place] (Saw


 [place] Fruit)


 450 Mg Capsule  450 Mg


 PO DAILY


   3/13/20 Reported


 


 D3-50


  (Cholecalciferol


  (Vitamin D3))


 50,000 Unit


 Capsule  1,000 Unit


 PO DAILY


   3/13/20 Reported


 


 Emergen-C 500 mg


 Chewable Tab (Vit


 C/Ascorb


 Sod/Multivit-Min)


 500 Mg Tab.chew  500 Mg


 PO DAILY


   3/13/20 Reported


 


 Zoloft


  (Sertraline Hcl)


 50 Mg Tablet  50 Mg


 PO DAILY


   3/13/20 Reported


 


 Trazodone Hcl 50


 Mg Tablet  1 Tab


 PO QHS


   3/13/20 Reported


 


 Trulicity


  (Dulaglutide)


 0.75 Mg/0.5 Ml


 Pen.injctr  0.75 Mg


 SQ WEEKLY


   3/13/20 Reported


 


 Insulin Aspart


 100 Unit/1 Ml Vial  25 Unit


 SQ TIDWMEALS


   3/13/20 Reported


 


 Levemir (Insulin


 Detemir) 100


 Unit/1 Ml Vial  80 Unit


 SQ DAILY


   3/13/20 Reported


 


 Lasix


  (Furosemide) 20


 Mg Tablet  20 Mg


 PO BID


   3/13/20 Reported


 


 Flomax


  (Tamsulosin Hcl)


 0.4 Mg Cap.er.24h  0.4 Mg


 PO HS


   3/13/20 Reported


 


 Omeprazole 20 Mg


 Capsule.dr  20 Mg


 PO BID


   3/13/20 Reported


 


 Lisinopril 40 Mg


 Tablet  1 Tab


 PO DAILY


   3/13/20 Reported


 


 Atenolol 100 Mg


 Tablet  100 Mg


 PO BID


   3/13/20 Reported








Comments


cxr reviewed 





Continued endotracheal intubation with slight improvement in findings of 


pulmonary vascular congestion with continued cardiomegaly and pleural 


effusions suggesting CHF.








echo 3/20





Limited study for LV function.


The left ventricle is normal size.


Left ventricle systolic function is low normal.


The Ejection Fraction is 50-55%.


Septal motion consistent with conduction abnormality but otherwise normall wall 

motion.





Impression


.


IMPRESSION:


1.  Acute hypercapnic and hypoxic respiratory failure sec to flash pulmonary e

erick


2.  Abnormal chest x-ray with bilateral interstitial infiltrates suggesting 

interstitial edema


3.  Leukocytosis


4.  No significant tobacco history.


5.  Suspected obesity hypoventilation syndrome and obstructive sleep apnea,


never been tested.


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


7.  HYPOTENSION IMPROVED


8.  COVID NEGATIVE





Plan


.


cont vent support, setting reviewed, will decrease sedation, sbt when awake


on lasix gtt


pepcid, hep sq for prophylaxis


PEEP 5 fi02 40%


ANTI ABX PER ID


psg as out pt


elevate hob





discussed w rn. rt, pts wife











JOEL KYLE MD               Mar 21, 2020 06:50

## 2020-03-21 NOTE — RAD
EXAM: CHEST AP ONLY

 

INDICATION: Respiratory failure.

 

TECHNIQUE: Single view 

 

COMPARISON: 3/20/2020 chest x-ray

 

FINDINGS:

 

Patient remains intubated with the ET tube tip terminating 7 cm above the 

fredy.

Enteric tube passes below the diaphragms.

Right subclavian approach central venous catheter is unchanged, tip 

terminating near the cavoatrial junction.

 

Heart is moderately enlarged, similar to prior.

 

The great vessels appear unremarkable.

 

There is no hilar or mediastinal mass.

 

Lungs show mild pulmonary vascular congestion, slightly improved in the 

interval.

 

No pneumothorax. Small left greater than right bilateral pleural effusions

remain present.

 

There are no significant osseous abnormalities.

 

IMPRESSION:

 

Continued endotracheal intubation with slight improvement in findings of 

pulmonary vascular congestion with continued cardiomegaly and pleural 

effusions suggesting CHF.

 

 

Electronically signed by: Yaneli Zabala MD (3/21/2020 7:37 AM) 

[id number]

## 2020-03-21 NOTE — PDOC
PROGRESS NOTES


Chief Complaint


Chief Complaint


Acute hypoxic respiratory failure - intubated 3/16 on way to cardiac cath


Acute CHF with possible combined systolic/diastolic dysfunction. Significantly 

improved. Continuing present treatment.


NSTEMI 


Likely RAVINDER


DM2 - insulin dependent


Accelerated HTN


Morbid obesity


Leukocytosis - sepsis 2/2 enterococcus UTI - COVID-19 NEG


S/p Partial nephrectomy.


Pyuria UC 50-100K enterococcus 3/13, ? colonization


hyponatremia





History of Present Illness


History of Present Illness


Mr Alexander is a 74 yo M w/ PMHx hypertension, dyslipidemia, diabetes mellitus who

presented via EMS with complaint of shortness of breath on 3/13/202 that had 

been ongoign for 10 days. He called 911 in attempts to go to [facility], was brought 

to [facility], found with O2 sat of 70s at room air and started on  BiPAP to 100% in a 

very short time.


Pulmonology, cardiology, ID consulted.





3/16: Intubation and CVC placed for code blue on way to cardiac catheterization


3/17: Intubated, sedated


3/18: Intubated, sedated


3/19: BP difficult to control, remains intubated


3/20: Still Intubated and sedated. COVID 19 Negative





Intubated, sedated. Failed 2 weans. K still low, replaced. Lasix GTT. Good UOP





Vitals


Vitals





Vital Signs








  Date Time  Temp Pulse Resp B/P (MAP) Pulse Ox O2 Delivery O2 Flow Rate FiO2


 


3/21/20 08:54  93  197/81    


 


3/21/20 07:25 97.9       





 97.9       


 


3/21/20 07:25     95 Ventilator  


 


3/21/20 06:00   16     


 


3/21/20 01:30       4.0 











Physical Exam


Physical Exam


GENERAL: intubated,,opens eyes to verbal stimuli


HEENT  intubated, ETT+,NGT +,no icterus


Neck supple , central line present


LUNGS:  Decreased breath sounds bases,  


HEART:  S1, S2 


ABDOMEN: Obese, soft, nontender


 mansfield in place 3/16


EXTREMITIES:  2+ pitting edema present. No cyanosis 


MSK bilateral knee scars intact


SKIN: Warm to touch. No signs of rash


NEURO:intubated


General:  Other (intubated.)


Heart:  Regular rate


Lungs:  Crackles


Abdomen:  Normal bowel sounds


Extremities:  No cyanosis, Other (3+ bilateral LE pitting edema)


Skin:  No breakdown, No significant lesion





Labs


LABS





Laboratory Tests








Test


 3/20/20


13:20 3/20/20


17:52 3/20/20


19:55 3/20/20


23:53


 


Glucose (Fingerstick)


 133 mg/dL


(70-99) 170 mg/dL


(70-99) 


 136 mg/dL


(70-99)


 


Potassium Level


 


 


 3.0 mmol/L


(3.5-5.1) 





 


Test


 3/21/20


05:54 3/21/20


06:00 3/21/20


07:15 





 


Glucose (Fingerstick)


 176 mg/dL


(70-99) 


 


 





 


White Blood Count


 


 14.7 x10^3/uL


(4.0-11.0) 


 





 


Red Blood Count


 


 5.07 x10^6/uL


(4.30-5.70) 


 





 


Hemoglobin


 


 14.1 g/dL


(13.0-17.5) 


 





 


Hematocrit


 


 43.8 %


(39.0-53.0) 


 





 


Mean Corpuscular Volume  86 fL ()   


 


Mean Corpuscular Hemoglobin  28 pg (25-35)   


 


Mean Corpuscular Hemoglobin


Concent 


 32 g/dL


(31-37) 


 





 


Red Cell Distribution Width


 


 15.1 %


(11.5-14.5) 


 





 


Platelet Count


 


 210 x10^3/uL


(140-400) 


 





 


Neutrophils (%) (Auto)  78 % (31-73)   


 


Lymphocytes (%) (Auto)  8 % (24-48)   


 


Monocytes (%) (Auto)  11 % (0-9)   


 


Eosinophils (%) (Auto)  1 % (0-3)   


 


Basophils (%) (Auto)  1 % (0-3)   


 


Neutrophils # (Auto)


 


 11.5 x10^3/uL


(1.8-7.7) 


 





 


Lymphocytes # (Auto)


 


 1.2 x10^3/uL


(1.0-4.8) 


 





 


Monocytes # (Auto)


 


 1.7 x10^3/uL


(0.0-1.1) 


 





 


Eosinophils # (Auto)


 


 0.2 x10^3/uL


(0.0-0.7) 


 





 


Basophils # (Auto)


 


 0.1 x10^3/uL


(0.0-0.2) 


 





 


Sodium Level


 


 156 mmol/L


(136-145) 


 





 


Potassium Level


 


 3.1 mmol/L


(3.5-5.1) 


 





 


Chloride Level


 


 106 mmol/L


() 


 





 


Carbon Dioxide Level


 


 43 mmol/L


(21-32) 


 





 


Anion Gap  7 (6-14)   


 


Blood Urea Nitrogen


 


 49 mg/dL


(8-26) 


 





 


Creatinine


 


 1.4 mg/dL


(0.7-1.3) 


 





 


Estimated GFR


(Cockcroft-Gault) 


 49.7 


 


 





 


Glucose Level


 


 181 mg/dL


(70-99) 


 





 


Calcium Level


 


 9.0 mg/dL


(8.5-10.1) 


 





 


Magnesium Level


 


 2.3 mg/dL


(1.8-2.4) 


 





 


O2 Saturation   94 % (92-99)  


 


Arterial Blood pH


 


 


 7.46


(7.35-7.45) 





 


Arterial Blood pCO2 at


Patient Temp 


 


 66 mmHg


(35-46) 





 


Arterial Blood pO2 at Patient


Temp 


 


 69 mmHg


() 





 


Arterial Blood HCO3


 


 


 46 mmol/L


(21-28) 





 


Arterial Blood Base Excess


 


 


 18 mmol/L


(-3-3) 





 


FiO2   40%  











Assessment and Plan


Assessmemt and Plan


Problems


Medical Problems:


(1) Acute respiratory distress


Status: Acute  





(2) CAP (community acquired pneumonia)


Status: Acute  





(3) CHF (congestive heart failure)


Status: Acute  





(4) Hypoxia


Status: Acute  





(5) SIRS (systemic inflammatory response syndrome)


Status: Acute  











Comment


Review of Relevant


I have reviewed the following items nicole (where applicable) has been applied.


Labs





Laboratory Tests








Test


 3/19/20


11:48 3/19/20


14:50 3/19/20


18:21 3/20/20


00:26


 


Glucose (Fingerstick)


 183 mg/dL


(70-99) 


 159 mg/dL


(70-99) 193 mg/dL


(70-99)


 


Sodium Level


 


 152 mmol/L


(136-145) 


 





 


Potassium Level


 


 2.6 mmol/L


(3.5-5.1) 


 





 


Chloride Level


 


 107 mmol/L


() 


 





 


Carbon Dioxide Level


 


 41 mmol/L


(21-32) 


 





 


Anion Gap  4 (6-14)   


 


Blood Urea Nitrogen


 


 39 mg/dL


(8-26) 


 





 


Creatinine


 


 1.3 mg/dL


(0.7-1.3) 


 





 


Estimated GFR


(Cockcroft-Gault) 


 54.1 


 


 





 


Glucose Level


 


 181 mg/dL


(70-99) 


 





 


Calcium Level


 


 8.6 mg/dL


(8.5-10.1) 


 





 


Test


 3/20/20


05:00 3/20/20


05:48 3/20/20


05:50 3/20/20


08:00


 


White Blood Count


 15.3 x10^3/uL


(4.0-11.0) 


 


 





 


Red Blood Count


 5.11 x10^6/uL


(4.30-5.70) 


 


 





 


Hemoglobin


 14.2 g/dL


(13.0-17.5) 


 


 





 


Hematocrit


 44.2 %


(39.0-53.0) 


 


 





 


Mean Corpuscular Volume 87 fL ()    


 


Mean Corpuscular Hemoglobin 28 pg (25-35)    


 


Mean Corpuscular Hemoglobin


Concent 32 g/dL


(31-37) 


 


 





 


Red Cell Distribution Width


 14.8 %


(11.5-14.5) 


 


 





 


Platelet Count


 226 x10^3/uL


(140-400) 


 


 





 


Neutrophils (%) (Auto) 79 % (31-73)    


 


Lymphocytes (%) (Auto) 11 % (24-48)    


 


Monocytes (%) (Auto) 10 % (0-9)    


 


Eosinophils (%) (Auto) 0 % (0-3)    


 


Basophils (%) (Auto) 1 % (0-3)    


 


Neutrophils # (Auto)


 12.0 x10^3/uL


(1.8-7.7) 


 


 





 


Lymphocytes # (Auto)


 1.6 x10^3/uL


(1.0-4.8) 


 


 





 


Monocytes # (Auto)


 1.5 x10^3/uL


(0.0-1.1) 


 


 





 


Eosinophils # (Auto)


 0.0 x10^3/uL


(0.0-0.7) 


 


 





 


Basophils # (Auto)


 0.1 x10^3/uL


(0.0-0.2) 


 


 





 


Glucose (Fingerstick)


 


 181 mg/dL


(70-99) 


 





 


Sodium Level


 


 


 151 mmol/L


(136-145) 





 


Potassium Level


 


 


 2.4 mmol/L


(3.5-5.1) 





 


Chloride Level


 


 


 105 mmol/L


() 





 


Carbon Dioxide Level


 


 


 > 45 mmol/L


(21-32) 





 


Anion Gap   1 (6-14)  


 


Blood Urea Nitrogen


 


 


 48 mg/dL


(8-26) 





 


Creatinine


 


 


 1.5 mg/dL


(0.7-1.3) 





 


Estimated GFR


(Cockcroft-Gault) 


 


 45.9 


 





 


Glucose Level


 


 


 179 mg/dL


(70-99) 





 


Calcium Level


 


 


 8.9 mg/dL


(8.5-10.1) 





 


O2 Saturation    94 % (92-99) 


 


Arterial Blood pH


 


 


 


 7.46


(7.35-7.45)


 


Arterial Blood pCO2 at


Patient Temp 


 


 


 55 mmHg


(35-46)


 


Arterial Blood pO2 at Patient


Temp 


 


 


 69 mmHg


()


 


Arterial Blood HCO3


 


 


 


 38 mmol/L


(21-28)


 


Arterial Blood Base Excess


 


 


 


 12 mmol/L


(-3-3)


 


FiO2    40 


 


Test


 3/20/20


13:20 3/20/20


17:52 3/20/20


19:55 3/20/20


23:53


 


Glucose (Fingerstick)


 133 mg/dL


(70-99) 170 mg/dL


(70-99) 


 136 mg/dL


(70-99)


 


Potassium Level


 


 


 3.0 mmol/L


(3.5-5.1) 





 


Test


 3/21/20


05:54 3/21/20


06:00 3/21/20


07:15 





 


Glucose (Fingerstick)


 176 mg/dL


(70-99) 


 


 





 


White Blood Count


 


 14.7 x10^3/uL


(4.0-11.0) 


 





 


Red Blood Count


 


 5.07 x10^6/uL


(4.30-5.70) 


 





 


Hemoglobin


 


 14.1 g/dL


(13.0-17.5) 


 





 


Hematocrit


 


 43.8 %


(39.0-53.0) 


 





 


Mean Corpuscular Volume  86 fL ()   


 


Mean Corpuscular Hemoglobin  28 pg (25-35)   


 


Mean Corpuscular Hemoglobin


Concent 


 32 g/dL


(31-37) 


 





 


Red Cell Distribution Width


 


 15.1 %


(11.5-14.5) 


 





 


Platelet Count


 


 210 x10^3/uL


(140-400) 


 





 


Neutrophils (%) (Auto)  78 % (31-73)   


 


Lymphocytes (%) (Auto)  8 % (24-48)   


 


Monocytes (%) (Auto)  11 % (0-9)   


 


Eosinophils (%) (Auto)  1 % (0-3)   


 


Basophils (%) (Auto)  1 % (0-3)   


 


Neutrophils # (Auto)


 


 11.5 x10^3/uL


(1.8-7.7) 


 





 


Lymphocytes # (Auto)


 


 1.2 x10^3/uL


(1.0-4.8) 


 





 


Monocytes # (Auto)


 


 1.7 x10^3/uL


(0.0-1.1) 


 





 


Eosinophils # (Auto)


 


 0.2 x10^3/uL


(0.0-0.7) 


 





 


Basophils # (Auto)


 


 0.1 x10^3/uL


(0.0-0.2) 


 





 


Sodium Level


 


 156 mmol/L


(136-145) 


 





 


Potassium Level


 


 3.1 mmol/L


(3.5-5.1) 


 





 


Chloride Level


 


 106 mmol/L


() 


 





 


Carbon Dioxide Level


 


 43 mmol/L


(21-32) 


 





 


Anion Gap  7 (6-14)   


 


Blood Urea Nitrogen


 


 49 mg/dL


(8-26) 


 





 


Creatinine


 


 1.4 mg/dL


(0.7-1.3) 


 





 


Estimated GFR


(Cockcroft-Gault) 


 49.7 


 


 





 


Glucose Level


 


 181 mg/dL


(70-99) 


 





 


Calcium Level


 


 9.0 mg/dL


(8.5-10.1) 


 





 


Magnesium Level


 


 2.3 mg/dL


(1.8-2.4) 


 





 


O2 Saturation   94 % (92-99)  


 


Arterial Blood pH


 


 


 7.46


(7.35-7.45) 





 


Arterial Blood pCO2 at


Patient Temp 


 


 66 mmHg


(35-46) 





 


Arterial Blood pO2 at Patient


Temp 


 


 69 mmHg


() 





 


Arterial Blood HCO3


 


 


 46 mmol/L


(21-28) 





 


Arterial Blood Base Excess


 


 


 18 mmol/L


(-3-3) 





 


FiO2   40%  








Laboratory Tests








Test


 3/20/20


13:20 3/20/20


17:52 3/20/20


19:55 3/20/20


23:53


 


Glucose (Fingerstick)


 133 mg/dL


(70-99) 170 mg/dL


(70-99) 


 136 mg/dL


(70-99)


 


Potassium Level


 


 


 3.0 mmol/L


(3.5-5.1) 





 


Test


 3/21/20


05:54 3/21/20


06:00 3/21/20


07:15 





 


Glucose (Fingerstick)


 176 mg/dL


(70-99) 


 


 





 


White Blood Count


 


 14.7 x10^3/uL


(4.0-11.0) 


 





 


Red Blood Count


 


 5.07 x10^6/uL


(4.30-5.70) 


 





 


Hemoglobin


 


 14.1 g/dL


(13.0-17.5) 


 





 


Hematocrit


 


 43.8 %


(39.0-53.0) 


 





 


Mean Corpuscular Volume  86 fL ()   


 


Mean Corpuscular Hemoglobin  28 pg (25-35)   


 


Mean Corpuscular Hemoglobin


Concent 


 32 g/dL


(31-37) 


 





 


Red Cell Distribution Width


 


 15.1 %


(11.5-14.5) 


 





 


Platelet Count


 


 210 x10^3/uL


(140-400) 


 





 


Neutrophils (%) (Auto)  78 % (31-73)   


 


Lymphocytes (%) (Auto)  8 % (24-48)   


 


Monocytes (%) (Auto)  11 % (0-9)   


 


Eosinophils (%) (Auto)  1 % (0-3)   


 


Basophils (%) (Auto)  1 % (0-3)   


 


Neutrophils # (Auto)


 


 11.5 x10^3/uL


(1.8-7.7) 


 





 


Lymphocytes # (Auto)


 


 1.2 x10^3/uL


(1.0-4.8) 


 





 


Monocytes # (Auto)


 


 1.7 x10^3/uL


(0.0-1.1) 


 





 


Eosinophils # (Auto)


 


 0.2 x10^3/uL


(0.0-0.7) 


 





 


Basophils # (Auto)


 


 0.1 x10^3/uL


(0.0-0.2) 


 





 


Sodium Level


 


 156 mmol/L


(136-145) 


 





 


Potassium Level


 


 3.1 mmol/L


(3.5-5.1) 


 





 


Chloride Level


 


 106 mmol/L


() 


 





 


Carbon Dioxide Level


 


 43 mmol/L


(21-32) 


 





 


Anion Gap  7 (6-14)   


 


Blood Urea Nitrogen


 


 49 mg/dL


(8-26) 


 





 


Creatinine


 


 1.4 mg/dL


(0.7-1.3) 


 





 


Estimated GFR


(Cockcroft-Gault) 


 49.7 


 


 





 


Glucose Level


 


 181 mg/dL


(70-99) 


 





 


Calcium Level


 


 9.0 mg/dL


(8.5-10.1) 


 





 


Magnesium Level


 


 2.3 mg/dL


(1.8-2.4) 


 





 


O2 Saturation   94 % (92-99)  


 


Arterial Blood pH


 


 


 7.46


(7.35-7.45) 





 


Arterial Blood pCO2 at


Patient Temp 


 


 66 mmHg


(35-46) 





 


Arterial Blood pO2 at Patient


Temp 


 


 69 mmHg


() 





 


Arterial Blood HCO3


 


 


 46 mmol/L


(21-28) 





 


Arterial Blood Base Excess


 


 


 18 mmol/L


(-3-3) 





 


FiO2   40%  








Microbiology


3/16/20 Blood Culture - Preliminary, Resulted


          NO GROWTH AFTER 4 DAYS


3/13/20 Urine Culture - Final, Complete


          


3/13/20 Urine Culture Result 1 (CAR) - Final, Complete


          


3/13/20 Antimicrobic Susceptibility - Final, Complete


Medications





Current Medications


Albuterol/ Ipratropium (Duoneb) 3 ml 1X  ONCE NEB  Last administered on 

3/13/20at 09:07;  Start 3/13/20 at 09:15;  Stop 3/13/20 at 09:16;  Status DC


Methylprednisolone Sodium Succinate (SOLU-Medrol 125MG VIAL) 125 mg 1X  ONCE IV 

Last administered on 3/13/20at 09:27;  Start 3/13/20 at 09:15;  Stop 3/13/20 at 

09:16;  Status DC


Labetalol HCl (Normodyne Iv Push) 10 mg 1X  ONCE IVP ;  Start 3/13/20 at 09:15; 

Stop 3/13/20 at 09:12;  Status DC


Piperacillin Sod/ Tazobactam Sod 3.375 gm/Sodium Chloride 50 ml @  100 mls/hr 1X

 ONCE IV  Last administered on 3/13/20at 10:23;  Start 3/13/20 at 09:30;  Stop 

3/13/20 at 09:59;  Status DC


Vancomycin HCl 250 ml @  250 mls/hr 1X  ONCE IV ;  Start 3/13/20 at 09:30;  Stop

3/13/20 at 10:29;  Status UNV


Vancomycin HCl 2 gm/Sodium Chloride 500 ml @  250 mls/hr 1X  ONCE IV  Last 

administered on 3/13/20at 09:56;  Start 3/13/20 at 09:45;  Stop 3/13/20 at 

11:44;  Status DC


Sodium Chloride 1,000 ml @  100 mls/hr Q10H IV  Last administered on 3/13/20at 

11:53;  Start 3/13/20 at 10:51;  Stop 3/14/20 at 10:50;  Status DC


Furosemide (Lasix) 40 mg 1X  ONCE IVP  Last administered on 3/13/20at 14:08;  

Start 3/13/20 at 14:00;  Stop 3/13/20 at 14:01;  Status DC


Piperacillin Sod/ Tazobactam Sod 3.375 gm/Sodium Chloride 50 ml @  100 mls/hr 

Q6HRS IV  Last administered on 3/21/20at 05:52;  Start 3/13/20 at 18:00


Insulin Human Lispro (HumaLOG) 0-9 UNITS TIDWMEALS SQ  Last administered on 

3/16/20at 17:00;  Start 3/13/20 at 17:00;  Stop 3/16/20 at 22:21;  Status DC


Dextrose (Dextrose 50%-Water Syringe) 12.5 gm PRN Q15MIN  PRN IV SEE COMMENTS;  

Start 3/13/20 at 14:00


Atorvastatin Calcium (Lipitor) 40 mg QHS PO  Last administered on 3/20/20at 

20:42;  Start 3/13/20 at 21:00


Aspirin (Ecotrin) 81 mg DAILYWBKFT PO  Last administered on 3/19/20at 08:30;  

Start 3/14/20 at 08:00


Aspirin (Ecotrin) 325 mg 1X  ONCE PO  Last administered on 3/13/20at 16:24;  

Start 3/13/20 at 16:00;  Stop 3/13/20 at 16:01;  Status DC


Furosemide (Lasix) 40 mg BID92 IVP  Last administered on 3/17/20at 10:11;  Start

3/14/20 at 09:00;  Stop 3/17/20 at 10:38;  Status DC


Heparin Sodium/ Dextrose 250 ml @ 0 mls/hr CONT  PRN IV PER PROTOCOL Last 

administered on 3/15/20at 20:31;  Start 3/13/20 at 16:15;  Stop 3/17/20 at 

13:51;  Status DC


Heparin Sodium (Porcine) (Heparin Sodium) 3,450 unit PRN Q6HRS  PRN IV FOR UFH 

LEVEL LESS THAN 0.2 Last administered on 3/13/20at 23:59;  Start 3/13/20 at 16:1

5;  Stop 3/17/20 at 13:52;  Status DC


Info (Anti-Coagulation Monitoring By Pharmacy) 1 each PRN DAILY  PRN MC SEE 

COMMENTS Last administered on 3/16/20at 13:49;  Start 3/13/20 at 16:15;  Stop 

3/17/20 at 13:52;  Status DC


Lisinopril (Prinivil) 40 mg DAILY PO  Last administered on 3/16/20at 08:12;  

Start 3/14/20 at 09:00;  Stop 3/17/20 at 10:38;  Status DC


Metoprolol Tartrate (Lopressor) 50 mg BID PO ;  Start 3/13/20 at 21:00;  Status 

UNV


Atenolol (Tenormin) 100 mg DAILY PO  Last administered on 3/16/20at 08:15;  

Start 3/14/20 at 09:00;  Stop 3/16/20 at 13:45;  Status DC


Insulin Human Lispro (HumaLOG) 5 units 1X  ONCE SQ  Last administered on 

3/13/20at 21:46;  Start 3/13/20 at 22:00;  Stop 3/13/20 at 22:01;  Status DC


Insulin Glargine (Lantus Syringe) 80 unit QHS SQ ;  Start 3/13/20 at 22:00;  

Stop 3/13/20 at 21:56;  Status DC


Insulin Glargine (Lantus Syringe) 80 unit DAILY SQ ;  Start 3/14/20 at 09:00;  

Status Cancel


Insulin Glargine (Lantus Syringe) 80 unit DAILY08 SQ  Last administered on 

3/20/20at 08:58;  Start 3/14/20 at 08:00


Lactobacillus Rhamnosus (Culturelle) 1 cap BID PO  Last administered on 

3/20/20at 20:42;  Start 3/14/20 at 21:00


Sodium Chloride 1,000 ml @  75 mls/hr [id number] IV ;  Start 3/16/20 at 06:00;  

Stop 3/17/20 at 12:28;  Status DC


Amlodipine Besylate (Norvasc) 5 mg DAILY PO  Last administered on 3/16/20at 

08:13;  Start 3/15/20 at 16:00;  Stop 3/17/20 at 10:38;  Status DC


Iodixanol (Visipaque 320) 100 ml STK-MED ONCE .ROUTE ;  Start 3/16/20 at 07:56; 

Stop 3/16/20 at 07:56;  Status DC


Lidocaine HCl (Lidocaine 1% 20ml Vial) 20 ml STK-MED ONCE .ROUTE ;  Start 

3/16/20 at 07:56;  Stop 3/16/20 at 07:56;  Status DC


Heparin Sodium/ Sodium Chloride 1,500 ml @  As Directed STK-MED ONCE .ROUTE ;  

Start 3/16/20 at 07:56;  Stop 3/16/20 at 07:56;  Status DC


Fentanyl Citrate (Fentanyl 2ml Vial) 100 mcg STK-MED ONCE .ROUTE ;  Start 

3/16/20 at 09:03;  Stop 3/16/20 at 09:03;  Status DC


Midazolam HCl (Versed) 2 mg STK-MED ONCE .ROUTE ;  Start 3/16/20 at 09:03;  Stop

3/16/20 at 09:03;  Status DC


Heparin Sodium (Porcine) (Heparin Sodium) 10,000 unit STK-MED ONCE .ROUTE ;  

Start 3/16/20 at 09:39;  Stop 3/16/20 at 09:39;  Status DC


Verapamil HCl (Verapamil) 5 mg STK-MED ONCE .ROUTE ;  Start 3/16/20 at 09:39;  

Stop 3/16/20 at 09:39;  Status DC


Nitroglycerin/ Dextrose 250 ml @  As Directed STK-MED ONCE IV ;  Start 3/16/20 

at 09:39;  Stop 3/16/20 at 09:39;  Status DC


Nitroglycerin (Nitroglycerin) 200 mcg STK-MED ONCE .ROUTE ;  Start 3/16/20 at 

09:39;  Stop 3/16/20 at 09:39;  Status DC


Albuterol/ Ipratropium (Duoneb) 3 ml 1X  ONCE NEB  Last administered on 

3/16/20at 10:00;  Start 3/16/20 at 10:00;  Stop 3/16/20 at 10:01;  Status DC


Etomidate (Amidate) 20 mg STK-MED ONCE IV ;  Start 3/16/20 at 10:23;  Stop 

3/16/20 at 10:23;  Status DC


Succinylcholine Chloride (Anectine) 200 mg STK-MED ONCE .ROUTE ;  Start 3/16/20 

at 10:23;  Stop 3/16/20 at 10:23;  Status DC


Propofol 100 ml @  As Directed STK-MED ONCE IV ;  Start 3/16/20 at 10:35;  Stop 

3/16/20 at 10:35;  Status DC


Nitroglycerin (Nitroglycerin) 200 mcg 1X  ONCE IART  Last administered on 

3/16/20at 11:00;  Start 3/16/20 at 11:00;  Stop 3/16/20 at 11:01;  Status DC


Verapamil HCl (Verapamil) 2.5 mg 1X  ONCE IART ;  Start 3/16/20 at 11:00;  Stop 

3/16/20 at 11:01;  Status DC


Heparin Sodium (Porcine) (Heparin Sodium) 2,500 unit 1X  ONCE IART ;  Start 

3/16/20 at 11:00;  Stop 3/16/20 at 11:01;  Status DC


Heparin Sodium/ Sodium Chloride (HEPARIN for ARTERIAL LINE FLUSH) 1,000 unit 1X 

ONCE IART  Last administered on 3/16/20at 11:00;  Start 3/16/20 at 11:00;  Stop 

3/16/20 at 11:01;  Status DC


Heparin Sodium/ Sodium Chloride (HEPARIN for ARTERIAL LINE FLUSH) 1,000 unit 1X 

ONCE IART  Last administered on 3/16/20at 11:00;  Start 3/16/20 at 11:00;  Stop 

3/16/20 at 11:01;  Status DC


Midazolam HCl (Versed) 2 mg 1X  ONCE IV  Last administered on 3/16/20at 11:00;  

Start 3/16/20 at 11:00;  Stop 3/16/20 at 11:01;  Status DC


Fentanyl Citrate (Fentanyl 2ml Vial) 100 mcg 1X  ONCE IV  Last administered on 

3/16/20at 11:00;  Start 3/16/20 at 11:00;  Stop 3/16/20 at 11:01;  Status DC


Iodixanol (Visipaque 320) 100 ml 1X  ONCE IART ;  Start 3/16/20 at 11:00;  Stop 

3/16/20 at 11:01;  Status DC


Lidocaine HCl (Lidocaine 1% 20ml Vial) 20 ml 1X  ONCE INJ ;  Start 3/16/20 at 

11:00;  Stop 3/16/20 at 11:01;  Status DC


Norepinephrine Bitartrate 8 mg/ Dextrose 258 ml @  27.09 mls/ hr CONT  PRN IV 

PER PROTOCOL Last administered on 3/16/20at 12:55;  Start 3/16/20 at 11:15


Propofol 100 ml @  As Directed STK-MED ONCE IV ;  Start 3/16/20 at 12:39;  Stop 

3/16/20 at 12:40;  Status DC


Potassium Chloride/Water 100 ml @  100 mls/hr 1X  ONCE IV  Last administered on 

3/16/20at 13:06;  Start 3/16/20 at 13:00;  Stop 3/16/20 at 13:59;  Status DC


Midazolam HCl 50 mg/Sodium Chloride 50 ml @ 1 mls/hr CONT  PRN IV SEE I/O RECORD

Last administered on 3/20/20at 05:29;  Start 3/16/20 at 13:00


Magnesium Sulfate 50 ml @ 25 mls/hr 1X  ONCE IV  Last administered on 3/16/20at 

14:28;  Start 3/16/20 at 13:00;  Stop 3/16/20 at 14:59;  Status DC


Midazolam HCl 50 mg/Sodium Chloride 50 ml @ 1 mls/hr CONT  PRN IV SEE I/O 

RECORD;  Start 3/16/20 at 13:00;  Status UNV


Dobutamine HCl/ Dextrose 250 ml @  8.43 mls/hr CONT  PRN IV SEE I/O RECORD;  

Start 3/16/20 at 13:30


Atenolol (Tenormin) 25 mg DAILY PO ;  Start 3/17/20 at 09:00;  Stop 3/17/20 at 

10:38;  Status DC


Metolazone (Zaroxolyn) 2.5 mg DAILY PO  Last administered on 3/19/20at 08:28;  

Start 3/17/20 at 09:00


Fentanyl Citrate 30 ml @ 0 mls/hr CONT  PRN IV SEE PROTOCOL Last administered on

3/21/20at 01:00;  Start 3/16/20 at 22:30


Insulin Human Lispro (HumaLOG) 0-9 UNITS Q6HRS SQ  Last administered on 3/21/20a

t 05:59;  Start 3/17/20 at 00:00


Furosemide 100 mg/ Sodium Chloride 100 ml @ 5 mls/hr CONT  PRN IV SEE I/O RECORD

Last administered on 3/20/20at 15:44;  Start 3/17/20 at 11:00


Hydralazine HCl (Apresoline Inj) 10 mg PRN Q4HRS  PRN IVP ELEVATED BP, SEE COMME

NTS Last administered on 3/21/20at 08:54;  Start 3/17/20 at 10:45


Verapamil HCl (Verapamil) 5 mg STK-MED ONCE .ROUTE ;  Start 3/16/20 at 10:00;  

Stop 3/17/20 at 13:11;  Status DC


Famotidine (Pepcid Vial) 20 mg BID IVP  Last administered on 3/20/20at 20:43;  

Start 3/17/20 at 21:00


Linezolid/Dextrose 300 ml @  300 mls/hr Q12HR IV  Last administered on 3/18/20at

20:59;  Start 3/18/20 at 09:00;  Stop 3/19/20 at 08:46;  Status DC


Potassium Bicarbonate (Potassium Effervescent Tablet) 40 meq 1X  ONCE PO  Last 

administered on 3/18/20at 09:27;  Start 3/18/20 at 09:00;  Stop 3/18/20 at 

09:02;  Status DC


Potassium Chloride/Water 100 ml @  100 mls/hr 1X  ONCE IV  Last administered on 

3/19/20at 07:07;  Start 3/19/20 at 06:45;  Stop 3/19/20 at 07:44;  Status DC


Potassium Chloride/Water 100 ml @  100 mls/hr 1X  ONCE IV  Last administered on 

3/19/20at 08:31;  Start 3/19/20 at 09:00;  Stop 3/19/20 at 09:59;  Status DC


Potassium Chloride/Water 100 ml @  100 mls/hr 1X  ONCE IV  Last administered on 

3/19/20at 13:24;  Start 3/19/20 at 13:00;  Stop 3/19/20 at 13:59;  Status DC


Enalaprilat (Vasotec Inj) 2.5 mg PRN Q6HRS  PRN IVP HYPERTENSION Last administer

ed on 3/20/20at 18:25;  Start 3/19/20 at 10:15


Amlodipine Besylate (Norvasc) 10 mg DAILY PO  Last administered on 3/19/20at 

13:24;  Start 3/19/20 at 12:00


Heparin Sodium (Porcine) (Heparin Sodium) 5,000 unit Q12HR SQ  Last administered

on 3/20/20at 20:44;  Start 3/19/20 at 14:00


Potassium Chloride/Water 100 ml @  100 mls/hr Q1H IV  Last administered on 

3/19/20at 19:26;  Start 3/19/20 at 16:00;  Stop 3/19/20 at 17:59;  Status DC


Propofol 100 ml @ 0 mls/hr CONT  PRN IV SEE PROTOCOL Last administered on 3/21/2

0at 05:52;  Start 3/20/20 at 11:00


Potassium Chloride/Water 100 ml @  100 mls/hr Q1HR IV  Last administered on 

3/20/20at 16:51;  Start 3/20/20 at 12:00;  Stop 3/20/20 at 17:59;  Status DC


Potassium Chloride/Water 100 ml @  100 mls/hr Q1H IV ;  Start 3/20/20 at 22:00; 

Stop 3/21/20 at 05:59;  Status Cancel


Potassium Chloride/Water 100 ml @  100 mls/hr Q1H IV  Last administered on 3/

21/20at 00:53;  Start 3/20/20 at 22:00;  Stop 3/21/20 at 01:59;  Status DC


Potassium Chloride/Water 100 ml @  100 mls/hr Q1H IV  Last administered on 

3/21/20at 07:26;  Start 3/21/20 at 07:00;  Stop 3/21/20 at 08:59;  Status DC





Active Scripts


Active


Reported


[place] (S[place] Fruit) 450 Mg Capsule 450 Mg PO DAILY


D3-50 (Cholecalciferol (Vitamin D3)) 50,000 Unit Capsule 1,000 Unit PO DAILY


Emergen-C 500 mg Chewable Tab (Vit C/Ascorb Sod/Multivit-Min) 500 Mg Tab.chew 

500 Mg PO DAILY


Zoloft (Sertraline Hcl) 50 Mg Tablet 50 Mg PO DAILY


Trazodone Hcl 50 Mg Tablet 1 Tab PO QHS


Trulicity (Dulaglutide) 0.75 Mg/0.5 Ml Pen.injctr 0.75 Mg SQ WEEKLY


Insulin Aspart 100 Unit/1 Ml Vial 25 Unit SQ TIDWMEALS


Levemir (Insulin Detemir) 100 Unit/1 Ml Vial 80 Unit SQ DAILY


Lasix (Furosemide) 20 Mg Tablet 20 Mg PO BID


Flomax (Tamsulosin Hcl) 0.4 Mg Cap.er.24h 0.4 Mg PO HS


Omeprazole 20 Mg Capsule.dr 20 Mg PO BID


Lisinopril 40 Mg Tablet 1 Tab PO DAILY


Atenolol 100 Mg Tablet 100 Mg PO BID


Vitals/I & O





Vital Sign - Last 24 Hours








 3/20/20 3/20/20 3/20/20 3/20/20





 09:38 10:00 11:00 12:00


 


Pulse  68 69 


 


Resp  18 16 


 


B/P (MAP)  175/71 (105) 151/52 (85) 


 


Pulse Ox 96 97 97 


 


O2 Delivery Ventilator Ventilator Ventilator Mechanical Ventilator





 3/20/20 3/20/20 3/20/20 3/20/20





 12:00 12:00 12:12 13:00


 


Temp 98.4   





 98.4   


 


Pulse 61   


 


Resp 16   


 


B/P (MAP) 136/62 (86)   


 


Pulse Ox 97  95 95


 


O2 Delivery Ventilator  Ventilator Ventilator


 


    





    





 3/20/20 3/20/20 3/20/20 3/20/20





 13:00 13:14 13:44 14:00


 


Pulse 60   59


 


Resp 16 16 16 16


 


B/P (MAP) 132/59 (83)   136/60 (85)


 


Pulse Ox 98 98 99 98


 


O2 Delivery Ventilator Ventilator Ventilator Ventilator





 3/20/20 3/20/20 3/20/20 3/20/20





 15:00 15:27 16:00 16:00


 


Temp    98.5





    98.5


 


Pulse 56   62


 


Resp 16   16


 


B/P (MAP) 139/65 (89)   141/69 (93)


 


Pulse Ox 98 95  98


 


O2 Delivery Ventilator Ventilator Mechanical Ventilator Ventilator


 


    





    





 3/20/20 3/20/20 3/20/20 3/20/20





 16:00 17:00 17:59 18:00


 


Pulse  64  67


 


Resp  16  16


 


B/P (MAP)  142/52 (82)  156/57 (90)


 


Pulse Ox  98 97 97


 


O2 Delivery  Ventilator Ventilator Ventilator





 3/20/20 3/20/20 3/20/20 3/20/20





 18:25 19:00 20:00 20:00


 


Temp  98.3  





  98.3  


 


Pulse 66 56 54 


 


Resp  16 16 


 


B/P (MAP) 174/61 128/44 (72) 115/41 (65) 


 


Pulse Ox  96 97 


 


O2 Delivery  Ventilator Ventilator Mechanical Ventilator


 


    





    





 3/20/20 3/20/20 3/20/20 3/20/20





 20:00 20:50 21:00 22:00


 


Pulse   54 62


 


Resp   16 16


 


B/P (MAP)   118/48 (71) 134/52 (79)


 


Pulse Ox  97 96 97


 


O2 Delivery  Ventilator Ventilator Ventilator





 3/20/20 3/20/20 3/21/20 3/21/20





 23:00 23:50 00:00 00:01


 


Temp   98.4 





   98.4 


 


Pulse 66  62 


 


Resp 16  16 


 


B/P (MAP) 128/52 (77)  146/60 (88) 


 


Pulse Ox 96 97 97 


 


O2 Delivery Ventilator Ventilator Ventilator Mechanical Ventilator


 


    





    





 3/21/20 3/21/20 3/21/20 3/21/20





 00:01 01:00 01:30 01:40


 


Pulse  76  


 


Resp  16  


 


B/P (MAP)  156/58 (90)  


 


Pulse Ox  97 97 97


 


O2 Delivery  Ventilator  Ventilator


 


O2 Flow Rate   4.0 





 3/21/20 3/21/20 3/21/20 3/21/20





 02:00 03:00 04:00 04:05


 


Temp   98.8 





   98.8 


 


Pulse 76 64 66 


 


Resp 16 16 16 


 


B/P (MAP) 152/58 (89) 128/54 (78) 128/52 (77) 


 


Pulse Ox 96 95 95 


 


O2 Delivery Ventilator Ventilator Ventilator 


 


    





    





 3/21/20 3/21/20 3/21/20 3/21/20





 04:05 04:57 05:00 06:00


 


Pulse   68 68


 


Resp   16 16


 


B/P (MAP)   134/54 (80) 142/54 (83)


 


Pulse Ox  95 96 95


 


O2 Delivery Mechanical Ventilator Ventilator Ventilator Ventilator





 3/21/20 3/21/20 3/21/20 





 07:25 07:25 08:54 


 


Temp  97.9  





  97.9  


 


Pulse   93 


 


B/P (MAP)   197/81 


 


Pulse Ox 95   


 


O2 Delivery Ventilator   














Intake and Output   


 


 3/20/20 3/20/20 3/21/20





 15:00 23:00 07:00


 


Intake Total 360 ml 360 ml 2694 ml


 


Output Total 790 ml 1035 ml 1195 ml


 


Balance -430 ml -675 ml 1499 ml

















LOWELL BOSS MD        Mar 21, 2020 09:10

## 2020-03-22 NOTE — RAD
Exam: CT head

 

INDICATION: Altered mental status, sepsis

 

TECHNIQUE: Sequential axial images through the head were obtained without 

the administration of IV contrast.

 

Comparisons: None

 

FINDINGS:

No focal parenchymal lesion or hemorrhage is identified. There is no 

midline shift or sulcal effacement.

 

No acute vascular territory infarction is identified. Gray-white 

distinction is preserved.

 

The ventricular system is within normal limits without compression 

hydrocephalus. The basal cisterns are well maintained.

 

The visualized portions of the paranasal sinuses and mastoid air cells are

well-pneumatized. No acute fractures.

 

IMPRESSION:

No acute intracranial abnormality.

 

Exposure: One or more of the following in the visualized dose reduction 

techniques were utilized for this examination:

1.  Automated exposure control

2.  Adjustment of the MA and/or KV according to patient size

Use of iterative of reconstructive technique

 

Electronically signed by: Damon Lockwood MD (3/22/2020 5:00 PM) [id number]

## 2020-03-22 NOTE — PDOC
PULMONARY PROGRESS NOTES


Subjective


on vent, open eyes w verbal stimuli, on precedex, large ett secretion, no 

pressors, on nitro gtt, didnt do well on sbt yesterday


Vitals





Vital Signs








  Date Time  Temp Pulse Resp B/P (MAP) Pulse Ox O2 Delivery O2 Flow Rate FiO2


 


3/22/20 05:45     95 Ventilator  


 


3/22/20 05:00  81 21 123/61 (81)    


 


3/22/20 04:00 100.5       





 100.5       








Comments


ros  unable to obtain





on vent  open eyes w verbal stimuli


HEENT:  Other (nc at perrl  nose clear   orally intubated   neck no lad  no 

thyromegaly)


Lungs:  Crackles


Cardiovascular:  S1, S2


Abdomen:  Soft, Non-tender, Other (no mass)


Extremities:  No Edema


Skin:  Warm


Labs





Laboratory Tests








Test


 3/20/20


08:00 3/20/20


13:20 3/20/20


17:52 3/20/20


19:55


 


O2 Saturation 94 % (92-99)    


 


Arterial Blood pH


 7.46


(7.35-7.45) 


 


 





 


Arterial Blood pCO2 at


Patient Temp 55 mmHg


(35-46) 


 


 





 


Arterial Blood pO2 at Patient


Temp 69 mmHg


() 


 


 





 


Arterial Blood HCO3


 38 mmol/L


(21-28) 


 


 





 


Arterial Blood Base Excess


 12 mmol/L


(-3-3) 


 


 





 


FiO2 40    


 


Glucose (Fingerstick)


 


 133 mg/dL


(70-99) 170 mg/dL


(70-99) 





 


Potassium Level


 


 


 


 3.0 mmol/L


(3.5-5.1)


 


Test


 3/20/20


23:53 3/21/20


05:54 3/21/20


06:00 3/21/20


07:15


 


Glucose (Fingerstick)


 136 mg/dL


(70-99) 176 mg/dL


(70-99) 


 





 


White Blood Count


 


 


 14.7 x10^3/uL


(4.0-11.0) 





 


Red Blood Count


 


 


 5.07 x10^6/uL


(4.30-5.70) 





 


Hemoglobin


 


 


 14.1 g/dL


(13.0-17.5) 





 


Hematocrit


 


 


 43.8 %


(39.0-53.0) 





 


Mean Corpuscular Volume   86 fL ()  


 


Mean Corpuscular Hemoglobin   28 pg (25-35)  


 


Mean Corpuscular Hemoglobin


Concent 


 


 32 g/dL


(31-37) 





 


Red Cell Distribution Width


 


 


 15.1 %


(11.5-14.5) 





 


Platelet Count


 


 


 210 x10^3/uL


(140-400) 





 


Neutrophils (%) (Auto)   78 % (31-73)  


 


Lymphocytes (%) (Auto)   8 % (24-48)  


 


Monocytes (%) (Auto)   11 % (0-9)  


 


Eosinophils (%) (Auto)   1 % (0-3)  


 


Basophils (%) (Auto)   1 % (0-3)  


 


Neutrophils # (Auto)


 


 


 11.5 x10^3/uL


(1.8-7.7) 





 


Lymphocytes # (Auto)


 


 


 1.2 x10^3/uL


(1.0-4.8) 





 


Monocytes # (Auto)


 


 


 1.7 x10^3/uL


(0.0-1.1) 





 


Eosinophils # (Auto)


 


 


 0.2 x10^3/uL


(0.0-0.7) 





 


Basophils # (Auto)


 


 


 0.1 x10^3/uL


(0.0-0.2) 





 


Sodium Level


 


 


 156 mmol/L


(136-145) 





 


Potassium Level


 


 


 3.1 mmol/L


(3.5-5.1) 





 


Chloride Level


 


 


 106 mmol/L


() 





 


Carbon Dioxide Level


 


 


 43 mmol/L


(21-32) 





 


Anion Gap   7 (6-14)  


 


Blood Urea Nitrogen


 


 


 49 mg/dL


(8-26) 





 


Creatinine


 


 


 1.4 mg/dL


(0.7-1.3) 





 


Estimated GFR


(Cockcroft-Gault) 


 


 49.7 


 





 


Glucose Level


 


 


 181 mg/dL


(70-99) 





 


Calcium Level


 


 


 9.0 mg/dL


(8.5-10.1) 





 


Magnesium Level


 


 


 2.3 mg/dL


(1.8-2.4) 





 


O2 Saturation    94 % (92-99) 


 


Arterial Blood pH


 


 


 


 7.46


(7.35-7.45)


 


Arterial Blood pCO2 at


Patient Temp 


 


 


 66 mmHg


(35-46)


 


Arterial Blood pO2 at Patient


Temp 


 


 


 69 mmHg


()


 


Arterial Blood HCO3


 


 


 


 46 mmol/L


(21-28)


 


Arterial Blood Base Excess


 


 


 


 18 mmol/L


(-3-3)


 


FiO2    40% 


 


Test


 3/21/20


09:16 3/21/20


14:41 3/21/20


14:47 3/21/20


16:15


 


Glucose (Fingerstick)


 166 mg/dL


(70-99) 


 128 mg/dL


(70-99) 





 


Potassium Level


 


 3.1 mmol/L


(3.5-5.1) 


 





 


O2 Saturation    93 % (92-99) 


 


Arterial Blood pH


 


 


 


 7.51


(7.35-7.45)


 


Arterial Blood pH (Temp


corrected) 


 


 


 7.48 





 


Arterial Blood pCO2 at


Patient Temp 


 


 


 56 mmHg


(35-46)


 


Arterial Blood pCO2 (Temp


correct) 


 


 


 60 mmHg 





 


Arterial Blood pO2 at Patient


Temp 


 


 


 63 mmHg


()


 


Arterial Blood pO2 (Temp


corrected) 


 


 


 70 mmHg 





 


Arterial Blood HCO3


 


 


 


 44 mmol/L


(21-28)


 


Arterial Blood Base Excess


 


 


 


 17 mmol/L


(-3-3)


 


FiO2    40% tc 


 


Test


 3/21/20


16:51 3/21/20


18:31 3/21/20


20:00 





 


Lactic Acid Level


 1.3 mmol/L


(0.4-2.0) 


 


 





 


Glucose (Fingerstick)


 


 174 mg/dL


(70-99) 


 





 


Potassium Level


 


 


 3.3 mmol/L


(3.5-5.1) 











Laboratory Tests








Test


 3/21/20


07:15 3/21/20


09:16 3/21/20


14:41 3/21/20


14:47


 


O2 Saturation 94 % (92-99)    


 


Arterial Blood pH


 7.46


(7.35-7.45) 


 


 





 


Arterial Blood pCO2 at


Patient Temp 66 mmHg


(35-46) 


 


 





 


Arterial Blood pO2 at Patient


Temp 69 mmHg


() 


 


 





 


Arterial Blood HCO3


 46 mmol/L


(21-28) 


 


 





 


Arterial Blood Base Excess


 18 mmol/L


(-3-3) 


 


 





 


FiO2 40%    


 


Glucose (Fingerstick)


 


 166 mg/dL


(70-99) 


 128 mg/dL


(70-99)


 


Potassium Level


 


 


 3.1 mmol/L


(3.5-5.1) 





 


Test


 3/21/20


16:15 3/21/20


16:51 3/21/20


18:31 3/21/20


20:00


 


O2 Saturation 93 % (92-99)    


 


Arterial Blood pH


 7.51


(7.35-7.45) 


 


 





 


Arterial Blood pH (Temp


corrected) 7.48 


 


 


 





 


Arterial Blood pCO2 at


Patient Temp 56 mmHg


(35-46) 


 


 





 


Arterial Blood pCO2 (Temp


correct) 60 mmHg 


 


 


 





 


Arterial Blood pO2 at Patient


Temp 63 mmHg


() 


 


 





 


Arterial Blood pO2 (Temp


corrected) 70 mmHg 


 


 


 





 


Arterial Blood HCO3


 44 mmol/L


(21-28) 


 


 





 


Arterial Blood Base Excess


 17 mmol/L


(-3-3) 


 


 





 


FiO2 40% tc    


 


Lactic Acid Level


 


 1.3 mmol/L


(0.4-2.0) 


 





 


Glucose (Fingerstick)


 


 


 174 mg/dL


(70-99) 





 


Potassium Level


 


 


 


 3.3 mmol/L


(3.5-5.1)








Medications





Active Scripts








 Medications  Dose


 Route/Sig


 Max Daily Dose Days Date Category


 


 Saw [place] (Saw


 [place]met[place] Fruit)


 450 Mg Capsule  450 Mg


 PO DAILY


   3/13/20 Reported


 


 D3-50


  (Cholecalciferol


  (Vitamin D3))


 50,000 Unit


 Capsule  1,000 Unit


 PO DAILY


   3/13/20 Reported


 


 Emergen-C 500 mg


 Chewable Tab (Vit


 C/Ascorb


 Sod/Multivit-Min)


 500 Mg Tab.chew  500 Mg


 PO DAILY


   3/13/20 Reported


 


 Zoloft


  (Sertraline Hcl)


 50 Mg Tablet  50 Mg


 PO DAILY


   3/13/20 Reported


 


 Trazodone Hcl 50


 Mg Tablet  1 Tab


 PO QHS


   3/13/20 Reported


 


 Trulicity


  (Dulaglutide)


 0.75 Mg/0.5 Ml


 Pen.injctr  0.75 Mg


 SQ WEEKLY


   3/13/20 Reported


 


 Insulin Aspart


 100 Unit/1 Ml Vial  25 Unit


 SQ TIDWMEALS


   3/13/20 Reported


 


 Levemir (Insulin


 Detemir) 100


 Unit/1 Ml Vial  80 Unit


 SQ DAILY


   3/13/20 Reported


 


 Lasix


  (Furosemide) 20


 Mg Tablet  20 Mg


 PO BID


   3/13/20 Reported


 


 Flomax


  (Tamsulosin Hcl)


 0.4 Mg Cap.er.24h  0.4 Mg


 PO HS


   3/13/20 Reported


 


 Omeprazole 20 Mg


 Capsule.dr  20 Mg


 PO BID


   3/13/20 Reported


 


 Lisinopril 40 Mg


 Tablet  1 Tab


 PO DAILY


   3/13/20 Reported


 


 Atenolol 100 Mg


 Tablet  100 Mg


 PO BID


   3/13/20 Reported








Comments


cxr reviewed 





Continued endotracheal intubation with slight improvement in findings of 


pulmonary vascular congestion with continued cardiomegaly and pleural 


effusions suggesting CHF.








echo 3/20





Limited study for LV function.


The left ventricle is normal size.


Left ventricle systolic function is low normal.


The Ejection Fraction is 50-55%.


Septal motion consistent with conduction abnormality but otherwise normall wall 

motion.





Impression


.


IMPRESSION:


1.  Acute hypercapnic and hypoxic respiratory failure sec to flash pulmonary 

edema


2.  Abnormal chest x-ray with bilateral interstitial infiltrates suggesting 

interstitial edema


3.  Leukocytosis


4.  No significant tobacco history.


5.  Suspected obesity hypoventilation syndrome and obstructive sleep apnea,


never been tested.


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


7.  HYPOTENSION IMPROVED


8.  COVID NEGATIVE





Plan


.


cont vent support, setting reviewed, cardiology requested to keep intubated for 

LHC, RHC in am


on nitro gtt per cardilogy


is febrile, bcx is done, will do sputum cx


pepcid, hep sq for prophylaxis


PEEP 5 fi02 40%


ANTI ABX PER ID


psg as out pt


elevate hob





discussed w rn. rt,











JOEL KYLE MD               Mar 22, 2020 06:10

## 2020-03-22 NOTE — RAD
Exam: CT of chest, abdomen and pelvis without contrast

 

INDICATION: Persistent fevers, assess for abscess

 

TECHNIQUE: Sequential axial images through the chest, abdomen and pelvis 

obtained without IV contrast. Sagittal and coronal reformatted images were

reconstructed from the axial data and reviewed.

 

Comparisons: None

 

FINDINGS:

Visualized portions of the thyroid is unremarkable. No enlarged 

mediastinal lymph nodes are identified.

 

Heart is enlarged. No pericardial effusion. Moderate coronary artery 

calcification are noted. Thoracic aorta has a normal course and caliber. 

Pulmonary artery is not enlarged.

 

Endotracheal tube with tip approximately 4 cm above the fredy. Airways 

are otherwise patent. Patchy areas of airspace disease within the 

dependent portion the lungs with consolidation lower lobes bilaterally. 

There are small bilateral pleural effusion.

 

Evaluation of the solid organs is limited secondary to noncontrast 

technique.

 

Liver, spleen, pancreas, gallbladder and adrenals are unremarkable.

 

No perinephric inflammation or hydronephrosis. No renal or ureteral 

calculi are identified.

 

Bladder is decompressed not well evaluated. Prostate is not enlarged.

 

Large and small bowel are unremarkable. Appendix is nonidentified. No free

intra-abdominal air or fluid. No obstruction.

 

Abdominal aorta has a normal course and caliber.

 

No enlarged intra-abdominal lymph nodes are identified.

 

No suspicious osseous lesions or acute fractures.

 

IMPRESSION:

1.  Patchy airspace disease in the lung bases bilaterally with more 

consolidative changes in the lower lobes bilaterally. Findings may 

represent infectious or inflammatory etiologies. Correlate for aspiration 

given distribution.

2.  No acute process identified within the abdomen and pelvis. No focal 

fluid collection to suggest abscess.

 

 

Exposure: One or more of the following in the visualized dose reduction 

techniques were utilized for this examination:

1.  Automated exposure control

2.  Adjustment of the MA and/or KV according to patient size

3.  Use of iterative of reconstructive technique

 

Electronically signed by: Damon Lockwood MD (3/22/2020 5:12 PM) [id number]

## 2020-03-22 NOTE — PDOC
PROGRESS NOTES


Chief Complaint


Chief Complaint


Acute hypoxic respiratory failure - intubated 3/16 on way to cardiac cath


Acute CHF with possible combined systolic/diastolic dysfunction. Significantly 

improved. Continuing present treatment.


NSTEMI 


Likely RAVINDER


DM2 - insulin dependent


Accelerated HTN


Morbid obesity


Leukocytosis - sepsis 2/2 enterococcus UTI - COVID-19 NEG


S/p Partial nephrectomy.


Pyuria UC 50-100K enterococcus 3/13, ? colonization


hyponatremia





History of Present Illness


History of Present Illness


Mr Alexander is a 76 yo M w/ PMHx hypertension, dyslipidemia, diabetes mellitus who

presented via EMS with complaint of shortness of breath on 3/13/202 that had 

been ongoign for 10 days. He called 911 in attempts to go to [facility], was brought 

to [facility], found with O2 sat of 70s at room air and started on  BiPAP to 100% in a 

very short time.


Pulmonology, cardiology, ID consulted.





3/16: Intubation and CVC placed for code blue on way to cardiac catheterization


3/17: Intubated, sedated


3/18: Intubated, sedated


3/19: BP difficult to control, remains intubated


3/20: Still Intubated and sedated. COVID 19 Negative


3/21: Intubated, sedated. Failed 2 weans. K still low, replaced. Lasix GTT. Good

UOP





Overnight with fevers TMax 102F. D/w cardiology to stay intubated and sedated 

until cath 3/24. Sodium improved. lasix GTT to stop, K 3.2, replaced IV central 

line protocol.





Vitals


Vitals





Vital Signs








  Date Time  Temp Pulse Resp B/P (MAP) Pulse Ox O2 Delivery O2 Flow Rate FiO2


 


3/22/20 06:00  75 20 117/59 (78) 96 Ventilator  


 


3/22/20 04:00 100.5       





 100.5       











Physical Exam


Physical Exam


GENERAL: Orally intubated,,opens eyes to verbal stimuli


HEENT:  ETT, OGT 


NECK: Supple 


LUNGS:  Decreased breath sounds bases,  


HEART:  S1, S2 


ABDOMEN: Obese, soft, no guarding 


: Younger in place 3/16


EXTREMITIES:  2+ pitting edema present. No cyanosis 


MSK bilateral knee scars intact


SKIN: Warm to touch. No signs of rash


NEURO: Noncommunicative, sedated 


RIJ clean 


Rt art-line


General:  Other (intubated.)


Heart:  Regular rate


Lungs:  Crackles


Abdomen:  Normal bowel sounds


Extremities:  No cyanosis, Other (3+ bilateral LE pitting edema)


Skin:  No breakdown, No significant lesion





Labs


LABS





Laboratory Tests








Test


 3/21/20


09:16 3/21/20


14:41 3/21/20


14:47 3/21/20


16:15


 


Glucose (Fingerstick)


 166 mg/dL


(70-99) 


 128 mg/dL


(70-99) 





 


Potassium Level


 


 3.1 mmol/L


(3.5-5.1) 


 





 


O2 Saturation    93 % (92-99) 


 


Arterial Blood pH


 


 


 


 7.51


(7.35-7.45)


 


Arterial Blood pH (Temp


corrected) 


 


 


 7.48 





 


Arterial Blood pCO2 at


Patient Temp 


 


 


 56 mmHg


(35-46)


 


Arterial Blood pCO2 (Temp


correct) 


 


 


 60 mmHg 





 


Arterial Blood pO2 at Patient


Temp 


 


 


 63 mmHg


()


 


Arterial Blood pO2 (Temp


corrected) 


 


 


 70 mmHg 





 


Arterial Blood HCO3


 


 


 


 44 mmol/L


(21-28)


 


Arterial Blood Base Excess


 


 


 


 17 mmol/L


(-3-3)


 


FiO2    40% tc 


 


Test


 3/21/20


16:51 3/21/20


18:31 3/21/20


20:00 3/22/20


00:35


 


Lactic Acid Level


 1.3 mmol/L


(0.4-2.0) 


 


 





 


Glucose (Fingerstick)


 


 174 mg/dL


(70-99) 


 176 mg/dL


(70-99)


 


Potassium Level


 


 


 3.3 mmol/L


(3.5-5.1) 





 


Test


 3/22/20


05:50 


 


 





 


White Blood Count


 17.7 x10^3/uL


(4.0-11.0) 


 


 





 


Red Blood Count


 4.79 x10^6/uL


(4.30-5.70) 


 


 





 


Hemoglobin


 13.5 g/dL


(13.0-17.5) 


 


 





 


Hematocrit


 41.4 %


(39.0-53.0) 


 


 





 


Mean Corpuscular Volume 87 fL ()    


 


Mean Corpuscular Hemoglobin 28 pg (25-35)    


 


Mean Corpuscular Hemoglobin


Concent 33 g/dL


(31-37) 


 


 





 


Red Cell Distribution Width


 15.0 %


(11.5-14.5) 


 


 





 


Platelet Count


 195 x10^3/uL


(140-400) 


 


 





 


Neutrophils (%) (Auto) 80 % (31-73)    


 


Lymphocytes (%) (Auto) 10 % (24-48)    


 


Monocytes (%) (Auto) 9 % (0-9)    


 


Eosinophils (%) (Auto) 1 % (0-3)    


 


Basophils (%) (Auto) 1 % (0-3)    


 


Neutrophils # (Auto)


 14.2 x10^3/uL


(1.8-7.7) 


 


 





 


Lymphocytes # (Auto)


 1.7 x10^3/uL


(1.0-4.8) 


 


 





 


Monocytes # (Auto)


 1.6 x10^3/uL


(0.0-1.1) 


 


 





 


Eosinophils # (Auto)


 0.1 x10^3/uL


(0.0-0.7) 


 


 





 


Basophils # (Auto)


 0.2 x10^3/uL


(0.0-0.2) 


 


 





 


Sodium Level


 152 mmol/L


(136-145) 


 


 





 


Potassium Level


 3.2 mmol/L


(3.5-5.1) 


 


 





 


Chloride Level


 105 mmol/L


() 


 


 





 


Carbon Dioxide Level


 44 mmol/L


(21-32) 


 


 





 


Anion Gap 3 (6-14)    


 


Blood Urea Nitrogen


 47 mg/dL


(8-26) 


 


 





 


Creatinine


 1.6 mg/dL


(0.7-1.3) 


 


 





 


Estimated GFR


(Cockcroft-Gault) 42.6 


 


 


 





 


Glucose Level


 199 mg/dL


(70-99) 


 


 





 


Calcium Level


 8.9 mg/dL


(8.5-10.1) 


 


 














Assessment and Plan


Assessmemt and Plan


Problems


Medical Problems:


(1) Acute respiratory distress


Status: Acute  





(2) CAP (community acquired pneumonia)


Status: Acute  





(3) CHF (congestive heart failure)


Status: Acute  





(4) Hypoxia


Status: Acute  





(5) SIRS (systemic inflammatory response syndrome)


Status: Acute  











Comment


Review of Relevant


I have reviewed the following items nicole (where applicable) has been applied.


Labs





Laboratory Tests








Test


 3/20/20


08:00 3/20/20


13:20 3/20/20


17:52 3/20/20


19:55


 


O2 Saturation 94 % (92-99)    


 


Arterial Blood pH


 7.46


(7.35-7.45) 


 


 





 


Arterial Blood pCO2 at


Patient Temp 55 mmHg


(35-46) 


 


 





 


Arterial Blood pO2 at Patient


Temp 69 mmHg


() 


 


 





 


Arterial Blood HCO3


 38 mmol/L


(21-28) 


 


 





 


Arterial Blood Base Excess


 12 mmol/L


(-3-3) 


 


 





 


FiO2 40    


 


Glucose (Fingerstick)


 


 133 mg/dL


(70-99) 170 mg/dL


(70-99) 





 


Potassium Level


 


 


 


 3.0 mmol/L


(3.5-5.1)


 


Test


 3/20/20


23:53 3/21/20


05:54 3/21/20


06:00 3/21/20


07:15


 


Glucose (Fingerstick)


 136 mg/dL


(70-99) 176 mg/dL


(70-99) 


 





 


White Blood Count


 


 


 14.7 x10^3/uL


(4.0-11.0) 





 


Red Blood Count


 


 


 5.07 x10^6/uL


(4.30-5.70) 





 


Hemoglobin


 


 


 14.1 g/dL


(13.0-17.5) 





 


Hematocrit


 


 


 43.8 %


(39.0-53.0) 





 


Mean Corpuscular Volume   86 fL ()  


 


Mean Corpuscular Hemoglobin   28 pg (25-35)  


 


Mean Corpuscular Hemoglobin


Concent 


 


 32 g/dL


(31-37) 





 


Red Cell Distribution Width


 


 


 15.1 %


(11.5-14.5) 





 


Platelet Count


 


 


 210 x10^3/uL


(140-400) 





 


Neutrophils (%) (Auto)   78 % (31-73)  


 


Lymphocytes (%) (Auto)   8 % (24-48)  


 


Monocytes (%) (Auto)   11 % (0-9)  


 


Eosinophils (%) (Auto)   1 % (0-3)  


 


Basophils (%) (Auto)   1 % (0-3)  


 


Neutrophils # (Auto)


 


 


 11.5 x10^3/uL


(1.8-7.7) 





 


Lymphocytes # (Auto)


 


 


 1.2 x10^3/uL


(1.0-4.8) 





 


Monocytes # (Auto)


 


 


 1.7 x10^3/uL


(0.0-1.1) 





 


Eosinophils # (Auto)


 


 


 0.2 x10^3/uL


(0.0-0.7) 





 


Basophils # (Auto)


 


 


 0.1 x10^3/uL


(0.0-0.2) 





 


Sodium Level


 


 


 156 mmol/L


(136-145) 





 


Potassium Level


 


 


 3.1 mmol/L


(3.5-5.1) 





 


Chloride Level


 


 


 106 mmol/L


() 





 


Carbon Dioxide Level


 


 


 43 mmol/L


(21-32) 





 


Anion Gap   7 (6-14)  


 


Blood Urea Nitrogen


 


 


 49 mg/dL


(8-26) 





 


Creatinine


 


 


 1.4 mg/dL


(0.7-1.3) 





 


Estimated GFR


(Cockcroft-Gault) 


 


 49.7 


 





 


Glucose Level


 


 


 181 mg/dL


(70-99) 





 


Calcium Level


 


 


 9.0 mg/dL


(8.5-10.1) 





 


Magnesium Level


 


 


 2.3 mg/dL


(1.8-2.4) 





 


O2 Saturation    94 % (92-99) 


 


Arterial Blood pH


 


 


 


 7.46


(7.35-7.45)


 


Arterial Blood pCO2 at


Patient Temp 


 


 


 66 mmHg


(35-46)


 


Arterial Blood pO2 at Patient


Temp 


 


 


 69 mmHg


()


 


Arterial Blood HCO3


 


 


 


 46 mmol/L


(21-28)


 


Arterial Blood Base Excess


 


 


 


 18 mmol/L


(-3-3)


 


FiO2    40% 


 


Test


 3/21/20


09:16 3/21/20


14:41 3/21/20


14:47 3/21/20


16:15


 


Glucose (Fingerstick)


 166 mg/dL


(70-99) 


 128 mg/dL


(70-99) 





 


Potassium Level


 


 3.1 mmol/L


(3.5-5.1) 


 





 


O2 Saturation    93 % (92-99) 


 


Arterial Blood pH


 


 


 


 7.51


(7.35-7.45)


 


Arterial Blood pH (Temp


corrected) 


 


 


 7.48 





 


Arterial Blood pCO2 at


Patient Temp 


 


 


 56 mmHg


(35-46)


 


Arterial Blood pCO2 (Temp


correct) 


 


 


 60 mmHg 





 


Arterial Blood pO2 at Patient


Temp 


 


 


 63 mmHg


()


 


Arterial Blood pO2 (Temp


corrected) 


 


 


 70 mmHg 





 


Arterial Blood HCO3


 


 


 


 44 mmol/L


(21-28)


 


Arterial Blood Base Excess


 


 


 


 17 mmol/L


(-3-3)


 


FiO2    40% tc 


 


Test


 3/21/20


16:51 3/21/20


18:31 3/21/20


20:00 3/22/20


00:35


 


Lactic Acid Level


 1.3 mmol/L


(0.4-2.0) 


 


 





 


Glucose (Fingerstick)


 


 174 mg/dL


(70-99) 


 176 mg/dL


(70-99)


 


Potassium Level


 


 


 3.3 mmol/L


(3.5-5.1) 





 


Test


 3/22/20


05:50 


 


 





 


White Blood Count


 17.7 x10^3/uL


(4.0-11.0) 


 


 





 


Red Blood Count


 4.79 x10^6/uL


(4.30-5.70) 


 


 





 


Hemoglobin


 13.5 g/dL


(13.0-17.5) 


 


 





 


Hematocrit


 41.4 %


(39.0-53.0) 


 


 





 


Mean Corpuscular Volume 87 fL ()    


 


Mean Corpuscular Hemoglobin 28 pg (25-35)    


 


Mean Corpuscular Hemoglobin


Concent 33 g/dL


(31-37) 


 


 





 


Red Cell Distribution Width


 15.0 %


(11.5-14.5) 


 


 





 


Platelet Count


 195 x10^3/uL


(140-400) 


 


 





 


Neutrophils (%) (Auto) 80 % (31-73)    


 


Lymphocytes (%) (Auto) 10 % (24-48)    


 


Monocytes (%) (Auto) 9 % (0-9)    


 


Eosinophils (%) (Auto) 1 % (0-3)    


 


Basophils (%) (Auto) 1 % (0-3)    


 


Neutrophils # (Auto)


 14.2 x10^3/uL


(1.8-7.7) 


 


 





 


Lymphocytes # (Auto)


 1.7 x10^3/uL


(1.0-4.8) 


 


 





 


Monocytes # (Auto)


 1.6 x10^3/uL


(0.0-1.1) 


 


 





 


Eosinophils # (Auto)


 0.1 x10^3/uL


(0.0-0.7) 


 


 





 


Basophils # (Auto)


 0.2 x10^3/uL


(0.0-0.2) 


 


 





 


Sodium Level


 152 mmol/L


(136-145) 


 


 





 


Potassium Level


 3.2 mmol/L


(3.5-5.1) 


 


 





 


Chloride Level


 105 mmol/L


() 


 


 





 


Carbon Dioxide Level


 44 mmol/L


(21-32) 


 


 





 


Anion Gap 3 (6-14)    


 


Blood Urea Nitrogen


 47 mg/dL


(8-26) 


 


 





 


Creatinine


 1.6 mg/dL


(0.7-1.3) 


 


 





 


Estimated GFR


(Cockcroft-Gault) 42.6 


 


 


 





 


Glucose Level


 199 mg/dL


(70-99) 


 


 





 


Calcium Level


 8.9 mg/dL


(8.5-10.1) 


 


 











Laboratory Tests








Test


 3/21/20


09:16 3/21/20


14:41 3/21/20


14:47 3/21/20


16:15


 


Glucose (Fingerstick)


 166 mg/dL


(70-99) 


 128 mg/dL


(70-99) 





 


Potassium Level


 


 3.1 mmol/L


(3.5-5.1) 


 





 


O2 Saturation    93 % (92-99) 


 


Arterial Blood pH


 


 


 


 7.51


(7.35-7.45)


 


Arterial Blood pH (Temp


corrected) 


 


 


 7.48 





 


Arterial Blood pCO2 at


Patient Temp 


 


 


 56 mmHg


(35-46)


 


Arterial Blood pCO2 (Temp


correct) 


 


 


 60 mmHg 





 


Arterial Blood pO2 at Patient


Temp 


 


 


 63 mmHg


()


 


Arterial Blood pO2 (Temp


corrected) 


 


 


 70 mmHg 





 


Arterial Blood HCO3


 


 


 


 44 mmol/L


(21-28)


 


Arterial Blood Base Excess


 


 


 


 17 mmol/L


(-3-3)


 


FiO2    40% tc 


 


Test


 3/21/20


16:51 3/21/20


18:31 3/21/20


20:00 3/22/20


00:35


 


Lactic Acid Level


 1.3 mmol/L


(0.4-2.0) 


 


 





 


Glucose (Fingerstick)


 


 174 mg/dL


(70-99) 


 176 mg/dL


(70-99)


 


Potassium Level


 


 


 3.3 mmol/L


(3.5-5.1) 





 


Test


 3/22/20


05:50 


 


 





 


White Blood Count


 17.7 x10^3/uL


(4.0-11.0) 


 


 





 


Red Blood Count


 4.79 x10^6/uL


(4.30-5.70) 


 


 





 


Hemoglobin


 13.5 g/dL


(13.0-17.5) 


 


 





 


Hematocrit


 41.4 %


(39.0-53.0) 


 


 





 


Mean Corpuscular Volume 87 fL ()    


 


Mean Corpuscular Hemoglobin 28 pg (25-35)    


 


Mean Corpuscular Hemoglobin


Concent 33 g/dL


(31-37) 


 


 





 


Red Cell Distribution Width


 15.0 %


(11.5-14.5) 


 


 





 


Platelet Count


 195 x10^3/uL


(140-400) 


 


 





 


Neutrophils (%) (Auto) 80 % (31-73)    


 


Lymphocytes (%) (Auto) 10 % (24-48)    


 


Monocytes (%) (Auto) 9 % (0-9)    


 


Eosinophils (%) (Auto) 1 % (0-3)    


 


Basophils (%) (Auto) 1 % (0-3)    


 


Neutrophils # (Auto)


 14.2 x10^3/uL


(1.8-7.7) 


 


 





 


Lymphocytes # (Auto)


 1.7 x10^3/uL


(1.0-4.8) 


 


 





 


Monocytes # (Auto)


 1.6 x10^3/uL


(0.0-1.1) 


 


 





 


Eosinophils # (Auto)


 0.1 x10^3/uL


(0.0-0.7) 


 


 





 


Basophils # (Auto)


 0.2 x10^3/uL


(0.0-0.2) 


 


 





 


Sodium Level


 152 mmol/L


(136-145) 


 


 





 


Potassium Level


 3.2 mmol/L


(3.5-5.1) 


 


 





 


Chloride Level


 105 mmol/L


() 


 


 





 


Carbon Dioxide Level


 44 mmol/L


(21-32) 


 


 





 


Anion Gap 3 (6-14)    


 


Blood Urea Nitrogen


 47 mg/dL


(8-26) 


 


 





 


Creatinine


 1.6 mg/dL


(0.7-1.3) 


 


 





 


Estimated GFR


(Cockcroft-Gault) 42.6 


 


 


 





 


Glucose Level


 199 mg/dL


(70-99) 


 


 





 


Calcium Level


 8.9 mg/dL


(8.5-10.1) 


 


 











Microbiology


3/16/20 Blood Culture - Final, Complete


          NO GROWTH AFTER 5 DAYS


3/13/20 Urine Culture - Final, Complete


          


3/13/20 Urine Culture Result 1 (CAR) - Final, Complete


          


3/13/20 Antimicrobic Susceptibility - Final, Complete


Medications





Current Medications


Albuterol/ Ipratropium (Duoneb) 3 ml 1X  ONCE NEB  Last administered on 

3/13/20at 09:07;  Start 3/13/20 at 09:15;  Stop 3/13/20 at 09:16;  Status DC


Methylprednisolone Sodium Succinate (SOLU-Medrol 125MG VIAL) 125 mg 1X  ONCE IV 

Last administered on 3/13/20at 09:27;  Start 3/13/20 at 09:15;  Stop 3/13/20 at 

09:16;  Status DC


Labetalol HCl (Normodyne Iv Push) 10 mg 1X  ONCE IVP ;  Start 3/13/20 at 09:15; 

Stop 3/13/20 at 09:12;  Status DC


Piperacillin Sod/ Tazobactam Sod 3.375 gm/Sodium Chloride 50 ml @  100 mls/hr 1X

 ONCE IV  Last administered on 3/13/20at 10:23;  Start 3/13/20 at 09:30;  Stop 

3/13/20 at 09:59;  Status DC


Vancomycin HCl 250 ml @  250 mls/hr 1X  ONCE IV ;  Start 3/13/20 at 09:30;  Stop

3/13/20 at 10:29;  Status UNV


Vancomycin HCl 2 gm/Sodium Chloride 500 ml @  250 mls/hr 1X  ONCE IV  Last 

administered on 3/13/20at 09:56;  Start 3/13/20 at 09:45;  Stop 3/13/20 at 

11:44;  Status DC


Sodium Chloride 1,000 ml @  100 mls/hr Q10H IV  Last administered on 3/13/20at 

11:53;  Start 3/13/20 at 10:51;  Stop 3/14/20 at 10:50;  Status DC


Furosemide (Lasix) 40 mg 1X  ONCE IVP  Last administered on 3/13/20at 14:08;  

Start 3/13/20 at 14:00;  Stop 3/13/20 at 14:01;  Status DC


Piperacillin Sod/ Tazobactam Sod 3.375 gm/Sodium Chloride 50 ml @  100 mls/hr 

Q6HRS IV  Last administered on 3/22/20at 06:19;  Start 3/13/20 at 18:00


Insulin Human Lispro (HumaLOG) 0-9 UNITS TIDWMEALS SQ  Last administered on 

3/16/20at 17:00;  Start 3/13/20 at 17:00;  Stop 3/16/20 at 22:21;  Status DC


Dextrose (Dextrose 50%-Water Syringe) 12.5 gm PRN Q15MIN  PRN IV SEE COMMENTS;  

Start 3/13/20 at 14:00


Atorvastatin Calcium (Lipitor) 40 mg QHS PO  Last administered on 3/21/20at 

21:25;  Start 3/13/20 at 21:00


Aspirin (Ecotrin) 81 mg DAILYWBKFT PO  Last administered on 3/21/20at 09:25;  

Start 3/14/20 at 08:00


Aspirin (Ecotrin) 325 mg 1X  ONCE PO  Last administered on 3/13/20at 16:24;  

Start 3/13/20 at 16:00;  Stop 3/13/20 at 16:01;  Status DC


Furosemide (Lasix) 40 mg BID92 IVP  Last administered on 3/17/20at 10:11;  Start

3/14/20 at 09:00;  Stop 3/17/20 at 10:38;  Status DC


Heparin Sodium/ Dextrose 250 ml @ 0 mls/hr CONT  PRN IV PER PROTOCOL Last 

administered on 3/15/20at 20:31;  Start 3/13/20 at 16:15;  Stop 3/17/20 at 

13:51;  Status DC


Heparin Sodium (Porcine) (Heparin Sodium) 3,450 unit PRN Q6HRS  PRN IV FOR UFH 

LEVEL LESS THAN 0.2 Last administered on 3/13/20at 23:59;  Start 3/13/20 at 

16:15;  Stop 3/17/20 at 13:52;  Status DC


Info (Anti-Coagulation Monitoring By Pharmacy) 1 each PRN DAILY  PRN MC SEE 

COMMENTS Last administered on 3/16/20at 13:49;  Start 3/13/20 at 16:15;  Stop 

3/17/20 at 13:52;  Status DC


Lisinopril (Prinivil) 40 mg DAILY PO  Last administered on 3/16/20at 08:12;  

Start 3/14/20 at 09:00;  Stop 3/17/20 at 10:38;  Status DC


Metoprolol Tartrate (Lopressor) 50 mg BID PO ;  Start 3/13/20 at 21:00;  Status 

UNV


Atenolol (Tenormin) 100 mg DAILY PO  Last administered on 3/16/20at 08:15;  

Start 3/14/20 at 09:00;  Stop 3/16/20 at 13:45;  Status DC


Insulin Human Lispro (HumaLOG) 5 units 1X  ONCE SQ  Last administered on 

3/13/20at 21:46;  Start 3/13/20 at 22:00;  Stop 3/13/20 at 22:01;  Status DC


Insulin Glargine (Lantus Syringe) 80 unit QHS SQ ;  Start 3/13/20 at 22:00;  

Stop 3/13/20 at 21:56;  Status DC


Insulin Glargine (Lantus Syringe) 80 unit DAILY SQ ;  Start 3/14/20 at 09:00;  

Status Cancel


Insulin Glargine (Lantus Syringe) 80 unit DAILY08 SQ  Last administered on 

3/21/20at 09:24;  Start 3/14/20 at 08:00


Lactobacillus Rhamnosus (Culturelle) 1 cap BID PO  Last administered on 

3/21/20at 21:24;  Start 3/14/20 at 21:00


Sodium Chloride 1,000 ml @  75 mls/hr [id number] IV ;  Start 3/16/20 at 06:00;  

Stop 3/17/20 at 12:28;  Status DC


Amlodipine Besylate (Norvasc) 5 mg DAILY PO  Last administered on 3/16/20at 

08:13;  Start 3/15/20 at 16:00;  Stop 3/17/20 at 10:38;  Status DC


Iodixanol (Visipaque 320) 100 ml STK-MED ONCE .ROUTE ;  Start 3/16/20 at 07:56; 

Stop 3/16/20 at 07:56;  Status DC


Lidocaine HCl (Lidocaine 1% 20ml Vial) 20 ml STK-MED ONCE .ROUTE ;  Start 

3/16/20 at 07:56;  Stop 3/16/20 at 07:56;  Status DC


Heparin Sodium/ Sodium Chloride 1,500 ml @  As Directed STK-MED ONCE .ROUTE ;  

Start 3/16/20 at 07:56;  Stop 3/16/20 at 07:56;  Status DC


Fentanyl Citrate (Fentanyl 2ml Vial) 100 mcg STK-MED ONCE .ROUTE ;  Start 

3/16/20 at 09:03;  Stop 3/16/20 at 09:03;  Status DC


Midazolam HCl (Versed) 2 mg STK-MED ONCE .ROUTE ;  Start 3/16/20 at 09:03;  Stop

3/16/20 at 09:03;  Status DC


Heparin Sodium (Porcine) (Heparin Sodium) 10,000 unit STK-MED ONCE .ROUTE ;  

Start 3/16/20 at 09:39;  Stop 3/16/20 at 09:39;  Status DC


Verapamil HCl (Verapamil) 5 mg STK-MED ONCE .ROUTE ;  Start 3/16/20 at 09:39;  

Stop 3/16/20 at 09:39;  Status DC


Nitroglycerin/ Dextrose 250 ml @  As Directed STK-MED ONCE IV ;  Start 3/16/20 

at 09:39;  Stop 3/16/20 at 09:39;  Status DC


Nitroglycerin (Nitroglycerin) 200 mcg STK-MED ONCE .ROUTE ;  Start 3/16/20 at 

09:39;  Stop 3/16/20 at 09:39;  Status DC


Albuterol/ Ipratropium (Duoneb) 3 ml 1X  ONCE NEB  Last administered on 

3/16/20at 10:00;  Start 3/16/20 at 10:00;  Stop 3/16/20 at 10:01;  Status DC


Etomidate (Amidate) 20 mg STK-MED ONCE IV ;  Start 3/16/20 at 10:23;  Stop 

3/16/20 at 10:23;  Status DC


Succinylcholine Chloride (Anectine) 200 mg STK-MED ONCE .ROUTE ;  Start 3/16/20 

at 10:23;  Stop 3/16/20 at 10:23;  Status DC


Propofol 100 ml @  As Directed STK-MED ONCE IV ;  Start 3/16/20 at 10:35;  Stop 

3/16/20 at 10:35;  Status DC


Nitroglycerin (Nitroglycerin) 200 mcg 1X  ONCE IART  Last administered on 

3/16/20at 11:00;  Start 3/16/20 at 11:00;  Stop 3/16/20 at 11:01;  Status DC


Verapamil HCl (Verapamil) 2.5 mg 1X  ONCE IART ;  Start 3/16/20 at 11:00;  Stop 

3/16/20 at 11:01;  Status DC


Heparin Sodium (Porcine) (Heparin Sodium) 2,500 unit 1X  ONCE IART ;  Start 

3/16/20 at 11:00;  Stop 3/16/20 at 11:01;  Status DC


Heparin Sodium/ Sodium Chloride (HEPARIN for ARTERIAL LINE FLUSH) 1,000 unit 1X 

ONCE IART  Last administered on 3/16/20at 11:00;  Start 3/16/20 at 11:00;  Stop 

3/16/20 at 11:01;  Status DC


Heparin Sodium/ Sodium Chloride (HEPARIN for ARTERIAL LINE FLUSH) 1,000 unit 1X 

ONCE IART  Last administered on 3/16/20at 11:00;  Start 3/16/20 at 11:00;  Stop 

3/16/20 at 11:01;  Status DC


Midazolam HCl (Versed) 2 mg 1X  ONCE IV  Last administered on 3/16/20at 11:00;  

Start 3/16/20 at 11:00;  Stop 3/16/20 at 11:01;  Status DC


Fentanyl Citrate (Fentanyl 2ml Vial) 100 mcg 1X  ONCE IV  Last administered on 

3/16/20at 11:00;  Start 3/16/20 at 11:00;  Stop 3/16/20 at 11:01;  Status DC


Iodixanol (Visipaque 320) 100 ml 1X  ONCE IART ;  Start 3/16/20 at 11:00;  Stop 

3/16/20 at 11:01;  Status DC


Lidocaine HCl (Lidocaine 1% 20ml Vial) 20 ml 1X  ONCE INJ ;  Start 3/16/20 at 

11:00;  Stop 3/16/20 at 11:01;  Status DC


Norepinephrine Bitartrate 8 mg/ Dextrose 258 ml @  27.09 mls/ hr CONT  PRN IV 

PER PROTOCOL Last administered on 3/16/20at 12:55;  Start 3/16/20 at 11:15


Propofol 100 ml @  As Directed STK-MED ONCE IV ;  Start 3/16/20 at 12:39;  Stop 

3/16/20 at 12:40;  Status DC


Potassium Chloride/Water 100 ml @  100 mls/hr 1X  ONCE IV  Last administered on 

3/16/20at 13:06;  Start 3/16/20 at 13:00;  Stop 3/16/20 at 13:59;  Status DC


Midazolam HCl 50 mg/Sodium Chloride 50 ml @ 1 mls/hr CONT  PRN IV SEE I/O RECORD

Last administered on 3/20/20at 05:29;  Start 3/16/20 at 13:00


Magnesium Sulfate 50 ml @ 25 mls/hr 1X  ONCE IV  Last administered on 3/16/20at 

14:28;  Start 3/16/20 at 13:00;  Stop 3/16/20 at 14:59;  Status DC


Midazolam HCl 50 mg/Sodium Chloride 50 ml @ 1 mls/hr CONT  PRN IV SEE I/O 

RECORD;  Start 3/16/20 at 13:00;  Status UNV


Dobutamine HCl/ Dextrose 250 ml @  8.43 mls/hr CONT  PRN IV SEE I/O RECORD;  

Start 3/16/20 at 13:30


Atenolol (Tenormin) 25 mg DAILY PO ;  Start 3/17/20 at 09:00;  Stop 3/17/20 at 

10:38;  Status DC


Metolazone (Zaroxolyn) 2.5 mg DAILY PO  Last administered on 3/21/20at 09:25;  

Start 3/17/20 at 09:00


Fentanyl Citrate 30 ml @ 0 mls/hr CONT  PRN IV SEE PROTOCOL Last administered on

3/21/20at 11:33;  Start 3/16/20 at 22:30


Insulin Human Lispro (HumaLOG) 0-9 UNITS Q6HRS SQ  Last administered on 

3/22/20at 06:23;  Start 3/17/20 at 00:00


Furosemide 100 mg/ Sodium Chloride 100 ml @ 5 mls/hr CONT  PRN IV SEE I/O RECORD

Last administered on 3/21/20at 15:25;  Start 3/17/20 at 11:00


Hydralazine HCl (Apresoline Inj) 10 mg PRN Q4HRS  PRN IVP ELEVATED BP, SEE 

COMMENTS Last administered on 3/21/20at 08:54;  Start 3/17/20 at 10:45


Verapamil HCl (Verapamil) 5 mg STK-MED ONCE .ROUTE ;  Start 3/16/20 at 10:00;  

Stop 3/17/20 at 13:11;  Status DC


Famotidine (Pepcid Vial) 20 mg BID IVP  Last administered on 3/21/20at 21:23;  

Start 3/17/20 at 21:00


Linezolid/Dextrose 300 ml @  300 mls/hr Q12HR IV  Last administered on 3/18/20at

20:59;  Start 3/18/20 at 09:00;  Stop 3/19/20 at 08:46;  Status DC


Potassium Bicarbonate (Potassium Effervescent Tablet) 40 meq 1X  ONCE PO  Last 

administered on 3/18/20at 09:27;  Start 3/18/20 at 09:00;  Stop 3/18/20 at 

09:02;  Status DC


Potassium Chloride/Water 100 ml @  100 mls/hr 1X  ONCE IV  Last administered on 

3/19/20at 07:07;  Start 3/19/20 at 06:45;  Stop 3/19/20 at 07:44;  Status DC


Potassium Chloride/Water 100 ml @  100 mls/hr 1X  ONCE IV  Last administered on 

3/19/20at 08:31;  Start 3/19/20 at 09:00;  Stop 3/19/20 at 09:59;  Status DC


Potassium Chloride/Water 100 ml @  100 mls/hr 1X  ONCE IV  Last administered on 

3/19/20at 13:24;  Start 3/19/20 at 13:00;  Stop 3/19/20 at 13:59;  Status DC


Enalaprilat (Vasotec Inj) 2.5 mg PRN Q6HRS  PRN IVP HYPERTENSION Last 

administered on 3/20/20at 18:25;  Start 3/19/20 at 10:15


Amlodipine Besylate (Norvasc) 10 mg DAILY PO  Last administered on 3/21/20at 

09:24;  Start 3/19/20 at 12:00


Heparin Sodium (Porcine) (Heparin Sodium) 5,000 unit Q12HR SQ  Last administered

on 3/21/20at 21:24;  Start 3/19/20 at 14:00


Potassium Chloride/Water 100 ml @  100 mls/hr Q1H IV  Last administered on 

3/19/20at 19:26;  Start 3/19/20 at 16:00;  Stop 3/19/20 at 17:59;  Status DC


Propofol 100 ml @ 0 mls/hr CONT  PRN IV SEE PROTOCOL Last administered on 

3/21/20at 11:31;  Start 3/20/20 at 11:00


Potassium Chloride/Water 100 ml @  100 mls/hr Q1HR IV  Last administered on 

3/20/20at 16:51;  Start 3/20/20 at 12:00;  Stop 3/20/20 at 17:59;  Status DC


Potassium Chloride/Water 100 ml @  100 mls/hr Q1H IV ;  Start 3/20/20 at 22:00; 

Stop 3/21/20 at 05:59;  Status Cancel


Potassium Chloride/Water 100 ml @  100 mls/hr Q1H IV  Last administered on 

3/21/20at 00:53;  Start 3/20/20 at 22:00;  Stop 3/21/20 at 01:59;  Status DC


Potassium Chloride/Water 100 ml @  100 mls/hr Q1H IV  Last administered on 

3/21/20at 09:34;  Start 3/21/20 at 07:00;  Stop 3/21/20 at 08:59;  Status DC


Nitroglycerin/ Dextrose 250 ml @  1.5 mls/hr CONT  PRN IV SEE I/O RECORD Last 

administered on 3/21/20at 21:30;  Start 3/21/20 at 10:00


Dexmedetomidine HCl 400 mcg/ Sodium Chloride 100 ml @ 0 mls/hr CONT  PRN IV MAGALI

TION Last administered on 3/22/20at 01:10;  Start 3/21/20 at 14:45


Sodium Chloride 500 ml @  500 mls/hr 1X PRN  PRN IV SEE COMMENTS;  Start 3/21/20

at 14:45


Atropine Sulfate (ATROPINE 0.5mg SYRINGE) 0.5 mg PRN Q5MIN  PRN IV SEE COMMENTS;

 Start 3/21/20 at 14:45


Potassium Chloride/Water 100 ml @  100 mls/hr Q1H IV  Last administered on 

3/21/20at 17:07;  Start 3/21/20 at 15:30;  Stop 3/21/20 at 17:29;  Status DC


Acetaminophen (Tylenol) 650 mg PRN Q6HRS  PRN PO MILD PAIN / TEMP Last 

administered on 3/21/20at 16:43;  Start 3/21/20 at 16:30


Potassium Chloride/Water 100 ml @  100 mls/hr Q1H IV  Last administered on 

3/22/20at 00:36;  Start 3/21/20 at 21:00;  Stop 3/21/20 at 22:59;  Status DC





Active Scripts


Active


Reported


Saw [place] (Saw [place] Fruit) 450 Mg Capsule 450 Mg PO DAILY


D3-50 (Cholecalciferol (Vitamin D3)) 50,000 Unit Capsule 1,000 Unit PO DAILY


Emergen-C 500 mg Chewable Tab (Vit C/Ascorb Sod/Multivit-Min) 500 Mg Tab.chew 

500 Mg PO DAILY


Zoloft (Sertraline Hcl) 50 Mg Tablet 50 Mg PO DAILY


Trazodone Hcl 50 Mg Tablet 1 Tab PO QHS


Trulicity (Dulaglutide) 0.75 Mg/0.5 Ml Pen.injctr 0.75 Mg SQ WEEKLY


Insulin Aspart 100 Unit/1 Ml Vial 25 Unit SQ TIDWMEALS


Levemir (Insulin Detemir) 100 Unit/1 Ml Vial 80 Unit SQ DAILY


Lasix (Furosemide) 20 Mg Tablet 20 Mg PO BID


Flomax (Tamsulosin Hcl) 0.4 Mg Cap.er.24h 0.4 Mg PO HS


Omeprazole 20 Mg Capsule.dr 20 Mg PO BID


Lisinopril 40 Mg Tablet 1 Tab PO DAILY


Atenolol 100 Mg Tablet 100 Mg PO BID


Vitals/I & O





Vital Sign - Last 24 Hours








 3/21/20 3/21/20 3/21/20 3/21/20





 07:25 07:25 08:00 08:00


 


Temp  97.9  





  97.9  


 


Pulse   76 


 


Resp   16 


 


B/P (MAP)   152/54 (86) 


 


Pulse Ox 95  96 


 


O2 Delivery Ventilator  Ventilator 


 


    





    





 3/21/20 3/21/20 3/21/20 3/21/20





 08:00 08:54 09:00 09:06


 


Pulse  93 102 


 


Resp   21 


 


B/P (MAP)  197/81 214/80 (124) 


 


Pulse Ox   96 95


 


O2 Delivery Mechanical Ventilator  Ventilator Ventilator





 3/21/20 3/21/20 3/21/20 3/21/20





 09:24 10:00 11:00 11:13


 


Pulse 95 96 96 


 


Resp  19 18 


 


B/P (MAP) 167/64 166/66 (99) 162/62 (95) 


 


Pulse Ox  97 94 94


 


O2 Delivery  Ventilator Ventilator Ventilator





 3/21/20 3/21/20 3/21/20 3/21/20





 11:33 11:33 12:00 12:00


 


Temp   99.7 





   99.7 


 


Pulse   92 


 


Resp 19 19 19 


 


B/P (MAP)   138/52 (80) 


 


Pulse Ox 94 94 94 


 


O2 Delivery Ventilator Ventilator Ventilator 


 


    





    





 3/21/20 3/21/20 3/21/20 3/21/20





 12:00 12:03 12:48 13:00


 


Pulse    92


 


Resp  18  18


 


B/P (MAP)    136/50 (78)


 


Pulse Ox  94 94 94


 


O2 Delivery Mechanical Ventilator Ventilator Ventilator Ventilator





 3/21/20 3/21/20 3/21/20 3/21/20





 14:00 15:00 15:38 15:40


 


Pulse 94 94  102


 


Resp 18 20  18


 


B/P (MAP) 142/51 (81) 142/46 (78)  137/65 (89)


 


Pulse Ox 94 94 94 94


 


O2 Delivery Ventilator Ventilator Ventilator Ventilator





 3/21/20 3/21/20 3/21/20 3/21/20





 15:45 15:50 15:55 16:00


 


Pulse 104 104 104 


 


Resp 18 18 18 


 


B/P (MAP) 142/66 (91) 142/65 (90) 144/67 (92) 


 


Pulse Ox 94 94 94 


 


O2 Delivery Ventilator Ventilator Ventilator 





 3/21/20 3/21/20 3/21/20 3/21/20





 16:00 16:00 16:05 16:10


 


Temp  101.4  





  101.4  


 


Pulse  104 104 


 


Resp  18 18 


 


B/P (MAP)  136/72 (93) 141/62 (88) 


 


Pulse Ox  94 94 94


 


O2 Delivery Mechanical Ventilator Ventilator Ventilator 


 


    





    





 3/21/20 3/21/20 3/21/20 3/21/20





 16:15 16:30 16:45 17:37


 


Temp   100.4 





   100.4 


 


Pulse 104 102 102 


 


Resp 18 18 18 


 


B/P (MAP) 140/62 (88) 122/62 (82) 131/60 (83) 


 


Pulse Ox 94 94 94 93


 


O2 Delivery Ventilator Ventilator Ventilator Ventilator


 


    





    





 3/21/20 3/21/20 3/21/20 3/21/20





 18:00 19:00 20:00 20:00


 


Temp 98.6 102.0  





 98.6 102.0  


 


Pulse 98 92 89 


 


Resp 20 20 20 


 


B/P (MAP) 113/59 (77) 94/53 (67) 94/57 (69) 


 


Pulse Ox 94 95 95 


 


O2 Delivery Ventilator Ventilator Ventilator Mechanical Ventilator


 


    





    





 3/21/20 3/21/20 3/21/20 3/21/20





 20:45 21:00 22:00 23:00


 


Pulse  77 76 77


 


Resp  22 22 22


 


B/P (MAP)  115/61 (79) 116/60 (78) 122/62 (82)


 


Pulse Ox 95 93 93 92


 


O2 Delivery Ventilator Ventilator Ventilator Ventilator





 3/22/20 3/22/20 3/22/20 3/22/20





 00:00 00:00 00:36 01:00


 


Temp  100.9  





  100.9  


 


Pulse  79  80


 


Resp  22 20


 


B/P (MAP)  139/67 (91)  128/61 (83)


 


Pulse Ox  95 95 96


 


O2 Delivery Mechanical Ventilator Ventilator Ventilator Ventilator


 


    





    





 3/22/20 3/22/20 3/22/20 3/22/20





 02:00 03:00 03:43 04:00


 


Pulse 77 75  


 


Resp 22 22  


 


B/P (MAP) 112/58 (76) 119/58 (78)  


 


Pulse Ox 96 96 96 


 


O2 Delivery Ventilator Ventilator Ventilator Mechanical Ventilator





 3/22/20 3/22/20 3/22/20 3/22/20





 04:00 05:00 05:45 06:00


 


Temp 100.5   





 100.5   


 


Pulse 71 81  75


 


Resp 21 21 20


 


B/P (MAP) 123/60 (81) 123/61 (81)  117/59 (78)


 


Pulse Ox 95 95 95 96


 


O2 Delivery Ventilator Ventilator Ventilator Ventilator














Intake and Output   


 


 3/21/20 3/21/20 3/22/20





 14:59 22:59 06:59


 


Intake Total 440 ml 1915 ml 595 ml


 


Output Total 1175 ml 1010 ml 800 ml


 


Balance -735 ml 905 ml -205 ml

















LOWELL BOSS MD        Mar 22, 2020 07:19

## 2020-03-22 NOTE — NUR
Pt sedated on precedex without issue today, only needed a bolus and some prn fentanyl when 
he was in cat scan. Pt to have cardiac cath tomorrow.

## 2020-03-22 NOTE — PDOC
Infectious Disease Note


Subjective


Subjective


Orally intubated and sedated


Remains on AbD760%


Off Nitro gtt


Tube feedings via OGT


Fever Tmax 102





ROS


ROS


unobtainable





Vital Sign


Vital Signs





Vital Signs








  Date Time  Temp Pulse Resp B/P (MAP) Pulse Ox O2 Delivery O2 Flow Rate FiO2


 


3/22/20 12:24     95 Ventilator  


 


3/22/20 08:31  80  117/57    


 


3/22/20 06:00   20     


 


3/22/20 04:00 100.5       





 100.5       











Physical Exam


PHYSICAL EXAM


GENERAL: Orally intubated,,unresponsive 


HEENT:  Pupils equal, ETT, OGT 


NECK: Supple 


LUNGS:  Decreased breath sounds bases,  


HEART:  S1, S2 


ABDOMEN: Obese, soft, no guarding 


: Younger in place 3/16


EXTREMITIES:  2+ pitting edema present. No cyanosis. SCDs bilaterally 


MSK bilateral knee scars intact


SKIN: Warm to touch. No signs of rash


NEURO: Noncommunicative, sedated 


RIJ clean 


Rt art-line out





Labs


Lab





Laboratory Tests








Test


 3/21/20


14:41 3/21/20


14:47 3/21/20


16:15 3/21/20


16:51


 


Potassium Level


 3.1 mmol/L


(3.5-5.1) 


 


 





 


Glucose (Fingerstick)


 


 128 mg/dL


(70-99) 


 





 


O2 Saturation   93 % (92-99)  


 


Arterial Blood pH


 


 


 7.51


(7.35-7.45) 





 


Arterial Blood pH (Temp


corrected) 


 


 7.48 


 





 


Arterial Blood pCO2 at


Patient Temp 


 


 56 mmHg


(35-46) 





 


Arterial Blood pCO2 (Temp


correct) 


 


 60 mmHg 


 





 


Arterial Blood pO2 at Patient


Temp 


 


 63 mmHg


() 





 


Arterial Blood pO2 (Temp


corrected) 


 


 70 mmHg 


 





 


Arterial Blood HCO3


 


 


 44 mmol/L


(21-28) 





 


Arterial Blood Base Excess


 


 


 17 mmol/L


(-3-3) 





 


FiO2   40% tc  


 


Lactic Acid Level


 


 


 


 1.3 mmol/L


(0.4-2.0)


 


Test


 3/21/20


18:31 3/21/20


20:00 3/22/20


00:35 3/22/20


05:50


 


Glucose (Fingerstick)


 174 mg/dL


(70-99) 


 176 mg/dL


(70-99) 





 


Potassium Level


 


 3.3 mmol/L


(3.5-5.1) 


 3.2 mmol/L


(3.5-5.1)


 


White Blood Count


 


 


 


 17.7 x10^3/uL


(4.0-11.0)


 


Red Blood Count


 


 


 


 4.79 x10^6/uL


(4.30-5.70)


 


Hemoglobin


 


 


 


 13.5 g/dL


(13.0-17.5)


 


Hematocrit


 


 


 


 41.4 %


(39.0-53.0)


 


Mean Corpuscular Volume    87 fL () 


 


Mean Corpuscular Hemoglobin    28 pg (25-35) 


 


Mean Corpuscular Hemoglobin


Concent 


 


 


 33 g/dL


(31-37)


 


Red Cell Distribution Width


 


 


 


 15.0 %


(11.5-14.5)


 


Platelet Count


 


 


 


 195 x10^3/uL


(140-400)


 


Neutrophils (%) (Auto)    80 % (31-73) 


 


Lymphocytes (%) (Auto)    10 % (24-48) 


 


Monocytes (%) (Auto)    9 % (0-9) 


 


Eosinophils (%) (Auto)    1 % (0-3) 


 


Basophils (%) (Auto)    1 % (0-3) 


 


Neutrophils # (Auto)


 


 


 


 14.2 x10^3/uL


(1.8-7.7)


 


Lymphocytes # (Auto)


 


 


 


 1.7 x10^3/uL


(1.0-4.8)


 


Monocytes # (Auto)


 


 


 


 1.6 x10^3/uL


(0.0-1.1)


 


Eosinophils # (Auto)


 


 


 


 0.1 x10^3/uL


(0.0-0.7)


 


Basophils # (Auto)


 


 


 


 0.2 x10^3/uL


(0.0-0.2)


 


Sodium Level


 


 


 


 152 mmol/L


(136-145)


 


Chloride Level


 


 


 


 105 mmol/L


()


 


Carbon Dioxide Level


 


 


 


 44 mmol/L


(21-32)


 


Anion Gap    3 (6-14) 


 


Blood Urea Nitrogen


 


 


 


 47 mg/dL


(8-26)


 


Creatinine


 


 


 


 1.6 mg/dL


(0.7-1.3)


 


Estimated GFR


(Cockcroft-Gault) 


 


 


 42.6 





 


Glucose Level


 


 


 


 199 mg/dL


(70-99)


 


Calcium Level


 


 


 


 8.9 mg/dL


(8.5-10.1)


 


Test


 3/22/20


06:18 3/22/20


09:00 


 





 


Glucose (Fingerstick)


 199 mg/dL


(70-99) 


 


 





 


O2 Saturation  93 % (92-99)   


 


Arterial Blood pH


 


 7.54


(7.35-7.45) 


 





 


Arterial Blood pCO2 at


Patient Temp 


 50 mmHg


(35-46) 


 





 


Arterial Blood pO2 at Patient


Temp 


 62 mmHg


() 


 





 


Arterial Blood HCO3


 


 41 mmol/L


(21-28) 


 





 


Arterial Blood Base Excess


 


 16 mmol/L


(-3-3) 


 





 


FiO2  40   








Micro





Microbiology


3/16/20 Blood Culture - Preliminary, Resulted


          NO GROWTH AFTER 4 DAYS





Objective


Assessment


Fever 


Leukocytosis 


Acute resp failure s/p intubation


    -COVID-19 NEG


NSTEMI


Pyuria UC 50-100K enterococcus 3/13, ? colonization


LUE trace warmth ? positioned on Left side with arm in sheets between body and 

bed


RICHELLE


Partial nephrectomy.





Hypernatremia 


CHF


DM2


Morbid obesity


Hypertension.





Plan


Plan of Care


zosyn (3/13) 


Probiotics


Repeat BC (3/16) neg to date 


Maintain aspiration precautions


Supportive care


May need renal eval





D/w wife at bedside


D/w nursing 





Critically ill





Encephlopathy


Alert some - tried to mouth words but not following commands


CT scan head/Chest/abd/pelvis - d/w Dr. Garza


Repeat cults/UA C and S


Dose Daptom/micafungin


Change to Meropenem


Labs in am





D/w wife/nursing





Attending Co-Sign


Attending Co-Sign


The patient was seen and interviewed as well as examined at the bedside. The 

chart was reviewed. The case was discussed. Agree with the plan of care.











CAROLYN KIM        Mar 22, 2020 13:33


JOSIAH MANNING MD              Mar 22, 2020 14:55

## 2020-03-23 NOTE — PDOC
Infectious Disease Note


Subjective


Subjective


Orally intubated and less sedated





ROS


ROS


unable to obtain





Vital Sign


Vital Signs





Vital Signs








  Date Time  Temp Pulse Resp B/P (MAP) Pulse Ox O2 Delivery O2 Flow Rate FiO2


 


3/23/20 08:00  61 18 131/52 (78) 94 Ventilator  


 


3/23/20 04:00 99.7       





 99.7       











Physical Exam


PHYSICAL EXAM


GENERAL: Orally intubated,,- more alert 


HEENT:  Pupils equal, ETT, OGT 


NECK: Supple - no JVD


LUNGS:  Decreased breath sounds bases,  


HEART:  S1, S2 


ABDOMEN: Obese, soft, no guarding 


: Younger in place 3/16


EXTREMITIES:  1- 2+ pitting edema present. No cyanosis. SCDs bilaterally 


MSK bilateral knee scars intact


SKIN: Warm to touch. No signs of rash


NEURO: Noncommunicative, sedated 


RIJ clean - 3/16


Rt art-line out





Labs


Lab





Laboratory Tests








Test


 3/22/20


15:24 3/22/20


16:00 3/22/20


17:48 3/23/20


00:48


 


Glucose (Fingerstick)


 234 mg/dL


(70-99) 


 207 mg/dL


(70-99) 181 mg/dL


(70-99)


 


Urine Collection Type  Unknown   


 


Urine Color  Yellow   


 


Urine Clarity  Clear   


 


Urine pH  7.0 (<5.0-8.0)   


 


Urine Specific Gravity


 


 1.020


(1.000-1.030) 


 





 


Urine Protein


 


 Negative mg/dL


(NEG-TRACE) 


 





 


Urine Glucose (UA)


 


 Negative mg/dL


(NEG) 


 





 


Urine Ketones (Stick)


 


 Negative mg/dL


(NEG) 


 





 


Urine Blood  Negative (NEG)   


 


Urine Nitrite  Negative (NEG)   


 


Urine Bilirubin  Negative (NEG)   


 


Urine Urobilinogen Dipstick


 


 4.0 mg/dL (0.2


mg/dL) 


 





 


Urine Leukocyte Esterase  Trace (NEG)   


 


Urine RBC  Occ /HPF (0-2)   


 


Urine WBC  1-4 /HPF (0-4)   


 


Urine Squamous Epithelial


Cells 


 None /LPF 


 


 





 


Urine Bacteria


 


 Few /HPF


(0-FEW) 


 





 


Test


 3/23/20


06:42 3/23/20


06:45 


 





 


Glucose (Fingerstick)


 178 mg/dL


(70-99) 


 


 





 


White Blood Count


 


 15.2 x10^3/uL


(4.0-11.0) 


 





 


Red Blood Count


 


 4.99 x10^6/uL


(4.30-5.70) 


 





 


Hemoglobin


 


 14.0 g/dL


(13.0-17.5) 


 





 


Hematocrit


 


 43.1 %


(39.0-53.0) 


 





 


Mean Corpuscular Volume  86 fL ()   


 


Mean Corpuscular Hemoglobin  28 pg (25-35)   


 


Mean Corpuscular Hemoglobin


Concent 


 33 g/dL


(31-37) 


 





 


Red Cell Distribution Width


 


 14.7 %


(11.5-14.5) 


 





 


Platelet Count


 


 197 x10^3/uL


(140-400) 


 





 


Neutrophils (%) (Auto)  76 % (31-73)   


 


Lymphocytes (%) (Auto)  13 % (24-48)   


 


Monocytes (%) (Auto)  9 % (0-9)   


 


Eosinophils (%) (Auto)  3 % (0-3)   


 


Basophils (%) (Auto)  0 % (0-3)   


 


Neutrophils # (Auto)


 


 11.5 x10^3/uL


(1.8-7.7) 


 





 


Lymphocytes # (Auto)


 


 1.9 x10^3/uL


(1.0-4.8) 


 





 


Monocytes # (Auto)


 


 1.3 x10^3/uL


(0.0-1.1) 


 





 


Eosinophils # (Auto)


 


 0.4 x10^3/uL


(0.0-0.7) 


 





 


Basophils # (Auto)


 


 0.1 x10^3/uL


(0.0-0.2) 


 





 


Sodium Level


 


 154 mmol/L


(136-145) 


 





 


Potassium Level


 


 3.1 mmol/L


(3.5-5.1) 


 





 


Chloride Level


 


 109 mmol/L


() 


 





 


Carbon Dioxide Level


 


 39 mmol/L


(21-32) 


 





 


Anion Gap  6 (6-14)   


 


Blood Urea Nitrogen


 


 45 mg/dL


(8-26) 


 





 


Creatinine


 


 1.2 mg/dL


(0.7-1.3) 


 





 


Estimated GFR


(Cockcroft-Gault) 


 59.3 


 


 





 


Glucose Level


 


 189 mg/dL


(70-99) 


 





 


Calcium Level


 


 9.1 mg/dL


(8.5-10.1) 


 











Micro


CT Head 3/22





IMPRESSION:


No acute intracranial abnormality.





CT CHest/ABd/Pel 3/22


IMPRESSION:


1.  Patchy airspace disease in the lung bases bilaterally with more 


consolidative changes in the lower lobes bilaterally. Findings may 


represent infectious or inflammatory etiologies. Correlate for aspiration 


given distribution.


2.  No acute process identified within the abdomen and pelvis. No focal 


fluid collection to suggest abscess.


 





Microbiology


3/21/20 Blood Culture - Preliminary, Resulted


          NO GROWTH AFTER 1 DAY


3/13/20 Urine Culture - Final, Complete


          


3/13/20 Urine Culture Result 1 (CAR) - Final, Complete


          


3/13/20 Antimicrobic Susceptibility - Final, Complete





Objective


Assessment


Fever - curve is better


Leukocytosis - better 


Acute resp failure s/p intubation


    -COVID-19 NEG


NSTEMI


Pyuria UC 50-100K enterococcus 3/13, ? colonization


LUE trace warmth ? positioned on Left side with arm in sheets between body and 

bed


RICHELLE - better


Partial nephrectomy.


Hypernatremia 


CHF


DM2


Morbid obesity


Hypertension.





Plan


Plan of Care





Dosed Daptom/micafungin changed to Meropenem 3/22


F/u Blood and Urine


Labs in am


zosyn (3/13-3/22) 


Probiotics


Electrolytes per primary


Repeat BC (3/16) neg to date 


Maintain aspiration precautions


Supportive care


Await Cardiology F/u





D/w wife at bedside


D/w nursing 





Critically ill











JOSIAH MANNING MD              Mar 23, 2020 09:20

## 2020-03-23 NOTE — CARD
MR#: [id number]

Account#: [id number]

Accession#: [id number]

Date of Study: 03/23/2020

Ordering Physician: MARCELA ZARCO, 

Referring Physician: MARCELA ZARCO, 

Tech: GIULIANO BRENNAN RTR





--------------- APPROVED REPORT --------------





Technologist: GIULIANO BRENNAN RTR

Nurse: INDIA GRANADO RN



Procedure(s) performed: 1.  Right and Left heart catheterization and selective coronary angiography

2.  Successful PCI/drug eluting stent placement to the left anterior descending artery. Attempted PCI
 to chronic total occlusion of left circumflex artery.



MODERATE SEDATION: NONE GIVEN

FLUORO TIME: 24.7 MIN

DOSE: 271 GYCM2

CONTRAST: 246CC VISI

NYHA: CLASS 4



INDICATION

The indication(s) include : non-STEMI .



CS Clinical Frailty Scale

[facility] Clinical Frailty Scale: Moderately Frail



Heart Failure

Heart Failure: Yes

If Yes, Newly Diagnosed: Yes

If Yes, HF Type: Diastolic     



PROCEDURE NARRATIVE

After explaining the risks, benefits and alternative options, informed consent was obtained from [facility] since patient was intubated. Patient was brought to the cardiac Cath Lab and his right groin was p
repped and draped in the usual fashion. 20 mL of 2% lidocaine was infiltrated into the skin and subcu
taneous tissues for local anesthesia. Arterial and venous accesses were obtained in the right common 
femoral artery and vein respectively and 6 and 8 [place] sheaths inserted. A 7.5 [place] Roscoe-Cely cath
eter was advanced under fluoroscopy guidance and intracardiac pressures, oxygen saturations and cardi
ac output by thermodilution method were measured. Subsequently, JL4 and JR4 catheters were used to pe
rform selective angiography of the left and right coronary arteries. The following findings were note
d.



A.  RIGHT HEART CATHETERIZATION

1.  Intracardiac pressures:  Mean right atrial pressure 20 mmHg, right 20 blood pressure 62/14 mmHg, 
mean primary capillary wedge pressure 26 mg to mercury, pulmonary artery pressure 61/29 mmHg and mean
 PA pressure 41 mmHg. This is consistent with mild to moderate pulmonary hypertension secondary to el
evated left-sided filling pressures.

2.  Oxygen saturations:  Right atrium 70.6%, pulmonary artery 71% and arterial sheath 98.4%. No evide
nce of intracardiac shunt.

3.  Cardiac output by thermodilution method 4.5 L/m and by Maricruz method 5.3 L/m.



B.  LEFT HEART CATHETERIZATION

1.  The left main coronary artery arose from the left sinus of Valsalva, gave rise to the left anteri
or descending and left circumflex arteries and did not show any significant stenosis.

2.  The left anterior descending artery showed 80% calcified stenosis involving the midsegment and 70
-80% stenosis involving the very distal segment.

3.  The left circumflex artery showed 100% chronic total occlusion in the midsegment with distal lynda
nstitution of obtuse marginal branch from left to left collaterals.

4.  The right coronary artery was a large and dominant vessel arising from the right sinus of Valsalv
a that did not show any significant stenosis.



INTERVENTION

The left main coronary artery was engaged with 6 [place] XB 3.5 guide catheter. The stenosis in the mi
dsegment of the left anterior descending artery was crossed with a 0.014 inch Asahi Pro water guidewi
re. The calcified stenosis in the midsegment was dilated with a 2.5 x 15 mm euphora balloon following
 which this was treated with a 2.75 x 18 mm Xience Alpine drug-eluting stent. Follow-up angiogrhaphy 
showed resolution of this lesion to 0% with a very distal lesion to worse the apical wall appeared wo
rse probably secondary to distal embolization. This was dilated with a 2.0 x 12 mm euphora balloon. T
here was 50% residual stenosis but we decided not to stent this vessel due to small caliber nature an
d very distal location. Subsequently attempts were made to cross the chronic total occlusion involvin
g the left circumflex artery with the 0.014 inch Asahi Pro water guidewire with backup support from 2
.0 balloon but were unsuccessful due to angulated takeoff of the left circumflex artery and chronic n
ature of the occlusion. Further attempts were abandoned with plans for future pontification of ischem
ia if patient becomes symptomatic and use more aggressive measures for PCI if necessary. Patient krystin
rated the procedure well. Hemostasis was achieved using Angio-Seal and manual compression. There were
 no immediate complications.



MANDI Flow

MANDI Flow (Pre-Intervention): MANDI-2     

MANDI Flow (Post-Intervention): MANDI-3



MANID Flow

MANDI Flow (Pre-Intervention): MANDI-1     

MANDI Flow (Post-Intervention): MANDI-2     



Conclusion

1.  Severe two-vessel coronary artery disease

2.  Successful PCI/drug eluting stent placement to the left anterior descending artery. Attempted PCI
 to the chronic total occlusion of the left circumflex artery.

3.  Mild to moderate pulmonary hypertension secondary to elevated left-sided filling pressures as diogo
dence by elevated primary capillary wedge pressure.

4.  No evidence of intra-cardiac shunt.



Recommendations

1.  Aspirin 325 mg daily for one month followed by 81 mg daily

2.  Plavix 75 mg daily for preferably one year

3.  Cardiovascular risk factor modification

4.  Diuresis for acute on chronic diastolic heart failure



Signed by : Enrique Boyer, 

Electronically Approved : 03/23/2020 13:28:20

## 2020-03-23 NOTE — NUR
SS following up with discharge planning. SS discussed with pt RN and pt's spouse, Ella. Pt 
currently on vent. LTAC referral discussed. Pt's spouse agreeable to LTAC referral. Pt's 
spouse requested referral to [facility], [phone]; fax [phone]. Pt's spouse 
reported that are from [place], [place]. Referral to [facility] phoned and faxed. SS will await 
acceptance decision and insurance determination and will proceed accordingly with discharge 
planning.

## 2020-03-23 NOTE — PDOC
PULMONARY PROGRESS NOTES


Subjective


PT TO GO TO CATH


Vitals





Vital Signs








  Date Time  Temp Pulse Resp B/P (MAP) Pulse Ox O2 Delivery O2 Flow Rate FiO2


 


3/23/20 06:00  78 21 150/64 (92) 98 Ventilator  


 


3/23/20 04:00 99.7       





 99.7       








Comments


ros  unable to obtain





on vent  open eyes w verbal stimuli


HEENT:  Other (nc at perrl  nose clear   orally intubated   neck no lad  no thy

romegaly)


Lungs:  Crackles


Cardiovascular:  S1, S2


Abdomen:  Soft, Non-tender, Other (no mass)


Extremities:  No Edema


Skin:  Warm


Labs





Laboratory Tests








Test


 3/21/20


09:16 3/21/20


14:41 3/21/20


14:47 3/21/20


16:15


 


Glucose (Fingerstick)


 166 mg/dL


(70-99) 


 128 mg/dL


(70-99) 





 


Potassium Level


 


 3.1 mmol/L


(3.5-5.1) 


 





 


O2 Saturation    93 % (92-99) 


 


Arterial Blood pH


 


 


 


 7.51


(7.35-7.45)


 


Arterial Blood pH (Temp


corrected) 


 


 


 7.48 





 


Arterial Blood pCO2 at


Patient Temp 


 


 


 56 mmHg


(35-46)


 


Arterial Blood pCO2 (Temp


correct) 


 


 


 60 mmHg 





 


Arterial Blood pO2 at Patient


Temp 


 


 


 63 mmHg


()


 


Arterial Blood pO2 (Temp


corrected) 


 


 


 70 mmHg 





 


Arterial Blood HCO3


 


 


 


 44 mmol/L


(21-28)


 


Arterial Blood Base Excess


 


 


 


 17 mmol/L


(-3-3)


 


FiO2    40% tc 


 


Test


 3/21/20


16:51 3/21/20


18:31 3/21/20


20:00 3/22/20


00:35


 


Lactic Acid Level


 1.3 mmol/L


(0.4-2.0) 


 


 





 


Glucose (Fingerstick)


 


 174 mg/dL


(70-99) 


 176 mg/dL


(70-99)


 


Potassium Level


 


 


 3.3 mmol/L


(3.5-5.1) 





 


Test


 3/22/20


05:50 3/22/20


06:18 3/22/20


09:00 3/22/20


15:24


 


White Blood Count


 17.7 x10^3/uL


(4.0-11.0) 


 


 





 


Red Blood Count


 4.79 x10^6/uL


(4.30-5.70) 


 


 





 


Hemoglobin


 13.5 g/dL


(13.0-17.5) 


 


 





 


Hematocrit


 41.4 %


(39.0-53.0) 


 


 





 


Mean Corpuscular Volume 87 fL ()    


 


Mean Corpuscular Hemoglobin 28 pg (25-35)    


 


Mean Corpuscular Hemoglobin


Concent 33 g/dL


(31-37) 


 


 





 


Red Cell Distribution Width


 15.0 %


(11.5-14.5) 


 


 





 


Platelet Count


 195 x10^3/uL


(140-400) 


 


 





 


Neutrophils (%) (Auto) 80 % (31-73)    


 


Lymphocytes (%) (Auto) 10 % (24-48)    


 


Monocytes (%) (Auto) 9 % (0-9)    


 


Eosinophils (%) (Auto) 1 % (0-3)    


 


Basophils (%) (Auto) 1 % (0-3)    


 


Neutrophils # (Auto)


 14.2 x10^3/uL


(1.8-7.7) 


 


 





 


Lymphocytes # (Auto)


 1.7 x10^3/uL


(1.0-4.8) 


 


 





 


Monocytes # (Auto)


 1.6 x10^3/uL


(0.0-1.1) 


 


 





 


Eosinophils # (Auto)


 0.1 x10^3/uL


(0.0-0.7) 


 


 





 


Basophils # (Auto)


 0.2 x10^3/uL


(0.0-0.2) 


 


 





 


Sodium Level


 152 mmol/L


(136-145) 


 


 





 


Potassium Level


 3.2 mmol/L


(3.5-5.1) 


 


 





 


Chloride Level


 105 mmol/L


() 


 


 





 


Carbon Dioxide Level


 44 mmol/L


(21-32) 


 


 





 


Anion Gap 3 (6-14)    


 


Blood Urea Nitrogen


 47 mg/dL


(8-26) 


 


 





 


Creatinine


 1.6 mg/dL


(0.7-1.3) 


 


 





 


Estimated GFR


(Cockcroft-Gault) 42.6 


 


 


 





 


Glucose Level


 199 mg/dL


(70-99) 


 


 





 


Calcium Level


 8.9 mg/dL


(8.5-10.1) 


 


 





 


Glucose (Fingerstick)


 


 199 mg/dL


(70-99) 


 234 mg/dL


(70-99)


 


O2 Saturation   93 % (92-99)  


 


Arterial Blood pH


 


 


 7.54


(7.35-7.45) 





 


Arterial Blood pCO2 at


Patient Temp 


 


 50 mmHg


(35-46) 





 


Arterial Blood pO2 at Patient


Temp 


 


 62 mmHg


() 





 


Arterial Blood HCO3


 


 


 41 mmol/L


(21-28) 





 


Arterial Blood Base Excess


 


 


 16 mmol/L


(-3-3) 





 


FiO2   40  


 


Test


 3/22/20


16:00 3/22/20


17:48 3/23/20


00:48 3/23/20


06:42


 


Urine Collection Type Unknown    


 


Urine Color Yellow    


 


Urine Clarity Clear    


 


Urine pH 7.0 (<5.0-8.0)    


 


Urine Specific Gravity


 1.020


(1.000-1.030) 


 


 





 


Urine Protein


 Negative mg/dL


(NEG-TRACE) 


 


 





 


Urine Glucose (UA)


 Negative mg/dL


(NEG) 


 


 





 


Urine Ketones (Stick)


 Negative mg/dL


(NEG) 


 


 





 


Urine Blood Negative (NEG)    


 


Urine Nitrite Negative (NEG)    


 


Urine Bilirubin Negative (NEG)    


 


Urine Urobilinogen Dipstick


 4.0 mg/dL (0.2


mg/dL) 


 


 





 


Urine Leukocyte Esterase Trace (NEG)    


 


Urine RBC Occ /HPF (0-2)    


 


Urine WBC 1-4 /HPF (0-4)    


 


Urine Squamous Epithelial


Cells None /LPF 


 


 


 





 


Urine Bacteria


 Few /HPF


(0-FEW) 


 


 





 


Glucose (Fingerstick)


 


 207 mg/dL


(70-99) 181 mg/dL


(70-99) 178 mg/dL


(70-99)


 


Test


 3/23/20


06:45 


 


 





 


White Blood Count


 15.2 x10^3/uL


(4.0-11.0) 


 


 





 


Red Blood Count


 4.99 x10^6/uL


(4.30-5.70) 


 


 





 


Hemoglobin


 14.0 g/dL


(13.0-17.5) 


 


 





 


Hematocrit


 43.1 %


(39.0-53.0) 


 


 





 


Mean Corpuscular Volume 86 fL ()    


 


Mean Corpuscular Hemoglobin 28 pg (25-35)    


 


Mean Corpuscular Hemoglobin


Concent 33 g/dL


(31-37) 


 


 





 


Red Cell Distribution Width


 14.7 %


(11.5-14.5) 


 


 





 


Platelet Count


 197 x10^3/uL


(140-400) 


 


 





 


Neutrophils (%) (Auto) 76 % (31-73)    


 


Lymphocytes (%) (Auto) 13 % (24-48)    


 


Monocytes (%) (Auto) 9 % (0-9)    


 


Eosinophils (%) (Auto) 3 % (0-3)    


 


Basophils (%) (Auto) 0 % (0-3)    


 


Neutrophils # (Auto)


 11.5 x10^3/uL


(1.8-7.7) 


 


 





 


Lymphocytes # (Auto)


 1.9 x10^3/uL


(1.0-4.8) 


 


 





 


Monocytes # (Auto)


 1.3 x10^3/uL


(0.0-1.1) 


 


 





 


Eosinophils # (Auto)


 0.4 x10^3/uL


(0.0-0.7) 


 


 





 


Basophils # (Auto)


 0.1 x10^3/uL


(0.0-0.2) 


 


 





 


Sodium Level


 154 mmol/L


(136-145) 


 


 





 


Potassium Level


 3.1 mmol/L


(3.5-5.1) 


 


 





 


Chloride Level


 109 mmol/L


() 


 


 





 


Carbon Dioxide Level


 39 mmol/L


(21-32) 


 


 





 


Anion Gap 6 (6-14)    


 


Blood Urea Nitrogen


 45 mg/dL


(8-26) 


 


 





 


Creatinine


 1.2 mg/dL


(0.7-1.3) 


 


 





 


Estimated GFR


(Cockcroft-Gault) 59.3 


 


 


 





 


Glucose Level


 189 mg/dL


(70-99) 


 


 





 


Calcium Level


 9.1 mg/dL


(8.5-10.1) 


 


 











Laboratory Tests








Test


 3/22/20


09:00 3/22/20


15:24 3/22/20


16:00 3/22/20


17:48


 


O2 Saturation 93 % (92-99)    


 


Arterial Blood pH


 7.54


(7.35-7.45) 


 


 





 


Arterial Blood pCO2 at


Patient Temp 50 mmHg


(35-46) 


 


 





 


Arterial Blood pO2 at Patient


Temp 62 mmHg


() 


 


 





 


Arterial Blood HCO3


 41 mmol/L


(21-28) 


 


 





 


Arterial Blood Base Excess


 16 mmol/L


(-3-3) 


 


 





 


FiO2 40    


 


Glucose (Fingerstick)


 


 234 mg/dL


(70-99) 


 207 mg/dL


(70-99)


 


Urine Collection Type   Unknown  


 


Urine Color   Yellow  


 


Urine Clarity   Clear  


 


Urine pH   7.0 (<5.0-8.0)  


 


Urine Specific Gravity


 


 


 1.020


(1.000-1.030) 





 


Urine Protein


 


 


 Negative mg/dL


(NEG-TRACE) 





 


Urine Glucose (UA)


 


 


 Negative mg/dL


(NEG) 





 


Urine Ketones (Stick)


 


 


 Negative mg/dL


(NEG) 





 


Urine Blood   Negative (NEG)  


 


Urine Nitrite   Negative (NEG)  


 


Urine Bilirubin   Negative (NEG)  


 


Urine Urobilinogen Dipstick


 


 


 4.0 mg/dL (0.2


mg/dL) 





 


Urine Leukocyte Esterase   Trace (NEG)  


 


Urine RBC   Occ /HPF (0-2)  


 


Urine WBC   1-4 /HPF (0-4)  


 


Urine Squamous Epithelial


Cells 


 


 None /LPF 


 





 


Urine Bacteria


 


 


 Few /HPF


(0-FEW) 





 


Test


 3/23/20


00:48 3/23/20


06:42 3/23/20


06:45 





 


Glucose (Fingerstick)


 181 mg/dL


(70-99) 178 mg/dL


(70-99) 


 





 


White Blood Count


 


 


 15.2 x10^3/uL


(4.0-11.0) 





 


Red Blood Count


 


 


 4.99 x10^6/uL


(4.30-5.70) 





 


Hemoglobin


 


 


 14.0 g/dL


(13.0-17.5) 





 


Hematocrit


 


 


 43.1 %


(39.0-53.0) 





 


Mean Corpuscular Volume   86 fL ()  


 


Mean Corpuscular Hemoglobin   28 pg (25-35)  


 


Mean Corpuscular Hemoglobin


Concent 


 


 33 g/dL


(31-37) 





 


Red Cell Distribution Width


 


 


 14.7 %


(11.5-14.5) 





 


Platelet Count


 


 


 197 x10^3/uL


(140-400) 





 


Neutrophils (%) (Auto)   76 % (31-73)  


 


Lymphocytes (%) (Auto)   13 % (24-48)  


 


Monocytes (%) (Auto)   9 % (0-9)  


 


Eosinophils (%) (Auto)   3 % (0-3)  


 


Basophils (%) (Auto)   0 % (0-3)  


 


Neutrophils # (Auto)


 


 


 11.5 x10^3/uL


(1.8-7.7) 





 


Lymphocytes # (Auto)


 


 


 1.9 x10^3/uL


(1.0-4.8) 





 


Monocytes # (Auto)


 


 


 1.3 x10^3/uL


(0.0-1.1) 





 


Eosinophils # (Auto)


 


 


 0.4 x10^3/uL


(0.0-0.7) 





 


Basophils # (Auto)


 


 


 0.1 x10^3/uL


(0.0-0.2) 





 


Sodium Level


 


 


 154 mmol/L


(136-145) 





 


Potassium Level


 


 


 3.1 mmol/L


(3.5-5.1) 





 


Chloride Level


 


 


 109 mmol/L


() 





 


Carbon Dioxide Level


 


 


 39 mmol/L


(21-32) 





 


Anion Gap   6 (6-14)  


 


Blood Urea Nitrogen


 


 


 45 mg/dL


(8-26) 





 


Creatinine


 


 


 1.2 mg/dL


(0.7-1.3) 





 


Estimated GFR


(Cockcroft-Gault) 


 


 59.3 


 





 


Glucose Level


 


 


 189 mg/dL


(70-99) 





 


Calcium Level


 


 


 9.1 mg/dL


(8.5-10.1) 











Medications





Active Scripts








 Medications  Dose


 Route/Sig


 Max Daily Dose Days Date Category


 


 Saw [place] (Saw


 [place]met[place] Fruit)


 450 Mg Capsule  450 Mg


 PO DAILY


   3/13/20 Reported


 


 D3-50


  (Cholecalciferol


  (Vitamin D3))


 50,000 Unit


 Capsule  1,000 Unit


 PO DAILY


   3/13/20 Reported


 


 Emergen-C 500 mg


 Chewable Tab (Vit


 C/Ascorb


 Sod/Multivit-Min)


 500 Mg Tab.chew  500 Mg


 PO DAILY


   3/13/20 Reported


 


 Zoloft


  (Sertraline Hcl)


 50 Mg Tablet  50 Mg


 PO DAILY


   3/13/20 Reported


 


 Trazodone Hcl 50


 Mg Tablet  1 Tab


 PO QHS


   3/13/20 Reported


 


 Trulicity


  (Dulaglutide)


 0.75 Mg/0.5 Ml


 Pen.injctr  0.75 Mg


 SQ WEEKLY


   3/13/20 Reported


 


 Insulin Aspart


 100 Unit/1 Ml Vial  25 Unit


 SQ TIDWMEALS


   3/13/20 Reported


 


 Levemir (Insulin


 Detemir) 100


 Unit/1 Ml Vial  80 Unit


 SQ DAILY


   3/13/20 Reported


 


 Lasix


  (Furosemide) 20


 Mg Tablet  20 Mg


 PO BID


   3/13/20 Reported


 


 Flomax


  (Tamsulosin Hcl)


 0.4 Mg Cap.er.24h  0.4 Mg


 PO HS


   3/13/20 Reported


 


 Omeprazole 20 Mg


 Capsule.dr  20 Mg


 PO BID


   3/13/20 Reported


 


 Lisinopril 40 Mg


 Tablet  1 Tab


 PO DAILY


   3/13/20 Reported


 


 Atenolol 100 Mg


 Tablet  100 Mg


 PO BID


   3/13/20 Reported








Comments


WILL WAIT TILL AFTER CATH FOR POSSIBLE TRIAL AND EXTUBATE


CONTINUE SAME FOR NOW


DIURESE


DVT AND GI PROPH


NUTRITIONAL SUPPORT








echo 3/20





Limited study for LV function.


The left ventricle is normal size.


Left ventricle systolic function is low normal.


The Ejection Fraction is 50-55%.


Septal motion consistent with conduction abnormality but otherwise normall wall 

motion.





Impression


.


IMPRESSION:


1.  Acute hypercapnic and hypoxic respiratory failure sec to flash pulmonary 

edema


2.  Abnormal chest x-ray with bilateral interstitial infiltrates suggesting 

interstitial edema


3.  Leukocytosis


4.  No significant tobacco history.


5.  Suspected obesity hypoventilation syndrome and obstructive sleep apnea,


never been tested.


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


7.  HYPOTENSION IMPROVED


8.  COVID NEGATIVE





Plan


.


cont vent support, setting reviewed, cardiology requested to keep intubated for 

LHC, RHC in am


on nitro gtt per cardilogy


is febrile, bcx is done, will do sputum cx


pepcid, hep sq for prophylaxis


PEEP 5 fi02 40%


ANTI ABX PER ID


psg as out pt


elevate hob





discussed w rn. rt,











ADALBERTO GAXIOLA MD              Mar 23, 2020 08:33

## 2020-03-23 NOTE — PDOC
PROGRESS NOTES


Chief Complaint


Chief Complaint





IMPRESSION








Acute hypoxic respiratory failure - intubated 3/16 on way to cardiac cath


Acute CHF with possible combined systolic/diastolic dysfunction. Significantly 

improved. Continuing present treatment.


 on ct chest Patchy airspace disease in the lung bases bilaterally with more 


consolidative changes in the lower lobes bilaterally. Findings may  represent 

infectious or inflammatory etiologies. Correlate for aspiration given 

distribution.


No acute process identified within the abdomen and pelvis. No focal 


fluid collection to suggest abscess.


NSTEMI 


Likely RAVINDER


DM2 - insulin dependent


Accelerated HTN


Morbid obesity


Leukocytosis - sepsis 2/2 enterococcus UTI - COVID-19 NEG


S/p Partial nephrectomy.


Pyuria UC 50-100K enterococcus 3/13, ? colonization


hypernatremia sec to iv diuresis , adi, consult nephrology


hypokalemia on replacement rx 








cardiology requested to keep intubated for LHC, RHC 3/23 








32 min cc time





History of Present Illness


History of Present Illness


Mr Alexander is a 74 yo M w/ PMHx hypertension, dyslipidemia, diabetes mellitus who

presented via EMS with complaint of shortness of breath on 3/13/202 that had 

been ongoign for 10 days. He called [phone] in attempts to go to [facility], was brought 

to [facility], found with O2 sat of 70s at room air and started on  BiPAP to 100% in a 

very short time.


Pulmonology, cardiology, ID consulted.





3/16: Intubation and CVC placed for code blue on way to cardiac catheterization


3/17: Intubated, sedated


3/18: Intubated, sedated


3/19: BP difficult to control, remains intubated


3/20: Still Intubated and sedated. COVID 19 Negative


3/21: Intubated, sedated. Failed 2 weans. K still low, replaced. Lasix GTT. Good

UOP





Dosed Daptom/micafungin changed to Meropenem 3/22





 3/21 Overnight with fevers TMax 102F. D/w cardiology to stay intubated and 

sedated until cath 3/24. Sodium improved. lasix GTT to stop, K 3.2, replaced IV 

central line protocol.





3/23 TO CATH LAB





32 MIN CC TIME





Vitals


Vitals





Vital Signs








  Date Time  Temp Pulse Resp B/P (MAP) Pulse Ox O2 Delivery O2 Flow Rate FiO2


 


3/23/20 11:00  62 18 169/70 (103) 96 Ventilator  


 


3/23/20 09:00 98.0       





 98.0       











Physical Exam


Physical Exam


GENERAL: Orally intubated,,- more alert 


HEENT:  Pupils equal, ETT, OGT 


NECK: Supple - no JVD


LUNGS:  Decreased breath sounds bases,  


HEART:  S1, S2 


ABDOMEN: Obese, soft, no guarding 


: Younger in place 3/16


EXTREMITIES:  1- 2+ pitting edema present. No cyanosis. SCDs bilaterally 


MSK bilateral knee scars intact


SKIN: Warm to touch. No signs of rash


NEURO: Noncommunicative, sedated 


RIJ clean - 3/16


Rt art-line out


General:  Other (intubated.)


Heart:  Regular rate


Lungs:  Crackles


Abdomen:  Normal bowel sounds


Extremities:  No cyanosis, Other (3+ bilateral LE pitting edema)


Skin:  No rashes, No breakdown, No significant lesion





Labs


LABS





Exam: CT of chest, abdomen and pelvis without contrast


 


INDICATION: Persistent fevers, assess for abscess


 


TECHNIQUE: Sequential axial images through the chest, abdomen and pelvis 


obtained without IV contrast. Sagittal and coronal reformatted images were


reconstructed from the axial data and reviewed.


 


Comparisons: None


 


FINDINGS:


Visualized portions of the thyroid is unremarkable. No enlarged 


mediastinal lymph nodes are identified.


 


Heart is enlarged. No pericardial effusion. Moderate coronary artery 


calcification are noted. Thoracic aorta has a normal course and caliber. 


Pulmonary artery is not enlarged.


 


Endotracheal tube with tip approximately 4 cm above the fredy. Airways 


are otherwise patent. Patchy areas of airspace disease within the 


dependent portion the lungs with consolidation lower lobes bilaterally. 


There are small bilateral pleural effusion.


 


Evaluation of the solid organs is limited secondary to noncontrast 


technique.


 


Liver, spleen, pancreas, gallbladder and adrenals are unremarkable.


 


No perinephric inflammation or hydronephrosis. No renal or ureteral 


calculi are identified.


 


Bladder is decompressed not well evaluated. Prostate is not enlarged.


 


Large and small bowel are unremarkable. Appendix is nonidentified. No free


intra-abdominal air or fluid. No obstruction.


 


Abdominal aorta has a normal course and caliber.


 


No enlarged intra-abdominal lymph nodes are identified.


 


No suspicious osseous lesions or acute fractures.


 


IMPRESSION:


1.  Patchy airspace disease in the lung bases bilaterally with more 


consolidative changes in the lower lobes bilaterally. Findings may 


represent infectious or inflammatory etiologies. Correlate for aspiration 


given distribution.


2.  No acute process identified within the abdomen and pelvis. No focal 


fluid collection to suggest abscess.


 


 


Exposure: One or more of the following in the visualized dose reduction 


techniques were utilized for this examination:


1.  Automated exposure control


2.  Adjustment of the MA and/or KV according to patient size


3.  Use of iterative of reconstructive technique


 


Electronically signed by: Damon Segura MD (3/22/2020 5:12 PM) [id number]














DICTATED and SIGNED BY:     DAMON SEGURA MD


DATE:     03/22/20 1712





 LEFT VENTRICLE 


The left ventricle is normal size. There is mild concentric left ventricular 

hypertrophy. The left ventricular systolic function is normal. The Ejection 

Fraction is 50-55%. There is normal LV segmental wall motion. Transmitral 

Doppler flow pattern is Grade I-abnormal relaxation pattern.





 RIGHT VENTRICLE 


The right ventricle is normal size. There is normal right ventricular wall 

thickness. The right ventricular systolic function is normal.





 ATRIA 


The left atrium is mildly dilated. The right atrium is mildly dilated. The 

interatrial septum is intact with no evidence for an atrial septal defect or 

patent foramen ovale as noted on 2-D or Doppler imaging.





 AORTIC VALVE 


The aortic valve is thickened but opens well. Doppler and Color Flow revealed t

race aortic regurgitation. There is no significant aortic valvular stenosis.





 MITRAL VALVE 


The mitral valve is normal in structure and function. There is no evidence of 

mitral valve prolapse. There is no mitral valve stenosis. Doppler and Color-flow

revealed trace mitral regurgitation.





 TRICUSPID VALVE 


The tricuspid valve is normal in structure and function. Doppler and Color Flow 

revealed trace tricuspid regurgitation with an estimated PAP of 28 mmHg. There 

is no tricuspid valve stenosis.





 PULMONIC VALVE 


The pulmonic valve is not well visualized. Doppler and Color Flow revealed no 

pulmonic valvular regurgitation.





 GREAT VESSELS 


The aortic root is normal in size. The IVC was not visualized.





 PERICARDIAL EFFUSION 


There is no evidence of significant pericardial effusion.





Critical Notification


Critical Value: No





<Conclusion>


The left ventricle is normal size.


The left ventricular systolic function is normal.


The Ejection Fraction is 50-55%.


There is mild concentric left ventricular hypertrophy.


Doppler and Color Flow revealed trace aortic regurgitation.


There is no significant aortic valvular stenosis.


Doppler and Color-flow revealed trace mitral regurgitation.


Doppler and Color Flow revealed trace tricuspid regurgitation with an estimated 

PAP of 28 mmHg.





Signed by : Marcela Silva MD


Electronically Approved : 03/13/2020 18:18:00














DICTATED and SIGNED BY:     MARCELA SILVA MD


DATE:     03/13/20 5096





Laboratory Tests








Test


 3/22/20


15:24 3/22/20


16:00 3/22/20


17:48 3/23/20


00:48


 


Glucose (Fingerstick)


 234 mg/dL


(70-99) 


 207 mg/dL


(70-99) 181 mg/dL


(70-99)


 


Urine Collection Type  Unknown   


 


Urine Color  Yellow   


 


Urine Clarity  Clear   


 


Urine pH  7.0 (<5.0-8.0)   


 


Urine Specific Gravity


 


 1.020


(1.000-1.030) 


 





 


Urine Protein


 


 Negative mg/dL


(NEG-TRACE) 


 





 


Urine Glucose (UA)


 


 Negative mg/dL


(NEG) 


 





 


Urine Ketones (Stick)


 


 Negative mg/dL


(NEG) 


 





 


Urine Blood  Negative (NEG)   


 


Urine Nitrite  Negative (NEG)   


 


Urine Bilirubin  Negative (NEG)   


 


Urine Urobilinogen Dipstick


 


 4.0 mg/dL (0.2


mg/dL) 


 





 


Urine Leukocyte Esterase  Trace (NEG)   


 


Urine RBC  Occ /HPF (0-2)   


 


Urine WBC  1-4 /HPF (0-4)   


 


Urine Squamous Epithelial


Cells 


 None /LPF 


 


 





 


Urine Bacteria


 


 Few /HPF


(0-FEW) 


 





 


Test


 3/23/20


06:42 3/23/20


06:45 3/23/20


09:35 





 


Glucose (Fingerstick)


 178 mg/dL


(70-99) 


 


 





 


White Blood Count


 


 15.2 x10^3/uL


(4.0-11.0) 


 





 


Red Blood Count


 


 4.99 x10^6/uL


(4.30-5.70) 


 





 


Hemoglobin


 


 14.0 g/dL


(13.0-17.5) 


 





 


Hematocrit


 


 43.1 %


(39.0-53.0) 


 





 


Mean Corpuscular Volume  86 fL ()   


 


Mean Corpuscular Hemoglobin  28 pg (25-35)   


 


Mean Corpuscular Hemoglobin


Concent 


 33 g/dL


(31-37) 


 





 


Red Cell Distribution Width


 


 14.7 %


(11.5-14.5) 


 





 


Platelet Count


 


 197 x10^3/uL


(140-400) 


 





 


Neutrophils (%) (Auto)  76 % (31-73)   


 


Lymphocytes (%) (Auto)  13 % (24-48)   


 


Monocytes (%) (Auto)  9 % (0-9)   


 


Eosinophils (%) (Auto)  3 % (0-3)   


 


Basophils (%) (Auto)  0 % (0-3)   


 


Neutrophils # (Auto)


 


 11.5 x10^3/uL


(1.8-7.7) 


 





 


Lymphocytes # (Auto)


 


 1.9 x10^3/uL


(1.0-4.8) 


 





 


Monocytes # (Auto)


 


 1.3 x10^3/uL


(0.0-1.1) 


 





 


Eosinophils # (Auto)


 


 0.4 x10^3/uL


(0.0-0.7) 


 





 


Basophils # (Auto)


 


 0.1 x10^3/uL


(0.0-0.2) 


 





 


Sodium Level


 


 154 mmol/L


(136-145) 


 





 


Potassium Level


 


 3.1 mmol/L


(3.5-5.1) 


 





 


Chloride Level


 


 109 mmol/L


() 


 





 


Carbon Dioxide Level


 


 39 mmol/L


(21-32) 


 





 


Anion Gap  6 (6-14)   


 


Blood Urea Nitrogen


 


 45 mg/dL


(8-26) 


 





 


Creatinine


 


 1.2 mg/dL


(0.7-1.3) 


 





 


Estimated GFR


(Cockcroft-Gault) 


 59.3 


 


 





 


Glucose Level


 


 189 mg/dL


(70-99) 


 





 


Calcium Level


 


 9.1 mg/dL


(8.5-10.1) 


 





 


O2 Saturation   95 % (92-99)  


 


Arterial Blood pH


 


 


 7.52


(7.35-7.45) 





 


Arterial Blood pCO2 at


Patient Temp 


 


 48 mmHg


(35-46) 





 


Arterial Blood pO2 at Patient


Temp 


 


 72 mmHg


() 





 


Arterial Blood HCO3


 


 


 38 mmol/L


(21-28) 





 


Arterial Blood Base Excess


 


 


 14 mmol/L


(-3-3) 





 


FiO2   40  











Assessment and Plan


Assessmemt and Plan


Problems


Medical Problems:


(1) Acute respiratory distress


Status: Acute  





(2) CAP (community acquired pneumonia)


Status: Acute  





(3) CHF (congestive heart failure)


Status: Acute  





(4) Hypoxia


Status: Acute  





(5) SIRS (systemic inflammatory response syndrome)


Status: Acute  











Comment


Review of Relevant


I have reviewed the following items nicole (where applicable) has been applied.


Labs





Laboratory Tests








Test


 3/21/20


14:41 3/21/20


14:47 3/21/20


16:15 3/21/20


16:51


 


Potassium Level


 3.1 mmol/L


(3.5-5.1) 


 


 





 


Glucose (Fingerstick)


 


 128 mg/dL


(70-99) 


 





 


O2 Saturation   93 % (92-99)  


 


Arterial Blood pH


 


 


 7.51


(7.35-7.45) 





 


Arterial Blood pH (Temp


corrected) 


 


 7.48 


 





 


Arterial Blood pCO2 at


Patient Temp 


 


 56 mmHg


(35-46) 





 


Arterial Blood pCO2 (Temp


correct) 


 


 60 mmHg 


 





 


Arterial Blood pO2 at Patient


Temp 


 


 63 mmHg


() 





 


Arterial Blood pO2 (Temp


corrected) 


 


 70 mmHg 


 





 


Arterial Blood HCO3


 


 


 44 mmol/L


(21-28) 





 


Arterial Blood Base Excess


 


 


 17 mmol/L


(-3-3) 





 


FiO2   40% tc  


 


Lactic Acid Level


 


 


 


 1.3 mmol/L


(0.4-2.0)


 


Test


 3/21/20


18:31 3/21/20


20:00 3/22/20


00:35 3/22/20


05:50


 


Glucose (Fingerstick)


 174 mg/dL


(70-99) 


 176 mg/dL


(70-99) 





 


Potassium Level


 


 3.3 mmol/L


(3.5-5.1) 


 3.2 mmol/L


(3.5-5.1)


 


White Blood Count


 


 


 


 17.7 x10^3/uL


(4.0-11.0)


 


Red Blood Count


 


 


 


 4.79 x10^6/uL


(4.30-5.70)


 


Hemoglobin


 


 


 


 13.5 g/dL


(13.0-17.5)


 


Hematocrit


 


 


 


 41.4 %


(39.0-53.0)


 


Mean Corpuscular Volume    87 fL () 


 


Mean Corpuscular Hemoglobin    28 pg (25-35) 


 


Mean Corpuscular Hemoglobin


Concent 


 


 


 33 g/dL


(31-37)


 


Red Cell Distribution Width


 


 


 


 15.0 %


(11.5-14.5)


 


Platelet Count


 


 


 


 195 x10^3/uL


(140-400)


 


Neutrophils (%) (Auto)    80 % (31-73) 


 


Lymphocytes (%) (Auto)    10 % (24-48) 


 


Monocytes (%) (Auto)    9 % (0-9) 


 


Eosinophils (%) (Auto)    1 % (0-3) 


 


Basophils (%) (Auto)    1 % (0-3) 


 


Neutrophils # (Auto)


 


 


 


 14.2 x10^3/uL


(1.8-7.7)


 


Lymphocytes # (Auto)


 


 


 


 1.7 x10^3/uL


(1.0-4.8)


 


Monocytes # (Auto)


 


 


 


 1.6 x10^3/uL


(0.0-1.1)


 


Eosinophils # (Auto)


 


 


 


 0.1 x10^3/uL


(0.0-0.7)


 


Basophils # (Auto)


 


 


 


 0.2 x10^3/uL


(0.0-0.2)


 


Sodium Level


 


 


 


 152 mmol/L


(136-145)


 


Chloride Level


 


 


 


 105 mmol/L


()


 


Carbon Dioxide Level


 


 


 


 44 mmol/L


(21-32)


 


Anion Gap    3 (6-14) 


 


Blood Urea Nitrogen


 


 


 


 47 mg/dL


(8-26)


 


Creatinine


 


 


 


 1.6 mg/dL


(0.7-1.3)


 


Estimated GFR


(Cockcroft-Gault) 


 


 


 42.6 





 


Glucose Level


 


 


 


 199 mg/dL


(70-99)


 


Calcium Level


 


 


 


 8.9 mg/dL


(8.5-10.1)


 


Test


 3/22/20


06:18 3/22/20


09:00 3/22/20


15:24 3/22/20


16:00


 


Glucose (Fingerstick)


 199 mg/dL


(70-99) 


 234 mg/dL


(70-99) 





 


O2 Saturation  93 % (92-99)   


 


Arterial Blood pH


 


 7.54


(7.35-7.45) 


 





 


Arterial Blood pCO2 at


Patient Temp 


 50 mmHg


(35-46) 


 





 


Arterial Blood pO2 at Patient


Temp 


 62 mmHg


() 


 





 


Arterial Blood HCO3


 


 41 mmol/L


(21-28) 


 





 


Arterial Blood Base Excess


 


 16 mmol/L


(-3-3) 


 





 


FiO2  40   


 


Urine Collection Type    Unknown 


 


Urine Color    Yellow 


 


Urine Clarity    Clear 


 


Urine pH    7.0 (<5.0-8.0) 


 


Urine Specific Gravity


 


 


 


 1.020


(1.000-1.030)


 


Urine Protein


 


 


 


 Negative mg/dL


(NEG-TRACE)


 


Urine Glucose (UA)


 


 


 


 Negative mg/dL


(NEG)


 


Urine Ketones (Stick)


 


 


 


 Negative mg/dL


(NEG)


 


Urine Blood    Negative (NEG) 


 


Urine Nitrite    Negative (NEG) 


 


Urine Bilirubin    Negative (NEG) 


 


Urine Urobilinogen Dipstick


 


 


 


 4.0 mg/dL (0.2


mg/dL)


 


Urine Leukocyte Esterase    Trace (NEG) 


 


Urine RBC    Occ /HPF (0-2) 


 


Urine WBC    1-4 /HPF (0-4) 


 


Urine Squamous Epithelial


Cells 


 


 


 None /LPF 





 


Urine Bacteria


 


 


 


 Few /HPF


(0-FEW)


 


Test


 3/22/20


17:48 3/23/20


00:48 3/23/20


06:42 3/23/20


06:45


 


Glucose (Fingerstick)


 207 mg/dL


(70-99) 181 mg/dL


(70-99) 178 mg/dL


(70-99) 





 


White Blood Count


 


 


 


 15.2 x10^3/uL


(4.0-11.0)


 


Red Blood Count


 


 


 


 4.99 x10^6/uL


(4.30-5.70)


 


Hemoglobin


 


 


 


 14.0 g/dL


(13.0-17.5)


 


Hematocrit


 


 


 


 43.1 %


(39.0-53.0)


 


Mean Corpuscular Volume    86 fL () 


 


Mean Corpuscular Hemoglobin    28 pg (25-35) 


 


Mean Corpuscular Hemoglobin


Concent 


 


 


 33 g/dL


(31-37)


 


Red Cell Distribution Width


 


 


 


 14.7 %


(11.5-14.5)


 


Platelet Count


 


 


 


 197 x10^3/uL


(140-400)


 


Neutrophils (%) (Auto)    76 % (31-73) 


 


Lymphocytes (%) (Auto)    13 % (24-48) 


 


Monocytes (%) (Auto)    9 % (0-9) 


 


Eosinophils (%) (Auto)    3 % (0-3) 


 


Basophils (%) (Auto)    0 % (0-3) 


 


Neutrophils # (Auto)


 


 


 


 11.5 x10^3/uL


(1.8-7.7)


 


Lymphocytes # (Auto)


 


 


 


 1.9 x10^3/uL


(1.0-4.8)


 


Monocytes # (Auto)


 


 


 


 1.3 x10^3/uL


(0.0-1.1)


 


Eosinophils # (Auto)


 


 


 


 0.4 x10^3/uL


(0.0-0.7)


 


Basophils # (Auto)


 


 


 


 0.1 x10^3/uL


(0.0-0.2)


 


Sodium Level


 


 


 


 154 mmol/L


(136-145)


 


Potassium Level


 


 


 


 3.1 mmol/L


(3.5-5.1)


 


Chloride Level


 


 


 


 109 mmol/L


()


 


Carbon Dioxide Level


 


 


 


 39 mmol/L


(21-32)


 


Anion Gap    6 (6-14) 


 


Blood Urea Nitrogen


 


 


 


 45 mg/dL


(8-26)


 


Creatinine


 


 


 


 1.2 mg/dL


(0.7-1.3)


 


Estimated GFR


(Cockcroft-Gault) 


 


 


 59.3 





 


Glucose Level


 


 


 


 189 mg/dL


(70-99)


 


Calcium Level


 


 


 


 9.1 mg/dL


(8.5-10.1)


 


Test


 3/23/20


09:35 


 


 





 


O2 Saturation 95 % (92-99)    


 


Arterial Blood pH


 7.52


(7.35-7.45) 


 


 





 


Arterial Blood pCO2 at


Patient Temp 48 mmHg


(35-46) 


 


 





 


Arterial Blood pO2 at Patient


Temp 72 mmHg


() 


 


 





 


Arterial Blood HCO3


 38 mmol/L


(21-28) 


 


 





 


Arterial Blood Base Excess


 14 mmol/L


(-3-3) 


 


 





 


FiO2 40    








Laboratory Tests








Test


 3/22/20


15:24 3/22/20


16:00 3/22/20


17:48 3/23/20


00:48


 


Glucose (Fingerstick)


 234 mg/dL


(70-99) 


 207 mg/dL


(70-99) 181 mg/dL


(70-99)


 


Urine Collection Type  Unknown   


 


Urine Color  Yellow   


 


Urine Clarity  Clear   


 


Urine pH  7.0 (<5.0-8.0)   


 


Urine Specific Gravity


 


 1.020


(1.000-1.030) 


 





 


Urine Protein


 


 Negative mg/dL


(NEG-TRACE) 


 





 


Urine Glucose (UA)


 


 Negative mg/dL


(NEG) 


 





 


Urine Ketones (Stick)


 


 Negative mg/dL


(NEG) 


 





 


Urine Blood  Negative (NEG)   


 


Urine Nitrite  Negative (NEG)   


 


Urine Bilirubin  Negative (NEG)   


 


Urine Urobilinogen Dipstick


 


 4.0 mg/dL (0.2


mg/dL) 


 





 


Urine Leukocyte Esterase  Trace (NEG)   


 


Urine RBC  Occ /HPF (0-2)   


 


Urine WBC  1-4 /HPF (0-4)   


 


Urine Squamous Epithelial


Cells 


 None /LPF 


 


 





 


Urine Bacteria


 


 Few /HPF


(0-FEW) 


 





 


Test


 3/23/20


06:42 3/23/20


06:45 3/23/20


09:35 





 


Glucose (Fingerstick)


 178 mg/dL


(70-99) 


 


 





 


White Blood Count


 


 15.2 x10^3/uL


(4.0-11.0) 


 





 


Red Blood Count


 


 4.99 x10^6/uL


(4.30-5.70) 


 





 


Hemoglobin


 


 14.0 g/dL


(13.0-17.5) 


 





 


Hematocrit


 


 43.1 %


(39.0-53.0) 


 





 


Mean Corpuscular Volume  86 fL ()   


 


Mean Corpuscular Hemoglobin  28 pg (25-35)   


 


Mean Corpuscular Hemoglobin


Concent 


 33 g/dL


(31-37) 


 





 


Red Cell Distribution Width


 


 14.7 %


(11.5-14.5) 


 





 


Platelet Count


 


 197 x10^3/uL


(140-400) 


 





 


Neutrophils (%) (Auto)  76 % (31-73)   


 


Lymphocytes (%) (Auto)  13 % (24-48)   


 


Monocytes (%) (Auto)  9 % (0-9)   


 


Eosinophils (%) (Auto)  3 % (0-3)   


 


Basophils (%) (Auto)  0 % (0-3)   


 


Neutrophils # (Auto)


 


 11.5 x10^3/uL


(1.8-7.7) 


 





 


Lymphocytes # (Auto)


 


 1.9 x10^3/uL


(1.0-4.8) 


 





 


Monocytes # (Auto)


 


 1.3 x10^3/uL


(0.0-1.1) 


 





 


Eosinophils # (Auto)


 


 0.4 x10^3/uL


(0.0-0.7) 


 





 


Basophils # (Auto)


 


 0.1 x10^3/uL


(0.0-0.2) 


 





 


Sodium Level


 


 154 mmol/L


(136-145) 


 





 


Potassium Level


 


 3.1 mmol/L


(3.5-5.1) 


 





 


Chloride Level


 


 109 mmol/L


() 


 





 


Carbon Dioxide Level


 


 39 mmol/L


(21-32) 


 





 


Anion Gap  6 (6-14)   


 


Blood Urea Nitrogen


 


 45 mg/dL


(8-26) 


 





 


Creatinine


 


 1.2 mg/dL


(0.7-1.3) 


 





 


Estimated GFR


(Cockcroft-Gault) 


 59.3 


 


 





 


Glucose Level


 


 189 mg/dL


(70-99) 


 





 


Calcium Level


 


 9.1 mg/dL


(8.5-10.1) 


 





 


O2 Saturation   95 % (92-99)  


 


Arterial Blood pH


 


 


 7.52


(7.35-7.45) 





 


Arterial Blood pCO2 at


Patient Temp 


 


 48 mmHg


(35-46) 





 


Arterial Blood pO2 at Patient


Temp 


 


 72 mmHg


() 





 


Arterial Blood HCO3


 


 


 38 mmol/L


(21-28) 





 


Arterial Blood Base Excess


 


 


 14 mmol/L


(-3-3) 





 


FiO2   40  








Microbiology


3/21/20 Blood Culture - Preliminary, Resulted


          NO GROWTH AFTER 1 DAY


3/13/20 Urine Culture - Final, Complete


          


3/13/20 Urine Culture Result 1 (CAR) - Final, Complete


          


3/13/20 Antimicrobic Susceptibility - Final, Complete


Medications





Current Medications


Albuterol/ Ipratropium (Duoneb) 3 ml 1X  ONCE NEB  Last administered on 

3/13/20at 09:07;  Start 3/13/20 at 09:15;  Stop 3/13/20 at 09:16;  Status DC


Methylprednisolone Sodium Succinate (SOLU-Medrol 125MG VIAL) 125 mg 1X  ONCE IV 

Last administered on 3/13/20at 09:27;  Start 3/13/20 at 09:15;  Stop 3/13/20 at 

09:16;  Status DC


Labetalol HCl (Normodyne Iv Push) 10 mg 1X  ONCE IVP ;  Start 3/13/20 at 09:15; 

Stop 3/13/20 at 09:12;  Status DC


Piperacillin Sod/ Tazobactam Sod 3.375 gm/Sodium Chloride 50 ml @  100 mls/hr 1X

 ONCE IV  Last administered on 3/13/20at 10:23;  Start 3/13/20 at 09:30;  Stop 

3/13/20 at 09:59;  Status DC


Vancomycin HCl 250 ml @  250 mls/hr 1X  ONCE IV ;  Start 3/13/20 at 09:30;  Stop

3/13/20 at 10:29;  Status UNV


Vancomycin HCl 2 gm/Sodium Chloride 500 ml @  250 mls/hr 1X  ONCE IV  Last 

administered on 3/13/20at 09:56;  Start 3/13/20 at 09:45;  Stop 3/13/20 at 

11:44;  Status DC


Sodium Chloride 1,000 ml @  100 mls/hr Q10H IV  Last administered on 3/13/20at 

11:53;  Start 3/13/20 at 10:51;  Stop 3/14/20 at 10:50;  Status DC


Furosemide (Lasix) 40 mg 1X  ONCE IVP  Last administered on 3/13/20at 14:08;  

Start 3/13/20 at 14:00;  Stop 3/13/20 at 14:01;  Status DC


Piperacillin Sod/ Tazobactam Sod 3.375 gm/Sodium Chloride 50 ml @  100 mls/hr 

Q6HRS IV  Last administered on 3/22/20at 06:19;  Start 3/13/20 at 18:00;  Stop 

3/22/20 at 14:55;  Status DC


Insulin Human Lispro (HumaLOG) 0-9 UNITS TIDWMEALS SQ  Last administered on 

3/16/20at 17:00;  Start 3/13/20 at 17:00;  Stop 3/16/20 at 22:21;  Status DC


Dextrose (Dextrose 50%-Water Syringe) 12.5 gm PRN Q15MIN  PRN IV SEE COMMENTS;  

Start 3/13/20 at 14:00


Atorvastatin Calcium (Lipitor) 40 mg QHS PO  Last administered on 3/22/20at 

23:30;  Start 3/13/20 at 21:00


Aspirin (Ecotrin) 81 mg DAILYWBKFT PO  Last administered on 3/23/20at 09:24;  

Start 3/14/20 at 08:00


Aspirin (Ecotrin) 325 mg 1X  ONCE PO  Last administered on 3/13/20at 16:24;  

Start 3/13/20 at 16:00;  Stop 3/13/20 at 16:01;  Status DC


Furosemide (Lasix) 40 mg BID92 IVP  Last administered on 3/17/20at 10:11;  Start

3/14/20 at 09:00;  Stop 3/17/20 at 10:38;  Status DC


Heparin Sodium/ Dextrose 250 ml @ 0 mls/hr CONT  PRN IV PER PROTOCOL Last 

administered on 3/15/20at 20:31;  Start 3/13/20 at 16:15;  Stop 3/17/20 at 

13:51;  Status DC


Heparin Sodium (Porcine) (Heparin Sodium) 3,450 unit PRN Q6HRS  PRN IV FOR UFH 

LEVEL LESS THAN 0.2 Last administered on 3/13/20at 23:59;  Start 3/13/20 at 

16:15;  Stop 3/17/20 at 13:52;  Status DC


Info (Anti-Coagulation Monitoring By Pharmacy) 1 each PRN DAILY  PRN MC SEE 

COMMENTS Last administered on 3/16/20at 13:49;  Start 3/13/20 at 16:15;  Stop 

3/17/20 at 13:52;  Status DC


Lisinopril (Prinivil) 40 mg DAILY PO  Last administered on 3/16/20at 08:12;  

Start 3/14/20 at 09:00;  Stop 3/17/20 at 10:38;  Status DC


Metoprolol Tartrate (Lopressor) 50 mg BID PO ;  Start 3/13/20 at 21:00;  Status 

UNV


Atenolol (Tenormin) 100 mg DAILY PO  Last administered on 3/16/20at 08:15;  

Start 3/14/20 at 09:00;  Stop 3/16/20 at 13:45;  Status DC


Insulin Human Lispro (HumaLOG) 5 units 1X  ONCE SQ  Last administered on 

3/13/20at 21:46;  Start 3/13/20 at 22:00;  Stop 3/13/20 at 22:01;  Status DC


Insulin Glargine (Lantus Syringe) 80 unit QHS SQ ;  Start 3/13/20 at 22:00;  

Stop 3/13/20 at 21:56;  Status DC


Insulin Glargine (Lantus Syringe) 80 unit DAILY SQ ;  Start 3/14/20 at 09:00;  

Status Cancel


Insulin Glargine (Lantus Syringe) 80 unit DAILY08 SQ  Last administered on 

3/23/20at 09:24;  Start 3/14/20 at 08:00


Lactobacillus Rhamnosus (Culturelle) 1 cap BID PO  Last administered on 

3/23/20at 09:24;  Start 3/14/20 at 21:00


Sodium Chloride 1,000 ml @  75 mls/hr [id number] IV ;  Start 3/16/20 at 06:00;  

Stop 3/17/20 at 12:28;  Status DC


Amlodipine Besylate (Norvasc) 5 mg DAILY PO  Last administered on 3/16/20at 

08:13;  Start 3/15/20 at 16:00;  Stop 3/17/20 at 10:38;  Status DC


Iodixanol (Visipaque 320) 100 ml STK-MED ONCE .ROUTE ;  Start 3/16/20 at 07:56; 

Stop 3/16/20 at 07:56;  Status DC


Lidocaine HCl (Lidocaine 1% 20ml Vial) 20 ml STK-MED ONCE .ROUTE ;  Start 

3/16/20 at 07:56;  Stop 3/16/20 at 07:56;  Status DC


Heparin Sodium/ Sodium Chloride 1,500 ml @  As Directed STK-MED ONCE .ROUTE ;  

Start 3/16/20 at 07:56;  Stop 3/16/20 at 07:56;  Status DC


Fentanyl Citrate (Fentanyl 2ml Vial) 100 mcg STK-MED ONCE .ROUTE ;  Start 

3/16/20 at 09:03;  Stop 3/16/20 at 09:03;  Status DC


Midazolam HCl (Versed) 2 mg STK-MED ONCE .ROUTE ;  Start 3/16/20 at 09:03;  Stop

3/16/20 at 09:03;  Status DC


Heparin Sodium (Porcine) (Heparin Sodium) 10,000 unit STK-MED ONCE .ROUTE ;  

Start 3/16/20 at 09:39;  Stop 3/16/20 at 09:39;  Status DC


Verapamil HCl (Verapamil) 5 mg STK-MED ONCE .ROUTE ;  Start 3/16/20 at 09:39;  

Stop 3/16/20 at 09:39;  Status DC


Nitroglycerin/ Dextrose 250 ml @  As Directed STK-MED ONCE IV ;  Start 3/16/20 

at 09:39;  Stop 3/16/20 at 09:39;  Status DC


Nitroglycerin (Nitroglycerin) 200 mcg STK-MED ONCE .ROUTE ;  Start 3/16/20 at 

09:39;  Stop 3/16/20 at 09:39;  Status DC


Albuterol/ Ipratropium (Duoneb) 3 ml 1X  ONCE NEB  Last administered on 

3/16/20at 10:00;  Start 3/16/20 at 10:00;  Stop 3/16/20 at 10:01;  Status DC


Etomidate (Amidate) 20 mg STK-MED ONCE IV ;  Start 3/16/20 at 10:23;  Stop 

3/16/20 at 10:23;  Status DC


Succinylcholine Chloride (Anectine) 200 mg STK-MED ONCE .ROUTE ;  Start 3/16/20 

at 10:23;  Stop 3/16/20 at 10:23;  Status DC


Propofol 100 ml @  As Directed STK-MED ONCE IV ;  Start 3/16/20 at 10:35;  Stop 

3/16/20 at 10:35;  Status DC


Nitroglycerin (Nitroglycerin) 200 mcg 1X  ONCE IART  Last administered on 

3/16/20at 11:00;  Start 3/16/20 at 11:00;  Stop 3/16/20 at 11:01;  Status DC


Verapamil HCl (Verapamil) 2.5 mg 1X  ONCE IART ;  Start 3/16/20 at 11:00;  Stop 

3/16/20 at 11:01;  Status DC


Heparin Sodium (Porcine) (Heparin Sodium) 2,500 unit 1X  ONCE IART ;  Start 

3/16/20 at 11:00;  Stop 3/16/20 at 11:01;  Status DC


Heparin Sodium/ Sodium Chloride (HEPARIN for ARTERIAL LINE FLUSH) 1,000 unit 1X 

ONCE IART  Last administered on 3/16/20at 11:00;  Start 3/16/20 at 11:00;  Stop 

3/16/20 at 11:01;  Status DC


Heparin Sodium/ Sodium Chloride (HEPARIN for ARTERIAL LINE FLUSH) 1,000 unit 1X 

ONCE IART  Last administered on 3/16/20at 11:00;  Start 3/16/20 at 11:00;  Stop 

3/16/20 at 11:01;  Status DC


Midazolam HCl (Versed) 2 mg 1X  ONCE IV  Last administered on 3/16/20at 11:00;  

Start 3/16/20 at 11:00;  Stop 3/16/20 at 11:01;  Status DC


Fentanyl Citrate (Fentanyl 2ml Vial) 100 mcg 1X  ONCE IV  Last administered on 

3/16/20at 11:00;  Start 3/16/20 at 11:00;  Stop 3/16/20 at 11:01;  Status DC


Iodixanol (Visipaque 320) 100 ml 1X  ONCE IART ;  Start 3/16/20 at 11:00;  Stop 

3/16/20 at 11:01;  Status DC


Lidocaine HCl (Lidocaine 1% 20ml Vial) 20 ml 1X  ONCE INJ ;  Start 3/16/20 at 

11:00;  Stop 3/16/20 at 11:01;  Status DC


Norepinephrine Bitartrate 8 mg/ Dextrose 258 ml @  27.09 mls/ hr CONT  PRN IV 

PER PROTOCOL Last administered on 3/16/20at 12:55;  Start 3/16/20 at 11:15


Propofol 100 ml @  As Directed STK-MED ONCE IV ;  Start 3/16/20 at 12:39;  Stop 

3/16/20 at 12:40;  Status DC


Potassium Chloride/Water 100 ml @  100 mls/hr 1X  ONCE IV  Last administered on 

3/16/20at 13:06;  Start 3/16/20 at 13:00;  Stop 3/16/20 at 13:59;  Status DC


Midazolam HCl 50 mg/Sodium Chloride 50 ml @ 1 mls/hr CONT  PRN IV SEE I/O RECORD

Last administered on 3/20/20at 05:29;  Start 3/16/20 at 13:00


Magnesium Sulfate 50 ml @ 25 mls/hr 1X  ONCE IV  Last administered on 3/16/20at 

14:28;  Start 3/16/20 at 13:00;  Stop 3/16/20 at 14:59;  Status DC


Midazolam HCl 50 mg/Sodium Chloride 50 ml @ 1 mls/hr CONT  PRN IV SEE I/O 

RECORD;  Start 3/16/20 at 13:00;  Status UNV


Dobutamine HCl/ Dextrose 250 ml @  8.43 mls/hr CONT  PRN IV SEE I/O RECORD;  

Start 3/16/20 at 13:30


Atenolol (Tenormin) 25 mg DAILY PO ;  Start 3/17/20 at 09:00;  Stop 3/17/20 at 

10:38;  Status DC


Metolazone (Zaroxolyn) 2.5 mg DAILY PO  Last administered on 3/23/20at 09:24;  

Start 3/17/20 at 09:00


Fentanyl Citrate 30 ml @ 0 mls/hr CONT  PRN IV SEE PROTOCOL Last administered on

3/21/20at 11:33;  Start 3/16/20 at 22:30


Insulin Human Lispro (HumaLOG) 0-9 UNITS Q6HRS SQ  Last administered on 

3/23/20at 06:44;  Start 3/17/20 at 00:00


Furosemide 100 mg/ Sodium Chloride 100 ml @ 5 mls/hr CONT  PRN IV SEE I/O RECORD

Last administered on 3/21/20at 15:25;  Start 3/17/20 at 11:00


Hydralazine HCl (Apresoline Inj) 10 mg PRN Q4HRS  PRN IVP ELEVATED BP, SEE 

COMMENTS Last administered on 3/22/20at 23:51;  Start 3/17/20 at 10:45


Verapamil HCl (Verapamil) 5 mg STK-MED ONCE .ROUTE ;  Start 3/16/20 at 10:00;  

Stop 3/17/20 at 13:11;  Status DC


Famotidine (Pepcid Vial) 20 mg BID IVP  Last administered on 3/23/20at 09:21;  

Start 3/17/20 at 21:00


Linezolid/Dextrose 300 ml @  300 mls/hr Q12HR IV  Last administered on 3/18/20at

20:59;  Start 3/18/20 at 09:00;  Stop 3/19/20 at 08:46;  Status DC


Potassium Bicarbonate (Potassium Effervescent Tablet) 40 meq 1X  ONCE PO  Last 

administered on 3/18/20at 09:27;  Start 3/18/20 at 09:00;  Stop 3/18/20 at 

09:02;  Status DC


Potassium Chloride/Water 100 ml @  100 mls/hr 1X  ONCE IV  Last administered on 

3/19/20at 07:07;  Start 3/19/20 at 06:45;  Stop 3/19/20 at 07:44;  Status DC


Potassium Chloride/Water 100 ml @  100 mls/hr 1X  ONCE IV  Last administered on 

3/19/20at 08:31;  Start 3/19/20 at 09:00;  Stop 3/19/20 at 09:59;  Status DC


Potassium Chloride/Water 100 ml @  100 mls/hr 1X  ONCE IV  Last administered on 

3/19/20at 13:24;  Start 3/19/20 at 13:00;  Stop 3/19/20 at 13:59;  Status DC


Enalaprilat (Vasotec Inj) 2.5 mg PRN Q6HRS  PRN IVP HYPERTENSION Last 

administered on 3/20/20at 18:25;  Start 3/19/20 at 10:15


Amlodipine Besylate (Norvasc) 10 mg DAILY PO  Last administered on 3/23/20at 

09:24;  Start 3/19/20 at 12:00


Heparin Sodium (Porcine) (Heparin Sodium) 5,000 unit Q12HR SQ  Last administered

on 3/23/20at 09:23;  Start 3/19/20 at 14:00


Potassium Chloride/Water 100 ml @  100 mls/hr Q1H IV  Last administered on 

3/19/20at 19:26;  Start 3/19/20 at 16:00;  Stop 3/19/20 at 17:59;  Status DC


Propofol 100 ml @ 0 mls/hr CONT  PRN IV SEE PROTOCOL Last administered on 

3/21/20at 11:31;  Start 3/20/20 at 11:00


Potassium Chloride/Water 100 ml @  100 mls/hr Q1HR IV  Last administered on 

3/20/20at 16:51;  Start 3/20/20 at 12:00;  Stop 3/20/20 at 17:59;  Status DC


Potassium Chloride/Water 100 ml @  100 mls/hr Q1H IV ;  Start 3/20/20 at 22:00; 

Stop 3/21/20 at 05:59;  Status Cancel


Potassium Chloride/Water 100 ml @  100 mls/hr Q1H IV  Last administered on 

3/21/20at 00:53;  Start 3/20/20 at 22:00;  Stop 3/21/20 at 01:59;  Status DC


Potassium Chloride/Water 100 ml @  100 mls/hr Q1H IV  Last administered on 

3/21/20at 09:34;  Start 3/21/20 at 07:00;  Stop 3/21/20 at 08:59;  Status DC


Nitroglycerin/ Dextrose 250 ml @  1.5 mls/hr CONT  PRN IV SEE I/O RECORD Last 

administered on 3/21/20at 21:30;  Start 3/21/20 at 10:00


Dexmedetomidine HCl 400 mcg/ Sodium Chloride 100 ml @ 0 mls/hr CONT  PRN IV 

SEDATION Last administered on 3/23/20at 09:22;  Start 3/21/20 at 14:45


Sodium Chloride 500 ml @  500 mls/hr 1X PRN  PRN IV SEE COMMENTS;  Start 3/21/20

at 14:45


Atropine Sulfate (ATROPINE 0.5mg SYRINGE) 0.5 mg PRN Q5MIN  PRN IV SEE COMMENTS;

 Start 3/21/20 at 14:45


Potassium Chloride/Water 100 ml @  100 mls/hr Q1H IV  Last administered on 

3/21/20at 17:07;  Start 3/21/20 at 15:30;  Stop 3/21/20 at 17:29;  Status DC


Acetaminophen (Tylenol) 650 mg PRN Q6HRS  PRN PO MILD PAIN / TEMP Last 

administered on 3/22/20at 08:31;  Start 3/21/20 at 16:30


Potassium Chloride/Water 100 ml @  100 mls/hr Q1H IV  Last administered on 

3/22/20at 00:36;  Start 3/21/20 at 21:00;  Stop 3/21/20 at 22:59;  Status DC


Potassium Chloride/Water 100 ml @  100 mls/hr Q1H IV  Last administered on 

3/22/20at 09:42;  Start 3/22/20 at 08:00;  Stop 3/22/20 at 09:59;  Status DC


Sodium Chloride 1,000 ml @  100 mls/hr Q10H IV  Last administered on 3/23/20at 

02:47;  Start 3/22/20 at 23:55


Daptomycin 620 mg/ Sodium Chloride 50 ml @  100 mls/hr Q24H IV  Last 

administered on 3/22/20at 17:08;  Start 3/22/20 at 15:00


Micafungin Sodium 100 mg/Dextrose 100 ml @  100 mls/hr Q24H IV  Last 

administered on 3/22/20at 15:25;  Start 3/22/20 at 15:00


Meropenem 500 mg/ Sodium Chloride 50 ml @  100 mls/hr Q6HRS IV  Last 

administered on 3/23/20at 06:35;  Start 3/22/20 at 16:00


Fentanyl Citrate (Fentanyl 2ml Vial) 100 mcg STK-MED ONCE .ROUTE ;  Start 

3/22/20 at 17:04;  Stop 3/22/20 at 17:04;  Status DC


Fentanyl Citrate (Fentanyl 2ml Vial) 50 mcg PRN Q2HR  PRN IVP PAIN Last 

administered on 3/22/20at 23:50;  Start 3/22/20 at 17:15


Potassium Chloride/Water 100 ml @  100 mls/hr 1X  ONCE IV  Last administered on 

3/23/20at 10:47;  Start 3/23/20 at 10:00;  Stop 3/23/20 at 10:59;  Status DC


Iodixanol (Visipaque 320) 100 ml STK-MED ONCE .ROUTE ;  Start 3/23/20 at 11:08; 

Stop 3/23/20 at 11:08;  Status DC


Lidocaine HCl (Lidocaine 1% 20ml Vial) 20 ml STK-MED ONCE .ROUTE ;  Start 

3/23/20 at 11:08;  Stop 3/23/20 at 11:08;  Status DC


Heparin Sodium/ Sodium Chloride 500 ml @  As Directed STK-MED ONCE .ROUTE ;  

Start 3/23/20 at 11:08;  Stop 3/23/20 at 11:09;  Status DC





Active Scripts


Active


Reported


S[place] (Saw [place] Fruit) 450 Mg Capsule 450 Mg PO DAILY


D3-50 (Cholecalciferol (Vitamin D3)) 50,000 Unit Capsule 1,000 Unit PO DAILY


Emergen-C 500 mg Chewable Tab (Vit C/Ascorb Sod/Multivit-Min) 500 Mg Tab.chew 

500 Mg PO DAILY


Zoloft (Sertraline Hcl) 50 Mg Tablet 50 Mg PO DAILY


Trazodone Hcl 50 Mg Tablet 1 Tab PO QHS


Trulicity (Dulaglutide) 0.75 Mg/0.5 Ml Pen.injctr 0.75 Mg SQ WEEKLY


Insulin Aspart 100 Unit/1 Ml Vial 25 Unit SQ TIDWMEALS


Levemir (Insulin Detemir) 100 Unit/1 Ml Vial 80 Unit SQ DAILY


Lasix (Furosemide) 20 Mg Tablet 20 Mg PO BID


Flomax (Tamsulosin Hcl) 0.4 Mg Cap.er.24h 0.4 Mg PO HS


Omeprazole 20 Mg Capsule.dr 20 Mg PO BID


Lisinopril 40 Mg Tablet 1 Tab PO DAILY


Atenolol 100 Mg Tablet 100 Mg PO BID


Vitals/I & O





Vital Sign - Last 24 Hours








 3/22/20 3/22/20 3/22/20 3/22/20





 12:00 12:00 12:24 13:00


 


Temp  100.8  101.6





  100.8  101.6


 


Pulse  66  66


 


Resp  22  22


 


B/P (MAP)  106/50 (68)  119/53 (75)


 


Pulse Ox  96 95 95


 


O2 Delivery Mechanical Ventilator Ventilator Ventilator Ventilator


 


    





    





 3/22/20 3/22/20 3/22/20 3/22/20





 14:00 14:52 15:00 16:00


 


Pulse 62  64 


 


Resp 23 21 


 


B/P (MAP) 139/60 (86)  131/57 (81) 


 


Pulse Ox 96 96 95 


 


O2 Delivery Ventilator Ventilator Ventilator Mechanical Ventilator





 3/22/20 3/22/20 3/22/20 3/22/20





 16:00 17:28 17:32 18:00


 


Temp    100.2





    100.2


 


Pulse 58   72


 


Resp 20 24 22


 


B/P (MAP) 124/54 (77)   128/57 (80)


 


Pulse Ox 96 96 93 93


 


O2 Delivery Ventilator Ventilator Ventilator Ventilator


 


    





    





 3/22/20 3/22/20 3/22/20 3/22/20





 18:10 19:00 20:00 20:00


 


Temp   99.2 





   99.2 


 


Pulse  68 74 


 


Resp 24 22 22 


 


B/P (MAP)  145/68 (93) 173/73 (106) 


 


Pulse Ox 93 95 95 


 


O2 Delivery Ventilator Ventilator Ventilator Mechanical Ventilator


 


    





    





 3/22/20 3/22/20 3/22/20 3/22/20





 20:47 21:00 22:00 23:00


 


Pulse  61 61 61


 


Resp  22 22 23


 


B/P (MAP)  158/63 (94) 186/78 (114) 163/63 (96)


 


Pulse Ox 95 93 93 93


 


O2 Delivery Ventilator Ventilator Ventilator Ventilator





 3/22/20 3/22/20 3/22/20 3/22/20





 23:26 23:50 23:51 23:59


 


Pulse   63 


 


Resp  22  


 


B/P (MAP)   166/56 


 


Pulse Ox 94 93  


 


O2 Delivery Ventilator Ventilator  Mechanical Ventilator





 3/23/20 3/23/20 3/23/20 3/23/20





 00:00 00:20 01:00 01:44


 


Temp 100.5   





 100.5   


 


Pulse 62  73 


 


Resp 24 22 24 


 


B/P (MAP) 172/64 (100)  155/69 (97) 


 


Pulse Ox 93 93 93 92


 


O2 Delivery Ventilator Ventilator Ventilator Ventilator


 


    





    





 3/23/20 3/23/20 3/23/20 3/23/20





 02:00 03:00 03:40 04:00


 


Temp    99.7





    99.7


 


Pulse 69 88  61


 


Resp 22 24  21


 


B/P (MAP) 161/67 (98) 181/76 (111)  151/47 (81)


 


Pulse Ox 93 96 93 93


 


O2 Delivery Ventilator Ventilator Ventilator Ventilator


 


    





    





 3/23/20 3/23/20 3/23/20 3/23/20





 04:00 05:00 05:26 06:00


 


Pulse  88  78


 


Resp  24  21


 


B/P (MAP)  145/65 (91)  150/64 (92)


 


Pulse Ox  96 95 98


 


O2 Delivery Mechanical Ventilator Ventilator Ventilator Ventilator





 3/23/20 3/23/20 3/23/20 3/23/20





 07:00 08:00 08:00 09:00


 


Temp    98.0





    98.0


 


Pulse 61 61  60


 


Resp 18 18  18


 


B/P (MAP) 160/64 (96) 131/52 (78)  151/67 (95)


 


Pulse Ox 98 94  95


 


O2 Delivery Ventilator Ventilator Mechanical Ventilator Ventilator


 


    





    





 3/23/20 3/23/20 3/23/20 3/23/20





 09:24 09:24 10:00 11:00


 


Pulse 61  61 62


 


Resp   18 18


 


B/P (MAP) 131/52  170/56 (94) 169/70 (103)


 


Pulse Ox  95 94 96


 


O2 Delivery  Ventilator Ventilator Ventilator














Intake and Output0   


 


 3/22/20 3/22/20 3/23/20





 15:00 23:00 07:00


 


Intake Total 500 ml 2694 ml 1587 ml


 


Output Total 675 ml 1250 ml 1125 ml


 


Balance -175 ml 1444 ml 462 ml

















ZEYNEP SINCLAIR MD          Mar 23, 2020 11:20

## 2020-03-24 NOTE — NUR
pt has been running a higher bp this am. attempted fentanyl for pain, vasotec, bolused with 
precedex, repositioning. 

place pt on weaning trial at 1250. pt anxious and coughing. attempting to grab for tube. 
unable to self sooth or wife to verbally attempt to calm pt. rr increased to 42 and 
diaphoretic.  weaning trial stopped at 1300.

pts wife requesting that the "liborio franck drug" be restarted. wife stated that she 
needed to leave for afternoon and just wants him to "rest like he did before".

pt restarted and bolused on propofol. pt resting quietly now.

abg and chest xray reordered for in am by dr ibrahim.

## 2020-03-24 NOTE — RAD
CHEST AP ONLY

 

Clinical Indication: Pneumonia

 

Comparison:  AP chest, 3 days ago.

 

Findings: 

Endotracheal tube tip is 7 cm superior to the fredy. Enteric tube courses

inferiorly outside of field-of-view. Right subclavian line tip in distal 

SVC. Atherosclerotic thoracic aorta. Stable cardiomegaly. Pulmonary 

vascular congestion is mildly worse. Bilateral pleural effusions are 

similar, more layering with semiupright positioning. There is no 

pneumothorax. Bones appear stable.

 

IMPRESSION: 

1.  Stable life support devices.

2.  Pulmonary vascular congestion is mildly worse.

3.  Bilateral pleural effusions are probably unchanged allowing for 

differences in patient positioning.

 

 

Electronically signed by: Waldo Garcia MD (3/24/2020 9:01 AM) [id number]

## 2020-03-24 NOTE — PDOC
[facility] PROGRESS NOTE


Chief Complaint


Chief Complaint


Status post cardiac catheter yesterday with a new cardiac stent


1. Acute CHF with possible combined systolic/diastolic dysfunction. 

Significantly improved. Continuing present treatment.


2. NSTEMI: no CP but suspecting ischemia as culprit. EKG LBBB no prior for 

comparison. Tentatively plan for heart catheterization tomorrow. Discussed with 

the patient.


2. Acute respiratory failure with CHF and possibly ongoing RAVINDER


3. DM2: insulin dependent


4. Accelerated HTN: better


5. Morbid obesity


6. Leukocytosis with UTI: on antibiotics per PCP


2 recent code blues with status post resuscitation





History of Present Illness


History of Present Illness


[id number]


Patient seen and examined in the ICU


Discussed with RN


Chart reviewed











Mr Alexander is a 74 yo M w/ PMHx hypertension, dyslipidemia, diabetes mellitus who

presented via EMS with complaint of shortness of breath on 3/13/202 that had 

been ongoign for 10 days. He called 911 in attempts to go to [facility], was brought 

to [facility], found with O2 sat of 70s at room air and started on  BiPAP to 100% in a 

very short time.


Pulmonology, cardiology, ID consulted.





3/16: Intubation and CVC placed for code blue on way to cardiac catheterization


3/17: Intubated, sedated


3/18: Intubated, sedated


3/19: BP difficult to control, remains intubated


3/20: Still Intubated and sedated. COVID 19 Negative


3/21: Intubated, sedated. Failed 2 weans. K still low, replaced. Lasix GTT. Good

UOP





Dosed Daptom/micafungin changed to Meropenem 3/22





 3/21 Overnight with fevers TMax 102F. D/w cardiology to stay intubated and 

sedated until cath 3/24. Sodium improved. lasix GTT to stop, K 3.2, replaced IV 

central line protocol.





3/23 TO CATH LAB





32 MIN CC TIME





Vitals/I&O


Vitals/I&O:





                                   Vital Signs








  Date Time  Temp Pulse Resp B/P (MAP) Pulse Ox O2 Delivery O2 Flow Rate FiO2


 


3/24/20 11:48     96 Ventilator  


 


3/24/20 10:56  77  190/105    


 


3/24/20 10:50   23     


 


3/24/20 10:00 99.9       





 99.9       


 


3/24/20 04:38       4.0 














                                    I & O   


 


 3/23/20 3/23/20 3/24/20





 15:00 23:00 07:00


 


Intake Total 400 ml 1642 ml 2560 ml


 


Output Total 645 ml 1125 ml 895 ml


 


Balance -245 ml 517 ml 1665 ml











Physical Exam


Physical Exam:


GENERAL: Orally intubated,- more alert - appears comfortable


HEENT:  Pupils equal, ETT, OGT 


NECK: Supple - no JVD


LUNGS:  Decreased breath sounds bases,  


HEART:  S1, S2 


ABDOMEN: Obese, soft, no guarding + BS


: Younger in place 3/16


EXTREMITIES:  1- + pitting edema present. No cyanosis. SCDs bilaterally 


MSK bilateral knee scars intact


SKIN: Warm to touch. No signs of rash


NEURO: Noncommunicative, sedated 


RIJ clean - 3/16


Rt art-line out


General:  Other (intubated.)


Heart:  Regular rate


Lungs:  Crackles


Abdomen:  Normal bowel sounds


Extremities:  No cyanosis, Other (3+ bilateral LE pitting edema)


Skin:  No rashes, No breakdown, No significant lesion





Labs


Labs:





Laboratory Tests








Test


 3/23/20


13:44 3/23/20


18:18 3/23/20


23:44 3/24/20


05:37


 


Glucose (Fingerstick)


 187 mg/dL


(70-99) 181 mg/dL


(70-99) 174 mg/dL


(70-99) 210 mg/dL


(70-99)


 


Test


 3/24/20


06:20 3/24/20


08:15 3/24/20


12:24 





 


White Blood Count


 11.4 x10^3/uL


(4.0-11.0) 


 


 





 


Red Blood Count


 4.79 x10^6/uL


(4.30-5.70) 


 


 





 


Hemoglobin


 13.2 g/dL


(13.0-17.5) 


 


 





 


Hematocrit


 41.5 %


(39.0-53.0) 


 


 





 


Mean Corpuscular Volume 87 fL ()    


 


Mean Corpuscular Hemoglobin 28 pg (25-35)    


 


Mean Corpuscular Hemoglobin


Concent 32 g/dL


(31-37) 


 


 





 


Red Cell Distribution Width


 14.8 %


(11.5-14.5) 


 


 





 


Platelet Count


 203 x10^3/uL


(140-400) 


 


 





 


Neutrophils (%) (Auto) 75 % (31-73)    


 


Lymphocytes (%) (Auto) 13 % (24-48)    


 


Monocytes (%) (Auto) 9 % (0-9)    


 


Eosinophils (%) (Auto) 3 % (0-3)    


 


Basophils (%) (Auto) 1 % (0-3)    


 


Neutrophils # (Auto)


 8.5 x10^3/uL


(1.8-7.7) 


 


 





 


Lymphocytes # (Auto)


 1.5 x10^3/uL


(1.0-4.8) 


 


 





 


Monocytes # (Auto)


 1.0 x10^3/uL


(0.0-1.1) 


 


 





 


Eosinophils # (Auto)


 0.4 x10^3/uL


(0.0-0.7) 


 


 





 


Basophils # (Auto)


 0.1 x10^3/uL


(0.0-0.2) 


 


 





 


Sodium Level


 152 mmol/L


(136-145) 


 


 





 


Potassium Level


 3.3 mmol/L


(3.5-5.1) 


 


 





 


Chloride Level


 110 mmol/L


() 


 


 





 


Carbon Dioxide Level


 37 mmol/L


(21-32) 


 


 





 


Anion Gap 5 (6-14)    


 


Blood Urea Nitrogen


 42 mg/dL


(8-26) 


 


 





 


Creatinine


 1.1 mg/dL


(0.7-1.3) 


 


 





 


Estimated GFR


(Cockcroft-Gault) 65.6 


 


 


 





 


Glucose Level


 202 mg/dL


(70-99) 


 


 





 


Calcium Level


 8.7 mg/dL


(8.5-10.1) 


 


 





 


Magnesium Level


 2.3 mg/dL


(1.8-2.4) 


 


 





 


O2 Saturation  96 % (92-99)   


 


Arterial Blood pH


 


 7.49


(7.35-7.45) 


 





 


Arterial Blood pCO2 at


Patient Temp 


 47 mmHg


(35-46) 


 





 


Arterial Blood pO2 at Patient


Temp 


 81 mmHg


() 


 





 


Arterial Blood HCO3


 


 35 mmol/L


(21-28) 


 





 


Arterial Blood Base Excess


 


 10 mmol/L


(-3-3) 


 





 


FiO2  40   


 


Glucose (Fingerstick)


 


 


 228 mg/dL


(70-99) 














Assessment and Plan


Assessmemt and Plan


Problems


Medical Problems:


(1) Acute respiratory distress


Status: Acute  





(2) CAP (community acquired pneumonia)


Status: Acute  





(3) CHF (congestive heart failure)


Status: Acute  





(4) Hypoxia


Status: Acute  





(5) SIRS (systemic inflammatory response syndrome)


Status: Acut


Status post cardiac catheter yesterday with new coronary stent 


1. Acute CHF with possible combined systolic/diastolic dysfunction. S

ignificantly improved. Continuing present treatment.


2. NSTEMI: no CP but suspecting ischemia as culprit. EKG LBBB no prior for 

comparison. Tentatively plan for heart catheterization tomorrow. Discussed with 

the patient.


2. Acute respiratory failure with CHF and possibly ongoing RAVINDER


3. DM2: insulin dependent


4. Accelerated HTN: better


5. Morbid obesity


6. Leukocytosis with UTI: on antibiotics per PCP


7. 2 recent code blues with resuscitation








Plan


For now:


ICU monitoring


Propofol for sedation


Vent weaning


IV Vasotec


Norvasc per OG tube


IV antibiotics


Duo nebs


Home meds


DVT prophylaxis


Full code


Prognosis guarded


We considered checking for coronavirus but he was turned down by the [facility] and environmental services


Discussed with RNs


Discussed with anesthesia


He remains very critically ill


Total time 33 minutes





Comment


Review of Relevant


I have reviewed the following items nicole (where applicable) has been applied.


Medications:





Current Medications








 Medications


  (Trade)  Dose


 Ordered  Sig/Elisa


 Route


 PRN Reason  Start Time


 Stop Time Status Last Admin


Dose Admin


 


 Potassium


 Chloride/Water  100 ml @ 


 100 mls/hr  1X  ONCE


 IV


   3/24/20 12:00


 3/24/20 12:59  3/24/20 12:29




















MARY LARA III DO           Mar 24, 2020 12:47

## 2020-03-24 NOTE — PDOC
Infectious Disease Note


Subjective


Subjective


Orally intubated and less sedated


More responsive





ROS


ROS


unable to obtain





Vital Sign


Vital Signs





Vital Signs








  Date Time  Temp Pulse Resp B/P (MAP) Pulse Ox O2 Delivery O2 Flow Rate FiO2


 


3/24/20 08:18  69  164/71    


 


3/24/20 08:16     40 Ventilator  


 


3/24/20 07:00 99.9  22     





 99.9       


 


3/24/20 04:38       4.0 











Physical Exam


PHYSICAL EXAM


GENERAL: Orally intubated,- more alert - appears comfortable


HEENT:  Pupils equal, ETT, OGT 


NECK: Supple - no JVD


LUNGS:  Decreased breath sounds bases,  


HEART:  S1, S2 


ABDOMEN: Obese, soft, no guarding + BS


: Younger in place 3/16


EXTREMITIES:  1- + pitting edema present. No cyanosis. SCDs bilaterally 


MSK bilateral knee scars intact


SKIN: Warm to touch. No signs of rash


NEURO: Noncommunicative, sedated 


RIJ clean - 3/16


Rt art-line out





Labs


Lab





Laboratory Tests








Test


 3/23/20


09:35 3/23/20


13:44 3/23/20


18:18 3/23/20


23:44


 


O2 Saturation 95 % (92-99)    


 


Arterial Blood pH


 7.52


(7.35-7.45) 


 


 





 


Arterial Blood pCO2 at


Patient Temp 48 mmHg


(35-46) 


 


 





 


Arterial Blood pO2 at Patient


Temp 72 mmHg


() 


 


 





 


Arterial Blood HCO3


 38 mmol/L


(21-28) 


 


 





 


Arterial Blood Base Excess


 14 mmol/L


(-3-3) 


 


 





 


FiO2 40    


 


Glucose (Fingerstick)


 


 187 mg/dL


(70-99) 181 mg/dL


(70-99) 174 mg/dL


(70-99)


 


Test


 3/24/20


05:37 3/24/20


06:20 


 





 


Glucose (Fingerstick)


 210 mg/dL


(70-99) 


 


 





 


White Blood Count


 


 11.4 x10^3/uL


(4.0-11.0) 


 





 


Red Blood Count


 


 4.79 x10^6/uL


(4.30-5.70) 


 





 


Hemoglobin


 


 13.2 g/dL


(13.0-17.5) 


 





 


Hematocrit


 


 41.5 %


(39.0-53.0) 


 





 


Mean Corpuscular Volume  87 fL ()   


 


Mean Corpuscular Hemoglobin  28 pg (25-35)   


 


Mean Corpuscular Hemoglobin


Concent 


 32 g/dL


(31-37) 


 





 


Red Cell Distribution Width


 


 14.8 %


(11.5-14.5) 


 





 


Platelet Count


 


 203 x10^3/uL


(140-400) 


 





 


Neutrophils (%) (Auto)  75 % (31-73)   


 


Lymphocytes (%) (Auto)  13 % (24-48)   


 


Monocytes (%) (Auto)  9 % (0-9)   


 


Eosinophils (%) (Auto)  3 % (0-3)   


 


Basophils (%) (Auto)  1 % (0-3)   


 


Neutrophils # (Auto)


 


 8.5 x10^3/uL


(1.8-7.7) 


 





 


Lymphocytes # (Auto)


 


 1.5 x10^3/uL


(1.0-4.8) 


 





 


Monocytes # (Auto)


 


 1.0 x10^3/uL


(0.0-1.1) 


 





 


Eosinophils # (Auto)


 


 0.4 x10^3/uL


(0.0-0.7) 


 





 


Basophils # (Auto)


 


 0.1 x10^3/uL


(0.0-0.2) 


 











Micro


CT Head 3/22





IMPRESSION:


No acute intracranial abnormality.





CT CHest/ABd/Pel 3/22


IMPRESSION:


1.  Patchy airspace disease in the lung bases bilaterally with more 


consolidative changes in the lower lobes bilaterally. Findings may 


represent infectious or inflammatory etiologies. Correlate for aspiration 


given distribution.


2.  No acute process identified within the abdomen and pelvis. No focal 


fluid collection to suggest abscess.


 





Microbiology


3/21/20 Blood Culture - Preliminary, Resulted


          NO GROWTH AFTER 1 DAY


3/13/20 Urine Culture - Final, Complete


          


3/13/20 Urine Culture Result 1 (CAR) - Final, Complete


          


3/13/20 Antimicrobic Susceptibility - Final, Complete





Objective


Assessment





S/p 3/23  1.  Right and Left heart catheterization and selective coronary 

angiography


2.  Successful PCI/drug eluting stent placement to the left anterior descending 

artery. 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


Fever - curve is better


Leukocytosis - better 


Acute resp failure s/p intubation


    -COVID-19 NEG


NSTEMI


Pyuria UC 50-100K enterococcus 3/13, ? colonization


LUE trace warmth ? positioned on Left side with arm in sheets between body and 

bed


RICHELLE - better


Partial nephrectomy.


Hypernatremia 


CHF


DM2


Morbid obesity


Hypertension.





Plan


Plan of Care





Began Dapto/micafungin changed to Meropenem 3/22


F/u Blood 3/21 neg so far and Urine 3/22


Labs in am


Off zosyn (3/13-3/22) 


Probiotics


Electrolytes per primary


Repeat BC (3/16/3/21) neg to date 


Maintain aspiration precautions


Supportive care








D/w wife at bedside





D/w nursing - hopefully for extubation soon





Critically ill











JOSIAH MANNING MD              Mar 24, 2020 08:38

## 2020-03-24 NOTE — NUR
SS following up with discharge planning. [facility] contacted SS and requesting 
another COVID19 test due to continued respiratory issues. SS discussed with pt's RN and 
notified Dr. Mcneil. Pt remains on the vent at this time. SS will continue to follow for 
discharge planning.

## 2020-03-24 NOTE — PDOC2
CONSULT


Date of Consult


Date of Consult


DATE: 3/24/20 


TIME: 10:35





Reason for Consult


Reason for Consult:


Hypernatremia





Identification/Chief Complaint


Chief Complaint


Unable to Obtain 2/2 Intubation/MV





Source


Source:  Chart review





History of Present Illness


Reason for Visit:


  This is a 75-year-old  male hospitalized on 3/13 with history of 

congestive heart failure, hypertension, who came in with shortness of breath. It

was


 progressively  getting worse in the last 2 weeks and he was [facility] the way to [facility] 

and he just cannot breathe, hence called 911.  The patient was


hypoxic, brought in on a BiPAP.  No  fever, nausea, vomiting, diarrhea , chest 

pain.   


Currently Intubated on MV . Unable to Obtain history from Pt. Per RN stable, 

good UOP , Opening eyes





Past Medical History


Cardiovascular:  HTN, Hyperlipidemia


Pulmonary:  No pertinent hx


CENTRAL NERVOUS SYSTEM:  Other (No pertinet history)


GI:  GERD


Heme/Onc:  Cancer (renal CA)


Hepatobiliary:  No pertinent hx


Psych:  Anxiety


Musculoskeletal:  Osteoarthritis


Rheumatologic:  No pertinent hx


Infectious disease:  No pertinent hx


ENT:  Other (cataract)


Renal/:  Chronic renal insuff


Endocrine:  Diabetes (2)


Dermatology:  No pertinent hx





Past Surgical History


Past Surgical History:  Cataract Removal, Total knee replacement (left), Other 

(right adrenalectomy, partial left nephrectomy, partial thyroidectomy 

(noncancerous tumor))





Family History


Family History:  Coronary Artery Disease (mother and father)





Social History


No


ALCOHOL:  none


Drugs:  None


Lives:  with Family





Current Problem List


Problem List


Problems


Medical Problems:


(1) Acute respiratory distress


Status: Acute  





(2) CAP (community acquired pneumonia)


Status: Acute  





(3) CHF (congestive heart failure)


Status: Acute  





(4) Hypoxia


Status: Acute  





(5) SIRS (systemic inflammatory response syndrome)


Status: Acute  











Current Medications


Current Medications





Current Medications


Albuterol/ Ipratropium (Duoneb) 3 ml 1X  ONCE NEB  Last administered on 3

/13/20at 09:07;  Start 3/13/20 at 09:15;  Stop 3/13/20 at 09:16;  Status DC


Methylprednisolone Sodium Succinate (SOLU-Medrol 125MG VIAL) 125 mg 1X  ONCE IV 

Last administered on 3/13/20at 09:27;  Start 3/13/20 at 09:15;  Stop 3/13/20 at 

09:16;  Status DC


Labetalol HCl (Normodyne Iv Push) 10 mg 1X  ONCE IVP ;  Start 3/13/20 at 09:15; 

Stop 3/13/20 at 09:12;  Status DC


Piperacillin Sod/ Tazobactam Sod 3.375 gm/Sodium Chloride 50 ml @  100 mls/hr 1X

 ONCE IV  Last administered on 3/13/20at 10:23;  Start 3/13/20 at 09:30;  Stop 

3/13/20 at 09:59;  Status DC


Vancomycin HCl 250 ml @  250 mls/hr 1X  ONCE IV ;  Start 3/13/20 at 09:30;  Stop

3/13/20 at 10:29;  Status UNV


Vancomycin HCl 2 gm/Sodium Chloride 500 ml @  250 mls/hr 1X  ONCE IV  Last 

administered on 3/13/20at 09:56;  Start 3/13/20 at 09:45;  Stop 3/13/20 at 

11:44;  Status DC


Sodium Chloride 1,000 ml @  100 mls/hr Q10H IV  Last administered on 3/13/20at 

11:53;  Start 3/13/20 at 10:51;  Stop 3/14/20 at 10:50;  Status DC


Furosemide (Lasix) 40 mg 1X  ONCE IVP  Last administered on 3/13/20at 14:08;  

Start 3/13/20 at 14:00;  Stop 3/13/20 at 14:01;  Status DC


Piperacillin Sod/ Tazobactam Sod 3.375 gm/Sodium Chloride 50 ml @  100 mls/hr 

Q6HRS IV  Last administered on 3/22/20at 06:19;  Start 3/13/20 at 18:00;  Stop 

3/22/20 at 14:55;  Status DC


Insulin Human Lispro (HumaLOG) 0-9 UNITS TIDWMEALS SQ  Last administered on 

3/16/20at 17:00;  Start 3/13/20 at 17:00;  Stop 3/16/20 at 22:21;  Status DC


Dextrose (Dextrose 50%-Water Syringe) 12.5 gm PRN Q15MIN  PRN IV SEE COMMENTS;  

Start 3/13/20 at 14:00


Atorvastatin Calcium (Lipitor) 40 mg QHS PO  Last administered on 3/23/20at 

20:29;  Start 3/13/20 at 21:00


Aspirin (Ecotrin) 81 mg DAILYWBKFT PO  Last administered on 3/24/20at 08:17;  

Start 3/14/20 at 08:00


Aspirin (Ecotrin) 325 mg 1X  ONCE PO  Last administered on 3/13/20at 16:24;  

Start 3/13/20 at 16:00;  Stop 3/13/20 at 16:01;  Status DC


Furosemide (Lasix) 40 mg BID92 IVP  Last administered on 3/17/20at 10:11;  Start

3/14/20 at 09:00;  Stop 3/17/20 at 10:38;  Status DC


Heparin Sodium/ Dextrose 250 ml @ 0 mls/hr CONT  PRN IV PER PROTOCOL Last 

administered on 3/15/20at 20:31;  Start 3/13/20 at 16:15;  Stop 3/17/20 at 

13:51;  Status DC


Heparin Sodium (Porcine) (Heparin Sodium) 3,450 unit PRN Q6HRS  PRN IV FOR UFH 

LEVEL LESS THAN 0.2 Last administered on 3/13/20at 23:59;  Start 3/13/20 at 

16:15;  Stop 3/17/20 at 13:52;  Status DC


Info (Anti-Coagulation Monitoring By Pharmacy) 1 each PRN DAILY  PRN MC SEE 

COMMENTS Last administered on 3/16/20at 13:49;  Start 3/13/20 at 16:15;  Stop 3

/17/20 at 13:52;  Status DC


Lisinopril (Prinivil) 40 mg DAILY PO  Last administered on 3/16/20at 08:12;  

Start 3/14/20 at 09:00;  Stop 3/17/20 at 10:38;  Status DC


Metoprolol Tartrate (Lopressor) 50 mg BID PO ;  Start 3/13/20 at 21:00;  Status 

UNV


Atenolol (Tenormin) 100 mg DAILY PO  Last administered on 3/16/20at 08:15;  

Start 3/14/20 at 09:00;  Stop 3/16/20 at 13:45;  Status DC


Insulin Human Lispro (HumaLOG) 5 units 1X  ONCE SQ  Last administered on 

3/13/20at 21:46;  Start 3/13/20 at 22:00;  Stop 3/13/20 at 22:01;  Status DC


Insulin Glargine (Lantus Syringe) 80 unit QHS SQ ;  Start 3/13/20 at 22:00;  

Stop 3/13/20 at 21:56;  Status DC


Insulin Glargine (Lantus Syringe) 80 unit DAILY SQ ;  Start 3/14/20 at 09:00;  

Status Cancel


Insulin Glargine (Lantus Syringe) 80 unit DAILY08 SQ  Last administered on 

3/24/20at 09:15;  Start 3/14/20 at 08:00


Lactobacillus Rhamnosus (Culturelle) 1 cap BID PO  Last administered on 

3/24/20at 08:17;  Start 3/14/20 at 21:00


Sodium Chloride 1,000 ml @  75 mls/hr [id number] IV ;  Start 3/16/20 at 06:00;  

Stop 3/17/20 at 12:28;  Status DC


Amlodipine Besylate (Norvasc) 5 mg DAILY PO  Last administered on 3/16/20at 

08:13;  Start 3/15/20 at 16:00;  Stop 3/17/20 at 10:38;  Status DC


Iodixanol (Visipaque 320) 100 ml STK-MED ONCE .ROUTE ;  Start 3/16/20 at 07:56; 

Stop 3/16/20 at 07:56;  Status DC


Lidocaine HCl (Lidocaine 1% 20ml Vial) 20 ml STK-MED ONCE .ROUTE ;  Start 

3/16/20 at 07:56;  Stop 3/16/20 at 07:56;  Status DC


Heparin Sodium/ Sodium Chloride 1,500 ml @  As Directed STK-MED ONCE .ROUTE ;  

Start 3/16/20 at 07:56;  Stop 3/16/20 at 07:56;  Status DC


Fentanyl Citrate (Fentanyl 2ml Vial) 100 mcg STK-MED ONCE .ROUTE ;  Start 

3/16/20 at 09:03;  Stop 3/16/20 at 09:03;  Status DC


Midazolam HCl (Versed) 2 mg STK-MED ONCE .ROUTE ;  Start 3/16/20 at 09:03;  Stop

3/16/20 at 09:03;  Status DC


Heparin Sodium (Porcine) (Heparin Sodium) 10,000 unit STK-MED ONCE .ROUTE ;  

Start 3/16/20 at 09:39;  Stop 3/16/20 at 09:39;  Status DC


Verapamil HCl (Verapamil) 5 mg STK-MED ONCE .ROUTE ;  Start 3/16/20 at 09:39;  

Stop 3/16/20 at 09:39;  Status DC


Nitroglycerin/ Dextrose 250 ml @  As Directed STK-MED ONCE IV ;  Start 3/16/20 

at 09:39;  Stop 3/16/20 at 09:39;  Status DC


Nitroglycerin (Nitroglycerin) 200 mcg STK-MED ONCE .ROUTE ;  Start 3/16/20 at 

09:39;  Stop 3/16/20 at 09:39;  Status DC


Albuterol/ Ipratropium (Duoneb) 3 ml 1X  ONCE NEB  Last administered on 

3/16/20at 10:00;  Start 3/16/20 at 10:00;  Stop 3/16/20 at 10:01;  Status DC


Etomidate (Amidate) 20 mg STK-MED ONCE IV ;  Start 3/16/20 at 10:23;  Stop 

3/16/20 at 10:23;  Status DC


Succinylcholine Chloride (Anectine) 200 mg STK-MED ONCE .ROUTE ;  Start 3/16/20 

at 10:23;  Stop 3/16/20 at 10:23;  Status DC


Propofol 100 ml @  As Directed STK-MED ONCE IV ;  Start 3/16/20 at 10:35;  Stop 

3/16/20 at 10:35;  Status DC


Nitroglycerin (Nitroglycerin) 200 mcg 1X  ONCE IART  Last administered on 

3/16/20at 11:00;  Start 3/16/20 at 11:00;  Stop 3/16/20 at 11:01;  Status DC


Verapamil HCl (Verapamil) 2.5 mg 1X  ONCE IART ;  Start 3/16/20 at 11:00;  Stop 

3/16/20 at 11:01;  Status DC


Heparin Sodium (Porcine) (Heparin Sodium) 2,500 unit 1X  ONCE IART ;  Start 

3/16/20 at 11:00;  Stop 3/16/20 at 11:01;  Status DC


Heparin Sodium/ Sodium Chloride (HEPARIN for ARTERIAL LINE FLUSH) 1,000 unit 1X 

ONCE IART  Last administered on 3/16/20at 11:00;  Start 3/16/20 at 11:00;  Stop 

3/16/20 at 11:01;  Status DC


Heparin Sodium/ Sodium Chloride (HEPARIN for ARTERIAL LINE FLUSH) 1,000 unit 1X 

ONCE IART  Last administered on 3/16/20at 11:00;  Start 3/16/20 at 11:00;  Stop 

3/16/20 at 11:01;  Status DC


Midazolam HCl (Versed) 2 mg 1X  ONCE IV  Last administered on 3/16/20at 11:00;  

Start 3/16/20 at 11:00;  Stop 3/16/20 at 11:01;  Status DC


Fentanyl Citrate (Fentanyl 2ml Vial) 100 mcg 1X  ONCE IV  Last administered on 

3/16/20at 11:00;  Start 3/16/20 at 11:00;  Stop 3/16/20 at 11:01;  Status DC


Iodixanol (Visipaque 320) 100 ml 1X  ONCE IART ;  Start 3/16/20 at 11:00;  Stop 

3/16/20 at 11:01;  Status DC


Lidocaine HCl (Lidocaine 1% 20ml Vial) 20 ml 1X  ONCE INJ ;  Start 3/16/20 at 

11:00;  Stop 3/16/20 at 11:01;  Status DC


Norepinephrine Bitartrate 8 mg/ Dextrose 258 ml @  27.09 mls/ hr CONT  PRN IV 

PER PROTOCOL Last administered on 3/16/20at 12:55;  Start 3/16/20 at 11:15


Propofol 100 ml @  As Directed STK-MED ONCE IV ;  Start 3/16/20 at 12:39;  Stop 

3/16/20 at 12:40;  Status DC


Potassium Chloride/Water 100 ml @  100 mls/hr 1X  ONCE IV  Last administered on 

3/16/20at 13:06;  Start 3/16/20 at 13:00;  Stop 3/16/20 at 13:59;  Status DC


Midazolam HCl 50 mg/Sodium Chloride 50 ml @ 1 mls/hr CONT  PRN IV SEE I/O RECORD

Last administered on 3/20/20at 05:29;  Start 3/16/20 at 13:00


Magnesium Sulfate 50 ml @ 25 mls/hr 1X  ONCE IV  Last administered on 3/16/20at 

14:28;  Start 3/16/20 at 13:00;  Stop 3/16/20 at 14:59;  Status DC


Midazolam HCl 50 mg/Sodium Chloride 50 ml @ 1 mls/hr CONT  PRN IV SEE I/O 

RECORD;  Start 3/16/20 at 13:00;  Status UNV


Dobutamine HCl/ Dextrose 250 ml @  8.43 mls/hr CONT  PRN IV SEE I/O RECORD;  

Start 3/16/20 at 13:30


Atenolol (Tenormin) 25 mg DAILY PO ;  Start 3/17/20 at 09:00;  Stop 3/17/20 at 

10:38;  Status DC


Metolazone (Zaroxolyn) 2.5 mg DAILY PO  Last administered on 3/24/20at 08:17;  

Start 3/17/20 at 09:00


Fentanyl Citrate 30 ml @ 0 mls/hr CONT  PRN IV SEE PROTOCOL Last administered on

3/21/20at 11:33;  Start 3/16/20 at 22:30


Insulin Human Lispro (HumaLOG) 0-9 UNITS Q6HRS SQ  Last administered on 

3/24/20at 05:39;  Start 3/17/20 at 00:00


Furosemide 100 mg/ Sodium Chloride 100 ml @ 5 mls/hr CONT  PRN IV SEE I/O RECORD

Last administered on 3/21/20at 15:25;  Start 3/17/20 at 11:00


Hydralazine HCl (Apresoline Inj) 10 mg PRN Q4HRS  PRN IVP ELEV BP, SEE COMMENTS,

1ST CHO Last administered on 3/22/20at 23:51;  Start 3/17/20 at 10:45


Verapamil HCl (Verapamil) 5 mg STK-MED ONCE .ROUTE ;  Start 3/16/20 at 10:00;  

Stop 3/17/20 at 13:11;  Status DC


Famotidine (Pepcid Vial) 20 mg BID IVP  Last administered on 3/24/20at 08:18;  

Start 3/17/20 at 21:00


Linezolid/Dextrose 300 ml @  300 mls/hr Q12HR IV  Last administered on 3/18/20at

20:59;  Start 3/18/20 at 09:00;  Stop 3/19/20 at 08:46;  Status DC


Potassium Bicarbonate (Potassium Effervescent Tablet) 40 meq 1X  ONCE PO  Last 

administered on 3/18/20at 09:27;  Start 3/18/20 at 09:00;  Stop 3/18/20 at 

09:02;  Status DC


Potassium Chloride/Water 100 ml @  100 mls/hr 1X  ONCE IV  Last administered on 

3/19/20at 07:07;  Start 3/19/20 at 06:45;  Stop 3/19/20 at 07:44;  Status DC


Potassium Chloride/Water 100 ml @  100 mls/hr 1X  ONCE IV  Last administered on 

3/19/20at 08:31;  Start 3/19/20 at 09:00;  Stop 3/19/20 at 09:59;  Status DC


Potassium Chloride/Water 100 ml @  100 mls/hr 1X  ONCE IV  Last administered on 

3/19/20at 13:24;  Start 3/19/20 at 13:00;  Stop 3/19/20 at 13:59;  Status DC


Enalaprilat (Vasotec Inj) 2.5 mg PRN Q6HRS  PRN IVP HYPERTENSION, 2ND CHOICE 

Last administered on 3/20/20at 18:25;  Start 3/19/20 at 10:15


Amlodipine Besylate (Norvasc) 10 mg DAILY PO  Last administered on 3/24/20at 

08:18;  Start 3/19/20 at 12:00


Heparin Sodium (Porcine) (Heparin Sodium) 5,000 unit Q12HR SQ  Last administered

on 3/24/20at 08:19;  Start 3/19/20 at 14:00


Potassium Chloride/Water 100 ml @  100 mls/hr Q1H IV  Last administered on 

3/19/20at 19:26;  Start 3/19/20 at 16:00;  Stop 3/19/20 at 17:59;  Status DC


Propofol 100 ml @ 0 mls/hr CONT  PRN IV SEE PROTOCOL Last administered on 

3/21/20at 11:31;  Start 3/20/20 at 11:00


Potassium Chloride/Water 100 ml @  100 mls/hr Q1HR IV  Last administered on 

3/20/20at 16:51;  Start 3/20/20 at 12:00;  Stop 3/20/20 at 17:59;  Status DC


Potassium Chloride/Water 100 ml @  100 mls/hr Q1H IV ;  Start 3/20/20 at 22:00; 

Stop 3/21/20 at 05:59;  Status Cancel


Potassium Chloride/Water 100 ml @  100 mls/hr Q1H IV  Last administered on 

3/21/20at 00:53;  Start 3/20/20 at 22:00;  Stop 3/21/20 at 01:59;  Status DC


Potassium Chloride/Water 100 ml @  100 mls/hr Q1H IV  Last administered on 

3/21/20at 09:34;  Start 3/21/20 at 07:00;  Stop 3/21/20 at 08:59;  Status DC


Nitroglycerin/ Dextrose 250 ml @  1.5 mls/hr CONT  PRN IV SEE I/O RECORD Last 

administered on 3/21/20at 21:30;  Start 3/21/20 at 10:00


Dexmedetomidine HCl 400 mcg/ Sodium Chloride 100 ml @ 0 mls/hr CONT  PRN IV 

SEDATION Last administered on 3/24/20at 06:49;  Start 3/21/20 at 14:45


Sodium Chloride 500 ml @  500 mls/hr 1X PRN  PRN IV SEE COMMENTS;  Start 3/21/20

at 14:45


Atropine Sulfate (ATROPINE 0.5mg SYRINGE) 0.5 mg PRN Q5MIN  PRN IV SEE COMMENTS;

 Start 3/21/20 at 14:45


Potassium Chloride/Water 100 ml @  100 mls/hr Q1H IV  Last administered on 

3/21/20at 17:07;  Start 3/21/20 at 15:30;  Stop 3/21/20 at 17:29;  Status DC


Acetaminophen (Tylenol) 650 mg PRN Q6HRS  PRN PO MILD PAIN / TEMP Last 

administered on 3/22/20at 08:31;  Start 3/21/20 at 16:30


Potassium Chloride/Water 100 ml @  100 mls/hr Q1H IV  Last administered on 

3/22/20at 00:36;  Start 3/21/20 at 21:00;  Stop 3/21/20 at 22:59;  Status DC


Potassium Chloride/Water 100 ml @  100 mls/hr Q1H IV  Last administered on 

3/22/20at 09:42;  Start 3/22/20 at 08:00;  Stop 3/22/20 at 09:59;  Status DC


Sodium Chloride 1,000 ml @  100 mls/hr Q10H IV  Last administered on 3/24/20at 

04:09;  Start 3/22/20 at 23:55


Daptomycin 620 mg/ Sodium Chloride 50 ml @  100 mls/hr Q24H IV  Last 

administered on 3/23/20at 14:34;  Start 3/22/20 at 15:00


Micafungin Sodium 100 mg/Dextrose 100 ml @  100 mls/hr Q24H IV  Last 

administered on 3/23/20at 14:34;  Start 3/22/20 at 15:00


Meropenem 500 mg/ Sodium Chloride 50 ml @  100 mls/hr Q6HRS IV  Last 

administered on 3/24/20at 05:38;  Start 3/22/20 at 16:00


Fentanyl Citrate (Fentanyl 2ml Vial) 100 mcg STK-MED ONCE .ROUTE ;  Start 

3/22/20 at 17:04;  Stop 3/22/20 at 17:04;  Status DC


Fentanyl Citrate (Fentanyl 2ml Vial) 50 mcg PRN Q2HR  PRN IVP PAIN Last 

administered on 3/24/20at 10:18;  Start 3/22/20 at 17:15


Potassium Chloride/Water 100 ml @  100 mls/hr 1X  ONCE IV  Last administered on 

3/23/20at 10:47;  Start 3/23/20 at 10:00;  Stop 3/23/20 at 10:59;  Status DC


Iodixanol (Visipaque 320) 100 ml STK-MED ONCE .ROUTE ;  Start 3/23/20 at 11:08; 

Stop 3/23/20 at 11:08;  Status DC


Lidocaine HCl (Lidocaine 1% 20ml Vial) 20 ml STK-MED ONCE .ROUTE ;  Start 

3/23/20 at 11:08;  Stop 3/23/20 at 11:08;  Status DC


Heparin Sodium/ Sodium Chloride 500 ml @  As Directed STK-MED ONCE .ROUTE ;  

Start 3/23/20 at 11:08;  Stop 3/23/20 at 11:09;  Status DC


Heparin Sodium/ Sodium Chloride (HEPARIN for ARTERIAL LINE FLUSH) 1,000 unit 1X 

ONCE IART  Last administered on 3/23/20at 11:30;  Start 3/23/20 at 11:30;  Stop 

3/23/20 at 11:33;  Status DC


Heparin Sodium/ Sodium Chloride (HEPARIN for ARTERIAL LINE FLUSH) 1,000 unit 1X 

ONCE IART  Last administered on 3/23/20at 11:30;  Start 3/23/20 at 11:30;  Stop 

3/23/20 at 11:33;  Status DC


Iodixanol (Visipaque 320) 100 ml 1X  ONCE IART  Last administered on 3/23/20at 

11:30;  Start 3/23/20 at 11:30;  Stop 3/23/20 at 11:33;  Status DC


Lidocaine HCl (Lidocaine 1% 20ml Vial) 20 ml 1X  ONCE INJ  Last administered on 

3/23/20at 11:30;  Start 3/23/20 at 11:30;  Stop 3/23/20 at 11:33;  Status DC


Info (CONTRAST GIVEN -- Rx MONITORING) 1 each PRN DAILY  PRN MC SEE COMMENTS;  

Start 3/23/20 at 11:45;  Stop 3/25/20 at 11:44


Fentanyl Citrate (Fentanyl 2ml Vial) 100 mcg STK-MED ONCE .ROUTE ;  Start 

3/23/20 at 11:56;  Stop 3/23/20 at 11:57;  Status DC


Fentanyl Citrate (Fentanyl 2ml Vial) 100 mcg 1X  ONCE IV  Last administered on 

3/23/20at 12:15;  Start 3/23/20 at 12:15;  Stop 3/23/20 at 12:16;  Status DC


Bivalirudin (Angiomax) 250 mg STK-MED ONCE IV ;  Start 3/23/20 at 12:00;  Stop 

3/23/20 at 12:02;  Status DC


Bivalirudin (Angiomax) 250 mg 1X  ONCE IV  Last administered on 3/23/20at 12:05;

 Start 3/23/20 at 12:15;  Stop 3/23/20 at 12:16;  Status DC


Iodixanol (Visipaque 320) 100 ml STK-MED ONCE .ROUTE ;  Start 3/23/20 at 12:20; 

Stop 3/23/20 at 12:21;  Status DC


Bivalirudin (Angiomax) 250 mg STK-MED ONCE IV ;  Start 3/23/20 at 12:21;  Stop 

3/23/20 at 12:21;  Status DC


Bivalirudin (Angiomax) 250 mg 1X  ONCE IV  Last administered on 3/23/20at 12:25;

 Start 3/23/20 at 12:30;  Stop 3/23/20 at 12:31;  Status DC


Nitroglycerin (Nitroglycerin) 200 mcg STK-MED ONCE .ROUTE ;  Start 3/23/20 at 

12:25;  Stop 3/23/20 at 12:25;  Status DC


Nitroglycerin (Nitroglycerin) 200 mcg 1X  ONCE IART  Last administered on 

3/23/20at 12:30;  Start 3/23/20 at 12:30;  Stop 3/23/20 at 12:31;  Status DC


Clopidogrel Bisulfate (Plavix) 600 mg 1X  ONCE PO  Last administered on 

3/23/20at 14:21;  Start 3/23/20 at 12:45;  Stop 3/23/20 at 12:49;  Status DC


Aspirin (Chacorta Aspirin) 325 mg 1X  ONCE PO  Last administered on 3/23/20at 

14:20;  Start 3/23/20 at 12:45;  Stop 3/23/20 at 12:49;  Status DC


Dopamine HCl/ Dextrose (DOPamine 400MG/ 250ML PREMIX) 400 mg STK-MED ONCE IV ;  

Start 3/16/20 at 12:00;  Stop 3/23/20 at 12:56;  Status DC


Epinephrine HCl (EPINEPHrine SYRINGE) 1 mg STK-MED ONCE .ROUTE ;  Start 3/16/20 

at 12:00;  Stop 3/23/20 at 12:56;  Status DC


Potassium Chloride/Water 100 ml @  100 mls/hr Q1H  PRN IV SEE ORDER COMMENTS;  

Start 3/23/20 at 14:00


Potassium Chloride/Water 100 ml @  100 mls/hr Q1H  PRN IV SEE ORDER COMMENTS;  

Start 3/23/20 at 14:00


Potassium Chloride (Klor-Con) 40 meq Q2H  PRN PO SEE ORDER COMMENTS;  Start 

3/23/20 at 14:00


Magnesium Sulfate 100 ml @  50 mls/hr PRN DAILY  PRN IV SEE ORDER COMMENTS;  

Start 3/24/20 at 09:00


Sodium Phosphate 15 mmol/Sodium Chloride 255 ml @  62.5 mls/hr 1X  PRN IV SEE 

ORDER COMMENTS;  Start 3/23/20 at 14:00


Multi-Ingred Cream/Lotion/Oil/ Oint (Artificial Tears Eye Ointment) 1 julieth PRN 

Q1HR  PRN OU DRY EYE;  Start 3/24/20 at 10:30





Active Scripts


Active


Reported


Saw Melvin (Saw Melvin Fruit) 450 Mg Capsule 450 Mg PO DAILY


D3-50 (Cholecalciferol (Vitamin D3)) 50,000 Unit Capsule 1,000 Unit PO DAILY


Emergen-C 500 mg Chewable Tab (Vit C/Ascorb Sod/Multivit-Min) 500 Mg Tab.chew 

500 Mg PO DAILY


Zoloft (Sertraline Hcl) 50 Mg Tablet 50 Mg PO DAILY


Trazodone Hcl 50 Mg Tablet 1 Tab PO QHS


Trulicity (Dulaglutide) 0.75 Mg/0.5 Ml Pen.injctr 0.75 Mg SQ WEEKLY


Insulin Aspart 100 Unit/1 Ml Vial 25 Unit SQ TIDWMEALS


Levemir (Insulin Detemir) 100 Unit/1 Ml Vial 80 Unit SQ DAILY


Lasix (Furosemide) 20 Mg Tablet 20 Mg PO BID


Flomax (Tamsulosin Hcl) 0.4 Mg Cap.er.24h 0.4 Mg PO HS


Omeprazole 20 Mg Capsule.dr 20 Mg PO BID


Lisinopril 40 Mg Tablet 1 Tab PO DAILY


Atenolol 100 Mg Tablet 100 Mg PO BID





Allergies


Allergies:  


Coded Allergies:  


     chlorothiazide (Verified  Allergy, Intermediate, 3/13/20)


     metoprolol (Verified  Allergy, Intermediate, 3/13/20)





ROS


Review of System


   Unable to Obtain 2/2 Intubation





Physical Exam


Physical Exam


GENERAL: Orally intubated,


HEENT:  Pupils equal, ETT, OGT 


NECK: Supple - no JVD


LUNGS:  Decreased breath sounds bases,  


HEART:  S1, S2 


ABDOMEN: Obese, soft, no guarding + BS


: Younger in place 3/16


EXTREMITIES:  1- + pitting edema present.


MSK bilateral knee scars intact


SKIN:  No rash


NEURO:Intubated


Vital Signs





Vital Signs








  Date Time  Temp Pulse Resp B/P (MAP) Pulse Ox O2 Delivery O2 Flow Rate FiO2


 


3/24/20 10:18   22  96 Ventilator  


 


3/24/20 10:00 99.9 69  190/75 (113)    





 99.9       


 


3/24/20 04:38       4.0 








Assessment & Plan


HyperNatremia- 


Stable Na, monitor, if no improvement with free water flushes (G tube) may 

consider  Hypotonic Fluid- IV D5w 





RICHELLE - resolved  , UOP excellent  





HypoKalemia-replace per  protocol  





NSTEMI-  S/p Right and Left heart catheterization and selective coronary 

angiography


  Successful PCI/drug eluting stent placement to the left anterior descending ar

ezekiel. 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





Fever /Leukocytosis - better 





Acute resp failure s/p intubation- -COVID-19 NEG





Pyuria UC 50-100K enterococcus 3/13, ? colonization per ID  





Hx of  Partial nephrectomy.





CHF- per cardiology  





DM2





Hypertension- Cardiology managing





Labs


Labs





Laboratory Tests








Test


 3/22/20


15:24 3/22/20


16:00 3/22/20


17:48 3/23/20


00:48


 


Glucose (Fingerstick)


 234 mg/dL


(70-99) 


 207 mg/dL


(70-99) 181 mg/dL


(70-99)


 


Urine Collection Type  Unknown   


 


Urine Color  Yellow   


 


Urine Clarity  Clear   


 


Urine pH  7.0 (<5.0-8.0)   


 


Urine Specific Gravity


 


 1.020


(1.000-1.030) 


 





 


Urine Protein


 


 Negative mg/dL


(NEG-TRACE) 


 





 


Urine Glucose (UA)


 


 Negative mg/dL


(NEG) 


 





 


Urine Ketones (Stick)


 


 Negative mg/dL


(NEG) 


 





 


Urine Blood  Negative (NEG)   


 


Urine Nitrite  Negative (NEG)   


 


Urine Bilirubin  Negative (NEG)   


 


Urine Urobilinogen Dipstick


 


 4.0 mg/dL (0.2


mg/dL) 


 





 


Urine Leukocyte Esterase  Trace (NEG)   


 


Urine RBC  Occ /HPF (0-2)   


 


Urine WBC  1-4 /HPF (0-4)   


 


Urine Squamous Epithelial


Cells 


 None /LPF 


 


 





 


Urine Bacteria


 


 Few /HPF


(0-FEW) 


 





 


Test


 3/23/20


06:42 3/23/20


06:45 3/23/20


09:35 3/23/20


13:44


 


Glucose (Fingerstick)


 178 mg/dL


(70-99) 


 


 187 mg/dL


(70-99)


 


White Blood Count


 


 15.2 x10^3/uL


(4.0-11.0) 


 





 


Red Blood Count


 


 4.99 x10^6/uL


(4.30-5.70) 


 





 


Hemoglobin


 


 14.0 g/dL


(13.0-17.5) 


 





 


Hematocrit


 


 43.1 %


(39.0-53.0) 


 





 


Mean Corpuscular Volume  86 fL ()   


 


Mean Corpuscular Hemoglobin  28 pg (25-35)   


 


Mean Corpuscular Hemoglobin


Concent 


 33 g/dL


(31-37) 


 





 


Red Cell Distribution Width


 


 14.7 %


(11.5-14.5) 


 





 


Platelet Count


 


 197 x10^3/uL


(140-400) 


 





 


Neutrophils (%) (Auto)  76 % (31-73)   


 


Lymphocytes (%) (Auto)  13 % (24-48)   


 


Monocytes (%) (Auto)  9 % (0-9)   


 


Eosinophils (%) (Auto)  3 % (0-3)   


 


Basophils (%) (Auto)  0 % (0-3)   


 


Neutrophils # (Auto)


 


 11.5 x10^3/uL


(1.8-7.7) 


 





 


Lymphocytes # (Auto)


 


 1.9 x10^3/uL


(1.0-4.8) 


 





 


Monocytes # (Auto)


 


 1.3 x10^3/uL


(0.0-1.1) 


 





 


Eosinophils # (Auto)


 


 0.4 x10^3/uL


(0.0-0.7) 


 





 


Basophils # (Auto)


 


 0.1 x10^3/uL


(0.0-0.2) 


 





 


Sodium Level


 


 154 mmol/L


(136-145) 


 





 


Potassium Level


 


 3.1 mmol/L


(3.5-5.1) 


 





 


Chloride Level


 


 109 mmol/L


() 


 





 


Carbon Dioxide Level


 


 39 mmol/L


(21-32) 


 





 


Anion Gap  6 (6-14)   


 


Blood Urea Nitrogen


 


 45 mg/dL


(8-26) 


 





 


Creatinine


 


 1.2 mg/dL


(0.7-1.3) 


 





 


Estimated GFR


(Cockcroft-Gault) 


 59.3 


 


 





 


Glucose Level


 


 189 mg/dL


(70-99) 


 





 


Calcium Level


 


 9.1 mg/dL


(8.5-10.1) 


 





 


O2 Saturation   95 % (92-99)  


 


Arterial Blood pH


 


 


 7.52


(7.35-7.45) 





 


Arterial Blood pCO2 at


Patient Temp 


 


 48 mmHg


(35-46) 





 


Arterial Blood pO2 at Patient


Temp 


 


 72 mmHg


() 





 


Arterial Blood HCO3


 


 


 38 mmol/L


(21-28) 





 


Arterial Blood Base Excess


 


 


 14 mmol/L


(-3-3) 





 


FiO2   40  


 


Test


 3/23/20


18:18 3/23/20


23:44 3/24/20


05:37 3/24/20


06:20


 


Glucose (Fingerstick)


 181 mg/dL


(70-99) 174 mg/dL


(70-99) 210 mg/dL


(70-99) 





 


White Blood Count


 


 


 


 11.4 x10^3/uL


(4.0-11.0)


 


Red Blood Count


 


 


 


 4.79 x10^6/uL


(4.30-5.70)


 


Hemoglobin


 


 


 


 13.2 g/dL


(13.0-17.5)


 


Hematocrit


 


 


 


 41.5 %


(39.0-53.0)


 


Mean Corpuscular Volume    87 fL () 


 


Mean Corpuscular Hemoglobin    28 pg (25-35) 


 


Mean Corpuscular Hemoglobin


Concent 


 


 


 32 g/dL


(31-37)


 


Red Cell Distribution Width


 


 


 


 14.8 %


(11.5-14.5)


 


Platelet Count


 


 


 


 203 x10^3/uL


(140-400)


 


Neutrophils (%) (Auto)    75 % (31-73) 


 


Lymphocytes (%) (Auto)    13 % (24-48) 


 


Monocytes (%) (Auto)    9 % (0-9) 


 


Eosinophils (%) (Auto)    3 % (0-3) 


 


Basophils (%) (Auto)    1 % (0-3) 


 


Neutrophils # (Auto)


 


 


 


 8.5 x10^3/uL


(1.8-7.7)


 


Lymphocytes # (Auto)


 


 


 


 1.5 x10^3/uL


(1.0-4.8)


 


Monocytes # (Auto)


 


 


 


 1.0 x10^3/uL


(0.0-1.1)


 


Eosinophils # (Auto)


 


 


 


 0.4 x10^3/uL


(0.0-0.7)


 


Basophils # (Auto)


 


 


 


 0.1 x10^3/uL


(0.0-0.2)


 


Sodium Level


 


 


 


 152 mmol/L


(136-145)


 


Potassium Level


 


 


 


 3.3 mmol/L


(3.5-5.1)


 


Chloride Level


 


 


 


 110 mmol/L


()


 


Carbon Dioxide Level


 


 


 


 37 mmol/L


(21-32)


 


Anion Gap    5 (6-14) 


 


Blood Urea Nitrogen


 


 


 


 42 mg/dL


(8-26)


 


Creatinine


 


 


 


 1.1 mg/dL


(0.7-1.3)


 


Estimated GFR


(Cockcroft-Gault) 


 


 


 65.6 





 


Glucose Level


 


 


 


 202 mg/dL


(70-99)


 


Calcium Level


 


 


 


 8.7 mg/dL


(8.5-10.1)


 


Magnesium Level


 


 


 


 2.3 mg/dL


(1.8-2.4)


 


Test


 3/24/20


08:15 


 


 





 


O2 Saturation 96 % (92-99)    


 


Arterial Blood pH


 7.49


(7.35-7.45) 


 


 





 


Arterial Blood pCO2 at


Patient Temp 47 mmHg


(35-46) 


 


 





 


Arterial Blood pO2 at Patient


Temp 81 mmHg


() 


 


 





 


Arterial Blood HCO3


 35 mmol/L


(21-28) 


 


 





 


Arterial Blood Base Excess


 10 mmol/L


(-3-3) 


 


 





 


FiO2 40    








Laboratory Tests








Test


 3/23/20


13:44 3/23/20


18:18 3/23/20


23:44 3/24/20


05:37


 


Glucose (Fingerstick)


 187 mg/dL


(70-99) 181 mg/dL


(70-99) 174 mg/dL


(70-99) 210 mg/dL


(70-99)


 


Test


 3/24/20


06:20 3/24/20


08:15 


 





 


White Blood Count


 11.4 x10^3/uL


(4.0-11.0) 


 


 





 


Red Blood Count


 4.79 x10^6/uL


(4.30-5.70) 


 


 





 


Hemoglobin


 13.2 g/dL


(13.0-17.5) 


 


 





 


Hematocrit


 41.5 %


(39.0-53.0) 


 


 





 


Mean Corpuscular Volume 87 fL ()    


 


Mean Corpuscular Hemoglobin 28 pg (25-35)    


 


Mean Corpuscular Hemoglobin


Concent 32 g/dL


(31-37) 


 


 





 


Red Cell Distribution Width


 14.8 %


(11.5-14.5) 


 


 





 


Platelet Count


 203 x10^3/uL


(140-400) 


 


 





 


Neutrophils (%) (Auto) 75 % (31-73)    


 


Lymphocytes (%) (Auto) 13 % (24-48)    


 


Monocytes (%) (Auto) 9 % (0-9)    


 


Eosinophils (%) (Auto) 3 % (0-3)    


 


Basophils (%) (Auto) 1 % (0-3)    


 


Neutrophils # (Auto)


 8.5 x10^3/uL


(1.8-7.7) 


 


 





 


Lymphocytes # (Auto)


 1.5 x10^3/uL


(1.0-4.8) 


 


 





 


Monocytes # (Auto)


 1.0 x10^3/uL


(0.0-1.1) 


 


 





 


Eosinophils # (Auto)


 0.4 x10^3/uL


(0.0-0.7) 


 


 





 


Basophils # (Auto)


 0.1 x10^3/uL


(0.0-0.2) 


 


 





 


Sodium Level


 152 mmol/L


(136-145) 


 


 





 


Potassium Level


 3.3 mmol/L


(3.5-5.1) 


 


 





 


Chloride Level


 110 mmol/L


() 


 


 





 


Carbon Dioxide Level


 37 mmol/L


(21-32) 


 


 





 


Anion Gap 5 (6-14)    


 


Blood Urea Nitrogen


 42 mg/dL


(8-26) 


 


 





 


Creatinine


 1.1 mg/dL


(0.7-1.3) 


 


 





 


Estimated GFR


(Cockcroft-Gault) 65.6 


 


 


 





 


Glucose Level


 202 mg/dL


(70-99) 


 


 





 


Calcium Level


 8.7 mg/dL


(8.5-10.1) 


 


 





 


Magnesium Level


 2.3 mg/dL


(1.8-2.4) 


 


 





 


O2 Saturation  96 % (92-99)   


 


Arterial Blood pH


 


 7.49


(7.35-7.45) 


 





 


Arterial Blood pCO2 at


Patient Temp 


 47 mmHg


(35-46) 


 





 


Arterial Blood pO2 at Patient


Temp 


 81 mmHg


() 


 





 


Arterial Blood HCO3


 


 35 mmol/L


(21-28) 


 





 


Arterial Blood Base Excess


 


 10 mmol/L


(-3-3) 


 





 


FiO2  40   








Review


All relevant outside records, renal labs, imaging studies, telemetry/EKG's were 

reviewed.





Images


Images


CT Head 3/22





IMPRESSION:


No acute intracranial abnormality.





CT CHest/ABd/Pel 3/22


IMPRESSION:


1.  Patchy airspace disease in the lung bases bilaterally with more 


consolidative changes in the lower lobes bilaterally. Findings may 


represent infectious or inflammatory etiologies. Correlate for aspiration 


given distribution.


2.  No acute process identified within the abdomen and pelvis. No focal 


fluid collection to suggest abscess.











BENNY ESCOBAR MD                Mar 24, 2020 10:48

## 2020-03-24 NOTE — NUR
SS following up with discharge planning. [facility] contacted SS and declined pt due 
to high medication costs. Pt's RN notified. SS will follow up with family to discuss other 
LTAC options.

## 2020-03-24 NOTE — PDOC
PULMONARY PROGRESS NOTES


Subjective


Patient underwent cardiac catheterization yesterday, currently on assist control

ventilation, sedated, at times his blood pressure rises,





Nursing staff informed me the patient does move all extremities


Vitals





Vital Signs








  Date Time  Temp Pulse Resp B/P (MAP) Pulse Ox O2 Delivery O2 Flow Rate FiO2


 


3/24/20 08:18  69  164/71    


 


3/24/20 08:16     40 Ventilator  


 


3/24/20 07:00 99.9  22     





 99.9       


 


3/24/20 04:38       4.0 








Comments


ros  unable to obtain





on vent  open eyes w verbal stimuli


HEENT:  Other (nc at perrl  nose clear   orally intubated   neck no lad  no 

thyromegaly)


Lungs:  Crackles


Cardiovascular:  S1, S2


Abdomen:  Soft, Non-tender, Other (no mass)


Extremities:  No Edema


Skin:  Warm


Labs





Laboratory Tests








Test


 3/22/20


15:24 3/22/20


16:00 3/22/20


17:48 3/23/20


00:48


 


Glucose (Fingerstick)


 234 mg/dL


(70-99) 


 207 mg/dL


(70-99) 181 mg/dL


(70-99)


 


Urine Collection Type  Unknown   


 


Urine Color  Yellow   


 


Urine Clarity  Clear   


 


Urine pH  7.0 (<5.0-8.0)   


 


Urine Specific Gravity


 


 1.020


(1.000-1.030) 


 





 


Urine Protein


 


 Negative mg/dL


(NEG-TRACE) 


 





 


Urine Glucose (UA)


 


 Negative mg/dL


(NEG) 


 





 


Urine Ketones (Stick)


 


 Negative mg/dL


(NEG) 


 





 


Urine Blood  Negative (NEG)   


 


Urine Nitrite  Negative (NEG)   


 


Urine Bilirubin  Negative (NEG)   


 


Urine Urobilinogen Dipstick


 


 4.0 mg/dL (0.2


mg/dL) 


 





 


Urine Leukocyte Esterase  Trace (NEG)   


 


Urine RBC  Occ /HPF (0-2)   


 


Urine WBC  1-4 /HPF (0-4)   


 


Urine Squamous Epithelial


Cells 


 None /LPF 


 


 





 


Urine Bacteria


 


 Few /HPF


(0-FEW) 


 





 


Test


 3/23/20


06:42 3/23/20


06:45 3/23/20


09:35 3/23/20


13:44


 


Glucose (Fingerstick)


 178 mg/dL


(70-99) 


 


 187 mg/dL


(70-99)


 


White Blood Count


 


 15.2 x10^3/uL


(4.0-11.0) 


 





 


Red Blood Count


 


 4.99 x10^6/uL


(4.30-5.70) 


 





 


Hemoglobin


 


 14.0 g/dL


(13.0-17.5) 


 





 


Hematocrit


 


 43.1 %


(39.0-53.0) 


 





 


Mean Corpuscular Volume  86 fL ()   


 


Mean Corpuscular Hemoglobin  28 pg (25-35)   


 


Mean Corpuscular Hemoglobin


Concent 


 33 g/dL


(31-37) 


 





 


Red Cell Distribution Width


 


 14.7 %


(11.5-14.5) 


 





 


Platelet Count


 


 197 x10^3/uL


(140-400) 


 





 


Neutrophils (%) (Auto)  76 % (31-73)   


 


Lymphocytes (%) (Auto)  13 % (24-48)   


 


Monocytes (%) (Auto)  9 % (0-9)   


 


Eosinophils (%) (Auto)  3 % (0-3)   


 


Basophils (%) (Auto)  0 % (0-3)   


 


Neutrophils # (Auto)


 


 11.5 x10^3/uL


(1.8-7.7) 


 





 


Lymphocytes # (Auto)


 


 1.9 x10^3/uL


(1.0-4.8) 


 





 


Monocytes # (Auto)


 


 1.3 x10^3/uL


(0.0-1.1) 


 





 


Eosinophils # (Auto)


 


 0.4 x10^3/uL


(0.0-0.7) 


 





 


Basophils # (Auto)


 


 0.1 x10^3/uL


(0.0-0.2) 


 





 


Sodium Level


 


 154 mmol/L


(136-145) 


 





 


Potassium Level


 


 3.1 mmol/L


(3.5-5.1) 


 





 


Chloride Level


 


 109 mmol/L


() 


 





 


Carbon Dioxide Level


 


 39 mmol/L


(21-32) 


 





 


Anion Gap  6 (6-14)   


 


Blood Urea Nitrogen


 


 45 mg/dL


(8-26) 


 





 


Creatinine


 


 1.2 mg/dL


(0.7-1.3) 


 





 


Estimated GFR


(Cockcroft-Gault) 


 59.3 


 


 





 


Glucose Level


 


 189 mg/dL


(70-99) 


 





 


Calcium Level


 


 9.1 mg/dL


(8.5-10.1) 


 





 


O2 Saturation   95 % (92-99)  


 


Arterial Blood pH


 


 


 7.52


(7.35-7.45) 





 


Arterial Blood pCO2 at


Patient Temp 


 


 48 mmHg


(35-46) 





 


Arterial Blood pO2 at Patient


Temp 


 


 72 mmHg


() 





 


Arterial Blood HCO3


 


 


 38 mmol/L


(21-28) 





 


Arterial Blood Base Excess


 


 


 14 mmol/L


(-3-3) 





 


FiO2   40  


 


Test


 3/23/20


18:18 3/23/20


23:44 3/24/20


05:37 3/24/20


06:20


 


Glucose (Fingerstick)


 181 mg/dL


(70-99) 174 mg/dL


(70-99) 210 mg/dL


(70-99) 





 


White Blood Count


 


 


 


 11.4 x10^3/uL


(4.0-11.0)


 


Red Blood Count


 


 


 


 4.79 x10^6/uL


(4.30-5.70)


 


Hemoglobin


 


 


 


 13.2 g/dL


(13.0-17.5)


 


Hematocrit


 


 


 


 41.5 %


(39.0-53.0)


 


Mean Corpuscular Volume    87 fL () 


 


Mean Corpuscular Hemoglobin    28 pg (25-35) 


 


Mean Corpuscular Hemoglobin


Concent 


 


 


 32 g/dL


(31-37)


 


Red Cell Distribution Width


 


 


 


 14.8 %


(11.5-14.5)


 


Platelet Count


 


 


 


 203 x10^3/uL


(140-400)


 


Neutrophils (%) (Auto)    75 % (31-73) 


 


Lymphocytes (%) (Auto)    13 % (24-48) 


 


Monocytes (%) (Auto)    9 % (0-9) 


 


Eosinophils (%) (Auto)    3 % (0-3) 


 


Basophils (%) (Auto)    1 % (0-3) 


 


Neutrophils # (Auto)


 


 


 


 8.5 x10^3/uL


(1.8-7.7)


 


Lymphocytes # (Auto)


 


 


 


 1.5 x10^3/uL


(1.0-4.8)


 


Monocytes # (Auto)


 


 


 


 1.0 x10^3/uL


(0.0-1.1)


 


Eosinophils # (Auto)


 


 


 


 0.4 x10^3/uL


(0.0-0.7)


 


Basophils # (Auto)


 


 


 


 0.1 x10^3/uL


(0.0-0.2)








Laboratory Tests








Test


 3/23/20


09:35 3/23/20


13:44 3/23/20


18:18 3/23/20


23:44


 


O2 Saturation 95 % (92-99)    


 


Arterial Blood pH


 7.52


(7.35-7.45) 


 


 





 


Arterial Blood pCO2 at


Patient Temp 48 mmHg


(35-46) 


 


 





 


Arterial Blood pO2 at Patient


Temp 72 mmHg


() 


 


 





 


Arterial Blood HCO3


 38 mmol/L


(21-28) 


 


 





 


Arterial Blood Base Excess


 14 mmol/L


(-3-3) 


 


 





 


FiO2 40    


 


Glucose (Fingerstick)


 


 187 mg/dL


(70-99) 181 mg/dL


(70-99) 174 mg/dL


(70-99)


 


Test


 3/24/20


05:37 3/24/20


06:20 


 





 


Glucose (Fingerstick)


 210 mg/dL


(70-99) 


 


 





 


White Blood Count


 


 11.4 x10^3/uL


(4.0-11.0) 


 





 


Red Blood Count


 


 4.79 x10^6/uL


(4.30-5.70) 


 





 


Hemoglobin


 


 13.2 g/dL


(13.0-17.5) 


 





 


Hematocrit


 


 41.5 %


(39.0-53.0) 


 





 


Mean Corpuscular Volume  87 fL ()   


 


Mean Corpuscular Hemoglobin  28 pg (25-35)   


 


Mean Corpuscular Hemoglobin


Concent 


 32 g/dL


(31-37) 


 





 


Red Cell Distribution Width


 


 14.8 %


(11.5-14.5) 


 





 


Platelet Count


 


 203 x10^3/uL


(140-400) 


 





 


Neutrophils (%) (Auto)  75 % (31-73)   


 


Lymphocytes (%) (Auto)  13 % (24-48)   


 


Monocytes (%) (Auto)  9 % (0-9)   


 


Eosinophils (%) (Auto)  3 % (0-3)   


 


Basophils (%) (Auto)  1 % (0-3)   


 


Neutrophils # (Auto)


 


 8.5 x10^3/uL


(1.8-7.7) 


 





 


Lymphocytes # (Auto)


 


 1.5 x10^3/uL


(1.0-4.8) 


 





 


Monocytes # (Auto)


 


 1.0 x10^3/uL


(0.0-1.1) 


 





 


Eosinophils # (Auto)


 


 0.4 x10^3/uL


(0.0-0.7) 


 





 


Basophils # (Auto)


 


 0.1 x10^3/uL


(0.0-0.2) 


 











Medications





Active Scripts








 Medications  Dose


 Route/Sig


 Max Daily Dose Days Date Category


 


 Saw Dougherty (Saw


 Palmetto Fruit)


 450 Mg Capsule  450 Mg


 PO DAILY


   3/13/20 Reported


 


 D3-50


  (Cholecalciferol


  (Vitamin D3))


 50,000 Unit


 Capsule  1,000 Unit


 PO DAILY


   3/13/20 Reported


 


 Emergen-C 500 mg


 Chewable Tab (Vit


 C/Ascorb


 Sod/Multivit-Min)


 500 Mg Tab.chew  500 Mg


 PO DAILY


   3/13/20 Reported


 


 Zoloft


  (Sertraline Hcl)


 50 Mg Tablet  50 Mg


 PO DAILY


   3/13/20 Reported


 


 Trazodone Hcl 50


 Mg Tablet  1 Tab


 PO QHS


   3/13/20 Reported


 


 Trulicity


  (Dulaglutide)


 0.75 Mg/0.5 Ml


 Pen.injctr  0.75 Mg


 SQ WEEKLY


   3/13/20 Reported


 


 Insulin Aspart


 100 Unit/1 Ml Vial  25 Unit


 SQ TIDWMEALS


   3/13/20 Reported


 


 Levemir (Insulin


 Detemir) 100


 Unit/1 Ml Vial  80 Unit


 SQ DAILY


   3/13/20 Reported


 


 Lasix


  (Furosemide) 20


 Mg Tablet  20 Mg


 PO BID


   3/13/20 Reported


 


 Flomax


  (Tamsulosin Hcl)


 0.4 Mg Cap.er.24h  0.4 Mg


 PO HS


   3/13/20 Reported


 


 Omeprazole 20 Mg


 Capsule.dr  20 Mg


 PO BID


   3/13/20 Reported


 


 Lisinopril 40 Mg


 Tablet  1 Tab


 PO DAILY


   3/13/20 Reported


 


 Atenolol 100 Mg


 Tablet  100 Mg


 PO BID


   3/13/20 Reported








Comments


Status post catheterization yesterday successful PCI drug-eluting stent to the 

LAD


Continue assist control for now, possible trial later on today, case discussed 

with RN


Diuresis, and monitor chest x-ray





Impression


.


IMPRESSION:


1.  Acute hypercapnic and hypoxic respiratory failure sec to flash pulmonary 

edema


2.  Abnormal chest x-ray with bilateral interstitial infiltrates suggesting 

interstitial edema


3.  Leukocytosis


4.  No significant tobacco history.


5.  Suspected obesity hypoventilation syndrome and obstructive sleep apnea,


never been tested.


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


7.  HYPOTENSION IMPROVED


8.  COVID NEGATIVE


9.  Status post cardiac catheterization yesterday revealing severe two-vessel 

coronary artery disease status post PCI drug-eluting stent to the LAD


10.  Fever, curve has improved, antibiotics per ID.





Chest x-ray reviewed slightly worse we will give additional Lasix











cath 3/23


1.  Severe two-vessel coronary artery disease


2.  Successful PCI/drug eluting stent placement to the left anterior descending 

artery. Attempted PCI to the chronic total occlusion of the left circumflex 

artery.


3.  Mild to moderate pulmonary hypertension secondary to elevated left-sided 

filling pressures as evidence by elevated primary capillary wedge pressure.


4.  No evidence of intra-cardiac shunt.





Recommendations


1.  Aspirin 325 mg daily for one month followed by 81 mg daily


2.  Plavix 75 mg daily for preferably one year


3.  Cardiovascular risk factor modification


4.  Diuresis for acute on chronic diastolic heart failure





Plan


.


Status post cardiac catheterization she see report





Antibiotics per ID





GI prophylaxis





Antibiotics per ID





Possible trial later on this afternoon


Case discussed with ADALBERTO SERRANO MD              Mar 24, 2020 09:18

## 2020-03-24 NOTE — PDOC
CASH DELAROSA APRN 3/24/20 1304:


CARDIO Progress Notes


Date and Time


Date of Service


3/24/20


Time of Evaluation


1210





Subjective


Subjective:  Other (intubated/sedated)





Vitals


Vitals





Vital Signs








  Date Time  Temp Pulse Resp B/P (MAP) Pulse Ox O2 Delivery O2 Flow Rate FiO2


 


3/24/20 11:48     96 Ventilator  


 


3/24/20 10:56  77  190/105    


 


3/24/20 10:50   23     


 


3/24/20 10:00 99.9       





 99.9       


 


3/24/20 04:38       4.0 








Weight


Weight [ ]





Input and Output


Intake and Output











Intake and Output 


 


 3/24/20





 07:00


 


Intake Total 4602 ml


 


Output Total 2665 ml


 


Balance 1937 ml


 


 


 


IV Total 2642 ml


 


Tube Feeding 1210 ml


 


Other 750 ml


 


Output Urine Total 2665 ml











Laboratory


Labs





Laboratory Tests








Test


 3/23/20


13:44 3/23/20


18:18 3/23/20


23:44 3/24/20


05:37


 


Glucose (Fingerstick)


 187 mg/dL


(70-99) 181 mg/dL


(70-99) 174 mg/dL


(70-99) 210 mg/dL


(70-99)


 


Test


 3/24/20


06:20 3/24/20


08:15 3/24/20


12:24 





 


White Blood Count


 11.4 x10^3/uL


(4.0-11.0) 


 


 





 


Red Blood Count


 4.79 x10^6/uL


(4.30-5.70) 


 


 





 


Hemoglobin


 13.2 g/dL


(13.0-17.5) 


 


 





 


Hematocrit


 41.5 %


(39.0-53.0) 


 


 





 


Mean Corpuscular Volume 87 fL ()    


 


Mean Corpuscular Hemoglobin 28 pg (25-35)    


 


Mean Corpuscular Hemoglobin


Concent 32 g/dL


(31-37) 


 


 





 


Red Cell Distribution Width


 14.8 %


(11.5-14.5) 


 


 





 


Platelet Count


 203 x10^3/uL


(140-400) 


 


 





 


Neutrophils (%) (Auto) 75 % (31-73)    


 


Lymphocytes (%) (Auto) 13 % (24-48)    


 


Monocytes (%) (Auto) 9 % (0-9)    


 


Eosinophils (%) (Auto) 3 % (0-3)    


 


Basophils (%) (Auto) 1 % (0-3)    


 


Neutrophils # (Auto)


 8.5 x10^3/uL


(1.8-7.7) 


 


 





 


Lymphocytes # (Auto)


 1.5 x10^3/uL


(1.0-4.8) 


 


 





 


Monocytes # (Auto)


 1.0 x10^3/uL


(0.0-1.1) 


 


 





 


Eosinophils # (Auto)


 0.4 x10^3/uL


(0.0-0.7) 


 


 





 


Basophils # (Auto)


 0.1 x10^3/uL


(0.0-0.2) 


 


 





 


Sodium Level


 152 mmol/L


(136-145) 


 


 





 


Potassium Level


 3.3 mmol/L


(3.5-5.1) 


 


 





 


Chloride Level


 110 mmol/L


() 


 


 





 


Carbon Dioxide Level


 37 mmol/L


(21-32) 


 


 





 


Anion Gap 5 (6-14)    


 


Blood Urea Nitrogen


 42 mg/dL


(8-26) 


 


 





 


Creatinine


 1.1 mg/dL


(0.7-1.3) 


 


 





 


Estimated GFR


(Cockcroft-Gault) 65.6 


 


 


 





 


Glucose Level


 202 mg/dL


(70-99) 


 


 





 


Calcium Level


 8.7 mg/dL


(8.5-10.1) 


 


 





 


Magnesium Level


 2.3 mg/dL


(1.8-2.4) 


 


 





 


O2 Saturation  96 % (92-99)   


 


Arterial Blood pH


 


 7.49


(7.35-7.45) 


 





 


Arterial Blood pCO2 at


Patient Temp 


 47 mmHg


(35-46) 


 





 


Arterial Blood pO2 at Patient


Temp 


 81 mmHg


() 


 





 


Arterial Blood HCO3


 


 35 mmol/L


(21-28) 


 





 


Arterial Blood Base Excess


 


 10 mmol/L


(-3-3) 


 





 


FiO2  40   


 


Glucose (Fingerstick)


 


 


 228 mg/dL


(70-99) 














Microbiology


Micro





Microbiology


3/22/20 - Final, Resulted


          


3/22/20 - Final, Resulted


          


3/22/20 - Final, Resulted


          


3/22/20 - Preliminary, Resulted


          


3/22/20 - Preliminary, Resulted


          


3/22/20 - Preliminary, Resulted


          


3/22/20 Gram Stain Evaluation - Final, Resulted


          


3/22/20 Sputum Culture, Resulted


          Pending


3/21/20 Blood Culture - Preliminary, Resulted


          NO GROWTH AFTER 2 DAYS


3/13/20 Urine Culture - Final, Complete


          


3/13/20 Urine Culture Result 1 (CAR) - Final, Complete


          


3/13/20 Antimicrobic Susceptibility - Final, Complete





Physical Exam


HEENT:  Neck Supple W Full Motion


Chest:  Symmetric


LUNGS:  Other (mechanical ventilation )


Heart:  S1S2, RRR (SB/SR with PVCs), other (distant heart tones)


Abdomen:  Other (soft, obese)


Extremities:  Other (1+ bilateral LE edema )


Neurology:  other (sedated)





Assessment


Assessment


1. Acute on chronic diastolic CHF; Echo with preserved LV systolic function. 

Cath with elevated filling pressures


2. NSTEMI; peak 0.7. 


3. CAD; cath with severe two-vessel CAD. S/p PCI/LEANNE to the LAD.  of LCx.


4. Acute respiratory failure with CHF; s/p intubation 


5. DM2: insulin dependent


6. Accelerated HTN: labile


7. Morbid obesity


8. Leukocytosis with UTI: on antibiotics per PCP


9. Hypokalemia, hypernatremia. Free water. 








Recommendations





Replace potassium 


Secondary prevention including DAPT with ASA, Plavix


No BB with bradycardia 


Hydralazine IV PRN for BP control


Continue free water with hypernatremia


Would recommend discontinuing IVFs and given Lasix. 


Ongoing supportive care





MARCELA ZARCO MD 3/24/20 1534:


CARDIO Progress Notes


Assessment


Assessment


Patient seen and examined


1. Acute on chronic diastolic CHF; Echo with preserved LV systolic function. 

Cath with mildly elevated filling pressures


2. NSTEMI; peak 0.7. 


3. CAD; cath with severe two-vessel CAD. S/p PCI/LEANNE to the LAD.  of LCx. 

Continue present treatment.


4. Acute respiratory failure with CHF; s/p intubation 


.  Weaning as per pulmonary.


5. DM2: insulin dependent


6. Accelerated HTN: labile


7. Morbid obesity


8. Leukocytosis with UTI: on antibiotics per PCP


9. Hypokalemia, hypernatremia.





BRANDIN GIBBONS MD 3/24/20 2016:


CARDIO Progress Notes


Assessment


Assessment


Patient seen and examined.  Agree with NP's assessment and plan.


s/p successful PCI/LEANNE to LAD yesterday and attempted PCI to LCX


Stable and chest pain free


Ac on chr diast HF better compensated


Daniel for outpatient ischemic eval to assess importance of  LCX if he continues

to be symptomatic and consider more aggressive intervention techniques











CASH DELAROSA           Mar 24, 2020 13:04


MARCELA ZARCO MD            Mar 24, 2020 15:34


BRANDIN GIBBONS MD           Mar 24, 2020 20:16

## 2020-03-25 NOTE — NUR
SS following up with discharge planning. SS discussed discharge planning and [facility] denial 
with pt's spouse, Ella. Other LTAC options discussed. Pt's spouse agreeable to [facility], [phone]; fax [phone]. SS phoned and faxed referral to [facility]. SS will await acceptance decision and insurance determination and will 
proceed accordingly.

## 2020-03-25 NOTE — PDOC
SUBJECTIVE


ROS


Stable, Intubate, plan to extubate today





OBJECTIVE


Vital Signs





Vital Signs








  Date Time  Temp Pulse Resp B/P (MAP) Pulse Ox O2 Delivery O2 Flow Rate FiO2


 


3/25/20 08:20     98 Ventilator  


 


3/25/20 08:09  52  124/59    


 


3/25/20 06:00   18     


 


3/25/20 04:00 98.3       





 98.3       








I & 0











Intake and Output 


 


 3/25/20





 07:00


 


Intake Total 6459 ml


 


Output Total 4030 ml


 


Balance 2429 ml


 


 


 


IV Total 2491 ml


 


Tube Feeding 2868 ml


 


Other 1100 ml


 


Output Urine Total 4030 ml











PHYSICAL EXAM


Physical Exam


GENERAL: Orally intubated,


HEENT:  Pupils equal, ETT, OGT 


NECK: Supple - no JVD


LUNGS:  Decreased breath sounds bases,  


HEART:  S1, S2 


ABDOMEN: Obese, soft, no guarding + BS


: Younger in place 3/16


EXTREMITIES:  1- + pitting edema present.


MSK bilateral knee scars intact


SKIN:  No rash


NEURO:Intubated





DIAGNOSIS/ASSESSMENT


Assessment & Plan


HyperNatremia- 


Stable Na, monitor, currently on IV D5w 


Free water flushes  





RICHELLE - resolved  , UOP excellent  





HypoKalemia-Normal K 





NSTEMI-  S/p Right and Left heart catheterization and selective coronary 

angiography


  Successful PCI/drug eluting stent placement to the left anterior descending 

artery. 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





Fever /Leukocytosis - better 





Acute resp failure s/p intubation- -COVID-19 NEG





Pyuria UC 50-100K enterococcus 3/13, ? colonization per ID  





Hx of  Partial nephrectomy.





CHF- per cardiology  





DM2





Hypertension- Cardiology managing





COMMENT/RELEVANT DATA


Meds





Current Medications








 Medications


  (Trade)  Dose


 Ordered  Sig/Elisa  Start Time


 Stop Time Status Last Admin


Dose Admin


 


 Acetaminophen


  (Tylenol)  650 mg  PRN Q6HRS  PRN  3/21/20 16:30


    3/22/20 08:31


650 MG


 


 Albuterol/


 Ipratropium


  (Duoneb)  3 ml  1X  ONCE  3/16/20 10:00


 3/16/20 10:01 DC 3/16/20 10:00


3 ML


 


 Amlodipine


 Besylate


  (Norvasc)  10 mg  DAILY  3/19/20 12:00


    3/25/20 08:09


10 MG


 


 Aspirin


  (Chacorta Aspirin)  325 mg  1X  ONCE  3/23/20 12:45


 3/23/20 12:49 DC 3/23/20 14:20


325 MG


 


 Aspirin


  (Ecotrin)  325 mg  1X  ONCE  3/13/20 16:00


 3/13/20 16:01 DC 3/13/20 16:24


325 MG


 


 Atenolol


  (Tenormin)  25 mg  DAILY  3/17/20 09:00


 3/17/20 10:38 DC  





 


 Atorvastatin


 Calcium


  (Lipitor)  40 mg  QHS  3/13/20 21:00


    3/24/20 20:55


40 MG


 


 Atropine Sulfate


  (ATROPINE 0.5mg


 SYRINGE)  0.5 mg  PRN Q5MIN  PRN  3/21/20 14:45


     





 


 Bivalirudin


  (Angiomax)  250 mg  1X  ONCE  3/23/20 12:30


 3/23/20 12:31 DC 3/23/20 12:25


250 MG


 


 Clopidogrel


 Bisulfate


  (Plavix)  600 mg  1X  ONCE  3/23/20 12:45


 3/23/20 12:49 DC 3/23/20 14:21


600 MG


 


 Daptomycin 620 mg/


 Sodium Chloride  50 ml @ 


 100 mls/hr  Q24H  3/22/20 15:00


    3/24/20 14:28


100 MLS/HR


 


 Dexmedetomidine


 HCl 400 mcg/


 Sodium Chloride  100 ml @ 0


 mls/hr  CONT  PRN  3/21/20 14:45


    3/24/20 14:29


8.3 MLS/HR


 


 Dextrose


  (Dextrose


 50%-Water Syringe)  12.5 gm  PRN Q15MIN  PRN  3/13/20 14:00


     





 


 Dobutamine HCl/


 Dextrose  250 ml @ 


 8.43 mls/hr  CONT  PRN  3/16/20 13:30


     





 


 Dopamine HCl/


 Dextrose


  (DOPamine 400MG/


 250ML PREMIX)  400 mg  STK-MED ONCE  3/16/20 12:00


 3/23/20 12:56 DC  





 


 Enalaprilat


  (Vasotec Inj)  2.5 mg  PRN Q6HRS  PRN  3/19/20 10:15


    3/24/20 10:56


2.5 MG


 


 Epinephrine HCl


  (EPINEPHrine


 SYRINGE)  1 mg  STK-MED ONCE  3/16/20 12:00


 3/23/20 12:56 DC  





 


 Etomidate


  (Amidate)  20 mg  STK-MED ONCE  3/16/20 10:23


 3/16/20 10:23 DC  





 


 Famotidine


  (Pepcid Vial)  20 mg  BID  3/17/20 21:00


    3/25/20 08:09


20 MG


 


 Fentanyl Citrate


  (Fentanyl 2ml


 Vial)  100 mcg  1X  ONCE  3/23/20 12:15


 3/23/20 12:16 DC 3/23/20 12:15


100 MCG


 


 Furosemide


  (Lasix)  40 mg  1X  ONCE  3/24/20 15:00


 3/24/20 15:06 DC 3/24/20 16:07


40 MG


 


 Furosemide 100 mg/


 Sodium Chloride  100 ml @ 5


 mls/hr  CONT  PRN  3/17/20 11:00


    3/21/20 15:25


5 MLS/HR


 


 Heparin Sodium


  (Porcine)


  (Heparin Sodium)  5,000 unit  Q12HR  3/19/20 14:00


    3/25/20 08:10


5,000 UNIT


 


 Heparin Sodium/


 Dextrose  250 ml @ 0


 mls/hr  CONT  PRN  3/13/20 16:15


 3/17/20 13:51 DC 3/15/20 20:31


20.6 MLS/HR


 


 Heparin Sodium/


 Sodium Chloride


  (HEPARIN for


 ARTERIAL LINE


 FLUSH)  1,000 unit  1X  ONCE  3/23/20 11:30


 3/23/20 11:33 DC 3/23/20 11:30


1,000 UNIT


 


 Hydralazine HCl


  (Apresoline Inj)  10 mg  PRN Q4HRS  PRN  3/17/20 10:45


    3/22/20 23:51


10 MG


 


 Info


  (Anti-Coagulation


 Monitoring By


 Pharmacy)  1 each  PRN DAILY  PRN  3/13/20 16:15


 3/17/20 13:52 DC 3/16/20 13:49


1 EACH


 


 Info


  (CONTRAST GIVEN


 -- Rx MONITORING)  1 each  PRN DAILY  PRN  3/23/20 11:45


 3/25/20 11:44   





 


 Insulin Glargine


  (Lantus Syringe)  80 unit  DAILY08  3/14/20 08:00


    3/25/20 08:11


80 UNIT


 


 Insulin Human


 Lispro


  (HumaLOG)  0-9 UNITS  Q6HRS  3/17/20 00:00


    3/25/20 05:39


5 UNITS


 


 Iodixanol


  (Visipaque 320)  100 ml  STK-MED ONCE  3/23/20 12:20


 3/23/20 12:21 DC  





 


 Labetalol HCl


  (Normodyne Iv


 Push)  10 mg  1X  ONCE  3/13/20 09:15


 3/13/20 09:12 DC  





 


 Lactobacillus


 Rhamnosus


  (Culturelle)  1 cap  BID  3/14/20 21:00


    3/25/20 08:09


1 CAP


 


 Lidocaine HCl


  (Lidocaine 1%


 20ml Vial)  20 ml  1X  ONCE  3/23/20 11:30


 3/23/20 11:33 DC 3/23/20 11:30


20 ML


 


 Linezolid/Dextrose  300 ml @ 


 300 mls/hr  Q12HR  3/18/20 09:00


 3/19/20 08:46 DC 3/18/20 20:59


300 MLS/HR


 


 Lisinopril


  (Prinivil)  40 mg  DAILY  3/14/20 09:00


 3/17/20 10:38 DC 3/16/20 08:12


40 MG


 


 Magnesium Sulfate  100 ml @ 


 50 mls/hr  PRN DAILY  PRN  3/24/20 09:00


 3/24/20 11:26 DC  





 


 Meropenem 500 mg/


 Sodium Chloride  50 ml @ 


 100 mls/hr  Q6HRS  3/22/20 16:00


    3/25/20 05:38


100 MLS/HR


 


 Methylprednisolone


 Sodium Succinate


  (SOLU-Medrol


 125MG VIAL)  125 mg  1X  ONCE  3/13/20 09:15


 3/13/20 09:16 DC 3/13/20 09:27


125 MG


 


 Metolazone


  (Zaroxolyn)  2.5 mg  DAILY  3/17/20 09:00


    3/25/20 08:08


2.5 MG


 


 Metoprolol


 Tartrate


  (Lopressor)  50 mg  BID  3/13/20 21:00


   UNV  





 


 Micafungin Sodium


 100 mg/Dextrose  100 ml @ 


 100 mls/hr  Q24H  3/22/20 15:00


    3/24/20 14:28


100 MLS/HR


 


 Midazolam HCl


  (Versed)  2 mg  1X  ONCE  3/16/20 11:00


 3/16/20 11:01 DC 3/16/20 11:00


1 MG


 


 Midazolam HCl 50


 mg/Sodium Chloride  50 ml @ 1


 mls/hr  CONT  PRN  3/16/20 13:00


   UNV  





 


 Multi-Ingred


 Cream/Lotion/Oil/


 Oint


  (Artificial


 Tears Eye


 Ointment)  1 julieth  PRN Q1HR  PRN  3/24/20 10:30


     





 


 Nitroglycerin


  (Nitroglycerin)  200 mcg  1X  ONCE  3/23/20 12:30


 3/23/20 12:31 DC 3/23/20 12:30


200 MCG


 


 Nitroglycerin/


 Dextrose  250 ml @ 


 1.5 mls/hr  CONT  PRN  3/21/20 10:00


    3/21/20 21:30


6 MLS/HR


 


 Norepinephrine


 Bitartrate 8 mg/


 Dextrose  258 ml @ 


 27.09 mls/


 hr  CONT  PRN  3/16/20 11:15


    3/16/20 12:55


27.09 MLS/HR


 


 Piperacillin Sod/


 Tazobactam Sod


 3.375 gm/Sodium


 Chloride  50 ml @ 


 100 mls/hr  Q6HRS  3/13/20 18:00


 3/22/20 14:55 DC 3/22/20 06:19


100 MLS/HR


 


 Potassium


 Bicarbonate


  (Potassium


 Effervescent


 Tablet)  40 meq  1X  ONCE  3/18/20 09:00


 3/18/20 09:02 DC 3/18/20 09:27


40 MEQ


 


 Potassium


 Chloride 40 meq/


 Dextrose  1,020 ml @ 


 75 mls/hr  1X  ONCE  3/25/20 05:00


 3/25/20 18:35  3/25/20 05:17


75 MLS/HR


 


 Potassium


 Chloride/Water  100 ml @ 


 100 mls/hr  1X  ONCE  3/24/20 12:00


 3/24/20 12:59 DC 3/24/20 12:29


100 MLS/HR


 


 Potassium Chloride


  (Klor-Con)  40 meq  Q2H  PRN  3/23/20 14:00


 3/24/20 11:26 DC  





 


 Propofol  100 ml @ 0


 mls/hr  CONT  PRN  3/20/20 11:00


    3/25/20 05:56


15 MLS/HR


 


 Sodium Chloride  1,000 ml @ 


 100 mls/hr  Q10H  3/22/20 23:55


 3/24/20 15:11 DC 3/24/20 04:09


100 MLS/HR


 


 Sodium Phosphate


 15 mmol/Sodium


 Chloride  255 ml @ 


 62.5 mls/hr  1X  PRN  3/23/20 14:00


 3/24/20 11:26 DC  





 


 Succinylcholine


 Chloride


  (Anectine)  200 mg  STK-MED ONCE  3/16/20 10:23


 3/16/20 10:23 DC  





 


 Vancomycin HCl  250 ml @ 


 250 mls/hr  1X  ONCE  3/13/20 09:30


 3/13/20 10:29 UNV  





 


 Vancomycin HCl 2


 gm/Sodium Chloride  500 ml @ 


 250 mls/hr  1X  ONCE  3/13/20 09:45


 3/13/20 11:44 DC 3/13/20 09:56


250 MLS/HR


 


 Verapamil HCl


  (Verapamil)  5 mg  STK-MED ONCE  3/16/20 10:00


 3/17/20 13:11 DC  











Lab





Laboratory Tests








Test


 3/24/20


12:24 3/24/20


17:52 3/25/20


00:09 3/25/20


05:30


 


Glucose (Fingerstick)


 228 mg/dL


(70-99) 235 mg/dL


(70-99) 240 mg/dL


(70-99) 





 


White Blood Count


 


 


 


 11.9 x10^3/uL


(4.0-11.0)


 


Red Blood Count


 


 


 


 4.78 x10^6/uL


(4.30-5.70)


 


Hemoglobin


 


 


 


 13.1 g/dL


(13.0-17.5)


 


Hematocrit


 


 


 


 41.4 %


(39.0-53.0)


 


Mean Corpuscular Volume    87 fL () 


 


Mean Corpuscular Hemoglobin    27 pg (25-35) 


 


Mean Corpuscular Hemoglobin


Concent 


 


 


 32 g/dL


(31-37)


 


Red Cell Distribution Width


 


 


 


 14.8 %


(11.5-14.5)


 


Platelet Count


 


 


 


 195 x10^3/uL


(140-400)


 


Neutrophils (%) (Auto)    70 % (31-73) 


 


Lymphocytes (%) (Auto)    15 % (24-48) 


 


Monocytes (%) (Auto)    9 % (0-9) 


 


Eosinophils (%) (Auto)    5 % (0-3) 


 


Basophils (%) (Auto)    0 % (0-3) 


 


Neutrophils # (Auto)


 


 


 


 8.2 x10^3/uL


(1.8-7.7)


 


Lymphocytes # (Auto)


 


 


 


 1.8 x10^3/uL


(1.0-4.8)


 


Monocytes # (Auto)


 


 


 


 1.1 x10^3/uL


(0.0-1.1)


 


Eosinophils # (Auto)


 


 


 


 0.6 x10^3/uL


(0.0-0.7)


 


Basophils # (Auto)


 


 


 


 0.1 x10^3/uL


(0.0-0.2)


 


Sodium Level


 


 


 


 148 mmol/L


(136-145)


 


Potassium Level


 


 


 


 3.6 mmol/L


(3.5-5.1)


 


Chloride Level


 


 


 


 106 mmol/L


()


 


Carbon Dioxide Level


 


 


 


 37 mmol/L


(21-32)


 


Anion Gap    5 (6-14) 


 


Blood Urea Nitrogen


 


 


 


 40 mg/dL


(8-26)


 


Creatinine


 


 


 


 1.0 mg/dL


(0.7-1.3)


 


Estimated GFR


(Cockcroft-Gault) 


 


 


 73.2 





 


Glucose Level


 


 


 


 246 mg/dL


(70-99)


 


Calcium Level


 


 


 


 8.6 mg/dL


(8.5-10.1)


 


Test


 3/25/20


05:37 


 


 





 


Glucose (Fingerstick)


 237 mg/dL


(70-99) 


 


 











Results


All relevant outside records, renal labs, imaging studies, telemetry/EKG's were 

reviewed.


Other


Cxr--


Low lung volumes and technique accentuates heart size and pulmonary 


vascularity. The ET tube, feeding tube, right subclavian line are 


unchanged. Patchy bibasilar lung airspace opacities likely atelectasis or 


infiltrates slightly improved since prior exam.











BENNY ESCOBAR MD                Mar 25, 2020 09:56

## 2020-03-25 NOTE — NUR
Pt in bed wife at bedside, pt is requesting to eat and drink poc explained to pt and pt wife 
vss assessment completed, pt c/o nausea call placed to  for medication to help with 
nausea. Pt denied pain at time of assessment call light placed in reach will resume care and 
continue to monitor pt.

## 2020-03-25 NOTE — PDOC
PULMONARY PROGRESS NOTES


Subjective


Patient underwent cardiac catheterization yesterday, currently on assist control

ventilation, sedated, at times his blood pressure rises,





Nursing staff informed me the patient does move all extremities


Vitals





Vital Signs








  Date Time  Temp Pulse Resp B/P (MAP) Pulse Ox O2 Delivery O2 Flow Rate FiO2


 


3/25/20 08:20     98 Ventilator  


 


3/25/20 08:09  52  124/59    


 


3/25/20 06:00   18     


 


3/25/20 04:00 98.3       





 98.3       








Comments


ros  unable to obtain





on vent  open eyes w verbal stimuli


HEENT:  Other (nc at perrl  nose clear   orally intubated   neck no lad  no 

thyromegaly)


Lungs:  Crackles


Cardiovascular:  S1, S2


Abdomen:  Soft, Non-tender, Other (no mass)


Extremities:  No Edema


Skin:  Warm


Labs





Laboratory Tests








Test


 3/23/20


09:35 3/23/20


13:44 3/23/20


18:18 3/23/20


23:44


 


O2 Saturation 95 % (92-99)    


 


Arterial Blood pH


 7.52


(7.35-7.45) 


 


 





 


Arterial Blood pCO2 at


Patient Temp 48 mmHg


(35-46) 


 


 





 


Arterial Blood pO2 at Patient


Temp 72 mmHg


() 


 


 





 


Arterial Blood HCO3


 38 mmol/L


(21-28) 


 


 





 


Arterial Blood Base Excess


 14 mmol/L


(-3-3) 


 


 





 


FiO2 40    


 


Glucose (Fingerstick)


 


 187 mg/dL


(70-99) 181 mg/dL


(70-99) 174 mg/dL


(70-99)


 


Test


 3/24/20


05:37 3/24/20


06:20 3/24/20


08:15 3/24/20


12:24


 


Glucose (Fingerstick)


 210 mg/dL


(70-99) 


 


 228 mg/dL


(70-99)


 


White Blood Count


 


 11.4 x10^3/uL


(4.0-11.0) 


 





 


Red Blood Count


 


 4.79 x10^6/uL


(4.30-5.70) 


 





 


Hemoglobin


 


 13.2 g/dL


(13.0-17.5) 


 





 


Hematocrit


 


 41.5 %


(39.0-53.0) 


 





 


Mean Corpuscular Volume  87 fL ()   


 


Mean Corpuscular Hemoglobin  28 pg (25-35)   


 


Mean Corpuscular Hemoglobin


Concent 


 32 g/dL


(31-37) 


 





 


Red Cell Distribution Width


 


 14.8 %


(11.5-14.5) 


 





 


Platelet Count


 


 203 x10^3/uL


(140-400) 


 





 


Neutrophils (%) (Auto)  75 % (31-73)   


 


Lymphocytes (%) (Auto)  13 % (24-48)   


 


Monocytes (%) (Auto)  9 % (0-9)   


 


Eosinophils (%) (Auto)  3 % (0-3)   


 


Basophils (%) (Auto)  1 % (0-3)   


 


Neutrophils # (Auto)


 


 8.5 x10^3/uL


(1.8-7.7) 


 





 


Lymphocytes # (Auto)


 


 1.5 x10^3/uL


(1.0-4.8) 


 





 


Monocytes # (Auto)


 


 1.0 x10^3/uL


(0.0-1.1) 


 





 


Eosinophils # (Auto)


 


 0.4 x10^3/uL


(0.0-0.7) 


 





 


Basophils # (Auto)


 


 0.1 x10^3/uL


(0.0-0.2) 


 





 


Sodium Level


 


 152 mmol/L


(136-145) 


 





 


Potassium Level


 


 3.3 mmol/L


(3.5-5.1) 


 





 


Chloride Level


 


 110 mmol/L


() 


 





 


Carbon Dioxide Level


 


 37 mmol/L


(21-32) 


 





 


Anion Gap  5 (6-14)   


 


Blood Urea Nitrogen


 


 42 mg/dL


(8-26) 


 





 


Creatinine


 


 1.1 mg/dL


(0.7-1.3) 


 





 


Estimated GFR


(Cockcroft-Gault) 


 65.6 


 


 





 


Glucose Level


 


 202 mg/dL


(70-99) 


 





 


Calcium Level


 


 8.7 mg/dL


(8.5-10.1) 


 





 


Magnesium Level


 


 2.3 mg/dL


(1.8-2.4) 


 





 


O2 Saturation   96 % (92-99)  


 


Arterial Blood pH


 


 


 7.49


(7.35-7.45) 





 


Arterial Blood pCO2 at


Patient Temp 


 


 47 mmHg


(35-46) 





 


Arterial Blood pO2 at Patient


Temp 


 


 81 mmHg


() 





 


Arterial Blood HCO3


 


 


 35 mmol/L


(21-28) 





 


Arterial Blood Base Excess


 


 


 10 mmol/L


(-3-3) 





 


FiO2   40  


 


Test


 3/24/20


17:52 3/25/20


00:09 3/25/20


05:30 3/25/20


05:37


 


Glucose (Fingerstick)


 235 mg/dL


(70-99) 240 mg/dL


(70-99) 


 237 mg/dL


(70-99)


 


White Blood Count


 


 


 11.9 x10^3/uL


(4.0-11.0) 





 


Red Blood Count


 


 


 4.78 x10^6/uL


(4.30-5.70) 





 


Hemoglobin


 


 


 13.1 g/dL


(13.0-17.5) 





 


Hematocrit


 


 


 41.4 %


(39.0-53.0) 





 


Mean Corpuscular Volume   87 fL ()  


 


Mean Corpuscular Hemoglobin   27 pg (25-35)  


 


Mean Corpuscular Hemoglobin


Concent 


 


 32 g/dL


(31-37) 





 


Red Cell Distribution Width


 


 


 14.8 %


(11.5-14.5) 





 


Platelet Count


 


 


 195 x10^3/uL


(140-400) 





 


Neutrophils (%) (Auto)   70 % (31-73)  


 


Lymphocytes (%) (Auto)   15 % (24-48)  


 


Monocytes (%) (Auto)   9 % (0-9)  


 


Eosinophils (%) (Auto)   5 % (0-3)  


 


Basophils (%) (Auto)   0 % (0-3)  


 


Neutrophils # (Auto)


 


 


 8.2 x10^3/uL


(1.8-7.7) 





 


Lymphocytes # (Auto)


 


 


 1.8 x10^3/uL


(1.0-4.8) 





 


Monocytes # (Auto)


 


 


 1.1 x10^3/uL


(0.0-1.1) 





 


Eosinophils # (Auto)


 


 


 0.6 x10^3/uL


(0.0-0.7) 





 


Basophils # (Auto)


 


 


 0.1 x10^3/uL


(0.0-0.2) 





 


Sodium Level


 


 


 148 mmol/L


(136-145) 





 


Potassium Level


 


 


 3.6 mmol/L


(3.5-5.1) 





 


Chloride Level


 


 


 106 mmol/L


() 





 


Carbon Dioxide Level


 


 


 37 mmol/L


(21-32) 





 


Anion Gap   5 (6-14)  


 


Blood Urea Nitrogen


 


 


 40 mg/dL


(8-26) 





 


Creatinine


 


 


 1.0 mg/dL


(0.7-1.3) 





 


Estimated GFR


(Cockcroft-Gault) 


 


 73.2 


 





 


Glucose Level


 


 


 246 mg/dL


(70-99) 





 


Calcium Level


 


 


 8.6 mg/dL


(8.5-10.1) 











Laboratory Tests








Test


 3/24/20


12:24 3/24/20


17:52 3/25/20


00:09 3/25/20


05:30


 


Glucose (Fingerstick)


 228 mg/dL


(70-99) 235 mg/dL


(70-99) 240 mg/dL


(70-99) 





 


White Blood Count


 


 


 


 11.9 x10^3/uL


(4.0-11.0)


 


Red Blood Count


 


 


 


 4.78 x10^6/uL


(4.30-5.70)


 


Hemoglobin


 


 


 


 13.1 g/dL


(13.0-17.5)


 


Hematocrit


 


 


 


 41.4 %


(39.0-53.0)


 


Mean Corpuscular Volume    87 fL () 


 


Mean Corpuscular Hemoglobin    27 pg (25-35) 


 


Mean Corpuscular Hemoglobin


Concent 


 


 


 32 g/dL


(31-37)


 


Red Cell Distribution Width


 


 


 


 14.8 %


(11.5-14.5)


 


Platelet Count


 


 


 


 195 x10^3/uL


(140-400)


 


Neutrophils (%) (Auto)    70 % (31-73) 


 


Lymphocytes (%) (Auto)    15 % (24-48) 


 


Monocytes (%) (Auto)    9 % (0-9) 


 


Eosinophils (%) (Auto)    5 % (0-3) 


 


Basophils (%) (Auto)    0 % (0-3) 


 


Neutrophils # (Auto)


 


 


 


 8.2 x10^3/uL


(1.8-7.7)


 


Lymphocytes # (Auto)


 


 


 


 1.8 x10^3/uL


(1.0-4.8)


 


Monocytes # (Auto)


 


 


 


 1.1 x10^3/uL


(0.0-1.1)


 


Eosinophils # (Auto)


 


 


 


 0.6 x10^3/uL


(0.0-0.7)


 


Basophils # (Auto)


 


 


 


 0.1 x10^3/uL


(0.0-0.2)


 


Sodium Level


 


 


 


 148 mmol/L


(136-145)


 


Potassium Level


 


 


 


 3.6 mmol/L


(3.5-5.1)


 


Chloride Level


 


 


 


 106 mmol/L


()


 


Carbon Dioxide Level


 


 


 


 37 mmol/L


(21-32)


 


Anion Gap    5 (6-14) 


 


Blood Urea Nitrogen


 


 


 


 40 mg/dL


(8-26)


 


Creatinine


 


 


 


 1.0 mg/dL


(0.7-1.3)


 


Estimated GFR


(Cockcroft-Gault) 


 


 


 73.2 





 


Glucose Level


 


 


 


 246 mg/dL


(70-99)


 


Calcium Level


 


 


 


 8.6 mg/dL


(8.5-10.1)


 


Test


 3/25/20


05:37 


 


 





 


Glucose (Fingerstick)


 237 mg/dL


(70-99) 


 


 











Medications





Active Scripts








 Medications  Dose


 Route/Sig


 Max Daily Dose Days Date Category


 


 Saw [place] (Saw


 [place])


 450 Mg Capsule  450 Mg


 PO DAILY


   3/13/20 Reported


 


 D3-50


  (Cholecalciferol


  (Vitamin D3))


 50,000 Unit


 Capsule  1,000 Unit


 PO DAILY


   3/13/20 Reported


 


 Emergen-C 500 mg


 Chewable Tab (Vit


 C/Ascorb


 Sod/Multivit-Min)


 500 Mg Tab.chew  500 Mg


 PO DAILY


   3/13/20 Reported


 


 Zoloft


  (Sertraline Hcl)


 50 Mg Tablet  50 Mg


 PO DAILY


   3/13/20 Reported


 


 Trazodone Hcl 50


 Mg Tablet  1 Tab


 PO QHS


   3/13/20 Reported


 


 Trulicity


  (Dulaglutide)


 0.75 Mg/0.5 Ml


 Pen.injctr  0.75 Mg


 SQ WEEKLY


   3/13/20 Reported


 


 Insulin Aspart


 100 Unit/1 Ml Vial  25 Unit


 SQ TIDWMEALS


   3/13/20 Reported


 


 Levemir (Insulin


 Detemir) 100


 Unit/1 Ml Vial  80 Unit


 SQ DAILY


   3/13/20 Reported


 


 Lasix


  (Furosemide) 20


 Mg Tablet  20 Mg


 PO BID


   3/13/20 Reported


 


 Flomax


  (Tamsulosin Hcl)


 0.4 Mg Cap.er.24h  0.4 Mg


 PO HS


   3/13/20 Reported


 


 Omeprazole 20 Mg


 Capsule.dr  20 Mg


 PO BID


   3/13/20 Reported


 


 Lisinopril 40 Mg


 Tablet  1 Tab


 PO DAILY


   3/13/20 Reported


 


 Atenolol 100 Mg


 Tablet  100 Mg


 PO BID


   3/13/20 Reported








Comments


Status post catheterization yesterday successful PCI drug-eluting stent to the 

LAD


Continue assist control for now, possible trial later on today, case discussed 

with RN


Diuresis, and monitor chest x-ray





Impression


.


IMPRESSION:


1.  Acute hypercapnic and hypoxic respiratory failure sec to flash pulmonary 

edema


2.  Abnormal chest x-ray with bilateral interstitial infiltrates suggesting 

interstitial edema


3.  Leukocytosis


4.  No significant tobacco history.


5.  Suspected obesity hypoventilation syndrome and obstructive sleep apnea,


never been tested.


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


7.  HYPOTENSION IMPROVED


8.  COVID NEGATIVE


9.  Status post cardiac catheterization yesterday revealing severe two-vessel 

coronary artery disease status post PCI drug-eluting stent to the LAD


10.  Fever, curve has improved, antibiotics per ID.





Chest x-ray reviewed slightly worse we will give additional Lasix











cath 3/23


1.  Severe two-vessel coronary artery disease


2.  Successful PCI/drug eluting stent placement to the left anterior descending 

artery. Attempted PCI to the chronic total occlusion of the left circumflex 

artery.


3.  Mild to moderate pulmonary hypertension secondary to elevated left-sided 

filling pressures as evidence by elevated primary capillary wedge pressure.


4.  No evidence of intra-cardiac shunt.





Recommendations


1.  Aspirin 325 mg daily for one month followed by 81 mg daily


2.  Plavix 75 mg daily for preferably one year


3.  Cardiovascular risk factor modification


4.  Diuresis for acute on chronic diastolic heart failure





Plan


.


Status post cardiac catheterization she see report





Antibiotics per ID





GI prophylaxis





Antibiotics per ID





Possible trial later on this afternoon


Case discussed with ADALBERTO SERRANO MD              Mar 25, 2020 08:53

## 2020-03-25 NOTE — PDOC
CASH DELAROSA APRN 3/25/20 1116:


CARDIO Progress Notes


Date and Time


Date of Service


3/25/20


Time of Evaluation


1110





Subjective


Subjective:  Other (intubated sedation off)





Vitals


Vitals





Vital Signs








  Date Time  Temp Pulse Resp B/P (MAP) Pulse Ox O2 Delivery O2 Flow Rate FiO2


 


3/25/20 11:00  60 18 157/76 (103) 97 Ventilator  


 


3/25/20 08:00 99.0       





 99.0       








Weight


Weight [ ]





Input and Output


Intake and Output











Intake and Output 


 


 3/25/20





 07:00


 


Intake Total 6459 ml


 


Output Total 4130 ml


 


Balance 2329 ml


 


 


 


IV Total 2491 ml


 


Tube Feeding 2868 ml


 


Other 1100 ml


 


Output Urine Total 4130 ml











Laboratory


Labs





Laboratory Tests








Test


 3/24/20


12:24 3/24/20


17:52 3/25/20


00:09 3/25/20


05:30


 


Glucose (Fingerstick)


 228 mg/dL


(70-99) 235 mg/dL


(70-99) 240 mg/dL


(70-99) 





 


White Blood Count


 


 


 


 11.9 x10^3/uL


(4.0-11.0)


 


Red Blood Count


 


 


 


 4.78 x10^6/uL


(4.30-5.70)


 


Hemoglobin


 


 


 


 13.1 g/dL


(13.0-17.5)


 


Hematocrit


 


 


 


 41.4 %


(39.0-53.0)


 


Mean Corpuscular Volume    87 fL () 


 


Mean Corpuscular Hemoglobin    27 pg (25-35) 


 


Mean Corpuscular Hemoglobin


Concent 


 


 


 32 g/dL


(31-37)


 


Red Cell Distribution Width


 


 


 


 14.8 %


(11.5-14.5)


 


Platelet Count


 


 


 


 195 x10^3/uL


(140-400)


 


Neutrophils (%) (Auto)    70 % (31-73) 


 


Lymphocytes (%) (Auto)    15 % (24-48) 


 


Monocytes (%) (Auto)    9 % (0-9) 


 


Eosinophils (%) (Auto)    5 % (0-3) 


 


Basophils (%) (Auto)    0 % (0-3) 


 


Neutrophils # (Auto)


 


 


 


 8.2 x10^3/uL


(1.8-7.7)


 


Lymphocytes # (Auto)


 


 


 


 1.8 x10^3/uL


(1.0-4.8)


 


Monocytes # (Auto)


 


 


 


 1.1 x10^3/uL


(0.0-1.1)


 


Eosinophils # (Auto)


 


 


 


 0.6 x10^3/uL


(0.0-0.7)


 


Basophils # (Auto)


 


 


 


 0.1 x10^3/uL


(0.0-0.2)


 


Sodium Level


 


 


 


 148 mmol/L


(136-145)


 


Potassium Level


 


 


 


 3.6 mmol/L


(3.5-5.1)


 


Chloride Level


 


 


 


 106 mmol/L


()


 


Carbon Dioxide Level


 


 


 


 37 mmol/L


(21-32)


 


Anion Gap    5 (6-14) 


 


Blood Urea Nitrogen


 


 


 


 40 mg/dL


(8-26)


 


Creatinine


 


 


 


 1.0 mg/dL


(0.7-1.3)


 


Estimated GFR


(Cockcroft-Gault) 


 


 


 73.2 





 


Glucose Level


 


 


 


 246 mg/dL


(70-99)


 


Calcium Level


 


 


 


 8.6 mg/dL


(8.5-10.1)


 


Test


 3/25/20


05:37 3/25/20


08:06 


 





 


Glucose (Fingerstick)


 237 mg/dL


(70-99) 244 mg/dL


(70-99) 


 














Microbiology


Micro





Microbiology


3/22/20 Urine Culture - Final, Complete


          


3/22/20 Urine Culture Result 1 (CAR) - Final, Complete


          


3/22/20 - Final, Resulted


          


3/22/20 - Final, Resulted


          


3/22/20 - Final, Resulted


          


3/22/20 - Preliminary, Resulted


          


3/22/20 - Preliminary, Resulted


          


3/22/20 - Preliminary, Resulted


          


3/22/20 Gram Stain Evaluation - Final, Resulted


          


3/22/20 Sputum Culture - Final, Resulted


          


3/22/20 Sputum Result 1 - Final, Resulted


          


3/21/20 Blood Culture - Preliminary, Resulted


          NO GROWTH AFTER 3 DAYS





Physical Exam


HEENT:  Neck Supple W Full Motion


Chest:  Symmetric


LUNGS:  Other (diminished )


Heart:  S1S2, RRR (SB/SR with PVCs), other (distant heart tones)


Abdomen:  Other (soft, obese)


Extremities:  Other (1+ bilateral LE edema )


Neurology:  other (sedation off, awake)





Assessment


Assessment


1. Acute on chronic diastolic CHF; Echo with preserved LV systolic function. 

Cath with elevated filling pressures


2. NSTEMI; peak 0.7. 


3. CAD; cath with severe two-vessel CAD. S/p PCI/LEANNE to the LAD.  of LCx.


4. Acute respiratory failure with CHF; s/p intubation. trialing today. 


5. DM2: insulin dependent


6. Accelerated HTN: labile


7. Morbid obesity


8. Leukocytosis with UTI: on antibiotics per PCP


9. Hypokalemia, hypernatremia. Free water. 








Recommendations





Lasix PRN


Secondary prevention including DAPT with ASA, Plavix


statin therapy


Add lisinopril for BP control


No BB with bradycardia 


Ongoing supportive care


Consider outpatient PCI of LCx  if symptomatic





BRANDIN GIBBONS MD 3/25/20 0299:


CARDIO Progress Notes


Assessment


Assessment


Patient seen and examined.  Agree with NP's assessment and plan.  


Ac on chr diastolic HF better compensated


CAD s/p PCI/LEANNE LAD, CP free


Extubated earlier today


Continue current secondary prevention measures











CASH DELAROSA           Mar 25, 2020 11:16


BRANDIN GIBBONS MD           Mar 25, 2020 18:59

## 2020-03-25 NOTE — PDOC
[facility] PROGRESS NOTE


Chief Complaint


Chief Complaint


Status post cardiac catheter yesterday with a new cardiac stent


1. Acute CHF with possible combined systolic/diastolic dysfunction. 

Significantly improved. Continuing present treatment.


2. NSTEMI: no CP but suspecting ischemia as culprit. EKG LBBB no prior for 

comparison. Tentatively plan for heart catheterization tomorrow. Discussed with 

the patient.


2. Acute respiratory failure with CHF and possibly ongoing RAVINDER


3. DM2: insulin dependent


4. Accelerated HTN: better


5. Morbid obesity


6. Leukocytosis with UTI: on antibiotics per PCP


2 recent code blues with status post resuscitation





History of Present Illness


History of Present Illness


[id number]


Patient seen and examined in the ICU


He remains intubated but is now off sedated with eyes open and nodding a

ppropriately


His wife Ella is present


Chart reviewed


Discussed with RN








[id number]


Patient seen and examined in the ICU


Discussed with RN


Chart reviewed











Mr Alexander is a 74 yo M w/ PMHx hypertension, dyslipidemia, diabetes mellitus who

presented via EMS with complaint of shortness of breath on 3/13/202 that had 

been ongoign for 10 days. He called 911 in attempts to go to [facility], was brought 

to [facility], found with O2 sat of 70s at room air and started on  BiPAP to 100% in a 

very short time.


Pulmonology, cardiology, ID consulted.





3/16: Intubation and CVC placed for code blue on way to cardiac catheterization


3/17: Intubated, sedated


3/18: Intubated, sedated


3/19: BP difficult to control, remains intubated


3/20: Still Intubated and sedated. COVID 19 Negative


3/21: Intubated, sedated. Failed 2 weans. K still low, replaced. Lasix GTT. Good

UOP





Dosed Daptom/micafungin changed to Meropenem 3/22





 3/21 Overnight with fevers TMax 102F. D/w cardiology to stay intubated and 

sedated until cath 3/24. Sodium improved. lasix GTT to stop, K 3.2, replaced IV 

central line protocol.





3/23 TO CATH LAB





32 MIN CC TIME





Vitals/I&O


Vitals/I&O:





                                   Vital Signs








  Date Time  Temp Pulse Resp B/P (MAP) Pulse Ox O2 Delivery O2 Flow Rate FiO2


 


3/25/20 12:00      Mechanical Ventilator  


 


3/25/20 11:00  60 18 157/76 (103) 97   


 


3/25/20 08:00 99.0       





 99.0       











l


                                    I & O   


 


 3/24/20 3/24/20 3/25/20





 14:59 22:59 06:59


 


Intake Total 800 ml 2769 ml 2890 ml


 


Output Total 1260 ml 1995 ml 1025 ml


 


Balance -460 ml 774 ml 1865 ml











Physical Exam


Physical Exam:


GENERAL: Orally intubated,- sedated - appears comfortable


HEENT:  Pupils equal, ETT, OGT 


NECK: Supple - no JVD


LUNGS:  Decreased breath sounds bases,  


HEART:  S1, S2 - Marty


ABDOMEN: Obese, soft, no guarding + BS


: Younger in place 3/16


EXTREMITIES:  1- + pitting edema present. No cyanosis. SCDs bilaterally 


MSK bilateral knee scars intact


SKIN: Warm to touch. No signs of rash


NEURO: Noncommunicative, sedated 


RIJ clean - 3/16


Rt art-line out


General:  Other (intubated.)


Heart:  Regular rate


Lungs:  Crackles


Abdomen:  Normal bowel sounds


Extremities:  No cyanosis, Other (3+ bilateral LE pitting edema)


Skin:  No rashes, No breakdown, No significant lesion





Labs


Labs:





Laboratory Tests








Test


 3/24/20


17:52 3/25/20


00:09 3/25/20


05:30 3/25/20


05:37


 


Glucose (Fingerstick)


 235 mg/dL


(70-99) 240 mg/dL


(70-99) 


 237 mg/dL


(70-99)


 


White Blood Count


 


 


 11.9 x10^3/uL


(4.0-11.0) 





 


Red Blood Count


 


 


 4.78 x10^6/uL


(4.30-5.70) 





 


Hemoglobin


 


 


 13.1 g/dL


(13.0-17.5) 





 


Hematocrit


 


 


 41.4 %


(39.0-53.0) 





 


Mean Corpuscular Volume   87 fL ()  


 


Mean Corpuscular Hemoglobin   27 pg (25-35)  


 


Mean Corpuscular Hemoglobin


Concent 


 


 32 g/dL


(31-37) 





 


Red Cell Distribution Width


 


 


 14.8 %


(11.5-14.5) 





 


Platelet Count


 


 


 195 x10^3/uL


(140-400) 





 


Neutrophils (%) (Auto)   70 % (31-73)  


 


Lymphocytes (%) (Auto)   15 % (24-48)  


 


Monocytes (%) (Auto)   9 % (0-9)  


 


Eosinophils (%) (Auto)   5 % (0-3)  


 


Basophils (%) (Auto)   0 % (0-3)  


 


Neutrophils # (Auto)


 


 


 8.2 x10^3/uL


(1.8-7.7) 





 


Lymphocytes # (Auto)


 


 


 1.8 x10^3/uL


(1.0-4.8) 





 


Monocytes # (Auto)


 


 


 1.1 x10^3/uL


(0.0-1.1) 





 


Eosinophils # (Auto)


 


 


 0.6 x10^3/uL


(0.0-0.7) 





 


Basophils # (Auto)


 


 


 0.1 x10^3/uL


(0.0-0.2) 





 


Sodium Level


 


 


 148 mmol/L


(136-145) 





 


Potassium Level


 


 


 3.6 mmol/L


(3.5-5.1) 





 


Chloride Level


 


 


 106 mmol/L


() 





 


Carbon Dioxide Level


 


 


 37 mmol/L


(21-32) 





 


Anion Gap   5 (6-14)  


 


Blood Urea Nitrogen


 


 


 40 mg/dL


(8-26) 





 


Creatinine


 


 


 1.0 mg/dL


(0.7-1.3) 





 


Estimated GFR


(Cockcroft-Gault) 


 


 73.2 


 





 


Glucose Level


 


 


 246 mg/dL


(70-99) 





 


Calcium Level


 


 


 8.6 mg/dL


(8.5-10.1) 





 


Test


 3/25/20


08:06 


 


 





 


Glucose (Fingerstick)


 244 mg/dL


(70-99) 


 


 














Review of Systems


Review of Systems:


Unable to obtain he is still intubated





Assessment and Plan


Assessmemt and Plan


Problems


Medical Problems:


(1) Acute respiratory distress


Status: Acute  





(2) CAP (community acquired pneumonia)


Status: Acute  





(3) CHF (congestive heart failure)


Status: Acute  





(4) Hypoxia


Status: Acute  





(5) SIRS (systemic inflammatory response syndrome)


Status: Acute  





Status post cardiac catheter yesterday with new coronary stent 


1. Acute CHF with possible combined systolic/diastolic dysfunction. 

Significantly improved. Continuing present treatment.


2. NSTEMI: no CP but suspecting ischemia as culprit. EKG LBBB no prior for 

comparison. Tentatively plan for heart catheterization tomorrow. Discussed with 

the patient.


2. Acute respiratory failure with CHF and possibly ongoing RAVINDER


3. DM2: insulin dependent


4. Accelerated HTN: better


5. Morbid obesity


6. Leukocytosis with UTI: on antibiotics per PCP


7. 2 recent code blues with resuscitation








Plan


For now:


ICU monitoring


When necessary Propofol for sedation


Vent weaning (we hope to extubate later today if he does better?)


IV Vasotec


Norvasc per OG tube


IV antibiotics


Duo nebs


Home meds


DVT prophylaxis


Full code


Prognosis guarded but improving


We considered checking for coronavirus but he was turned down by the [place] 

[employer]


Discussed with RNs





Total time 32 minutes





Comment


Review of Relevant


I have reviewed the following items nicole (where applicable) has been applied.


Medications:





Current Medications








 Medications


  (Trade)  Dose


 Ordered  Sig/Elisa


 Route


 PRN Reason  Start Time


 Stop Time Status Last Admin


Dose Admin


 


 Furosemide


  (Lasix)  40 mg  1X  ONCE


 IVP


   3/24/20 15:00


 3/24/20 15:06 DC 3/24/20 16:07





 


 Potassium


 Chloride 40 meq/


 Dextrose  1,020 ml @ 


 75 mls/hr  1X  ONCE


 IV


   3/24/20 15:00


 3/25/20 04:35 DC 3/24/20 16:08





 


 Potassium


 Chloride 40 meq/


 Dextrose  1,020 ml @ 


 75 mls/hr  1X  ONCE


 IV


   3/25/20 05:00


 3/25/20 18:35  3/25/20 05:17




















MARY LARA III DO           Mar 25, 2020 12:27

## 2020-03-25 NOTE — PDOC
PULMONARY PROGRESS NOTES


Subjective


Awake, on CPAP trial


Vitals





Vital Signs








  Date Time  Temp Pulse Resp B/P (MAP) Pulse Ox O2 Delivery O2 Flow Rate FiO2


 


3/25/20 14:00  62 18 171/90 (117) 93 Nasal Cannula 3.0 


 


3/25/20 12:00 98.4       





 98.4       








Comments


follows commands


ROS:  No Chest Pain


General:  Alert


HEENT:  Other (nc at perrl  nose clear   orally intubated   neck no lad  no 

thyromegaly)


Lungs:  Clear


Cardiovascular:  S1, S2


Abdomen:  Soft, Non-tender, Other (no mass)


Extremities:  No Edema


Skin:  Warm


Labs





Laboratory Tests








Test


 3/23/20


18:18 3/23/20


23:44 3/24/20


05:37 3/24/20


06:20


 


Glucose (Fingerstick)


 181 mg/dL


(70-99) 174 mg/dL


(70-99) 210 mg/dL


(70-99) 





 


White Blood Count


 


 


 


 11.4 x10^3/uL


(4.0-11.0)


 


Red Blood Count


 


 


 


 4.79 x10^6/uL


(4.30-5.70)


 


Hemoglobin


 


 


 


 13.2 g/dL


(13.0-17.5)


 


Hematocrit


 


 


 


 41.5 %


(39.0-53.0)


 


Mean Corpuscular Volume    87 fL () 


 


Mean Corpuscular Hemoglobin    28 pg (25-35) 


 


Mean Corpuscular Hemoglobin


Concent 


 


 


 32 g/dL


(31-37)


 


Red Cell Distribution Width


 


 


 


 14.8 %


(11.5-14.5)


 


Platelet Count


 


 


 


 203 x10^3/uL


(140-400)


 


Neutrophils (%) (Auto)    75 % (31-73) 


 


Lymphocytes (%) (Auto)    13 % (24-48) 


 


Monocytes (%) (Auto)    9 % (0-9) 


 


Eosinophils (%) (Auto)    3 % (0-3) 


 


Basophils (%) (Auto)    1 % (0-3) 


 


Neutrophils # (Auto)


 


 


 


 8.5 x10^3/uL


(1.8-7.7)


 


Lymphocytes # (Auto)


 


 


 


 1.5 x10^3/uL


(1.0-4.8)


 


Monocytes # (Auto)


 


 


 


 1.0 x10^3/uL


(0.0-1.1)


 


Eosinophils # (Auto)


 


 


 


 0.4 x10^3/uL


(0.0-0.7)


 


Basophils # (Auto)


 


 


 


 0.1 x10^3/uL


(0.0-0.2)


 


Sodium Level


 


 


 


 152 mmol/L


(136-145)


 


Potassium Level


 


 


 


 3.3 mmol/L


(3.5-5.1)


 


Chloride Level


 


 


 


 110 mmol/L


()


 


Carbon Dioxide Level


 


 


 


 37 mmol/L


(21-32)


 


Anion Gap    5 (6-14) 


 


Blood Urea Nitrogen


 


 


 


 42 mg/dL


(8-26)


 


Creatinine


 


 


 


 1.1 mg/dL


(0.7-1.3)


 


Estimated GFR


(Cockcroft-Gault) 


 


 


 65.6 





 


Glucose Level


 


 


 


 202 mg/dL


(70-99)


 


Calcium Level


 


 


 


 8.7 mg/dL


(8.5-10.1)


 


Magnesium Level


 


 


 


 2.3 mg/dL


(1.8-2.4)


 


Test


 3/24/20


08:15 3/24/20


12:24 3/24/20


17:52 3/25/20


00:09


 


O2 Saturation 96 % (92-99)    


 


Arterial Blood pH


 7.49


(7.35-7.45) 


 


 





 


Arterial Blood pCO2 at


Patient Temp 47 mmHg


(35-46) 


 


 





 


Arterial Blood pO2 at Patient


Temp 81 mmHg


() 


 


 





 


Arterial Blood HCO3


 35 mmol/L


(21-28) 


 


 





 


Arterial Blood Base Excess


 10 mmol/L


(-3-3) 


 


 





 


FiO2 40    


 


Glucose (Fingerstick)


 


 228 mg/dL


(70-99) 235 mg/dL


(70-99) 240 mg/dL


(70-99)


 


Test


 3/25/20


05:30 3/25/20


05:37 3/25/20


08:06 3/25/20


09:20


 


White Blood Count


 11.9 x10^3/uL


(4.0-11.0) 


 


 





 


Red Blood Count


 4.78 x10^6/uL


(4.30-5.70) 


 


 





 


Hemoglobin


 13.1 g/dL


(13.0-17.5) 


 


 





 


Hematocrit


 41.4 %


(39.0-53.0) 


 


 





 


Mean Corpuscular Volume 87 fL ()    


 


Mean Corpuscular Hemoglobin 27 pg (25-35)    


 


Mean Corpuscular Hemoglobin


Concent 32 g/dL


(31-37) 


 


 





 


Red Cell Distribution Width


 14.8 %


(11.5-14.5) 


 


 





 


Platelet Count


 195 x10^3/uL


(140-400) 


 


 





 


Neutrophils (%) (Auto) 70 % (31-73)    


 


Lymphocytes (%) (Auto) 15 % (24-48)    


 


Monocytes (%) (Auto) 9 % (0-9)    


 


Eosinophils (%) (Auto) 5 % (0-3)    


 


Basophils (%) (Auto) 0 % (0-3)    


 


Neutrophils # (Auto)


 8.2 x10^3/uL


(1.8-7.7) 


 


 





 


Lymphocytes # (Auto)


 1.8 x10^3/uL


(1.0-4.8) 


 


 





 


Monocytes # (Auto)


 1.1 x10^3/uL


(0.0-1.1) 


 


 





 


Eosinophils # (Auto)


 0.6 x10^3/uL


(0.0-0.7) 


 


 





 


Basophils # (Auto)


 0.1 x10^3/uL


(0.0-0.2) 


 


 





 


Sodium Level


 148 mmol/L


(136-145) 


 


 





 


Potassium Level


 3.6 mmol/L


(3.5-5.1) 


 


 





 


Chloride Level


 106 mmol/L


() 


 


 





 


Carbon Dioxide Level


 37 mmol/L


(21-32) 


 


 





 


Anion Gap 5 (6-14)    


 


Blood Urea Nitrogen


 40 mg/dL


(8-26) 


 


 





 


Creatinine


 1.0 mg/dL


(0.7-1.3) 


 


 





 


Estimated GFR


(Cockcroft-Gault) 73.2 


 


 


 





 


Glucose Level


 246 mg/dL


(70-99) 


 


 





 


Calcium Level


 8.6 mg/dL


(8.5-10.1) 


 


 





 


Glucose (Fingerstick)


 


 237 mg/dL


(70-99) 244 mg/dL


(70-99) 





 


O2 Saturation    97 % (92-99) 


 


Arterial Blood pH


 


 


 


 7.48


(7.35-7.45)


 


Arterial Blood pCO2 at


Patient Temp 


 


 


 45 mmHg


(35-46)


 


Arterial Blood pO2 at Patient


Temp 


 


 


 90 mmHg


()


 


Arterial Blood HCO3


 


 


 


 33 mmol/L


(21-28)


 


Arterial Blood Base Excess


 


 


 


 8 mmol/L


(-3-3)


 


FiO2    40 


 


Test


 3/25/20


12:44 3/25/20


13:00 


 





 


Glucose (Fingerstick)


 223 mg/dL


(70-99) 


 


 





 


O2 Saturation  98 % (92-99)   


 


Arterial Blood pH


 


 7.43


(7.35-7.45) 


 





 


Arterial Blood pCO2 at


Patient Temp 


 54 mmHg


(35-46) 


 





 


Arterial Blood pO2 at Patient


Temp 


 106 mmHg


() 


 





 


Arterial Blood HCO3


 


 35 mmol/L


(21-28) 


 





 


Arterial Blood Base Excess


 


 9 mmol/L


(-3-3) 


 





 


FiO2


 


 40 ps 10 peep


5 


 











Laboratory Tests








Test


 3/24/20


17:52 3/25/20


00:09 3/25/20


05:30 3/25/20


05:37


 


Glucose (Fingerstick)


 235 mg/dL


(70-99) 240 mg/dL


(70-99) 


 237 mg/dL


(70-99)


 


White Blood Count


 


 


 11.9 x10^3/uL


(4.0-11.0) 





 


Red Blood Count


 


 


 4.78 x10^6/uL


(4.30-5.70) 





 


Hemoglobin


 


 


 13.1 g/dL


(13.0-17.5) 





 


Hematocrit


 


 


 41.4 %


(39.0-53.0) 





 


Mean Corpuscular Volume   87 fL ()  


 


Mean Corpuscular Hemoglobin   27 pg (25-35)  


 


Mean Corpuscular Hemoglobin


Concent 


 


 32 g/dL


(31-37) 





 


Red Cell Distribution Width


 


 


 14.8 %


(11.5-14.5) 





 


Platelet Count


 


 


 195 x10^3/uL


(140-400) 





 


Neutrophils (%) (Auto)   70 % (31-73)  


 


Lymphocytes (%) (Auto)   15 % (24-48)  


 


Monocytes (%) (Auto)   9 % (0-9)  


 


Eosinophils (%) (Auto)   5 % (0-3)  


 


Basophils (%) (Auto)   0 % (0-3)  


 


Neutrophils # (Auto)


 


 


 8.2 x10^3/uL


(1.8-7.7) 





 


Lymphocytes # (Auto)


 


 


 1.8 x10^3/uL


(1.0-4.8) 





 


Monocytes # (Auto)


 


 


 1.1 x10^3/uL


(0.0-1.1) 





 


Eosinophils # (Auto)


 


 


 0.6 x10^3/uL


(0.0-0.7) 





 


Basophils # (Auto)


 


 


 0.1 x10^3/uL


(0.0-0.2) 





 


Sodium Level


 


 


 148 mmol/L


(136-145) 





 


Potassium Level


 


 


 3.6 mmol/L


(3.5-5.1) 





 


Chloride Level


 


 


 106 mmol/L


() 





 


Carbon Dioxide Level


 


 


 37 mmol/L


(21-32) 





 


Anion Gap   5 (6-14)  


 


Blood Urea Nitrogen


 


 


 40 mg/dL


(8-26) 





 


Creatinine


 


 


 1.0 mg/dL


(0.7-1.3) 





 


Estimated GFR


(Cockcroft-Gault) 


 


 73.2 


 





 


Glucose Level


 


 


 246 mg/dL


(70-99) 





 


Calcium Level


 


 


 8.6 mg/dL


(8.5-10.1) 





 


Test


 3/25/20


08:06 3/25/20


09:20 3/25/20


12:44 3/25/20


13:00


 


Glucose (Fingerstick)


 244 mg/dL


(70-99) 


 223 mg/dL


(70-99) 





 


O2 Saturation  97 % (92-99)   98 % (92-99) 


 


Arterial Blood pH


 


 7.48


(7.35-7.45) 


 7.43


(7.35-7.45)


 


Arterial Blood pCO2 at


Patient Temp 


 45 mmHg


(35-46) 


 54 mmHg


(35-46)


 


Arterial Blood pO2 at Patient


Temp 


 90 mmHg


() 


 106 mmHg


()


 


Arterial Blood HCO3


 


 33 mmol/L


(21-28) 


 35 mmol/L


(21-28)


 


Arterial Blood Base Excess


 


 8 mmol/L


(-3-3) 


 9 mmol/L


(-3-3)


 


FiO2


 


 40 


 


 40 ps 10 peep


5








Medications





Active Scripts








 Medications  Dose


 Route/Sig


 Max Daily Dose Days Date Category


 


 Saw [place] (Saw


 Palmet[place] Fruit)


 450 Mg Capsule  450 Mg


 PO DAILY


   3/13/20 Reported


 


 D3-50


  (Cholecalciferol


  (Vitamin D3))


 50,000 Unit


 Capsule  1,000 Unit


 PO DAILY


   3/13/20 Reported


 


 Emergen-C 500 mg


 Chewable Tab (Vit


 C/Ascorb


 Sod/Multivit-Min)


 500 Mg Tab.chew  500 Mg


 PO DAILY


   3/13/20 Reported


 


 Zoloft


  (Sertraline Hcl)


 50 Mg Tablet  50 Mg


 PO DAILY


   3/13/20 Reported


 


 Trazodone Hcl 50


 Mg Tablet  1 Tab


 PO QHS


   3/13/20 Reported


 


 Trulicity


  (Dulaglutide)


 0.75 Mg/0.5 Ml


 Pen.injctr  0.75 Mg


 SQ WEEKLY


   3/13/20 Reported


 


 Insulin Aspart


 100 Unit/1 Ml Vial  25 Unit


 SQ TIDWMEALS


   3/13/20 Reported


 


 Levemir (Insulin


 Detemir) 100


 Unit/1 Ml Vial  80 Unit


 SQ DAILY


   3/13/20 Reported


 


 Lasix


  (Furosemide) 20


 Mg Tablet  20 Mg


 PO BID


   3/13/20 Reported


 


 Flomax


  (Tamsulosin Hcl)


 0.4 Mg Cap.er.24h  0.4 Mg


 PO HS


   3/13/20 Reported


 


 Omeprazole 20 Mg


 Capsule.dr  20 Mg


 PO BID


   3/13/20 Reported


 


 Lisinopril 40 Mg


 Tablet  1 Tab


 PO DAILY


   3/13/20 Reported


 


 Atenolol 100 Mg


 Tablet  100 Mg


 PO BID


   3/13/20 Reported








Comments


Status post catheterization yesterday successful PCI drug-eluting stent to the 

LAD





cxr 3/25


basal atelectasis





Impression


.


IMPRESSION:


1.  Acute hypercapnic and hypoxic respiratory failure sec to flash pulmonary 

edema


2.  Abnormal chest x-ray with bilateral interstitial infiltrates suggesting 

interstitial edema


3.  s/p cath with severe two-vessel CAD. S/p PCI/LEANNE to the LAD.  of 

LCx.Increase LV filling pressures


4.  No significant tobacco history.


5.  Suspected obesity hypoventilation syndrome and obstructive sleep apnea,


never been tested.


6.  CAD


7.  HYPOTENSION RESOLVED


8.  COVID NEGATIVE


9.  Status post cardiac catheterization yesterday revealing severe two-vessel 

coronary artery disease status post PCI drug-eluting stent to the LAD


10.  Fever, curve has improved, antibiotics per ID.





Chest x-ray reviewed slightly worse we will give additional Lasix











cath 3/23


1.  Severe two-vessel coronary artery disease


2.  Successful PCI/drug eluting stent placement to the left anterior descending 

artery. Attempted PCI to the chronic total occlusion of the left circumflex 

artery.


3.  Mild to moderate pulmonary hypertension secondary to elevated left-sided 

filling pressures as evidence by elevated primary capillary wedge pressure.


4.  No evidence of intra-cardiac shunt.





Plan


.


Doing well on CPAP trial


ABG adequate


Proceed with extubation.


Status post cardiac catheterization she see report


Antibiotics per ID


GI prophylaxis


Rec BIPAP qhs post extubation





Case discussed with RN/ family











PRIYANKA MCRAE MD                 Mar 25, 2020 15:02

## 2020-03-25 NOTE — PDOC
Infectious Disease Note


Subjective


Subjective


Orally intubated and less sedated


More responsive





Vital Sign


Vital Signs





Vital Signs








  Date Time  Temp Pulse Resp B/P (MAP) Pulse Ox O2 Delivery O2 Flow Rate FiO2


 


3/25/20 06:00  56 18 133/67 (89) 98 Ventilator  


 


3/25/20 04:00 98.3       





 98.3       











Physical Exam


PHYSICAL EXAM


GENERAL: Orally intubated,- sedated - appears comfortable


HEENT:  Pupils equal, ETT, OGT 


NECK: Supple - no JVD


LUNGS:  Decreased breath sounds bases,  


HEART:  S1, S2 - Marty


ABDOMEN: Obese, soft, no guarding + BS


: Younger in place 3/16


EXTREMITIES:  1- + pitting edema present. No cyanosis. SCDs bilaterally 


MSK bilateral knee scars intact


SKIN: Warm to touch. No signs of rash


NEURO: Noncommunicative, sedated 


RIJ clean - 3/16


Rt art-line out





Labs


Lab





Laboratory Tests








Test


 3/24/20


08:15 3/24/20


12:24 3/24/20


17:52 3/25/20


00:09


 


O2 Saturation 96 % (92-99)    


 


Arterial Blood pH


 7.49


(7.35-7.45) 


 


 





 


Arterial Blood pCO2 at


Patient Temp 47 mmHg


(35-46) 


 


 





 


Arterial Blood pO2 at Patient


Temp 81 mmHg


() 


 


 





 


Arterial Blood HCO3


 35 mmol/L


(21-28) 


 


 





 


Arterial Blood Base Excess


 10 mmol/L


(-3-3) 


 


 





 


FiO2 40    


 


Glucose (Fingerstick)


 


 228 mg/dL


(70-99) 235 mg/dL


(70-99) 240 mg/dL


(70-99)


 


Test


 3/25/20


05:30 3/25/20


05:37 


 





 


White Blood Count


 11.9 x10^3/uL


(4.0-11.0) 


 


 





 


Red Blood Count


 4.78 x10^6/uL


(4.30-5.70) 


 


 





 


Hemoglobin


 13.1 g/dL


(13.0-17.5) 


 


 





 


Hematocrit


 41.4 %


(39.0-53.0) 


 


 





 


Mean Corpuscular Volume 87 fL ()    


 


Mean Corpuscular Hemoglobin 27 pg (25-35)    


 


Mean Corpuscular Hemoglobin


Concent 32 g/dL


(31-37) 


 


 





 


Red Cell Distribution Width


 14.8 %


(11.5-14.5) 


 


 





 


Platelet Count


 195 x10^3/uL


(140-400) 


 


 





 


Neutrophils (%) (Auto) 70 % (31-73)    


 


Lymphocytes (%) (Auto) 15 % (24-48)    


 


Monocytes (%) (Auto) 9 % (0-9)    


 


Eosinophils (%) (Auto) 5 % (0-3)    


 


Basophils (%) (Auto) 0 % (0-3)    


 


Neutrophils # (Auto)


 8.2 x10^3/uL


(1.8-7.7) 


 


 





 


Lymphocytes # (Auto)


 1.8 x10^3/uL


(1.0-4.8) 


 


 





 


Monocytes # (Auto)


 1.1 x10^3/uL


(0.0-1.1) 


 


 





 


Eosinophils # (Auto)


 0.6 x10^3/uL


(0.0-0.7) 


 


 





 


Basophils # (Auto)


 0.1 x10^3/uL


(0.0-0.2) 


 


 





 


Sodium Level


 148 mmol/L


(136-145) 


 


 





 


Potassium Level


 3.6 mmol/L


(3.5-5.1) 


 


 





 


Chloride Level


 106 mmol/L


() 


 


 





 


Carbon Dioxide Level


 37 mmol/L


(21-32) 


 


 





 


Anion Gap 5 (6-14)    


 


Blood Urea Nitrogen


 40 mg/dL


(8-26) 


 


 





 


Creatinine


 1.0 mg/dL


(0.7-1.3) 


 


 





 


Estimated GFR


(Cockcroft-Gault) 73.2 


 


 


 





 


Glucose Level


 246 mg/dL


(70-99) 


 


 





 


Calcium Level


 8.6 mg/dL


(8.5-10.1) 


 


 





 


Glucose (Fingerstick)


 


 237 mg/dL


(70-99) 


 











Micro


CXR 3/25


impression:


 


Low lung volumes and technique accentuates heart size and pulmonary 


vascularity. The ET tube, feeding tube, right subclavian line are 


unchanged. Patchy bibasilar lung airspace opacities likely atelectasis or 


infiltrates slightly improved since prior exam.








CT Head 3/22





IMPRESSION:


No acute intracranial abnormality.





CT CHest/ABd/Pel 3/22


IMPRESSION:


1.  Patchy airspace disease in the lung bases bilaterally with more 


consolidative changes in the lower lobes bilaterally. Findings may 


represent infectious or inflammatory etiologies. Correlate for aspiration 


given distribution.


2.  No acute process identified within the abdomen and pelvis. No focal 


fluid collection to suggest abscess.


 





Microbiology


3/21/20 Blood Culture - Preliminary, Resulted


          NO GROWTH AFTER 1 DAY


3/13/20 Urine Culture - Final, Complete


          


3/13/20 Urine Culture Result 1 (CAR) - Final, Complete


          


3/13/20 Antimicrobic Susceptibility - Final, Complete





Objective


Assessment





S/p 3/23  1.  Right and Left heart catheterization and selective coronary 

angiography


2.  Successful PCI/drug eluting stent placement to the left anterior descending 

artery. 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


Fever - better - sputum - neg


Leukocytosis - stable


Marty cardia - nursing and Card aware - will reduce sedation currently


Acute resp failure s/p intubation


    -COVID-19 NEG


NSTEMI


Pyuria UC 50-100K enterococcus 3/13, ? colonization


LUE trace warmth ? positioned on Left side with arm in sheets between body and 

bed - better


RICHELLE - better


Partial nephrectomy.


Hypernatremia 


CHF


DM2


Morbid obesity


Hypertension.





Plan


Plan of Care





Began Dapto/micafungin changed to Meropenem 3/22 taper soon


F/u Blood 3/21 neg so far and Urine 3/22


Labs in am


Off zosyn (3/13-3/22) 


Probiotics


Electrolytes per primary


Repeat BC (3/16/3/21) neg to date 


Maintain aspiration precautions


Supportive care








D/w wife at bedside





D/w nursing - 





Critically ill











JOSIAH MANNING MD              Mar 25, 2020 07:45

## 2020-03-26 NOTE — PDOC
SUBJECTIVE


ROS


extubated





OBJECTIVE


Vital Signs





Vital Signs








  Date Time  Temp Pulse Resp B/P (MAP) Pulse Ox O2 Delivery O2 Flow Rate FiO2


 


3/26/20 08:29  71  164/84    


 


3/26/20 08:00      Nasal Cannula 2.0 


 


3/26/20 07:00   18  93   


 


3/26/20 03:22 97.9       





 97.9       








I & 0











Intake and Output 


 


 3/26/20





 07:00


 


Intake Total 1288.8 ml


 


Output Total 2975 ml


 


Balance -1686.2 ml


 


 


 


Intake Oral 0 ml


 


IV Total 288.8 ml


 


Tube Feeding 500 ml


 


Other 500 ml


 


Output Urine Total 2975 ml


 


# Bowel Movements 1











PHYSICAL EXAM


Physical Exam


GENERAL: Alert , NAD 


HEENT: OM moist  


NECK: Supple - no JVD


LUNGS:  Decreased breath sounds bases,  


HEART:  S1, S2


ABDOMEN: Obese, soft, no guarding + BS


EXTREMITIES:  trace  pitting edema present.


SKIN: No rash


NEURO: grossly normal 


: Younger in place





DIAGNOSIS/ASSESSMENT


Assessment & Plan


HyperNatremia- 


Resolved 





RICHELLE - resolved  , UOP excellent  





HypoKalemia- Replace  





NSTEMI-  S/p Right and Left heart catheterization and selective coronary 

angiography


  Successful PCI/drug eluting stent placement to the left anterior descending 

artery. 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





Fever /Leukocytosis - better 





Acute resp failure s/p intubation- -COVID-19 NEG





Pyuria UC 50-100K enterococcus 3/13, ? colonization per ID  





Hx of  Partial nephrectomy.





CHF- per cardiology  





DM2





Hypertension- Cardiology managing





Will sign off





COMMENT/RELEVANT DATA


Meds





Current Medications








 Medications


  (Trade)  Dose


 Ordered  Sig/Elisa  Start Time


 Stop Time Status Last Admin


Dose Admin


 


 Acetaminophen


  (Tylenol)  650 mg  PRN Q6HRS  PRN  3/21/20 16:30


    3/22/20 08:31


650 MG


 


 Albuterol/


 Ipratropium


  (Duoneb)  3 ml  1X  ONCE  3/16/20 10:00


 3/16/20 10:01 DC 3/16/20 10:00


3 ML


 


 Amlodipine


 Besylate


  (Norvasc)  10 mg  DAILY  3/19/20 12:00


    3/26/20 08:29


10 MG


 


 Aspirin


  (Chacorta Aspirin)  325 mg  1X  ONCE  3/23/20 12:45


 3/23/20 12:49 DC 3/23/20 14:20


325 MG


 


 Aspirin


  (Children'S


 Aspirin)  81 mg  DAILYWBKFT  3/26/20 08:00


    3/26/20 08:29


81 MG


 


 Aspirin


  (Ecotrin)  325 mg  1X  ONCE  3/13/20 16:00


 3/13/20 16:01 DC 3/13/20 16:24


325 MG


 


 Atenolol


  (Tenormin)  25 mg  DAILY  3/17/20 09:00


 3/17/20 10:38 DC  





 


 Atorvastatin


 Calcium


  (Lipitor)  40 mg  QHS  3/13/20 21:00


    3/24/20 20:55


40 MG


 


 Atropine Sulfate


  (ATROPINE 0.5mg


 SYRINGE)  0.5 mg  PRN Q5MIN  PRN  3/21/20 14:45


     





 


 Bivalirudin


  (Angiomax)  250 mg  1X  ONCE  3/23/20 12:30


 3/23/20 12:31 DC 3/23/20 12:25


250 MG


 


 Clopidogrel


 Bisulfate


  (Plavix)  75 mg  DAILYWBKFT  3/25/20 13:30


    3/26/20 08:29


75 MG


 


 Daptomycin 620 mg/


 Sodium Chloride  50 ml @ 


 100 mls/hr  Q24H  3/22/20 15:00


 3/26/20 09:35 DC 3/25/20 15:25


100 MLS/HR


 


 Dexmedetomidine


 HCl 400 mcg/


 Sodium Chloride  100 ml @ 0


 mls/hr  CONT  PRN  3/21/20 14:45


    3/25/20 11:13


1.4 MLS/HR


 


 Dextrose


  (Dextrose


 50%-Water Syringe)  12.5 gm  PRN Q15MIN  PRN  3/13/20 14:00


     





 


 Dobutamine HCl/


 Dextrose  250 ml @ 


 8.43 mls/hr  CONT  PRN  3/16/20 13:30


     





 


 Dopamine HCl/


 Dextrose


  (DOPamine 400MG/


 250ML PREMIX)  400 mg  STK-MED ONCE  3/16/20 12:00


 3/23/20 12:56 DC  





 


 Enalaprilat


  (Vasotec Inj)  2.5 mg  PRN Q6HRS  PRN  3/19/20 10:15


    3/25/20 15:57


2.5 MG


 


 Epinephrine HCl


  (EPINEPHrine


 SYRINGE)  1 mg  STK-MED ONCE  3/16/20 12:00


 3/23/20 12:56 DC  





 


 Etomidate


  (Amidate)  20 mg  STK-MED ONCE  3/16/20 10:23


 3/16/20 10:23 DC  





 


 Famotidine


  (Pepcid Vial)  20 mg  BID  3/17/20 21:00


    3/26/20 08:30


20 MG


 


 Fentanyl Citrate


  (Fentanyl 2ml


 Vial)  100 mcg  1X  ONCE  3/23/20 12:15


 3/23/20 12:16 DC 3/23/20 12:15


100 MCG


 


 Furosemide


  (Lasix)  40 mg  1X  ONCE  3/24/20 15:00


 3/24/20 15:06 DC 3/24/20 16:07


40 MG


 


 Furosemide 100 mg/


 Sodium Chloride  100 ml @ 5


 mls/hr  CONT  PRN  3/17/20 11:00


    3/21/20 15:25


5 MLS/HR


 


 Heparin Sodium


  (Porcine)


  (Heparin Sodium)  5,000 unit  Q12HR  3/19/20 14:00


    3/26/20 08:30


5,000 UNIT


 


 Heparin Sodium/


 Dextrose  250 ml @ 0


 mls/hr  CONT  PRN  3/13/20 16:15


 3/17/20 13:51 DC 3/15/20 20:31


20.6 MLS/HR


 


 Heparin Sodium/


 Sodium Chloride


  (HEPARIN for


 ARTERIAL LINE


 FLUSH)  1,000 unit  1X  ONCE  3/23/20 11:30


 3/23/20 11:33 DC 3/23/20 11:30


1,000 UNIT


 


 Hydralazine HCl


  (Apresoline Inj)  10 mg  PRN Q4HRS  PRN  3/17/20 10:45


    3/25/20 10:06


10 MG


 


 Info


  (Anti-Coagulation


 Monitoring By


 Pharmacy)  1 each  PRN DAILY  PRN  3/13/20 16:15


 3/17/20 13:52 DC 3/16/20 13:49


1 EACH


 


 Info


  (CONTRAST GIVEN


 -- Rx MONITORING)  1 each  PRN DAILY  PRN  3/23/20 11:45


 3/25/20 11:44 DC  





 


 Insulin Glargine


  (Lantus Syringe)  80 unit  DAILY08  3/14/20 08:00


    3/25/20 08:11


80 UNIT


 


 Insulin Human


 Lispro


  (HumaLOG)  0-9 UNITS  Q6HRS  3/17/20 00:00


    3/25/20 12:46


5 UNITS


 


 Iodixanol


  (Visipaque 320)  100 ml  STK-MED ONCE  3/23/20 12:20


 3/23/20 12:21 DC  





 


 Labetalol HCl


  (Normodyne Iv


 Push)  10 mg  1X  ONCE  3/13/20 09:15


 3/13/20 09:12 DC  





 


 Lactobacillus


 Rhamnosus


  (Culturelle)  1 cap  BID  3/14/20 21:00


 3/26/20 09:25 DC 3/26/20 08:47


1 CAP


 


 Lidocaine HCl


  (Lidocaine 1%


 20ml Vial)  20 ml  1X  ONCE  3/23/20 11:30


 3/23/20 11:33 DC 3/23/20 11:30


20 ML


 


 Linezolid/Dextrose  300 ml @ 


 300 mls/hr  Q12HR  3/18/20 09:00


 3/19/20 08:46 DC 3/18/20 20:59


300 MLS/HR


 


 Lisinopril


  (Prinivil)  10 mg  DAILY  3/25/20 16:30


    3/26/20 08:29


10 MG


 


 Magnesium Sulfate  100 ml @ 


 50 mls/hr  PRN DAILY  PRN  3/24/20 09:00


 3/24/20 11:26 DC  





 


 Meropenem 500 mg/


 Sodium Chloride  50 ml @ 


 100 mls/hr  Q6HRS  3/22/20 16:00


    3/26/20 06:00


100 MLS/HR


 


 Methylprednisolone


 Sodium Succinate


  (SOLU-Medrol


 125MG VIAL)  125 mg  1X  ONCE  3/13/20 09:15


 3/13/20 09:16 DC 3/13/20 09:27


125 MG


 


 Metolazone


  (Zaroxolyn)  2.5 mg  DAILY  3/17/20 09:00


    3/26/20 08:28


2.5 MG


 


 Metoprolol


 Tartrate


  (Lopressor)  50 mg  BID  3/13/20 21:00


   UNV  





 


 Micafungin Sodium


 100 mg/Dextrose  100 ml @ 


 100 mls/hr  Q24H  3/22/20 15:00


 3/26/20 09:38 DC 3/25/20 15:26


100 MLS/HR


 


 Midazolam HCl


  (Versed)  2 mg  1X  ONCE  3/16/20 11:00


 3/16/20 11:01 DC 3/16/20 11:00


1 MG


 


 Midazolam HCl 50


 mg/Sodium Chloride  50 ml @ 1


 mls/hr  CONT  PRN  3/16/20 13:00


   UNV  





 


 Multi-Ingred


 Cream/Lotion/Oil/


 Oint


  (Artificial


 Tears Eye


 Ointment)  1 julieth  PRN Q1HR  PRN  3/24/20 10:30


     





 


 Nitroglycerin


  (Nitroglycerin)  200 mcg  1X  ONCE  3/23/20 12:30


 3/23/20 12:31 DC 3/23/20 12:30


200 MCG


 


 Nitroglycerin/


 Dextrose  250 ml @ 


 1.5 mls/hr  CONT  PRN  3/21/20 10:00


    3/21/20 21:30


6 MLS/HR


 


 Norepinephrine


 Bitartrate 8 mg/


 Dextrose  258 ml @ 


 27.09 mls/


 hr  CONT  PRN  3/16/20 11:15


    3/16/20 12:55


27.09 MLS/HR


 


 Piperacillin Sod/


 Tazobactam Sod


 3.375 gm/Sodium


 Chloride  50 ml @ 


 100 mls/hr  Q6HRS  3/13/20 18:00


 3/22/20 14:55 DC 3/22/20 06:19


100 MLS/HR


 


 Potassium


 Bicarbonate


  (Potassium


 Effervescent


 Tablet)  40 meq  1X  ONCE  3/18/20 09:00


 3/18/20 09:02 DC 3/18/20 09:27


40 MEQ


 


 Potassium


 Chloride 40 meq/


 Dextrose  1,020 ml @ 


 75 mls/hr  1X  ONCE  3/25/20 05:00


 3/25/20 18:35 DC 3/25/20 05:17


75 MLS/HR


 


 Potassium


 Chloride/Water  100 ml @ 


 100 mls/hr  Q1H  3/26/20 07:00


 3/26/20 08:59 DC 3/26/20 08:28


100 MLS/HR


 


 Potassium Chloride


  (Klor-Con)  40 meq  Q2H  PRN  3/23/20 14:00


 3/24/20 11:26 DC  





 


 Prochlorperazine


 Edisylate


  (Compazine)  10 mg  PRN Q8HRS  PRN  3/25/20 20:30


    3/25/20 20:46


10 MG


 


 Propofol  100 ml @ 0


 mls/hr  CONT  PRN  3/20/20 11:00


    3/25/20 05:56


15 MLS/HR


 


 Sodium Chloride  1,000 ml @ 


 100 mls/hr  Q10H  3/22/20 23:55


 3/24/20 15:11 DC 3/24/20 04:09


100 MLS/HR


 


 Sodium Phosphate


 15 mmol/Sodium


 Chloride  255 ml @ 


 62.5 mls/hr  1X  PRN  3/23/20 14:00


 3/24/20 11:26 DC  





 


 Succinylcholine


 Chloride


  (Anectine)  200 mg  STK-MED ONCE  3/16/20 10:23


 3/16/20 10:23 DC  





 


 Vancomycin HCl  250 ml @ 


 250 mls/hr  1X  ONCE  3/13/20 09:30


 3/13/20 10:29 UNV  





 


 Vancomycin HCl 2


 gm/Sodium Chloride  500 ml @ 


 250 mls/hr  1X  ONCE  3/13/20 09:45


 3/13/20 11:44 DC 3/13/20 09:56


250 MLS/HR


 


 Verapamil HCl


  (Verapamil)  5 mg  STK-MED ONCE  3/16/20 10:00


 3/17/20 13:11 DC  











Lab





Laboratory Tests








Test


 3/25/20


12:44 3/25/20


13:00 3/25/20


23:20 3/26/20


05:00


 


Glucose (Fingerstick)


 223 mg/dL


(70-99) 


 139 mg/dL


(70-99) 





 


O2 Saturation  98 % (92-99)   


 


Arterial Blood pH


 


 7.43


(7.35-7.45) 


 





 


Arterial Blood pCO2 at


Patient Temp 


 54 mmHg


(35-46) 


 





 


Arterial Blood pO2 at Patient


Temp 


 106 mmHg


() 


 





 


Arterial Blood HCO3


 


 35 mmol/L


(21-28) 


 





 


Arterial Blood Base Excess


 


 9 mmol/L


(-3-3) 


 





 


FiO2


 


 40 ps 10 peep


5 


 





 


White Blood Count


 


 


 


 11.7 x10^3/uL


(4.0-11.0)


 


Red Blood Count


 


 


 


 5.18 x10^6/uL


(4.30-5.70)


 


Hemoglobin


 


 


 


 14.4 g/dL


(13.0-17.5)


 


Hematocrit


 


 


 


 44.2 %


(39.0-53.0)


 


Mean Corpuscular Volume    85 fL () 


 


Mean Corpuscular Hemoglobin    28 pg (25-35) 


 


Mean Corpuscular Hemoglobin


Concent 


 


 


 33 g/dL


(31-37)


 


Red Cell Distribution Width


 


 


 


 14.3 %


(11.5-14.5)


 


Platelet Count


 


 


 


 207 x10^3/uL


(140-400)


 


Neutrophils (%) (Auto)    69 % (31-73) 


 


Lymphocytes (%) (Auto)    16 % (24-48) 


 


Monocytes (%) (Auto)    10 % (0-9) 


 


Eosinophils (%) (Auto)    5 % (0-3) 


 


Basophils (%) (Auto)    1 % (0-3) 


 


Neutrophils # (Auto)


 


 


 


 8.1 x10^3/uL


(1.8-7.7)


 


Lymphocytes # (Auto)


 


 


 


 1.8 x10^3/uL


(1.0-4.8)


 


Monocytes # (Auto)


 


 


 


 1.2 x10^3/uL


(0.0-1.1)


 


Eosinophils # (Auto)


 


 


 


 0.5 x10^3/uL


(0.0-0.7)


 


Basophils # (Auto)


 


 


 


 0.1 x10^3/uL


(0.0-0.2)


 


Sodium Level


 


 


 


 145 mmol/L


(136-145)


 


Potassium Level


 


 


 


 2.9 mmol/L


(3.5-5.1)


 


Chloride Level


 


 


 


 104 mmol/L


()


 


Carbon Dioxide Level


 


 


 


 35 mmol/L


(21-32)


 


Anion Gap    6 (6-14) 


 


Blood Urea Nitrogen


 


 


 


 23 mg/dL


(8-26)


 


Creatinine


 


 


 


 0.7 mg/dL


(0.7-1.3)


 


Estimated GFR


(Cockcroft-Gault) 


 


 


 110.5 





 


BUN/Creatinine Ratio    33 (6-20) 


 


Glucose Level


 


 


 


 110 mg/dL


(70-99)


 


Calcium Level


 


 


 


 8.9 mg/dL


(8.5-10.1)


 


Total Bilirubin


 


 


 


 0.8 mg/dL


(0.2-1.0)


 


Aspartate Amino Transf


(AST/SGOT) 


 


 


 54 U/L (15-37) 





 


Alanine Aminotransferase


(ALT/SGPT) 


 


 


 37 U/L (16-63) 





 


Alkaline Phosphatase


 


 


 


 62 U/L


()


 


Total Protein


 


 


 


 6.2 g/dL


(6.4-8.2)


 


Albumin


 


 


 


 2.2 g/dL


(3.4-5.0)


 


Albumin/Globulin Ratio    0.6 (1.0-1.7) 


 


Test


 3/26/20


05:59 3/26/20


08:40 


 





 


Glucose (Fingerstick)


 107 mg/dL


(70-99) 104 mg/dL


(70-99) 


 











Results


All relevant outside records, renal labs, imaging studies, telemetry/EKG's were 

reviewed.











BENNY ESCOBAR MD                Mar 26, 2020 09:54

## 2020-03-26 NOTE — NUR
SS following up with discharge planning. Pt accepted at [facility], 
[phone]; fax [phone], pending insurance authorization. SS will continue to follow 
for discharge planning.

## 2020-03-26 NOTE — NUR
Pt restless with c/o not able to fall asleep,pt want to get up in chair pt advised to allow 
pt/ot to work with him first and then he can be put in a chair,pt verbalized understanding 
will monitor pt.

## 2020-03-26 NOTE — PDOC
[employer] PROGRESS NOTE


Chief Complaint


Chief Complaint


Status post cardiac catheter with a new cardiac stent


1. Acute CHF with possible combined systolic/diastolic dysfunction. 

Significantly improved. Continuing present treatment.


2. NSTEMI: no CP but suspecting ischemia as culprit. EKG LBBB no prior for 

comparison. Tentatively plan for heart catheterization tomorrow. Discussed with 

the patient.


2. Acute respiratory failure with CHF and possibly ongoing RAVINDER


3. DM2: insulin dependent


4. Accelerated HTN: better


5. Morbid obesity


6. Leukocytosis with UTI: on antibiotics per PCP


2 recent code blues with status post resuscitation





History of Present Illness


History of Present Illness


[id number]


Patient seen and examined in the ICU


Full out his wife's FM [place] paperwork


Discussed with RN


Chart reviewed


Discussed with 


He apparently has been accepted at [facility] but awaiting insurance approval








[id number]


Patient seen and examined in the ICU


He remains intubated but is now off sedated with eyes open and nodding 

appropriately


His wife Ella is present


Chart reviewed


Discussed with RN








[id number]


Patient seen and examined in the ICU


Discussed with RN


Chart reviewed











Mr Alexander is a 76 yo M w/ PMHx hypertension, dyslipidemia, diabetes mellitus who

presented via EMS with complaint of shortness of breath on 3/13/202 that had 

been ongoign for 10 days. He called 911 in attempts to go to [facility], was brought 

to [facility], found with O2 sat of 70s at room air and started on  BiPAP to 100% in a 

very short time.


Pulmonology, cardiology, ID consulted.





3/16: Intubation and CVC placed for code blue on way to cardiac catheterization


3/17: Intubated, sedated


3/18: Intubated, sedated


3/19: BP difficult to control, remains intubated


3/20: Still Intubated and sedated. COVID 19 Negative


3/21: Intubated, sedated. Failed 2 weans. K still low, replaced. Lasix GTT. Good

UOP





Dosed Daptom/micafungin changed to Meropenem 3/22





 3/21 Overnight with fevers TMax 102F. D/w cardiology to stay intubated and 

sedated until cath 3/24. Sodium improved. lasix GTT to stop, K 3.2, replaced IV 

central line protocol.





3/23 TO CATH LAB





32 MIN CC TIME





Vitals/I&O


Vitals/I&O:





                                   Vital Signs








  Date Time  Temp Pulse Resp B/P (MAP) Pulse Ox O2 Delivery O2 Flow Rate FiO2


 


3/26/20 11:48  98  125/68    


 


3/26/20 11:00   22  98 Nasal Cannula 2.0 


 


3/26/20 08:00 98.5       





 98.5       














                                    I & O   


 


 3/25/20 3/25/20 3/26/20





 15:00 23:00 07:00


 


Intake Total 1000 ml 288.8 ml 0 ml


 


Output Total 700 ml 575 ml 1700 ml


 


Balance 300 ml -286.2 ml -1700 ml











Physical Exam


Physical Exam:


GENERAL: Alert and coop. appears comfortable - looks well


HEENT:  Pupils equal, OC/Op - clear


NECK: Supple - no JVD


LUNGS:  Decreased breath sounds bases,  


HEART:  S1, S2


ABDOMEN: Obese, soft, no guarding + BS


: Younger in place 3/16


EXTREMITIES:  trace 1- + pitting edema present. No cyanosis. SCDs bilaterally 


MSK bilateral knee scars intact


SKIN: Warm to touch. No signs of rash


NEURO: Non focal and looks well


RIJ clean - 3/16


General:  Other (intubated.)


Heart:  Regular rate


Lungs:  Clear


Abdomen:  Normal bowel sounds


Extremities:  No cyanosis, Other (3+ bilateral LE pitting edema)


Skin:  No rashes, No breakdown, No significant lesion





Labs


Labs:





Laboratory Tests








Test


 3/25/20


12:44 3/25/20


13:00 3/25/20


23:20 3/26/20


05:00


 


Glucose (Fingerstick)


 223 mg/dL


(70-99) 


 139 mg/dL


(70-99) 





 


O2 Saturation  98 % (92-99)   


 


Arterial Blood pH


 


 7.43


(7.35-7.45) 


 





 


Arterial Blood pCO2 at


Patient Temp 


 54 mmHg


(35-46) 


 





 


Arterial Blood pO2 at Patient


Temp 


 106 mmHg


() 


 





 


Arterial Blood HCO3


 


 35 mmol/L


(21-28) 


 





 


Arterial Blood Base Excess


 


 9 mmol/L


(-3-3) 


 





 


FiO2


 


 40 ps 10 peep


5 


 





 


White Blood Count


 


 


 


 11.7 x10^3/uL


(4.0-11.0)


 


Red Blood Count


 


 


 


 5.18 x10^6/uL


(4.30-5.70)


 


Hemoglobin


 


 


 


 14.4 g/dL


(13.0-17.5)


 


Hematocrit


 


 


 


 44.2 %


(39.0-53.0)


 


Mean Corpuscular Volume    85 fL () 


 


Mean Corpuscular Hemoglobin    28 pg (25-35) 


 


Mean Corpuscular Hemoglobin


Concent 


 


 


 33 g/dL


(31-37)


 


Red Cell Distribution Width


 


 


 


 14.3 %


(11.5-14.5)


 


Platelet Count


 


 


 


 207 x10^3/uL


(140-400)


 


Neutrophils (%) (Auto)    69 % (31-73) 


 


Lymphocytes (%) (Auto)    16 % (24-48) 


 


Monocytes (%) (Auto)    10 % (0-9) 


 


Eosinophils (%) (Auto)    5 % (0-3) 


 


Basophils (%) (Auto)    1 % (0-3) 


 


Neutrophils # (Auto)


 


 


 


 8.1 x10^3/uL


(1.8-7.7)


 


Lymphocytes # (Auto)


 


 


 


 1.8 x10^3/uL


(1.0-4.8)


 


Monocytes # (Auto)


 


 


 


 1.2 x10^3/uL


(0.0-1.1)


 


Eosinophils # (Auto)


 


 


 


 0.5 x10^3/uL


(0.0-0.7)


 


Basophils # (Auto)


 


 


 


 0.1 x10^3/uL


(0.0-0.2)


 


Sodium Level


 


 


 


 145 mmol/L


(136-145)


 


Potassium Level


 


 


 


 2.9 mmol/L


(3.5-5.1)


 


Chloride Level


 


 


 


 104 mmol/L


()


 


Carbon Dioxide Level


 


 


 


 35 mmol/L


(21-32)


 


Anion Gap    6 (6-14) 


 


Blood Urea Nitrogen


 


 


 


 23 mg/dL


(8-26)


 


Creatinine


 


 


 


 0.7 mg/dL


(0.7-1.3)


 


Estimated GFR


(Cockcroft-Gault) 


 


 


 110.5 





 


BUN/Creatinine Ratio    33 (6-20) 


 


Glucose Level


 


 


 


 110 mg/dL


(70-99)


 


Calcium Level


 


 


 


 8.9 mg/dL


(8.5-10.1)


 


Magnesium Level


 


 


 


 1.8 mg/dL


(1.8-2.4)


 


Total Bilirubin


 


 


 


 0.8 mg/dL


(0.2-1.0)


 


Aspartate Amino Transf


(AST/SGOT) 


 


 


 54 U/L (15-37) 





 


Alanine Aminotransferase


(ALT/SGPT) 


 


 


 37 U/L (16-63) 





 


Alkaline Phosphatase


 


 


 


 62 U/L


()


 


Total Protein


 


 


 


 6.2 g/dL


(6.4-8.2)


 


Albumin


 


 


 


 2.2 g/dL


(3.4-5.0)


 


Albumin/Globulin Ratio    0.6 (1.0-1.7) 


 


Test


 3/26/20


05:59 3/26/20


08:40 3/26/20


10:50 3/26/20


11:52


 


Glucose (Fingerstick)


 107 mg/dL


(70-99) 104 mg/dL


(70-99) 


 143 mg/dL


(70-99)


 


Potassium Level


 


 


 3.2 mmol/L


(3.5-5.1) 














Assessment and Plan


Assessmemt and Plan


Problems


Medical Problems:


(1) Acute respiratory distress


Status: Acute  





(2) CAP (community acquired pneumonia)


Status: Acute  





(3) CHF (congestive heart failure)


Status: Acute  





(4) Hypoxia


Status: Acute  





(5) SIRS (systemic inflammatory response syndrome)


Status: Acute  





Status post cardiac catheter with new coronary stent 


1. Acute CHF with possible combined systolic/diastolic dysfunction. 

Significantly improved. Continuing present treatment.


2. NSTEMI: no CP but suspecting ischemia as culprit. EKG LBBB no prior for 

comparison. Tentatively plan for heart catheterization tomorrow. Discussed with 

the patient.


2. Acute respiratory failure with CHF and possibly ongoing RAVINDER


3. DM2: insulin dependent


4. Accelerated HTN: better


5. Morbid obesity


6. Leukocytosis with UTI: on antibiotics per PCP


7. 2 recent code blues with resuscitation








Plan


For now:


ICU monitoring


When necessary Propofol for sedation


IV Vasotec


Norvasc per OG tube


IV antibiotics


Duo nebs


Home meds


DVT prophylaxis


Full code


Prognosis guarded but improving


We considered checking for coronavirus but he was turned down by the [place] 

[employer]


Discussed with RNs


I put orders in for discharge to LTAC later today if his insurance will approve





Comment


Review of Relevant


I have reviewed the following items nicole (where applicable) has been applied.


Medications:





Current Medications








 Medications


  (Trade)  Dose


 Ordered  Sig/Elisa


 Route


 PRN Reason  Start Time


 Stop Time Status Last Admin


Dose Admin


 


 Clopidogrel


 Bisulfate


  (Plavix)  75 mg  DAILYWBKFT


 PO


   3/25/20 13:30


    3/26/20 08:29





 


 Aspirin


  (Children'S


 Aspirin)  81 mg  DAILYWBKFT


 PO


   3/26/20 08:00


    3/26/20 08:29





 


 Lisinopril


  (Prinivil)  10 mg  DAILY


 PO


   3/25/20 16:30


 3/26/20 10:01 DC 3/26/20 08:29





 


 Prochlorperazine


 Edisylate


  (Compazine)  10 mg  PRN Q8HRS  PRN


 IV


 NAUSEA/VOMITING  3/25/20 20:30


    3/25/20 20:46





 


 Potassium


 Chloride/Water  100 ml @ 


 100 mls/hr  Q1H


 IV


   3/26/20 07:00


 3/26/20 08:59 DC 3/26/20 08:28





 


 Furosemide


  (Lasix)  40 mg  DAILY


 PO


   3/26/20 10:00


    3/26/20 11:47





 


 Atenolol


  (Tenormin)  25 mg  DAILY


 PO


   3/26/20 10:15


    3/26/20 11:48





 


 Potassium Chloride


  (Klor-Con)  40 meq  1X  ONCE


 PO


   3/26/20 11:45


 3/26/20 11:46 DC 3/26/20 11:48




















CASTMARY RAMIREZ K III DO           Mar 26, 2020 12:25

## 2020-03-26 NOTE — PDOC
PULMONARY PROGRESS NOTES


Subjective


Extubated yesterday.  sitting up in chair, alert, no soa


Vitals





Vital Signs








  Date Time  Temp Pulse Resp B/P (MAP) Pulse Ox O2 Delivery O2 Flow Rate FiO2


 


3/26/20 08:29  71  164/84    


 


3/26/20 08:00      Nasal Cannula 2.0 


 


3/26/20 07:00   18  93   


 


3/26/20 03:22 97.9       





 97.9       








Comments


follows commands


ROS:  No Nausea, No Chest Pain, No Abdominal Pain


General:  Alert, Oriented X4, No acute distress


HEENT:  Other (nc at perrl  nose clear   orally intubated   neck no lad  no 

thyromegaly)


Lungs:  Clear


Cardiovascular:  S1, S2


Abdomen:  Soft, Non-tender, Other (no mass)


Extremities:  No Edema


Skin:  Warm


Labs





Laboratory Tests








Test


 3/24/20


12:24 3/24/20


17:52 3/25/20


00:09 3/25/20


05:30


 


Glucose (Fingerstick)


 228 mg/dL


(70-99) 235 mg/dL


(70-99) 240 mg/dL


(70-99) 





 


White Blood Count


 


 


 


 11.9 x10^3/uL


(4.0-11.0)


 


Red Blood Count


 


 


 


 4.78 x10^6/uL


(4.30-5.70)


 


Hemoglobin


 


 


 


 13.1 g/dL


(13.0-17.5)


 


Hematocrit


 


 


 


 41.4 %


(39.0-53.0)


 


Mean Corpuscular Volume    87 fL () 


 


Mean Corpuscular Hemoglobin    27 pg (25-35) 


 


Mean Corpuscular Hemoglobin


Concent 


 


 


 32 g/dL


(31-37)


 


Red Cell Distribution Width


 


 


 


 14.8 %


(11.5-14.5)


 


Platelet Count


 


 


 


 195 x10^3/uL


(140-400)


 


Neutrophils (%) (Auto)    70 % (31-73) 


 


Lymphocytes (%) (Auto)    15 % (24-48) 


 


Monocytes (%) (Auto)    9 % (0-9) 


 


Eosinophils (%) (Auto)    5 % (0-3) 


 


Basophils (%) (Auto)    0 % (0-3) 


 


Neutrophils # (Auto)


 


 


 


 8.2 x10^3/uL


(1.8-7.7)


 


Lymphocytes # (Auto)


 


 


 


 1.8 x10^3/uL


(1.0-4.8)


 


Monocytes # (Auto)


 


 


 


 1.1 x10^3/uL


(0.0-1.1)


 


Eosinophils # (Auto)


 


 


 


 0.6 x10^3/uL


(0.0-0.7)


 


Basophils # (Auto)


 


 


 


 0.1 x10^3/uL


(0.0-0.2)


 


Sodium Level


 


 


 


 148 mmol/L


(136-145)


 


Potassium Level


 


 


 


 3.6 mmol/L


(3.5-5.1)


 


Chloride Level


 


 


 


 106 mmol/L


()


 


Carbon Dioxide Level


 


 


 


 37 mmol/L


(21-32)


 


Anion Gap    5 (6-14) 


 


Blood Urea Nitrogen


 


 


 


 40 mg/dL


(8-26)


 


Creatinine


 


 


 


 1.0 mg/dL


(0.7-1.3)


 


Estimated GFR


(Cockcroft-Gault) 


 


 


 73.2 





 


Glucose Level


 


 


 


 246 mg/dL


(70-99)


 


Calcium Level


 


 


 


 8.6 mg/dL


(8.5-10.1)


 


Test


 3/25/20


05:37 3/25/20


08:06 3/25/20


09:20 3/25/20


12:44


 


Glucose (Fingerstick)


 237 mg/dL


(70-99) 244 mg/dL


(70-99) 


 223 mg/dL


(70-99)


 


O2 Saturation   97 % (92-99)  


 


Arterial Blood pH


 


 


 7.48


(7.35-7.45) 





 


Arterial Blood pCO2 at


Patient Temp 


 


 45 mmHg


(35-46) 





 


Arterial Blood pO2 at Patient


Temp 


 


 90 mmHg


() 





 


Arterial Blood HCO3


 


 


 33 mmol/L


(21-28) 





 


Arterial Blood Base Excess


 


 


 8 mmol/L


(-3-3) 





 


FiO2   40  


 


Test


 3/25/20


13:00 3/25/20


23:20 3/26/20


05:00 3/26/20


05:59


 


O2 Saturation 98 % (92-99)    


 


Arterial Blood pH


 7.43


(7.35-7.45) 


 


 





 


Arterial Blood pCO2 at


Patient Temp 54 mmHg


(35-46) 


 


 





 


Arterial Blood pO2 at Patient


Temp 106 mmHg


() 


 


 





 


Arterial Blood HCO3


 35 mmol/L


(21-28) 


 


 





 


Arterial Blood Base Excess


 9 mmol/L


(-3-3) 


 


 





 


FiO2


 40 ps 10 peep


5 


 


 





 


Glucose (Fingerstick)


 


 139 mg/dL


(70-99) 


 107 mg/dL


(70-99)


 


White Blood Count


 


 


 11.7 x10^3/uL


(4.0-11.0) 





 


Red Blood Count


 


 


 5.18 x10^6/uL


(4.30-5.70) 





 


Hemoglobin


 


 


 14.4 g/dL


(13.0-17.5) 





 


Hematocrit


 


 


 44.2 %


(39.0-53.0) 





 


Mean Corpuscular Volume   85 fL ()  


 


Mean Corpuscular Hemoglobin   28 pg (25-35)  


 


Mean Corpuscular Hemoglobin


Concent 


 


 33 g/dL


(31-37) 





 


Red Cell Distribution Width


 


 


 14.3 %


(11.5-14.5) 





 


Platelet Count


 


 


 207 x10^3/uL


(140-400) 





 


Neutrophils (%) (Auto)   69 % (31-73)  


 


Lymphocytes (%) (Auto)   16 % (24-48)  


 


Monocytes (%) (Auto)   10 % (0-9)  


 


Eosinophils (%) (Auto)   5 % (0-3)  


 


Basophils (%) (Auto)   1 % (0-3)  


 


Neutrophils # (Auto)


 


 


 8.1 x10^3/uL


(1.8-7.7) 





 


Lymphocytes # (Auto)


 


 


 1.8 x10^3/uL


(1.0-4.8) 





 


Monocytes # (Auto)


 


 


 1.2 x10^3/uL


(0.0-1.1) 





 


Eosinophils # (Auto)


 


 


 0.5 x10^3/uL


(0.0-0.7) 





 


Basophils # (Auto)


 


 


 0.1 x10^3/uL


(0.0-0.2) 





 


Sodium Level


 


 


 145 mmol/L


(136-145) 





 


Potassium Level


 


 


 2.9 mmol/L


(3.5-5.1) 





 


Chloride Level


 


 


 104 mmol/L


() 





 


Carbon Dioxide Level


 


 


 35 mmol/L


(21-32) 





 


Anion Gap   6 (6-14)  


 


Blood Urea Nitrogen


 


 


 23 mg/dL


(8-26) 





 


Creatinine


 


 


 0.7 mg/dL


(0.7-1.3) 





 


Estimated GFR


(Cockcroft-Gault) 


 


 110.5 


 





 


BUN/Creatinine Ratio   33 (6-20)  


 


Glucose Level


 


 


 110 mg/dL


(70-99) 





 


Calcium Level


 


 


 8.9 mg/dL


(8.5-10.1) 





 


Magnesium Level


 


 


 1.8 mg/dL


(1.8-2.4) 





 


Total Bilirubin


 


 


 0.8 mg/dL


(0.2-1.0) 





 


Aspartate Amino Transf


(AST/SGOT) 


 


 54 U/L (15-37) 


 





 


Alanine Aminotransferase


(ALT/SGPT) 


 


 37 U/L (16-63) 


 





 


Alkaline Phosphatase


 


 


 62 U/L


() 





 


Total Protein


 


 


 6.2 g/dL


(6.4-8.2) 





 


Albumin


 


 


 2.2 g/dL


(3.4-5.0) 





 


Albumin/Globulin Ratio   0.6 (1.0-1.7)  


 


Test


 3/26/20


08:40 


 


 





 


Glucose (Fingerstick)


 104 mg/dL


(70-99) 


 


 











Laboratory Tests








Test


 3/25/20


12:44 3/25/20


13:00 3/25/20


23:20 3/26/20


05:00


 


Glucose (Fingerstick)


 223 mg/dL


(70-99) 


 139 mg/dL


(70-99) 





 


O2 Saturation  98 % (92-99)   


 


Arterial Blood pH


 


 7.43


(7.35-7.45) 


 





 


Arterial Blood pCO2 at


Patient Temp 


 54 mmHg


(35-46) 


 





 


Arterial Blood pO2 at Patient


Temp 


 106 mmHg


() 


 





 


Arterial Blood HCO3


 


 35 mmol/L


(21-28) 


 





 


Arterial Blood Base Excess


 


 9 mmol/L


(-3-3) 


 





 


FiO2


 


 40 ps 10 peep


5 


 





 


White Blood Count


 


 


 


 11.7 x10^3/uL


(4.0-11.0)


 


Red Blood Count


 


 


 


 5.18 x10^6/uL


(4.30-5.70)


 


Hemoglobin


 


 


 


 14.4 g/dL


(13.0-17.5)


 


Hematocrit


 


 


 


 44.2 %


(39.0-53.0)


 


Mean Corpuscular Volume    85 fL () 


 


Mean Corpuscular Hemoglobin    28 pg (25-35) 


 


Mean Corpuscular Hemoglobin


Concent 


 


 


 33 g/dL


(31-37)


 


Red Cell Distribution Width


 


 


 


 14.3 %


(11.5-14.5)


 


Platelet Count


 


 


 


 207 x10^3/uL


(140-400)


 


Neutrophils (%) (Auto)    69 % (31-73) 


 


Lymphocytes (%) (Auto)    16 % (24-48) 


 


Monocytes (%) (Auto)    10 % (0-9) 


 


Eosinophils (%) (Auto)    5 % (0-3) 


 


Basophils (%) (Auto)    1 % (0-3) 


 


Neutrophils # (Auto)


 


 


 


 8.1 x10^3/uL


(1.8-7.7)


 


Lymphocytes # (Auto)


 


 


 


 1.8 x10^3/uL


(1.0-4.8)


 


Monocytes # (Auto)


 


 


 


 1.2 x10^3/uL


(0.0-1.1)


 


Eosinophils # (Auto)


 


 


 


 0.5 x10^3/uL


(0.0-0.7)


 


Basophils # (Auto)


 


 


 


 0.1 x10^3/uL


(0.0-0.2)


 


Sodium Level


 


 


 


 145 mmol/L


(136-145)


 


Potassium Level


 


 


 


 2.9 mmol/L


(3.5-5.1)


 


Chloride Level


 


 


 


 104 mmol/L


()


 


Carbon Dioxide Level


 


 


 


 35 mmol/L


(21-32)


 


Anion Gap    6 (6-14) 


 


Blood Urea Nitrogen


 


 


 


 23 mg/dL


(8-26)


 


Creatinine


 


 


 


 0.7 mg/dL


(0.7-1.3)


 


Estimated GFR


(Cockcroft-Gault) 


 


 


 110.5 





 


BUN/Creatinine Ratio    33 (6-20) 


 


Glucose Level


 


 


 


 110 mg/dL


(70-99)


 


Calcium Level


 


 


 


 8.9 mg/dL


(8.5-10.1)


 


Magnesium Level


 


 


 


 1.8 mg/dL


(1.8-2.4)


 


Total Bilirubin


 


 


 


 0.8 mg/dL


(0.2-1.0)


 


Aspartate Amino Transf


(AST/SGOT) 


 


 


 54 U/L (15-37) 





 


Alanine Aminotransferase


(ALT/SGPT) 


 


 


 37 U/L (16-63) 





 


Alkaline Phosphatase


 


 


 


 62 U/L


()


 


Total Protein


 


 


 


 6.2 g/dL


(6.4-8.2)


 


Albumin


 


 


 


 2.2 g/dL


(3.4-5.0)


 


Albumin/Globulin Ratio    0.6 (1.0-1.7) 


 


Test


 3/26/20


05:59 3/26/20


08:40 


 





 


Glucose (Fingerstick)


 107 mg/dL


(70-99) 104 mg/dL


(70-99) 


 











Medications





Active Scripts








 Medications  Dose


 Route/Sig


 Max Daily Dose Days Date Category


 


 Saw [place] (Saw


 Palmetto Fruit)


 450 Mg Capsule  450 Mg


 PO DAILY


   3/13/20 Reported


 


 D3-50


  (Cholecalciferol


  (Vitamin D3))


 50,000 Unit


 Capsule  1,000 Unit


 PO DAILY


   3/13/20 Reported


 


 Emergen-C 500 mg


 Chewable Tab (Vit


 C/Ascorb


 Sod/Multivit-Min)


 500 Mg Tab.chew  500 Mg


 PO DAILY


   3/13/20 Reported


 


 Zoloft


  (Sertraline Hcl)


 50 Mg Tablet  50 Mg


 PO DAILY


   3/13/20 Reported


 


 Trazodone Hcl 50


 Mg Tablet  1 Tab


 PO QHS


   3/13/20 Reported


 


 Trulicity


  (Dulaglutide)


 0.75 Mg/0.5 Ml


 Pen.injctr  0.75 Mg


 SQ WEEKLY


   3/13/20 Reported


 


 Insulin Aspart


 100 Unit/1 Ml Vial  25 Unit


 SQ TIDWMEALS


   3/13/20 Reported


 


 Levemir (Insulin


 Detemir) 100


 Unit/1 Ml Vial  80 Unit


 SQ DAILY


   3/13/20 Reported


 


 Lasix


  (Furosemide) 20


 Mg Tablet  20 Mg


 PO BID


   3/13/20 Reported


 


 Flomax


  (Tamsulosin Hcl)


 0.4 Mg Cap.er.24h  0.4 Mg


 PO HS


   3/13/20 Reported


 


 Omeprazole 20 Mg


 Capsule.dr  20 Mg


 PO BID


   3/13/20 Reported


 


 Lisinopril 40 Mg


 Tablet  1 Tab


 PO DAILY


   3/13/20 Reported


 


 Atenolol 100 Mg


 Tablet  100 Mg


 PO BID


   3/13/20 Reported








Comments


Status post catheterization  successful PCI drug-eluting stent to the LAD





cxr 3/25


basal atelectasis





Impression


.


IMPRESSION:


1.  Acute hypercapnic and hypoxic respiratory failure sec to flash pulmonary 

edema, resolved


2.  Abnormal chest x-ray with bilateral interstitial infiltrates suggesting 

interstitial edema


3.  s/p cath with severe two-vessel CAD. S/p PCI/LEANNE to the LAD.  of 

LCx.Increase LV filling pressures


4.  No significant tobacco history.


5.  Suspected obesity hypoventilation syndrome and obstructive sleep apnea,


never been tested.


6.  CAD


7.  HYPOTENSION RESOLVED


8.  COVID NEGATIVE


9.  Status post cardiac catheterization yesterday revealing severe two-vessel 

coronary artery disease status post PCI drug-eluting stent to the LAD


10.  Fever, curve has improved, antibiotics per ID.





Chest x-ray reviewed slightly worse we will give additional Lasix











cath 3/23


1.  Severe two-vessel coronary artery disease


2.  Successful PCI/drug eluting stent placement to the left anterior descending 

artery. Attempted PCI to the chronic total occlusion of the left circumflex wilbur

ry.


3.  Mild to moderate pulmonary hypertension secondary to elevated left-sided 

filling pressures as evidence by elevated primary capillary wedge pressure.


4.  No evidence of intra-cardiac shunt.





Plan


.


Doing well onO2 via nc


ABG adequate


Status post cardiac catheterization she see report


Antibiotics per ID


GI prophylaxis


Rec BIPAP qhs post extubation





Case discussed with RN/ family











REISZ,NORA R MD              Mar 26, 2020 10:38

## 2020-03-26 NOTE — SNU/HH DC
DISCHARGE ORDERS


DISCHARGE INFORMATION:


FINAL DIAGNOSIS


Problems


Medical Problems:


(1) Acute respiratory distress


Status: Acute  





(2) CAP (community acquired pneumonia)


Status: Acute  





(3) CHF (congestive heart failure)


Status: Acute  





(4) Hypoxia


Status: Acute  





(5) SIRS (systemic inflammatory response syndrome)


Status: Acute  








CONDITION ON DISCHARGE:  Stable





CODE STATUS:


Code Status:  Full





SKILLED NURSING:


SNF STAY <30 DAYS:  No





HOSPICE:


HOSPICE:  No


HOSPICE EVAL & TREAT:  No





LTAC:


ADMIT TO LTAC:  Yes





POST DISCHARGE ORDERS:


ACTIVITY ORDERS:  Activity as tolerated


DIET AFTER DISCHARGE:  Cardiac





TREATMENT/EQUIPMENT ORDERS:


Physical Therapy For:  Evalulation/Treatment


Occupational Therapy For:  Evaluation/Treatment





DISCHARGE MEDICATIONS:


Home Meds


Reported Medications


Saw [place] Fruit (SAW PALMETTO) 450 Mg Capsule, 450 MG PO DAILY for FOR 

URINARY RETENTION, CAP


   3/13/20


Cholecalciferol (Vitamin D3) (D3-50) 50,000 Unit Capsule, 1000 UNIT PO DAILY for

VITAMIN, CAP


   3/13/20


Vit C/Ascorb Sod/Multivit-Min (Emergen-C 500 mg Chewable Tab) 500 Mg Tab.chew, 

500 MG PO DAILY for VITAMIN, TAB.CHEW


   3/13/20


Sertraline Hcl (ZOLOFT) 50 Mg Tablet, 50 MG PO DAILY for ANTI-DEPRESSANT, TAB 0 

Refills


   3/13/20


Trazodone Hcl (TRAZODONE HCL) 50 Mg Tablet, 1 TAB PO QHS for INSOMNIA, #30 TAB 1

Refill


   3/13/20


Dulaglutide (Trulicity) 0.75 Mg/0.5 Ml Pen.injctr, 0.75 MG SQ WEEKLY for LOWER 

BLOOD SUGAR, EACH


   3/13/20


Insulin Aspart (Insulin Aspart) 100 Unit/1 Ml Vial, 25 UNIT SQ TIDWMEALS for 

LOWER BLOOD GLUCOSE, EACH


   3/13/20


Insulin Detemir (LEVEMIR) 100 Unit/1 Ml Vial, 80 UNIT SQ DAILY for LOWER BLOOD 

GLUCOSE, VIAL


   3/13/20


Furosemide (LASIX) 20 Mg Tablet, 20 MG PO BID for DIURETIC, TAB


   3/13/20


Tamsulosin Hcl (FLOMAX) 0.4 Mg Cap.er.24h, 0.4 MG PO HS for HELP WITH URINATION,

TAB


   3/13/20


Omeprazole (OMEPRAZOLE) 20 Mg Capsule.dr, 20 MG PO BID for LOWER STOMACH ACID, 

CAP


   3/13/20


Lisinopril (LISINOPRIL) 40 Mg Tablet, 1 TAB PO DAILY for HTN, #30 TAB 5 Refills


   3/13/20


Atenolol (ATENOLOL) 100 Mg Tablet, 100 MG PO BID for htn, TAB


   3/13/20











MARY LARA III DO           Mar 26, 2020 11:49

## 2020-03-26 NOTE — PDOC
CASH DELAROSA APRN 3/26/20 0948:


CARDIO Progress Notes


Date and Time


Date of Service


3/26/20


Time of Evaluation


0940





Subjective


Subjective:  No Chest Pain, No shortness of breath, No Palpitations, No 

Dizziness





Vitals


Vitals





Vital Signs








  Date Time  Temp Pulse Resp B/P (MAP) Pulse Ox O2 Delivery O2 Flow Rate FiO2


 


3/26/20 08:29  71  164/84    


 


3/26/20 08:00      Nasal Cannula 2.0 


 


3/26/20 07:00   18  93   


 


3/26/20 03:22 97.9       





 97.9       








Weight


Weight [ ]





Input and Output


Intake and Output











Intake and Output0 


 


 3/26/20





 07:00


 


Intake Total 1288.8 ml


 


Output Total 2975 ml


 


Balance -1686.2 ml


 


 


 


Intake Oral 0 ml


 


IV Total 288.8 ml


 


Tube Feeding 500 ml


 


Other 500 ml


 


Output Urine Total 2975 ml


 


# Bowel Movements 1











Laboratory


Labs





Laboratory Tests








Test


 3/25/20


12:44 3/25/20


13:00 3/25/20


23:20 3/26/20


05:00


 


Glucose (Fingerstick)


 223 mg/dL


(70-99) 


 139 mg/dL


(70-99) 





 


O2 Saturation  98 % (92-99)   


 


Arterial Blood pH


 


 7.43


(7.35-7.45) 


 





 


Arterial Blood pCO2 at


Patient Temp 


 54 mmHg


(35-46) 


 





 


Arterial Blood pO2 at Patient


Temp 


 106 mmHg


() 


 





 


Arterial Blood HCO3


 


 35 mmol/L


(21-28) 


 





 


Arterial Blood Base Excess


 


 9 mmol/L


(-3-3) 


 





 


FiO2


 


 40 ps 10 peep


5 


 





 


White Blood Count


 


 


 


 11.7 x10^3/uL


(4.0-11.0)


 


Red Blood Count


 


 


 


 5.18 x10^6/uL


(4.30-5.70)


 


Hemoglobin


 


 


 


 14.4 g/dL


(13.0-17.5)


 


Hematocrit


 


 


 


 44.2 %


(39.0-53.0)


 


Mean Corpuscular Volume    85 fL () 


 


Mean Corpuscular Hemoglobin    28 pg (25-35) 


 


Mean Corpuscular Hemoglobin


Concent 


 


 


 33 g/dL


(31-37)


 


Red Cell Distribution Width


 


 


 


 14.3 %


(11.5-14.5)


 


Platelet Count


 


 


 


 207 x10^3/uL


(140-400)


 


Neutrophils (%) (Auto)    69 % (31-73) 


 


Lymphocytes (%) (Auto)    16 % (24-48) 


 


Monocytes (%) (Auto)    10 % (0-9) 


 


Eosinophils (%) (Auto)    5 % (0-3) 


 


Basophils (%) (Auto)    1 % (0-3) 


 


Neutrophils # (Auto)


 


 


 


 8.1 x10^3/uL


(1.8-7.7)


 


Lymphocytes # (Auto)


 


 


 


 1.8 x10^3/uL


(1.0-4.8)


 


Monocytes # (Auto)


 


 


 


 1.2 x10^3/uL


(0.0-1.1)


 


Eosinophils # (Auto)


 


 


 


 0.5 x10^3/uL


(0.0-0.7)


 


Basophils # (Auto)


 


 


 


 0.1 x10^3/uL


(0.0-0.2)


 


Sodium Level


 


 


 


 145 mmol/L


(136-145)


 


Potassium Level


 


 


 


 2.9 mmol/L


(3.5-5.1)


 


Chloride Level


 


 


 


 104 mmol/L


()


 


Carbon Dioxide Level


 


 


 


 35 mmol/L


(21-32)


 


Anion Gap    6 (6-14) 


 


Blood Urea Nitrogen


 


 


 


 23 mg/dL


(8-26)


 


Creatinine


 


 


 


 0.7 mg/dL


(0.7-1.3)


 


Estimated GFR


(Cockcroft-Gault) 


 


 


 110.5 





 


BUN/Creatinine Ratio    33 (6-20) 


 


Glucose Level


 


 


 


 110 mg/dL


(70-99)


 


Calcium Level


 


 


 


 8.9 mg/dL


(8.5-10.1)


 


Total Bilirubin


 


 


 


 0.8 mg/dL


(0.2-1.0)


 


Aspartate Amino Transf


(AST/SGOT) 


 


 


 54 U/L (15-37) 





 


Alanine Aminotransferase


(ALT/SGPT) 


 


 


 37 U/L (16-63) 





 


Alkaline Phosphatase


 


 


 


 62 U/L


()


 


Total Protein


 


 


 


 6.2 g/dL


(6.4-8.2)


 


Albumin


 


 


 


 2.2 g/dL


(3.4-5.0)


 


Albumin/Globulin Ratio    0.6 (1.0-1.7) 


 


Test


 3/26/20


05:59 3/26/20


08:40 


 





 


Glucose (Fingerstick)


 107 mg/dL


(70-99) 104 mg/dL


(70-99) 


 














Microbiology


Micro





Microbiology


3/22/20 Urine Culture - Final, Complete


          


3/22/20 Urine Culture Result 1 (CAR) - Final, Complete


          


3/22/20 - Final, Resulted


          


3/22/20 - Final, Resulted


          


3/22/20 - Final, Resulted


          


3/22/20 - Preliminary, Resulted


          


3/22/20 - Preliminary, Resulted


          


3/22/20 - Preliminary, Resulted


          


3/22/20 Gram Stain Evaluation - Final, Resulted


          


3/22/20 Sputum Culture - Final, Resulted


          


3/22/20 Sputum Result 1 - Final, Resulted


          


3/21/20 Blood Culture - Preliminary, Resulted


          NO GROWTH AFTER 4 DAYS





Physical Exam


HEENT:  Neck Supple W Full Motion


Chest:  Symmetric


LUNGS:  Other (diminished )


Heart:  S1S2, RRR (SB/SR with PVCs), other (distant heart tones)


Abdomen:  Soft N/T, Other (obese)


Extremities:  Other (trace-1+ bilateral LE edema )


Neurology:  alert, follow commands





Assessment


Assessment


1. Acute on chronic diastolic CHF; Echo with preserved LV systolic function. 

better compensated 


2. NSTEMI; peak 0.7. 


3. CAD; cath with severe two-vessel CAD. S/p PCI/LEANNE to the LAD.  of LCx.


4. Acute respiratory failure with CHF; extubated 3/25


5. Diabetes, II; as per IM


6. Accelerated HTN: remains mildly elevated 


7. Leukocytosis with UTI: on antibiotics per PCP


8. Hypokalemia








Recommendations





Check Mg- replaced as warranted 


Secondary prevention including DAPT with ASA, Plavix, statin therapy


Increase lisinopril for better BP control


Resume low-dose BB as HR has been consistently stable off sedation. Monitor for 

bradycardia


Oral Lasix 


Consider outpatient PCI of LCx  if symptomatic 


Supportive care


Follow up with Dr. Boyer scheduled.





BRANDIN BOYER MD 3/27/20 0936:


CARDIO Progress Notes


Assessment


Assessment


Patient seen and examined 3/26/20. Agree with NP's assessment and plan.


Acute on chronic diastolic heart failure better compensated.


s/p PCI/LEANNE to LAD, clinically stable. We will consider PCI to  of LCx if 

patient is symptomatic.


Agree with increasing lisinopril dose for better blood pressure control.











CASH DELAROSA           Mar 26, 2020 09:48


BRANDIN BOYER MD           Mar 27, 2020 09:36

## 2020-03-26 NOTE — PDOC
Infectious Disease Note


Subjective


Subjective


Feeling great


Hungry


No F/C/S/N/v/SOA/harris


Needs to hav a BM





Vital Sign


Vital Signs





Vital Signs








  Date Time  Temp Pulse Resp B/P (MAP) Pulse Ox O2 Delivery O2 Flow Rate FiO2


 


3/26/20 08:29  71  164/84    


 


3/26/20 08:00      Nasal Cannula 2.0 


 


3/26/20 07:00   18  93   


 


3/26/20 03:22 97.9       





 97.9       











Physical Exam


PHYSICAL EXAM


GENERAL: Alert and coop. appears comfortable - looks well


HEENT:  Pupils equal, OC/Op - clear


NECK: Supple - no JVD


LUNGS:  Decreased breath sounds bases,  


HEART:  S1, S2


ABDOMEN: Obese, soft, no guarding + BS


: Younger in place 3/16


EXTREMITIES:  trace 1- + pitting edema present. No cyanosis. SCDs bilaterally 


MSK bilateral knee scars intact


SKIN: Warm to touch. No signs of rash


NEURO: Non focal and looks well


RIJ clean - 3/16





Labs


Lab





Laboratory Tests








Test


 3/25/20


12:44 3/25/20


13:00 3/25/20


23:20 3/26/20


05:00


 


Glucose (Fingerstick)


 223 mg/dL


(70-99) 


 139 mg/dL


(70-99) 





 


O2 Saturation  98 % (92-99)   


 


Arterial Blood pH


 


 7.43


(7.35-7.45) 


 





 


Arterial Blood pCO2 at


Patient Temp 


 54 mmHg


(35-46) 


 





 


Arterial Blood pO2 at Patient


Temp 


 106 mmHg


() 


 





 


Arterial Blood HCO3


 


 35 mmol/L


(21-28) 


 





 


Arterial Blood Base Excess


 


 9 mmol/L


(-3-3) 


 





 


FiO2


 


 40 ps 10 peep


5 


 





 


White Blood Count


 


 


 


 11.7 x10^3/uL


(4.0-11.0)


 


Red Blood Count


 


 


 


 5.18 x10^6/uL


(4.30-5.70)


 


Hemoglobin


 


 


 


 14.4 g/dL


(13.0-17.5)


 


Hematocrit


 


 


 


 44.2 %


(39.0-53.0)


 


Mean Corpuscular Volume    85 fL () 


 


Mean Corpuscular Hemoglobin    28 pg (25-35) 


 


Mean Corpuscular Hemoglobin


Concent 


 


 


 33 g/dL


(31-37)


 


Red Cell Distribution Width


 


 


 


 14.3 %


(11.5-14.5)


 


Platelet Count


 


 


 


 207 x10^3/uL


(140-400)


 


Neutrophils (%) (Auto)    69 % (31-73) 


 


Lymphocytes (%) (Auto)    16 % (24-48) 


 


Monocytes (%) (Auto)    10 % (0-9) 


 


Eosinophils (%) (Auto)    5 % (0-3) 


 


Basophils (%) (Auto)    1 % (0-3) 


 


Neutrophils # (Auto)


 


 


 


 8.1 x10^3/uL


(1.8-7.7)


 


Lymphocytes # (Auto)


 


 


 


 1.8 x10^3/uL


(1.0-4.8)


 


Monocytes # (Auto)


 


 


 


 1.2 x10^3/uL


(0.0-1.1)


 


Eosinophils # (Auto)


 


 


 


 0.5 x10^3/uL


(0.0-0.7)


 


Basophils # (Auto)


 


 


 


 0.1 x10^3/uL


(0.0-0.2)


 


Sodium Level


 


 


 


 145 mmol/L


(136-145)


 


Potassium Level


 


 


 


 2.9 mmol/L


(3.5-5.1)


 


Chloride Level


 


 


 


 104 mmol/L


()


 


Carbon Dioxide Level


 


 


 


 35 mmol/L


(21-32)


 


Anion Gap    6 (6-14) 


 


Blood Urea Nitrogen


 


 


 


 23 mg/dL


(8-26)


 


Creatinine


 


 


 


 0.7 mg/dL


(0.7-1.3)


 


Estimated GFR


(Cockcroft-Gault) 


 


 


 110.5 





 


BUN/Creatinine Ratio    33 (6-20) 


 


Glucose Level


 


 


 


 110 mg/dL


(70-99)


 


Calcium Level


 


 


 


 8.9 mg/dL


(8.5-10.1)


 


Total Bilirubin


 


 


 


 0.8 mg/dL


(0.2-1.0)


 


Aspartate Amino Transf


(AST/SGOT) 


 


 


 54 U/L (15-37) 





 


Alanine Aminotransferase


(ALT/SGPT) 


 


 


 37 U/L (16-63) 





 


Alkaline Phosphatase


 


 


 


 62 U/L


()


 


Total Protein


 


 


 


 6.2 g/dL


(6.4-8.2)


 


Albumin


 


 


 


 2.2 g/dL


(3.4-5.0)


 


Albumin/Globulin Ratio    0.6 (1.0-1.7) 


 


Test


 3/26/20


05:59 3/26/20


08:40 


 





 


Glucose (Fingerstick)


 107 mg/dL


(70-99) 104 mg/dL


(70-99) 


 











Micro


CXR 3/25


impression:


 


Low lung volumes and technique accentuates heart size and pulmonary 


vascularity. The ET tube, feeding tube, right subclavian line are 


unchanged. Patchy bibasilar lung airspace opacities likely atelectasis or 


infiltrates slightly improved since prior exam.








CT Head 3/22





IMPRESSION:


No acute intracranial abnormality.





CT CHest/ABd/Pel 3/22


IMPRESSION:


1.  Patchy airspace disease in the lung bases bilaterally with more 


consolidative changes in the lower lobes bilaterally. Findings may 


represent infectious or inflammatory etiologies. Correlate for aspiration 


given distribution.


2.  No acute process identified within the abdomen and pelvis. No focal 


fluid collection to suggest abscess.


 





Microbiology


3/21/20 Blood Culture - Preliminary, Resulted


          NO GROWTH AFTER 1 DAY


3/13/20 Urine Culture - Final, Complete


          


3/13/20 Urine Culture Result 1 (CAR) - Final, Complete


          


3/13/20 Antimicrobic Susceptibility - Final, Complete





Objective


Assessment





S/p 3/23  1.  Right and Left heart catheterization and selective coronary 

angiography


2.  Successful PCI/drug eluting stent placement to the left anterior descending 

artery. 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


Fever - better - sputum - neg


Leukocytosis - stable


Marty cardia - nursing and Card aware - will reduce sedation currently


Acute resp failure s/p intubation


    -COVID-19 NEG


NSTEMI


Pyuria UC 50-100K enterococcus 3/13, ? colonization


LUE trace warmth ? positioned on Left side with arm in sheets between body and 

bed - better


RICHELLE - better


Partial nephrectomy.


Hypernatremia 


CHF


DM2


Morbid obesity


Hypertension.





Plan


Plan of Care





D/c Dapto/micafungin


Cont Meropenem 3/22 taper soon


F/u Blood 3/21 neg so far and Urine 3/22


Labs in am


Off zosyn (3/13-3/22) 


Probiotics


Electrolytes per primary


Repeat BC (3/16/3/21) neg to date 


Maintain aspiration precautions


Supportive care








D/w wife at bedside





D/w nursing -











JOSIAH MANNING MD              Mar 26, 2020 09:22

## 2020-03-27 NOTE — PDOC
Infectious Disease Note


Subjective


Subjective


S/p Ativan





min responsive





ROS


ROS


difficult to obtain





Vital Sign


Vital Signs





Vital Signs








  Date Time  Temp Pulse Resp B/P (MAP) Pulse Ox O2 Delivery O2 Flow Rate FiO2


 


3/27/20 07:00 96.6 104 24 159/87 (111) 97 Nasal Cannula 2.0 





 96.6       











Physical Exam


PHYSICAL EXAM


GENERAL: Arousable but not too responsive


HEENT:  Pupils equal, OC/Op - clear


NECK: Supple - no JVD


LUNGS:  Decreased breath sounds bases,  


HEART:  S1, S2


ABDOMEN: Obese, soft, NT no guarding + BS


: Younger in place 3/16


EXTREMITIES:  trace 1- + pitting edema present. No cyanosis. SCDs bilaterally 


MSK bilateral knee scars intact


SKIN: Warm to touch. No signs of rash


NEURO: Non focal s/p Ativan


RIJ clean - 3/16





Labs


Lab





Laboratory Tests








Test


 3/26/20


10:50 3/26/20


11:52 3/26/20


16:42 3/26/20


20:44


 


Potassium Level


 3.2 mmol/L


(3.5-5.1) 


 


 





 


Glucose (Fingerstick)


 


 143 mg/dL


(70-99) 187 mg/dL


(70-99) 158 mg/dL


(70-99)


 


Test


 3/27/20


03:43 3/27/20


07:15 3/27/20


07:31 





 


Glucose (Fingerstick)


 142 mg/dL


(70-99) 


 173 mg/dL


(70-99) 





 


White Blood Count


 


 13.2 x10^3/uL


(4.0-11.0) 


 





 


Red Blood Count


 


 5.60 x10^6/uL


(4.30-5.70) 


 





 


Hemoglobin


 


 15.7 g/dL


(13.0-17.5) 


 





 


Hematocrit


 


 47.4 %


(39.0-53.0) 


 





 


Mean Corpuscular Volume  85 fL ()   


 


Mean Corpuscular Hemoglobin  28 pg (25-35)   


 


Mean Corpuscular Hemoglobin


Concent 


 33 g/dL


(31-37) 


 





 


Red Cell Distribution Width


 


 14.3 %


(11.5-14.5) 


 





 


Platelet Count


 


 255 x10^3/uL


(140-400) 


 





 


Neutrophils (%) (Auto)  73 % (31-73)   


 


Lymphocytes (%) (Auto)  14 % (24-48)   


 


Monocytes (%) (Auto)  9 % (0-9)   


 


Eosinophils (%) (Auto)  3 % (0-3)   


 


Basophils (%) (Auto)  1 % (0-3)   


 


Neutrophils # (Auto)


 


 9.7 x10^3/uL


(1.8-7.7) 


 





 


Lymphocytes # (Auto)


 


 1.9 x10^3/uL


(1.0-4.8) 


 





 


Monocytes # (Auto)


 


 1.1 x10^3/uL


(0.0-1.1) 


 





 


Eosinophils # (Auto)


 


 0.4 x10^3/uL


(0.0-0.7) 


 





 


Basophils # (Auto)


 


 0.1 x10^3/uL


(0.0-0.2) 


 





 


Sodium Level


 


 144 mmol/L


(136-145) 


 





 


Potassium Level


 


 3.6 mmol/L


(3.5-5.1) 


 





 


Chloride Level


 


 102 mmol/L


() 


 





 


Carbon Dioxide Level


 


 31 mmol/L


(21-32) 


 





 


Anion Gap  11 (6-14)   


 


Blood Urea Nitrogen


 


 23 mg/dL


(8-26) 


 





 


Creatinine


 


 0.7 mg/dL


(0.7-1.3) 


 





 


Estimated GFR


(Cockcroft-Gault) 


 110.5 


 


 





 


Glucose Level


 


 172 mg/dL


(70-99) 


 





 


Calcium Level


 


 9.2 mg/dL


(8.5-10.1) 


 











Micro


CXR 3/25


impression:


 


Low lung volumes and technique accentuates heart size and pulmonary 


vascularity. The ET tube, feeding tube, right subclavian line are 


unchanged. Patchy bibasilar lung airspace opacities likely atelectasis or 


infiltrates slightly improved since prior exam.








CT Head 3/22





IMPRESSION:


No acute intracranial abnormality.





CT CHest/ABd/Pel 3/22


IMPRESSION:


1.  Patchy airspace disease in the lung bases bilaterally with more 


consolidative changes in the lower lobes bilaterally. Findings may 


represent infectious or inflammatory etiologies. Correlate for aspiration 


given distribution.


2.  No acute process identified within the abdomen and pelvis. No focal 


fluid collection to suggest abscess.


 





Microbiology


3/21/20 Blood Culture - Preliminary, Resulted


          NO GROWTH AFTER 1 DAY


3/13/20 Urine Culture - Final, Complete


          


3/13/20 Urine Culture Result 1 (CAR) - Final, Complete


          


3/13/20 Antimicrobic Susceptibility - Final, Complete





Objective


Assessment








Leukocytosis - increased today


Encehalopathy - now S/p Ativan


S/p 3/23  1.  Right and Left heart catheterization and selective coronary 

angiography


2.  Successful PCI/drug eluting stent placement to the left anterior descending 

artery. 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


Fever - better - sputum - neg


Marty cardia - nursing and Card aware - will reduce sedation currently


Acute resp failure s/p intubation


    -COVID-19 NEG


NSTEMI


Pyuria UC 50-100K enterococcus 3/13, ? colonization


LUE trace warmth ? positioned on Left side with arm in sheets between body and 

bed - better


RICHELLE - better


Partial nephrectomy.


Hypernatremia 


CHF


DM2


Morbid obesity


Hypertension.





Plan


Plan of Care





D/c Meropenem today 3/27 as WBC has increased despite


Trial of Zyvox today


Labs in am


D/w nursing to Bladder scan to be sure not retaining  and having overflow 

incontinence





F/u Blood 3/21 neg so far and Urine 3/22





Off zosyn (3/13-3/22) 


Probiotics


Electrolytes per primary


Repeat BC (3/16/3/21) neg to date 


Maintain aspiration precautions


Supportive care








D/w wife at bedside





D/w nursing -











JOSIAH MANNING MD              Mar 27, 2020 10:22

## 2020-03-27 NOTE — PDOC
[employer] PROGRESS NOTE


Chief Complaint


Chief Complaint


Status post cardiac catheter with a new cardiac stent


1. Acute CHF with possible combined systolic/diastolic dysfunction. 

Significantly improved. Continuing present treatment.


2. NSTEMI: no CP but suspecting ischemia as culprit. EKG LBBB no prior for 

comparison. Tentatively plan for heart catheterization tomorrow. Discussed with 

the patient.


2. Acute respiratory failure with CHF and possibly ongoing RAVINDER


3. DM2: insulin dependent


4. Accelerated HTN: better


5. Morbid obesity


6. Leukocytosis with UTI: on antibiotics per PCP


2 recent code blues with status post resuscitation





History of Present Illness


History of Present Illness


[id number]


Patient seen and examined


Discussed with case management and his nurse


Chart reviewed


Plan is to hopefully discharge to LTAC today








[id number]


Patient seen and examined in the ICU


Full out his wife's FM [place] paperwork


Discussed with RN


Chart reviewed


Discussed with 


He apparently has been accepted at LTAC but awaiting insurance approval








[id number]


Patient seen and examined in the ICU


He remains intubated but is now off sedated with eyes open and nodding 

appropriately


His wife Ella is present


Chart reviewed


Discussed with RN








[id number]


Patient seen and examined in the ICU


Discussed with RN


Chart reviewed











Mr Alexander is a 76 yo M w/ PMHx hypertension, dyslipidemia, diabetes mellitus who

presented via EMS with complaint of shortness of breath on 3/13/202 that had 

been ongoign for 10 days. He called 911 in attempts to go to [facility], was brought 

to [facility], found with O2 sat of 70s at room air and started on  BiPAP to 100% in a 

very short time.


Pulmonology, cardiology, ID consulted.





3/16: Intubation and CVC placed for code blue on way to cardiac catheterization


3/17: Intubated, sedated


3/18: Intubated, sedated


3/19: BP difficult to control, remains intubated


3/20: Still Intubated and sedated. COVID 19 Negative


3/21: Intubated, sedated. Failed 2 weans. K still low, replaced. Lasix GTT. Good

UOP





Dosed Daptom/micafungin changed to Meropenem 3/22





 3/21 Overnight with fevers TMax 102F. D/w cardiology to stay intubated and 

sedated until cath 3/24. Sodium improved. lasix GTT to stop, K 3.2, replaced IV 

central line protocol.





3/23 TO CATH LAB





32 MIN CC TIME





Vitals/I&O


Vitals/I&O:





                                   Vital Signs








  Date Time  Temp Pulse Resp B/P (MAP) Pulse Ox O2 Delivery O2 Flow Rate FiO2


 


3/27/20 10:29  90  161/82    


 


3/27/20 07:00 96.6  24  97 Nasal Cannula 2.0 





 96.6       














                                    I & O   


 


 3/26/20 3/26/20 3/27/20





 14:59 22:59 06:59


 


Intake Total 50 ml 550 ml 50 ml


 


Output Total 325 ml 2100 ml 


 


Balance -275 ml -1550 ml 50 ml











Physical Exam


Physical Exam:


GENERAL: Resting with NAD


HEENT:  Pupils equal, OC/Op - clear


NECK: Supple - no JVD


LUNGS:  Decreased breath sounds bases,  


HEART:  S1, S2


ABDOMEN: Obese, soft, NT no guarding + BS


: Younger in place 3/16


EXTREMITIES:  trace 1- + pitting edema present. No cyanosis. SCDs bilaterally 


MSK bilateral knee scars intact


SKIN: Warm to touch. No signs of rash


NEURO: Non focal s/p Ativan


RIJ clean - 3/16


General:  Other (intubated.)


Heart:  Regular rate


Lungs:  Clear


Abdomen:  Normal bowel sounds


Extremities:  No cyanosis, Other (3+ bilateral LE pitting edema)


Skin:  No rashes, No breakdown, No significant lesion





Labs


Labs:





Laboratory Tests








Test


 3/26/20


16:42 3/26/20


20:44 3/27/20


03:43 3/27/20


07:15


 


Glucose (Fingerstick)


 187 mg/dL


(70-99) 158 mg/dL


(70-99) 142 mg/dL


(70-99) 





 


White Blood Count


 


 


 


 13.2 x10^3/uL


(4.0-11.0)


 


Red Blood Count


 


 


 


 5.60 x10^6/uL


(4.30-5.70)


 


Hemoglobin


 


 


 


 15.7 g/dL


(13.0-17.5)


 


Hematocrit


 


 


 


 47.4 %


(39.0-53.0)


 


Mean Corpuscular Volume    85 fL () 


 


Mean Corpuscular Hemoglobin    28 pg (25-35) 


 


Mean Corpuscular Hemoglobin


Concent 


 


 


 33 g/dL


(31-37)


 


Red Cell Distribution Width


 


 


 


 14.3 %


(11.5-14.5)


 


Platelet Count


 


 


 


 255 x10^3/uL


(140-400)


 


Neutrophils (%) (Auto)    73 % (31-73) 


 


Lymphocytes (%) (Auto)    14 % (24-48) 


 


Monocytes (%) (Auto)    9 % (0-9) 


 


Eosinophils (%) (Auto)    3 % (0-3) 


 


Basophils (%) (Auto)    1 % (0-3) 


 


Neutrophils # (Auto)


 


 


 


 9.7 x10^3/uL


(1.8-7.7)


 


Lymphocytes # (Auto)


 


 


 


 1.9 x10^3/uL


(1.0-4.8)


 


Monocytes # (Auto)


 


 


 


 1.1 x10^3/uL


(0.0-1.1)


 


Eosinophils # (Auto)


 


 


 


 0.4 x10^3/uL


(0.0-0.7)


 


Basophils # (Auto)


 


 


 


 0.1 x10^3/uL


(0.0-0.2)


 


Sodium Level


 


 


 


 144 mmol/L


(136-145)


 


Potassium Level


 


 


 


 3.6 mmol/L


(3.5-5.1)


 


Chloride Level


 


 


 


 102 mmol/L


()


 


Carbon Dioxide Level


 


 


 


 31 mmol/L


(21-32)


 


Anion Gap    11 (6-14) 


 


Blood Urea Nitrogen


 


 


 


 23 mg/dL


(8-26)


 


Creatinine


 


 


 


 0.7 mg/dL


(0.7-1.3)


 


Estimated GFR


(Cockcroft-Gault) 


 


 


 110.5 





 


Glucose Level


 


 


 


 172 mg/dL


(70-99)


 


Calcium Level


 


 


 


 9.2 mg/dL


(8.5-10.1)


 


Test


 3/27/20


07:31 3/27/20


10:07 


 





 


Glucose (Fingerstick)


 173 mg/dL


(70-99) 


 


 





 


O2 Saturation  96 % (92-99)   


 


Arterial Blood pH


 


 7.49


(7.35-7.45) 


 





 


Arterial Blood pCO2 at


Patient Temp 


 41 mmHg


(35-46) 


 





 


Arterial Blood pO2 at Patient


Temp 


 76 mmHg


() 


 





 


Arterial Blood HCO3


 


 31 mmol/L


(21-28) 


 





 


Arterial Blood Base Excess


 


 7 mmol/L


(-3-3) 


 





 


FiO2  32   











Assessment and Plan


Assessmemt and Plan


Problems


Medical Problems:


(1) Acute respiratory distress


Status: Acute  





(2) CAP (community acquired pneumonia)


Status: Acute  





(3) CHF (congestive heart failure)


Status: Acute  





(4) Hypoxia


Status: Acute  





(5) SIRS (systemic inflammatory response syndrome)


Status: Acute  





Status post cardiac catheter with new coronary stent 


1. Acute CHF with possible combined systolic/diastolic dysfunction. 

Significantly improved. Continuing present treatment.


2. NSTEMI: no CP but suspecting ischemia as culprit. EKG LBBB no prior for 

comparison. Tentatively plan for heart catheterization tomorrow. Discussed with 

the patient.


2. Acute respiratory failure with CHF and possibly ongoing RAVINDER


3. DM2: insulin dependent


4. Accelerated HTN: better


5. Morbid obesity


6. Leukocytosis with UTI: on antibiotics per PCP


7. 2 recent code blues with resuscitation








Plan


For now:


Discharge to LTAC hopefully today


Duo nebs


Home meds


DVT prophylaxis


Full code


We considered checking for coronavirus but he was turned down by the [facility]


Discussed with RNs


I put orders in for discharge to LTAC later today if his insurance will approve





Comment


Review of Relevant


I have reviewed the following items nicole (where applicable) has been applied.


Medications:





Current Medications








 Medications


  (Trade)  Dose


 Ordered  Sig/Elisa


 Route


 PRN Reason  Start Time


 Stop Time Status Last Admin


Dose Admin


 


 Lisinopril


  (Prinivil)  20 mg  DAILY


 PO


   3/27/20 09:00


    3/27/20 10:29





 


 Lorazepam


  (Ativan Inj)  2 mg  PRN Q2HRS  PRN


 IVP


 ANXIETY / AGITATION  3/26/20 21:15


    3/27/20 04:19





 


 Potassium Chloride


  (Klor-Con)  20 meq  [id number]


 PO


   3/27/20 10:30


    3/27/20 10:41





 


 Linezolid/Dextrose  300 ml @ 


 300 mls/hr  Q12HR


 IV


   3/27/20 10:30


    3/27/20 10:38




















MARY LARA III DO           Mar 27, 2020 12:06

## 2020-03-27 NOTE — PDOC
PULMONARY PROGRESS NOTES


Subjective


Extubated yesterday.  sitting up in chair, alert, no soa.  He is on O2 

continuously and not on bipap at night.  he notes that when admitted he is in 

the process of sleep apnea evaluation.


Vitals





Vital Signs








  Date Time  Temp Pulse Resp B/P (MAP) Pulse Ox O2 Delivery O2 Flow Rate FiO2


 


3/27/20 10:29  90  161/82    


 


3/27/20 07:00 96.6  24  97 Nasal Cannula 2.0 





 96.6       








Comments


follows commands


ROS:  No Nausea, No Chest Pain, No Abdominal Pain


General:  Alert, Oriented X4, No acute distress


HEENT:  Other (nc at perrl  nose clear   orally intubated   neck no lad  no 

thyromegaly)


Lungs:  Clear


Cardiovascular:  S1, S2


Abdomen:  Soft, Non-tender, Other (no mass)


Extremities:  No Edema


Skin:  Warm


Labs





Laboratory Tests








Test


 3/25/20


12:44 3/25/20


13:00 3/25/20


23:20 3/26/20


05:00


 


Glucose (Fingerstick)


 223 mg/dL


(70-99) 


 139 mg/dL


(70-99) 





 


O2 Saturation  98 % (92-99)   


 


Arterial Blood pH


 


 7.43


(7.35-7.45) 


 





 


Arterial Blood pCO2 at


Patient Temp 


 54 mmHg


(35-46) 


 





 


Arterial Blood pO2 at Patient


Temp 


 106 mmHg


() 


 





 


Arterial Blood HCO3


 


 35 mmol/L


(21-28) 


 





 


Arterial Blood Base Excess


 


 9 mmol/L


(-3-3) 


 





 


FiO2


 


 40 ps 10 peep


5 


 





 


White Blood Count


 


 


 


 11.7 x10^3/uL


(4.0-11.0)


 


Red Blood Count


 


 


 


 5.18 x10^6/uL


(4.30-5.70)


 


Hemoglobin


 


 


 


 14.4 g/dL


(13.0-17.5)


 


Hematocrit


 


 


 


 44.2 %


(39.0-53.0)


 


Mean Corpuscular Volume    85 fL () 


 


Mean Corpuscular Hemoglobin    28 pg (25-35) 


 


Mean Corpuscular Hemoglobin


Concent 


 


 


 33 g/dL


(31-37)


 


Red Cell Distribution Width


 


 


 


 14.3 %


(11.5-14.5)


 


Platelet Count


 


 


 


 207 x10^3/uL


(140-400)


 


Neutrophils (%) (Auto)    69 % (31-73) 


 


Lymphocytes (%) (Auto)    16 % (24-48) 


 


Monocytes (%) (Auto)    10 % (0-9) 


 


Eosinophils (%) (Auto)    5 % (0-3) 


 


Basophils (%) (Auto)    1 % (0-3) 


 


Neutrophils # (Auto)


 


 


 


 8.1 x10^3/uL


(1.8-7.7)


 


Lymphocytes # (Auto)


 


 


 


 1.8 x10^3/uL


(1.0-4.8)


 


Monocytes # (Auto)


 


 


 


 1.2 x10^3/uL


(0.0-1.1)


 


Eosinophils # (Auto)


 


 


 


 0.5 x10^3/uL


(0.0-0.7)


 


Basophils # (Auto)


 


 


 


 0.1 x10^3/uL


(0.0-0.2)


 


Sodium Level


 


 


 


 145 mmol/L


(136-145)


 


Potassium Level


 


 


 


 2.9 mmol/L


(3.5-5.1)


 


Chloride Level


 


 


 


 104 mmol/L


()


 


Carbon Dioxide Level


 


 


 


 35 mmol/L


(21-32)


 


Anion Gap    6 (6-14) 


 


Blood Urea Nitrogen


 


 


 


 23 mg/dL


(8-26)


 


Creatinine


 


 


 


 0.7 mg/dL


(0.7-1.3)


 


Estimated GFR


(Cockcroft-Gault) 


 


 


 110.5 





 


BUN/Creatinine Ratio    33 (6-20) 


 


Glucose Level


 


 


 


 110 mg/dL


(70-99)


 


Calcium Level


 


 


 


 8.9 mg/dL


(8.5-10.1)


 


Magnesium Level


 


 


 


 1.8 mg/dL


(1.8-2.4)


 


Total Bilirubin


 


 


 


 0.8 mg/dL


(0.2-1.0)


 


Aspartate Amino Transf


(AST/SGOT) 


 


 


 54 U/L (15-37) 





 


Alanine Aminotransferase


(ALT/SGPT) 


 


 


 37 U/L (16-63) 





 


Alkaline Phosphatase


 


 


 


 62 U/L


()


 


Total Protein


 


 


 


 6.2 g/dL


(6.4-8.2)


 


Albumin


 


 


 


 2.2 g/dL


(3.4-5.0)


 


Albumin/Globulin Ratio    0.6 (1.0-1.7) 


 


Test


 3/26/20


05:59 3/26/20


08:40 3/26/20


10:50 3/26/20


11:52


 


Glucose (Fingerstick)


 107 mg/dL


(70-99) 104 mg/dL


(70-99) 


 143 mg/dL


(70-99)


 


Potassium Level


 


 


 3.2 mmol/L


(3.5-5.1) 





 


Test


 3/26/20


16:42 3/26/20


20:44 3/27/20


03:43 3/27/20


07:15


 


Glucose (Fingerstick)


 187 mg/dL


(70-99) 158 mg/dL


(70-99) 142 mg/dL


(70-99) 





 


White Blood Count


 


 


 


 13.2 x10^3/uL


(4.0-11.0)


 


Red Blood Count


 


 


 


 5.60 x10^6/uL


(4.30-5.70)


 


Hemoglobin


 


 


 


 15.7 g/dL


(13.0-17.5)


 


Hematocrit


 


 


 


 47.4 %


(39.0-53.0)


 


Mean Corpuscular Volume    85 fL () 


 


Mean Corpuscular Hemoglobin    28 pg (25-35) 


 


Mean Corpuscular Hemoglobin


Concent 


 


 


 33 g/dL


(31-37)


 


Red Cell Distribution Width


 


 


 


 14.3 %


(11.5-14.5)


 


Platelet Count


 


 


 


 255 x10^3/uL


(140-400)


 


Neutrophils (%) (Auto)    73 % (31-73) 


 


Lymphocytes (%) (Auto)    14 % (24-48) 


 


Monocytes (%) (Auto)    9 % (0-9) 


 


Eosinophils (%) (Auto)    3 % (0-3) 


 


Basophils (%) (Auto)    1 % (0-3) 


 


Neutrophils # (Auto)


 


 


 


 9.7 x10^3/uL


(1.8-7.7)


 


Lymphocytes # (Auto)


 


 


 


 1.9 x10^3/uL


(1.0-4.8)


 


Monocytes # (Auto)


 


 


 


 1.1 x10^3/uL


(0.0-1.1)


 


Eosinophils # (Auto)


 


 


 


 0.4 x10^3/uL


(0.0-0.7)


 


Basophils # (Auto)


 


 


 


 0.1 x10^3/uL


(0.0-0.2)


 


Sodium Level


 


 


 


 144 mmol/L


(136-145)


 


Potassium Level


 


 


 


 3.6 mmol/L


(3.5-5.1)


 


Chloride Level


 


 


 


 102 mmol/L


()


 


Carbon Dioxide Level


 


 


 


 31 mmol/L


(21-32)


 


Anion Gap    11 (6-14) 


 


Blood Urea Nitrogen


 


 


 


 23 mg/dL


(8-26)


 


Creatinine


 


 


 


 0.7 mg/dL


(0.7-1.3)


 


Estimated GFR


(Cockcroft-Gault) 


 


 


 110.5 





 


Glucose Level


 


 


 


 172 mg/dL


(70-99)


 


Calcium Level


 


 


 


 9.2 mg/dL


(8.5-10.1)


 


Test


 3/27/20


07:31 3/27/20


10:07 


 





 


Glucose (Fingerstick)


 173 mg/dL


(70-99) 


 


 





 


O2 Saturation  96 % (92-99)   


 


Arterial Blood pH


 


 7.49


(7.35-7.45) 


 





 


Arterial Blood pCO2 at


Patient Temp 


 41 mmHg


(35-46) 


 





 


Arterial Blood pO2 at Patient


Temp 


 76 mmHg


() 


 





 


Arterial Blood HCO3


 


 31 mmol/L


(21-28) 


 





 


Arterial Blood Base Excess


 


 7 mmol/L


(-3-3) 


 





 


FiO2  32   








Laboratory Tests








Test


 3/26/20


16:42 3/26/20


20:44 3/27/20


03:43 3/27/20


07:15


 


Glucose (Fingerstick)


 187 mg/dL


(70-99) 158 mg/dL


(70-99) 142 mg/dL


(70-99) 





 


White Blood Count


 


 


 


 13.2 x10^3/uL


(4.0-11.0)


 


Red Blood Count


 


 


 


 5.60 x10^6/uL


(4.30-5.70)


 


Hemoglobin


 


 


 


 15.7 g/dL


(13.0-17.5)


 


Hematocrit


 


 


 


 47.4 %


(39.0-53.0)


 


Mean Corpuscular Volume    85 fL () 


 


Mean Corpuscular Hemoglobin    28 pg (25-35) 


 


Mean Corpuscular Hemoglobin


Concent 


 


 


 33 g/dL


(31-37)


 


Red Cell Distribution Width


 


 


 


 14.3 %


(11.5-14.5)


 


Platelet Count


 


 


 


 255 x10^3/uL


(140-400)


 


Neutrophils (%) (Auto)    73 % (31-73) 


 


Lymphocytes (%) (Auto)    14 % (24-48) 


 


Monocytes (%) (Auto)    9 % (0-9) 


 


Eosinophils (%) (Auto)    3 % (0-3) 


 


Basophils (%) (Auto)    1 % (0-3) 


 


Neutrophils # (Auto)


 


 


 


 9.7 x10^3/uL


(1.8-7.7)


 


Lymphocytes # (Auto)


 


 


 


 1.9 x10^3/uL


(1.0-4.8)


 


Monocytes # (Auto)


 


 


 


 1.1 x10^3/uL


(0.0-1.1)


 


Eosinophils # (Auto)


 


 


 


 0.4 x10^3/uL


(0.0-0.7)


 


Basophils # (Auto)


 


 


 


 0.1 x10^3/uL


(0.0-0.2)


 


Sodium Level


 


 


 


 144 mmol/L


(136-145)


 


Potassium Level


 


 


 


 3.6 mmol/L


(3.5-5.1)


 


Chloride Level


 


 


 


 102 mmol/L


()


 


Carbon Dioxide Level


 


 


 


 31 mmol/L


(21-32)


 


Anion Gap    11 (6-14) 


 


Blood Urea Nitrogen


 


 


 


 23 mg/dL


(8-26)


 


Creatinine


 


 


 


 0.7 mg/dL


(0.7-1.3)


 


Estimated GFR


(Cockcroft-Gault) 


 


 


 110.5 





 


Glucose Level


 


 


 


 172 mg/dL


(70-99)


 


Calcium Level


 


 


 


 9.2 mg/dL


(8.5-10.1)


 


Test


 3/27/20


07:31 3/27/20


10:07 


 





 


Glucose (Fingerstick)


 173 mg/dL


(70-99) 


 


 





 


O2 Saturation  96 % (92-99)   


 


Arterial Blood pH


 


 7.49


(7.35-7.45) 


 





 


Arterial Blood pCO2 at


Patient Temp 


 41 mmHg


(35-46) 


 





 


Arterial Blood pO2 at Patient


Temp 


 76 mmHg


() 


 





 


Arterial Blood HCO3


 


 31 mmol/L


(21-28) 


 





 


Arterial Blood Base Excess


 


 7 mmol/L


(-3-3) 


 





 


FiO2  32   








Medications





Active Scripts








 Medications  Dose


 Route/Sig


 Max Daily Dose Days Date Category


 


 Saw [place] (Saw


 Palmet[place] Fruit)


 450 Mg Capsule  450 Mg


 PO DAILY


   3/13/20 Reported


 


 D3-50


  (Cholecalciferol


  (Vitamin D3))


 50,000 Unit


 Capsule  1,000 Unit


 PO DAILY


   3/13/20 Reported


 


 Emergen-C 500 mg


 Chewable Tab (Vit


 C/Ascorb


 Sod/Multivit-Min)


 500 Mg Tab.chew  500 Mg


 PO DAILY


   3/13/20 Reported


 


 Zoloft


  (Sertraline Hcl)


 50 Mg Tablet  50 Mg


 PO DAILY


   3/13/20 Reported


 


 Trazodone Hcl 50


 Mg Tablet  1 Tab


 PO QHS


   3/13/20 Reported


 


 Trulicity


  (Dulaglutide)


 0.75 Mg/0.5 Ml


 Pen.injctr  0.75 Mg


 SQ WEEKLY


   3/13/20 Reported


 


 Insulin Aspart


 100 Unit/1 Ml Vial  25 Unit


 SQ TIDWMEALS


   3/13/20 Reported


 


 Levemir (Insulin


 Detemir) 100


 Unit/1 Ml Vial  80 Unit


 SQ DAILY


   3/13/20 Reported


 


 Lasix


  (Furosemide) 20


 Mg Tablet  20 Mg


 PO BID


   3/13/20 Reported


 


 Flomax


  (Tamsulosin Hcl)


 0.4 Mg Cap.er.24h  0.4 Mg


 PO HS


   3/13/20 Reported


 


 Omeprazole 20 Mg


 Capsule.dr  20 Mg


 PO BID


   3/13/20 Reported


 


 Lisinopril 40 Mg


 Tablet  1 Tab


 PO DAILY


   3/13/20 Reported


 


 Atenolol 100 Mg


 Tablet  100 Mg


 PO BID


   3/13/20 Reported








Comments


Status post catheterization  successful PCI drug-eluting stent to the LAD





cxr 3/25


basal atelectasis





Impression


.


IMPRESSION:


1.  Acute hypercapnic and hypoxic respiratory failure sec to flash pulmonary 

edema, resolved


2  s/p cath with severe two-vessel CAD. S/p PCI/LEANNE to the LAD.  of 

LCx.Increase LV filling pressures


3.  Suspected obesity hypoventilation syndrome and obstructive sleep apnea,


never been tested.


4.  CAD


5.  HYPOTENSION RESOLVED


6.  COVID NEGATIVE





Plan


.


Cont low flow oxygen


I discussed with patient the importance of completing sleep apnea evaluation 

after discharge


Antibiotics per ID


GI prophylaxis








Case discussed with NORA ELIZABETH MD              Mar 27, 2020 12:17

## 2020-03-27 NOTE — RAD
AP chest.

 

HISTORY: Pulmonary congestion

 

AP view was taken of the chest. Right central line is unchanged. 

Endotracheal tube and NG tube have been removed. There has been a mild 

improvement in the hazy infiltrates or pulmonary edema. No new infiltrates

are noted.

 

IMPRESSION:

1. Improving infiltrates or edema.

2. Endotracheal tube was removed.

 

Electronically signed by: Kyree Parr MD (3/27/2020 8:28 AM) [id number]

## 2020-03-27 NOTE — PDOC
YANA CHA APRN 3/27/20 1009:


CARDIO Progress Notes


Date and Time


Date of Service


3/27/2020


Time of Evaluation


0930





Subjective


Subjective:  No Chest Pain, Other (drowsy)





Vitals


Vitals





Vital Signs








  Date Time  Temp Pulse Resp B/P (MAP) Pulse Ox O2 Delivery O2 Flow Rate FiO2


 


3/27/20 07:00 96.6 104 24 159/87 (111) 97 Nasal Cannula 2.0 





 96.6       








Weight


Weight [ ]





Input and Output


Intake and Output











Intake and Output 


 


 3/27/20





 07:00


 


Intake Total 650 ml


 


Output Total 2425 ml


 


Balance -1775 ml


 


 


 


Intake Oral 550 ml


 


IV Total 100 ml


 


Output Urine Total 2425 ml


 


# Voids 7


 


# Bowel Movements 10











Laboratory


Labs





Laboratory Tests








Test


 3/26/20


10:50 3/26/20


11:52 3/26/20


16:42 3/26/20


20:44


 


Potassium Level


 3.2 mmol/L


(3.5-5.1) 


 


 





 


Glucose (Fingerstick)


 


 143 mg/dL


(70-99) 187 mg/dL


(70-99) 158 mg/dL


(70-99)


 


Test


 3/27/20


03:43 3/27/20


07:15 3/27/20


07:31 





 


Glucose (Fingerstick)


 142 mg/dL


(70-99) 


 173 mg/dL


(70-99) 





 


White Blood Count


 


 13.2 x10^3/uL


(4.0-11.0) 


 





 


Red Blood Count


 


 5.60 x10^6/uL


(4.30-5.70) 


 





 


Hemoglobin


 


 15.7 g/dL


(13.0-17.5) 


 





 


Hematocrit


 


 47.4 %


(39.0-53.0) 


 





 


Mean Corpuscular Volume  85 fL ()   


 


Mean Corpuscular Hemoglobin  28 pg (25-35)   


 


Mean Corpuscular Hemoglobin


Concent 


 33 g/dL


(31-37) 


 





 


Red Cell Distribution Width


 


 14.3 %


(11.5-14.5) 


 





 


Platelet Count


 


 255 x10^3/uL


(140-400) 


 





 


Neutrophils (%) (Auto)  73 % (31-73)   


 


Lymphocytes (%) (Auto)  14 % (24-48)   


 


Monocytes (%) (Auto)  9 % (0-9)   


 


Eosinophils (%) (Auto)  3 % (0-3)   


 


Basophils (%) (Auto)  1 % (0-3)   


 


Neutrophils # (Auto)


 


 9.7 x10^3/uL


(1.8-7.7) 


 





 


Lymphocytes # (Auto)


 


 1.9 x10^3/uL


(1.0-4.8) 


 





 


Monocytes # (Auto)


 


 1.1 x10^3/uL


(0.0-1.1) 


 





 


Eosinophils # (Auto)


 


 0.4 x10^3/uL


(0.0-0.7) 


 





 


Basophils # (Auto)


 


 0.1 x10^3/uL


(0.0-0.2) 


 





 


Sodium Level


 


 144 mmol/L


(136-145) 


 





 


Potassium Level


 


 3.6 mmol/L


(3.5-5.1) 


 





 


Chloride Level


 


 102 mmol/L


() 


 





 


Carbon Dioxide Level


 


 31 mmol/L


(21-32) 


 





 


Anion Gap  11 (6-14)   


 


Blood Urea Nitrogen


 


 23 mg/dL


(8-26) 


 





 


Creatinine


 


 0.7 mg/dL


(0.7-1.3) 


 





 


Estimated GFR


(Cockcroft-Gault) 


 110.5 


 


 





 


Glucose Level


 


 172 mg/dL


(70-99) 


 





 


Calcium Level


 


 9.2 mg/dL


(8.5-10.1) 


 














Microbiology


Micro





Microbiology


3/22/20 Urine Culture - Final, Complete


          


3/22/20 Urine Culture Result 1 (CAR) - Final, Complete


          


3/22/20 - Final, Resulted


          


3/22/20 - Final, Resulted


          


3/22/20 - Final, Resulted


          


3/22/20 - Preliminary, Resulted


          


3/22/20 - Preliminary, Resulted


          


3/22/20 - Preliminary, Resulted


          


3/22/20 Gram Stain Evaluation - Final, Resulted


          


3/22/20 Sputum Culture - Final, Resulted


          


3/22/20 Sputum Result 1 - Final, Resulted


          


3/21/20 Blood Culture - Final, Complete


          NO GROWTH AFTER 5 DAYS





Physical Exam


HEENT:  Neck Supple W Full Motion


Chest:  Symmetric


LUNGS:  Other (diminished bases)


Heart:  S1S2, RRR (SR), other (distant heart tones)


Abdomen:  Soft N/T, Other (obese)


Extremities:  Other (trace-1+ bilateral LE edema )


Neurology:  confused, other (drowsy)





Assessment


Assessment


1. Acute on chronic diastolic CHF; Echo with preserved LV systolic function. 

better compensated 


2. NSTEMI; peak 0.7. 


3. CAD; cath with severe two-vessel CAD. S/p PCI/LEANNE to the LAD.  of LCx.


4. Acute respiratory failure with CHF; extubated 3/25


5. Diabetes, II; as per IM


6. Accelerated HTN: remains mildly elevated odes


7. Leukocytosis with UTI: on antibiotics per PCP


8. Hypokalemia: resolved


9. Encephalopathy: having hallucination last night, ativan was given x3 last 

noc. Drowsy and confused. 








Recommendations


1. Continue current BP regimen with secondary prevention measures. Pending H 

transfer, adjust BP meds per trend as an outpt. ACEi increased


2. DAPT with ASA, Plavix, statin therapy. 


3. Resume low dose atenolol, do not increase with prior bradycardia at higher 

dose. Refused metoprolol before due to hallucination


4. Lasix/zaroxylyn


5. Consider outpatient PCI of LCx  if symptomatic 


6. Follow up with Dr. Gibbons scheduled.


7. Will check ABG.





BRANDIN GIBBONS MD 3/27/20 1312:


CARDIO Progress Notes


Assessment


Assessment


Patient seen and examined. Agree with NP's assessment and plan.


Acute on chronic diastolic heart failure better compensated.


s/p PCI/LEANNE to LAD, chest pain-free. Continue DAPT.


We will consider PCI to  of LCx if he is symptomatic.


Follow-up as scheduled.











YANA CHA          Mar 27, 2020 10:09


BRANDIN GIBBONS MD           Mar 27, 2020 13:12

## 2020-03-27 NOTE — NUR
SS following up with discharge planning. Discharge orders received for [facility], [phone]; fax [phone]. SS phoned and faxed discharge orders to [facility]. SS discussed with Mary at [facility] and Mary reported that she would 
contact SS with update on bed. SS will continue to follow for discharge planning.

## 2020-03-27 NOTE — NUR
Dr. Leal asked me to do a PVR bladder scan on this patient. After urinating 100cc he had 
252cc left in bladder.

## 2020-03-28 NOTE — PDOC
PULMONARY PROGRESS NOTES


Subjective


Extubated 3/26/2020.  


Remain on N/C. Denies SOA, CP, or increased cough. 


Patient having difficulty voiding, he has had previous bladder infections


Vitals





Vital Signs








  Date Time  Temp Pulse Resp B/P (MAP) Pulse Ox O2 Delivery O2 Flow Rate FiO2


 


3/28/20 03:00 98.1 70 20 142/69 (93) 96 Nasal Cannula 2.0 





 98.1       








ROS:  No Nausea, No Chest Pain, No Abdominal Pain


General:  Alert, Oriented X4, No acute distress


HEENT:  Other


Lungs:  Clear


Cardiovascular:  S1, S2


Abdomen:  Soft, Non-tender, Other (obese )


Neuro Exam:  Oriented


Extremities:  No Edema


Skin:  Warm


Labs





Laboratory Tests








Test


 3/26/20


08:40 3/26/20


10:50 3/26/20


11:52 3/26/20


16:42


 


Glucose (Fingerstick)


 104 mg/dL


(70-99) 


 143 mg/dL


(70-99) 187 mg/dL


(70-99)


 


Potassium Level


 


 3.2 mmol/L


(3.5-5.1) 


 





 


Test


 3/26/20


20:44 3/27/20


03:43 3/27/20


07:15 3/27/20


07:31


 


Glucose (Fingerstick)


 158 mg/dL


(70-99) 142 mg/dL


(70-99) 


 173 mg/dL


(70-99)


 


White Blood Count


 


 


 13.2 x10^3/uL


(4.0-11.0) 





 


Red Blood Count


 


 


 5.60 x10^6/uL


(4.30-5.70) 





 


Hemoglobin


 


 


 15.7 g/dL


(13.0-17.5) 





 


Hematocrit


 


 


 47.4 %


(39.0-53.0) 





 


Mean Corpuscular Volume   85 fL ()  


 


Mean Corpuscular Hemoglobin   28 pg (25-35)  


 


Mean Corpuscular Hemoglobin


Concent 


 


 33 g/dL


(31-37) 





 


Red Cell Distribution Width


 


 


 14.3 %


(11.5-14.5) 





 


Platelet Count


 


 


 255 x10^3/uL


(140-400) 





 


Neutrophils (%) (Auto)   73 % (31-73)  


 


Lymphocytes (%) (Auto)   14 % (24-48)  


 


Monocytes (%) (Auto)   9 % (0-9)  


 


Eosinophils (%) (Auto)   3 % (0-3)  


 


Basophils (%) (Auto)   1 % (0-3)  


 


Neutrophils # (Auto)


 


 


 9.7 x10^3/uL


(1.8-7.7) 





 


Lymphocytes # (Auto)


 


 


 1.9 x10^3/uL


(1.0-4.8) 





 


Monocytes # (Auto)


 


 


 1.1 x10^3/uL


(0.0-1.1) 





 


Eosinophils # (Auto)


 


 


 0.4 x10^3/uL


(0.0-0.7) 





 


Basophils # (Auto)


 


 


 0.1 x10^3/uL


(0.0-0.2) 





 


Sodium Level


 


 


 144 mmol/L


(136-145) 





 


Potassium Level


 


 


 3.6 mmol/L


(3.5-5.1) 





 


Chloride Level


 


 


 102 mmol/L


() 





 


Carbon Dioxide Level


 


 


 31 mmol/L


(21-32) 





 


Anion Gap   11 (6-14)  


 


Blood Urea Nitrogen


 


 


 23 mg/dL


(8-26) 





 


Creatinine


 


 


 0.7 mg/dL


(0.7-1.3) 





 


Estimated GFR


(Cockcroft-Gault) 


 


 110.5 


 





 


Glucose Level


 


 


 172 mg/dL


(70-99) 





 


Calcium Level


 


 


 9.2 mg/dL


(8.5-10.1) 





 


Test


 3/27/20


10:07 3/27/20


18:17 3/27/20


20:35 3/28/20


07:15


 


O2 Saturation 96 % (92-99)    


 


Arterial Blood pH


 7.49


(7.35-7.45) 


 


 





 


Arterial Blood pCO2 at


Patient Temp 41 mmHg


(35-46) 


 


 





 


Arterial Blood pO2 at Patient


Temp 76 mmHg


() 


 


 





 


Arterial Blood HCO3


 31 mmol/L


(21-28) 


 


 





 


Arterial Blood Base Excess


 7 mmol/L


(-3-3) 


 


 





 


FiO2 32    


 


Glucose (Fingerstick)


 


 141 mg/dL


(70-99) 103 mg/dL


(70-99) 119 mg/dL


(70-99)








Laboratory Tests








Test


 3/27/20


10:07 3/27/20


18:17 3/27/20


20:35 3/28/20


07:15


 


O2 Saturation 96 % (92-99)    


 


Arterial Blood pH


 7.49


(7.35-7.45) 


 


 





 


Arterial Blood pCO2 at


Patient Temp 41 mmHg


(35-46) 


 


 





 


Arterial Blood pO2 at Patient


Temp 76 mmHg


() 


 


 





 


Arterial Blood HCO3


 31 mmol/L


(21-28) 


 


 





 


Arterial Blood Base Excess


 7 mmol/L


(-3-3) 


 


 





 


FiO2 32    


 


Glucose (Fingerstick)


 


 141 mg/dL


(70-99) 103 mg/dL


(70-99) 119 mg/dL


(70-99)








Medications





Active Scripts








 Medications  Dose


 Route/Sig


 Max Daily Dose Days Date Category


 


 Saw [place] (Saw


 [place]met[place] Fruit)


 450 Mg Capsule  450 Mg


 PO DAILY


   3/13/20 Reported


 


 D3-50


  (Cholecalciferol


  (Vitamin D3))


 50,000 Unit


 Capsule  1,000 Unit


 PO DAILY


   3/13/20 Reported


 


 Emergen-C 500 mg


 Chewable Tab (Vit


 C/Ascorb


 Sod/Multivit-Min)


 500 Mg Tab.chew  500 Mg


 PO DAILY


   3/13/20 Reported


 


 Zoloft


  (Sertraline Hcl)


 50 Mg Tablet  50 Mg


 PO DAILY


   3/13/20 Reported


 


 Trazodone Hcl 50


 Mg Tablet  1 Tab


 PO QHS


   3/13/20 Reported


 


 Trulicity


  (Dulaglutide)


 0.75 Mg/0.5 Ml


 Pen.injctr  0.75 Mg


 SQ WEEKLY


   3/13/20 Reported


 


 Insulin Aspart


 100 Unit/1 Ml Vial  25 Unit


 SQ TIDWMEALS


   3/13/20 Reported


 


 Levemir (Insulin


 Detemir) 100


 Unit/1 Ml Vial  80 Unit


 SQ DAILY


   3/13/20 Reported


 


 Lasix


  (Furosemide) 20


 Mg Tablet  20 Mg


 PO BID


   3/13/20 Reported


 


 Flomax


  (Tamsulosin Hcl)


 0.4 Mg Cap.er.24h  0.4 Mg


 PO HS


   3/13/20 Reported


 


 Omeprazole 20 Mg


 Capsule.dr  20 Mg


 PO BID


   3/13/20 Reported


 


 Lisinopril 40 Mg


 Tablet  1 Tab


 PO DAILY


   3/13/20 Reported


 


 Atenolol 100 Mg


 Tablet  100 Mg


 PO BID


   3/13/20 Reported








Comments


Status post catheterization  successful PCI drug-eluting stent to the LAD


CXR- 3/27/2020-


IMPRESSION:


1. Improving infiltrates or edema.


2. Endotracheal tube was removed.





Impression


.


IMPRESSION:


1.  Acute hypercapnic and hypoxic respiratory failure sec to flash pulmonary 

edema, resolved


2  s/p cath with severe two-vessel CAD. S/p PCI/LEANNE to the LAD.  of 

LCx.Increase LV filling pressures


3.  Suspected obesity hypoventilation syndrome and obstructive sleep apnea, 

never been tested.


4.  CAD


5.  HYPOTENSION RESOLVED


6.  COVID NEGATIVE


7.  Possible recurrent UTI





Plan


.


Insert Younger, UA, add Bactrim for possible prostatitis


Cont. supplemental oxygen as needed to keep sats above 92%


Recommended outpatient follow up for sleep apnea testing 


Antibiotics per ID


Follow cardiology recs-- S/P  PCI/LEANNE to LAD, chest pain-free. Continue DAPT.





D/W RN 





DVT/GI PPX:pepcid/ heparin











ADALBERTO GAXIOLA MD              Mar 28, 2020 07:45

## 2020-03-28 NOTE — PDOC
[facility] [facility] PROGRESS NOTE


Chief Complaint


Chief Complaint


Status post cardiac catheter with a new cardiac stent


1. Acute CHF with possible combined systolic/diastolic dysfunction. 

Significantly improved. Continuing present treatment.


2. NSTEMI: no CP but suspecting ischemia as culprit. EKG LBBB no prior for 

comparison. Tentatively plan for heart catheterization tomorrow. Discussed with 

the patient.


2. Acute respiratory failure with CHF and possibly ongoing RAVINDER


3. DM2: insulin dependent


4. Accelerated HTN: better


5. Morbid obesity


6. Leukocytosis with UTI: on antibiotics per PCP


2 recent code blues with status post resuscitation





History of Present Illness


History of Present Illness


[id number]


Patient seen and examined


Discussed with RN


The patient seems a little short of breath and weak and a little confused today 

although he did tell me the year (it took a while)


Chart reviewed








[id number]


Patient seen and examined


Discussed with case management and his nurse


Chart reviewed


Plan is to hopefully discharge to LTAC today








[id number]


Patient seen and examined in the ICU


Full out his wife's FM [place] paperwork


Discussed with RN


Chart reviewed


Discussed with 


He apparently has been accepted at LTAC but awaiting insurance approval








[id number]


Patient seen and examined in the ICU


He remains intubated but is now off sedated with eyes open and nodding 

appropriately


His wife Ella is present


Chart reviewed


Discussed with RN








[id number]


Patient seen and examined in the ICU


Discussed with RN


Chart reviewed











Mr Alexander is a 76 yo M w/ PMHx hypertension, dyslipidemia, diabetes mellitus who

presented via EMS with complaint of shortness of breath on 3/13/202 that had 

been ongoign for 10 days. He called 911 in attempts to go to [facility], was brought 

to [facility], found with O2 sat of 70s at room air and started on  BiPAP to 100% in a 

very short time.


Pulmonology, cardiology, ID consulted.





3/16: Intubation and CVC placed for code blue on way to cardiac catheterization


3/17: Intubated, sedated


3/18: Intubated, sedated


3/19: BP difficult to control, remains intubated


3/20: Still Intubated and sedated. COVID 19 Negative


3/21: Intubated, sedated. Failed 2 weans. K still low, replaced. Lasix GTT. Good

UOP





Dosed Daptom/micafungin changed to Meropenem 3/22





 3/21 Overnight with fevers TMax 102F. D/w cardiology to stay intubated and 

sedated until cath 3/24. Sodium improved. lasix GTT to stop, K 3.2, replaced IV 

central line protocol.





3/23 TO CATH LAB





32 MIN CC TIME





Vitals/I&O


Vitals/I&O:





                                   Vital Signs








  Date Time  Temp Pulse Resp B/P (MAP) Pulse Ox O2 Delivery O2 Flow Rate FiO2


 


3/28/20 11:11 98.7 77 20 98/51 (67) 98 Nasal Cannula 3.0 





 98.7       














                                    I & O   


 


 3/27/20 3/27/20 3/28/20





 15:00 23:00 07:00


 


Intake Total   100 ml


 


Output Total 75 ml 200 ml 50 ml


 


Balance -75 ml -200 ml 50 ml











Physical Exam


Physical Exam:


GENERAL: Sitting in the chair, resting quietly


HEENT:  OC/Op dry


NECK: Supple - no JVD


LUNGS:  Decreased breath sounds bases   


HEART:  S1, S2


ABDOMEN: Obese, soft, NT no guarding + BS


EXTREMITIES:  trace edema present. No cyanosis. 


SKIN: Warm to touch. No signs of rash


NEURO: Seems a little weak and confused but did tell me the year


[facility] clean - 3/16


General:  Other (intubated.)


Heart:  Regular rate


Lungs:  Clear


Abdomen:  Normal bowel sounds


Extremities:  No cyanosis, Other (3+ bilateral LE pitting edema)


Skin:  No rashes, No breakdown, No significant lesion





Labs


Labs:





Laboratory Tests








Test


 3/27/20


18:17 3/27/20


20:35 3/28/20


07:15 3/28/20


10:30


 


Glucose (Fingerstick)


 141 mg/dL


(70-99) 103 mg/dL


(70-99) 119 mg/dL


(70-99) 





 


Urine Collection Type    Unknown 


 


Urine Color    Yellow 


 


Urine Clarity    Clear 


 


Urine pH    5.5 (<5.0-8.0) 


 


Urine Specific Gravity


 


 


 


 1.015


(1.000-1.030)


 


Urine Protein


 


 


 


 Negative mg/dL


(NEG-TRACE)


 


Urine Glucose (UA)


 


 


 


 Negative mg/dL


(NEG)


 


Urine Ketones (Stick)


 


 


 


 Negative mg/dL


(NEG)


 


Urine Blood    Negative (NEG) 


 


Urine Nitrite    Negative (NEG) 


 


Urine Bilirubin    Negative (NEG) 


 


Urine Urobilinogen Dipstick


 


 


 


 1.0 mg/dL (0.2


mg/dL)


 


Urine Leukocyte Esterase    Negative (NEG) 


 


Urine RBC    0 /HPF (0-2) 


 


Urine WBC    1-4 /HPF (0-4) 


 


Urine Squamous Epithelial


Cells 


 


 


 Few /LPF 





 


Urine Transitional Epithelial


Cells 


 


 


 Few /LPF 





 


Urine Bacteria    0 /HPF (0-FEW) 


 


Urine Hyaline Casts    Moderate /HPF 


 


Urine Mucus    Marked /LPF 


 


Test


 3/28/20


11:47 3/28/20


12:20 3/28/20


12:45 





 


Glucose (Fingerstick)


 142 mg/dL


(70-99) 


 


 





 


O2 Saturation  96 % (92-99)   


 


Arterial Blood pH


 


 7.48


(7.35-7.45) 


 





 


Arterial Blood pCO2 at


Patient Temp 


 40 mmHg


(35-46) 


 





 


Arterial Blood pO2 at Patient


Temp 


 89 mmHg


() 


 





 


Arterial Blood HCO3


 


 29 mmol/L


(21-28) 


 





 


Arterial Blood Base Excess


 


 5 mmol/L


(-3-3) 


 





 


FiO2     


 


White Blood Count


 


 


 13.7 x10^3/uL


(4.0-11.0) 





 


Red Blood Count


 


 


 5.63 x10^6/uL


(4.30-5.70) 





 


Hemoglobin


 


 


 15.6 g/dL


(13.0-17.5) 





 


Hematocrit


 


 


 47.5 %


(39.0-53.0) 





 


Mean Corpuscular Volume   84 fL ()  


 


Mean Corpuscular Hemoglobin   28 pg (25-35)  


 


Mean Corpuscular Hemoglobin


Concent 


 


 33 g/dL


(31-37) 





 


Red Cell Distribution Width


 


 


 14.0 %


(11.5-14.5) 





 


Platelet Count


 


 


 348 x10^3/uL


(140-400) 





 


Neutrophils (%) (Auto)   72 % (31-73)  


 


Lymphocytes (%) (Auto)   14 % (24-48)  


 


Monocytes (%) (Auto)   10 % (0-9)  


 


Eosinophils (%) (Auto)   3 % (0-3)  


 


Basophils (%) (Auto)   1 % (0-3)  


 


Neutrophils # (Auto)


 


 


 9.9 x10^3/uL


(1.8-7.7) 





 


Lymphocytes # (Auto)


 


 


 1.9 x10^3/uL


(1.0-4.8) 





 


Monocytes # (Auto)


 


 


 1.4 x10^3/uL


(0.0-1.1) 





 


Eosinophils # (Auto)


 


 


 0.4 x10^3/uL


(0.0-0.7) 





 


Basophils # (Auto)


 


 


 0.1 x10^3/uL


(0.0-0.2) 














Assessment and Plan


Assessmemt and Plan


Problems


Medical Problems:


(1) Acute respiratory distress


Status: Acute  





(2) CAP (community acquired pneumonia)


Status: Acute  





(3) CHF (congestive heart failure)


Status: Acute  





(4) Hypoxia


Status: Acute  





(5) SIRS (systemic inflammatory response syndrome)


Status: AcuteStatus post cardiac catheter with new coronary stent 


1. Acute CHF with possible combined systolic/diastolic dysfunction. 

Significantly improved. Continuing present treatment.


2. NSTEMI: no CP but suspecting ischemia as culprit. EKG LBBB no prior for 

comparison. Tentatively plan for heart catheterization tomorrow. Discussed with 

the patient.


2. Acute respiratory failure with CHF and possibly ongoing RAVINDER


3. DM2: insulin dependent


4. Accelerated HTN: better


5. Morbid obesity


6. Leukocytosis with UTI: on antibiotics per PCP


7. 2 recent code blues with resuscitation








Plan


For now:


Neuro checks


Duo nebs


Await further subspecialist input


Home meds


DVT prophylaxis


Full code


We considered checking for coronavirus but he was turned down by the [place] 

[employer]


Discussed with RNs


I put orders in for discharge to LTAC later today if his insurance will approve


  








Comment


Review of Relevant


I have reviewed the following items nicole (where applicable) has been applied.


Medications:





Current Medications








 Medications


  (Trade)  Dose


 Ordered  Sig/Elisa


 Route


 PRN Reason  Start Time


 Stop Time Status Last Admin


Dose Admin


 


 Famotidine


  (Pepcid)  20 mg  BID


 PO


   3/27/20 21:00


    3/28/20 09:05





 


 Alteplase,


 Recombinant


  (Cathflo For


 Central Catheter


 Clearance)  1 mg  1X  ONCE


 INT CAT


   3/28/20 11:30


 3/28/20 11:31 DC 3/28/20 12:15




















MARY LARA III DO           Mar 28, 2020 13:23

## 2020-03-28 NOTE — PDOC
Infectious Disease Note


Subjective


Subjective


Groggy today 


+ Urinary retention 


No fevers last 24 hrs


3L O2





Vital Sign


Vital Signs





Vital Signs








  Date Time  Temp Pulse Resp B/P (MAP) Pulse Ox O2 Delivery O2 Flow Rate FiO2


 


3/28/20 09:06  79  109/57    


 


3/28/20 07:00 98.4  20  93 Nasal Cannula 3.0 





 98.4       











Physical Exam


PHYSICAL EXAM


GENERAL: Sitting in the chair, resting quietly


HEENT:  OC/Op dry


NECK: Supple - no JVD


LUNGS:  Decreased breath sounds bases   


HEART:  S1, S2


ABDOMEN: Obese, soft, NT no guarding + BS


EXTREMITIES:  trace edema present. No cyanosis. 


SKIN: Warm to touch. No signs of rash


NEURO: Arouses to name, decrease attention span


[facility] clean - 3/16





Labs


Lab





Laboratory Tests








Test


 3/27/20


18:17 3/27/20


20:35 3/28/20


07:15


 


Glucose (Fingerstick)


 141 mg/dL


(70-99) 103 mg/dL


(70-99) 119 mg/dL


(70-99)





FINDINGS:


 


Single upright portable exam performed. Heart and mediastinal contours are


stable. Right-sided PICC in place, unchanged. There are some prominent 


perihilar linear markings, similar to prior study. No consolidation or 


pleural effusion. No pneumothorax.


 


IMPRESSION:


 


No significant interval change compared to 3/27/2020.


Micro





Microbiology


3/16/20 Blood Culture - Preliminary, Resulted


          NO GROWTH AFTER 4 DAYS





Objective


Assessment


Urinary retention


Leukocytosis - increased


Encehalopathy - now S/p Ativan


S/p cardiac cath, 3/23


Fever - better - sputum - neg


Bradycardia - cardiology following 


Acute resp failure s/p extubation


    -COVID-19 NEG


NSTEMI


Pyuria UC 50-100K enterococcus 3/13, ? colonization


LUE trace warmth ? positioned on Left side with arm in sheets between body and 

bed - better


RICHELLE - better


Partial nephrectomy.


Hypernatremia 


CHF


DM2


Morbid obesity


Hypertension.





Plan


Plan of Care


Younger placement underway 


ABG ordered per pulm


d/c bactrim


On trial of Zyvox (3/27)


off zosyn (3/13-3/22) 


Probiotics


f/u today's CBC results still pending 


Cultures neg 


Maintain aspiration precautions


Supportive care








D/w nursing


Attending Co-Sign


The patient was seen and interviewed as well as examined at the bedside. The 

chart was reviewed. The case was discussed. Agree with the plan of care.











CAROLYN KIM        Mar 28, 2020 11:06


CHRISTOPHER RICHARDSON MD               Mar 28, 2020 12:25

## 2020-03-28 NOTE — NUR
Pt triple lumen central line assessed. All three lumens flush but will not return blood. 
Cathflo ordered. Will continue to monitor per protocol.

## 2020-03-28 NOTE — NUR
Pt triple lumen catheter flushing appropriately but not returning blood flow in any of the 
three ports. Dressing changed and cathflo placed into the white lumen. Protocol followed and 
blood return achieved in the white lumen. Red lumen also has blood return. Blue lumen 
continues to have no blood return. Cathflo inserted into the blue lumen. Protocol followed 
and blood return achieved. Will continue to monitor.

## 2020-03-28 NOTE — RAD
Single view chest dated 3/28/2020.

 

Comparison made to 3/27/2020.

 

CLINICAL INDICATION: Pulmonary congestion.

 

FINDINGS:

 

Single upright portable exam performed. Heart and mediastinal contours are

stable. Right-sided PICC in place, unchanged. There are some prominent 

perihilar linear markings, similar to prior study. No consolidation or 

pleural effusion. No pneumothorax.

 

IMPRESSION:

 

No significant interval change compared to 3/27/2020.

 

Electronically signed by: Pastor Grady MD (3/28/2020 9:28 AM) 

[facility] [place]

## 2020-03-29 NOTE — PDOC
PULMONARY PROGRESS NOTES


Subjective


Extubated 3/26/2020.  


on room air today. Denies SOA, CP, or increased cough. 


Patient now has [facility] cath,


Vitals





Vital Signs








  Date Time  Temp Pulse Resp B/P (MAP) Pulse Ox O2 Delivery O2 Flow Rate FiO2


 


3/29/20 07:00 98.1 72 20 144/62 (89) 94 Nasal Cannula 3.0 





 98.1       








ROS:  No Nausea, No Chest Pain, No Abdominal Pain


General:  Alert, Oriented X4, No acute distress


HEENT:  Other


Lungs:  Clear


Cardiovascular:  S1, S2


Abdomen:  Soft, Non-tender, Other (obese )


Neuro Exam:  Oriented


Extremities:  No Edema


Skin:  Warm


Labs





Laboratory Tests








Test


 3/27/20


10:07 3/27/20


18:17 3/27/20


20:35 3/28/20


07:15


 


O2 Saturation 96 % (92-99)    


 


Arterial Blood pH


 7.49


(7.35-7.45) 


 


 





 


Arterial Blood pCO2 at


Patient Temp 41 mmHg


(35-46) 


 


 





 


Arterial Blood pO2 at Patient


Temp 76 mmHg


() 


 


 





 


Arterial Blood HCO3


 31 mmol/L


(21-28) 


 


 





 


Arterial Blood Base Excess


 7 mmol/L


(-3-3) 


 


 





 


FiO2 32    


 


Glucose (Fingerstick)


 


 141 mg/dL


(70-99) 103 mg/dL


(70-99) 119 mg/dL


(70-99)


 


Test


 3/28/20


10:30 3/28/20


11:47 3/28/20


12:20 3/28/20


12:45


 


Urine Collection Type Unknown    


 


Urine Color Yellow    


 


Urine Clarity Clear    


 


Urine pH 5.5 (<5.0-8.0)    


 


Urine Specific Gravity


 1.015


(1.000-1.030) 


 


 





 


Urine Protein


 Negative mg/dL


(NEG-TRACE) 


 


 





 


Urine Glucose (UA)


 Negative mg/dL


(NEG) 


 


 





 


Urine Ketones (Stick)


 Negative mg/dL


(NEG) 


 


 





 


Urine Blood Negative (NEG)    


 


Urine Nitrite Negative (NEG)    


 


Urine Bilirubin Negative (NEG)    


 


Urine Urobilinogen Dipstick


 1.0 mg/dL (0.2


mg/dL) 


 


 





 


Urine Leukocyte Esterase Negative (NEG)    


 


Urine RBC 0 /HPF (0-2)    


 


Urine WBC 1-4 /HPF (0-4)    


 


Urine Squamous Epithelial


Cells Few /LPF 


 


 


 





 


Urine Transitional Epithelial


Cells Few /LPF 


 


 


 





 


Urine Bacteria 0 /HPF (0-FEW)    


 


Urine Hyaline Casts Moderate /HPF    


 


Urine Mucus Marked /LPF    


 


Glucose (Fingerstick)


 


 142 mg/dL


(70-99) 


 





 


O2 Saturation   96 % (92-99)  


 


Arterial Blood pH


 


 


 7.48


(7.35-7.45) 





 


Arterial Blood pCO2 at


Patient Temp 


 


 40 mmHg


(35-46) 





 


Arterial Blood pO2 at Patient


Temp 


 


 89 mmHg


() 





 


Arterial Blood HCO3


 


 


 29 mmol/L


(21-28) 





 


Arterial Blood Base Excess


 


 


 5 mmol/L


(-3-3) 





 


FiO2     


 


White Blood Count


 


 


 


 13.7 x10^3/uL


(4.0-11.0)


 


Red Blood Count


 


 


 


 5.63 x10^6/uL


(4.30-5.70)


 


Hemoglobin


 


 


 


 15.6 g/dL


(13.0-17.5)


 


Hematocrit


 


 


 


 47.5 %


(39.0-53.0)


 


Mean Corpuscular Volume    84 fL () 


 


Mean Corpuscular Hemoglobin    28 pg (25-35) 


 


Mean Corpuscular Hemoglobin


Concent 


 


 


 33 g/dL


(31-37)


 


Red Cell Distribution Width


 


 


 


 14.0 %


(11.5-14.5)


 


Platelet Count


 


 


 


 348 x10^3/uL


(140-400)


 


Neutrophils (%) (Auto)    72 % (31-73) 


 


Lymphocytes (%) (Auto)    14 % (24-48) 


 


Monocytes (%) (Auto)    10 % (0-9) 


 


Eosinophils (%) (Auto)    3 % (0-3) 


 


Basophils (%) (Auto)    1 % (0-3) 


 


Neutrophils # (Auto)


 


 


 


 9.9 x10^3/uL


(1.8-7.7)


 


Lymphocytes # (Auto)


 


 


 


 1.9 x10^3/uL


(1.0-4.8)


 


Monocytes # (Auto)


 


 


 


 1.4 x10^3/uL


(0.0-1.1)


 


Eosinophils # (Auto)


 


 


 


 0.4 x10^3/uL


(0.0-0.7)


 


Basophils # (Auto)


 


 


 


 0.1 x10^3/uL


(0.0-0.2)


 


Sodium Level


 


 


 


 141 mmol/L


(136-145)


 


Potassium Level


 


 


 


 3.2 mmol/L


(3.5-5.1)


 


Chloride Level


 


 


 


 101 mmol/L


()


 


Carbon Dioxide Level


 


 


 


 33 mmol/L


(21-32)


 


Anion Gap    7 (6-14) 


 


Blood Urea Nitrogen


 


 


 


 25 mg/dL


(8-26)


 


Creatinine


 


 


 


 0.9 mg/dL


(0.7-1.3)


 


Estimated GFR


(Cockcroft-Gault) 


 


 


 82.7 





 


Glucose Level


 


 


 


 139 mg/dL


(70-99)


 


Calcium Level


 


 


 


 9.3 mg/dL


(8.5-10.1)


 


Test


 3/28/20


16:33 3/28/20


20:15 3/29/20


07:36 





 


Glucose (Fingerstick)


 91 mg/dL


(70-99) 86 mg/dL


(70-99) 72 mg/dL


(70-99) 











Laboratory Tests








Test


 3/28/20


10:30 3/28/20


11:47 3/28/20


12:20 3/28/20


12:45


 


Urine Collection Type Unknown    


 


Urine Color Yellow    


 


Urine Clarity Clear    


 


Urine pH 5.5 (<5.0-8.0)    


 


Urine Specific Gravity


 1.015


(1.000-1.030) 


 


 





 


Urine Protein


 Negative mg/dL


(NEG-TRACE) 


 


 





 


Urine Glucose (UA)


 Negative mg/dL


(NEG) 


 


 





 


Urine Ketones (Stick)


 Negative mg/dL


(NEG) 


 


 





 


Urine Blood Negative (NEG)    


 


Urine Nitrite Negative (NEG)    


 


Urine Bilirubin Negative (NEG)    


 


Urine Urobilinogen Dipstick


 1.0 mg/dL (0.2


mg/dL) 


 


 





 


Urine Leukocyte Esterase Negative (NEG)    


 


Urine RBC 0 /HPF (0-2)    


 


Urine WBC 1-4 /HPF (0-4)    


 


Urine Squamous Epithelial


Cells Few /LPF 


 


 


 





 


Urine Transitional Epithelial


Cells Few /LPF 


 


 


 





 


Urine Bacteria 0 /HPF (0-FEW)    


 


Urine Hyaline Casts Moderate /HPF    


 


Urine Mucus Marked /LPF    


 


Glucose (Fingerstick)


 


 142 mg/dL


(70-99) 


 





 


O2 Saturation   96 % (92-99)  


 


Arterial Blood pH


 


 


 7.48


(7.35-7.45) 





 


Arterial Blood pCO2 at


Patient Temp 


 


 40 mmHg


(35-46) 





 


Arterial Blood pO2 at Patient


Temp 


 


 89 mmHg


() 





 


Arterial Blood HCO3


 


 


 29 mmol/L


(21-28) 





 


Arterial Blood Base Excess


 


 


 5 mmol/L


(-3-3) 





 


FiO2     


 


White Blood Count


 


 


 


 13.7 x10^3/uL


(4.0-11.0)


 


Red Blood Count


 


 


 


 5.63 x10^6/uL


(4.30-5.70)


 


Hemoglobin


 


 


 


 15.6 g/dL


(13.0-17.5)


 


Hematocrit


 


 


 


 47.5 %


(39.0-53.0)


 


Mean Corpuscular Volume    84 fL () 


 


Mean Corpuscular Hemoglobin    28 pg (25-35) 


 


Mean Corpuscular Hemoglobin


Concent 


 


 


 33 g/dL


(31-37)


 


Red Cell Distribution Width


 


 


 


 14.0 %


(11.5-14.5)


 


Platelet Count


 


 


 


 348 x10^3/uL


(140-400)


 


Neutrophils (%) (Auto)    72 % (31-73) 


 


Lymphocytes (%) (Auto)    14 % (24-48) 


 


Monocytes (%) (Auto)    10 % (0-9) 


 


Eosinophils (%) (Auto)    3 % (0-3) 


 


Basophils (%) (Auto)    1 % (0-3) 


 


Neutrophils # (Auto)


 


 


 


 9.9 x10^3/uL


(1.8-7.7)


 


Lymphocytes # (Auto)


 


 


 


 1.9 x10^3/uL


(1.0-4.8)


 


Monocytes # (Auto)


 


 


 


 1.4 x10^3/uL


(0.0-1.1)


 


Eosinophils # (Auto)


 


 


 


 0.4 x10^3/uL


(0.0-0.7)


 


Basophils # (Auto)


 


 


 


 0.1 x10^3/uL


(0.0-0.2)


 


Sodium Level


 


 


 


 141 mmol/L


(136-145)


 


Potassium Level


 


 


 


 3.2 mmol/L


(3.5-5.1)


 


Chloride Level


 


 


 


 101 mmol/L


()


 


Carbon Dioxide Level


 


 


 


 33 mmol/L


(21-32)


 


Anion Gap    7 (6-14) 


 


Blood Urea Nitrogen


 


 


 


 25 mg/dL


(8-26)


 


Creatinine


 


 


 


 0.9 mg/dL


(0.7-1.3)


 


Estimated GFR


(Cockcroft-Gault) 


 


 


 82.7 





 


Glucose Level


 


 


 


 139 mg/dL


(70-99)


 


Calcium Level


 


 


 


 9.3 mg/dL


(8.5-10.1)


 


Test


 3/28/20


16:33 3/28/20


20:15 3/29/20


07:36 





 


Glucose (Fingerstick)


 91 mg/dL


(70-99) 86 mg/dL


(70-99) 72 mg/dL


(70-99) 











Medications





Active Scripts








 Medications  Dose


 Route/Sig


 Max Daily Dose Days Date Category


 


 [place] (Saw


 Palmetto Fruit)


 450 Mg Capsule  450 Mg


 PO DAILY


   3/13/20 Reported


 


 D3-50


  (Cholecalciferol


  (Vitamin D3))


 50,000 Unit


 Capsule  1,000 Unit


 PO DAILY


   3/13/20 Reported


 


 Emergen-C 500 mg


 Chewable Tab (Vit


 C/Ascorb


 Sod/Multivit-Min)


 500 Mg Tab.chew  500 Mg


 PO DAILY


   3/13/20 Reported


 


 Zoloft


  (Sertraline Hcl)


 50 Mg Tablet  50 Mg


 PO DAILY


   3/13/20 Reported


 


 Trazodone Hcl 50


 Mg Tablet  1 Tab


 PO QHS


   3/13/20 Reported


 


 Trulicity


  (Dulaglutide)


 0.75 Mg/0.5 Ml


 Pen.injctr  0.75 Mg


 SQ WEEKLY


   3/13/20 Reported


 


 Insulin Aspart


 100 Unit/1 Ml Vial  25 Unit


 SQ TIDWMEALS


   3/13/20 Reported


 


 Levemir (Insulin


 Detemir) 100


 Unit/1 Ml Vial  80 Unit


 SQ DAILY


   3/13/20 Reported


 


 Lasix


  (Furosemide) 20


 Mg Tablet  20 Mg


 PO BID


   3/13/20 Reported


 


 Flomax


  (Tamsulosin Hcl)


 0.4 Mg Cap.er.24h  0.4 Mg


 PO HS


   3/13/20 Reported


 


 Omeprazole 20 Mg


 Capsule.dr  20 Mg


 PO BID


   3/13/20 Reported


 


 Lisinopril 40 Mg


 Tablet  1 Tab


 PO DAILY


   3/13/20 Reported


 


 Atenolol 100 Mg


 Tablet  100 Mg


 PO BID


   3/13/20 Reported








Comments


Status post catheterization  successful PCI drug-eluting stent to the LAD








CXR- 3/27/2020-


IMPRESSION:


1. Improving infiltrates or edema.


2. Endotracheal tube was removed.





Impression


.


IMPRESSION:


1.  Acute hypercapnic and hypoxic respiratory failure sec to flash pulmonary 

edema, resolved


2  s/p cath with severe two-vessel CAD. S/p PCI/LEANNE to the LAD.  of 

LCx.Increase LV filling pressures


3.  Suspected obesity hypoventilation syndrome and obstructive sleep apnea, 

never been tested.


4.  CAD


5.  HYPOTENSION RESOLVED


6.  COVID NEGATIVE


7.  Possible recurrent UTI





Plan


.


Cont. supplemental oxygen as needed to keep sats above 92%


Recommended outpatient follow up for sleep apnea testing 


Antibiotics per ID


Follow cardiology recs-- S/P  PCI/LEANNE to LAD, chest pain-free. Continue DAPT.


cont. [place] cath


D/W RN 





DVT/GI PPX:pepcid/ heparin











ADALBERTO GAXIOLA MD              Mar 29, 2020 09:12

## 2020-03-29 NOTE — PDOC
[employer] PROGRESS NOTE


Chief Complaint


Chief Complaint


Status post cardiac catheter with a new cardiac stent


1. Acute CHF with possible combined systolic/diastolic dysfunction. 

Significantly improved. Continuing present treatment.


2. NSTEMI: no CP but suspecting ischemia as culprit. EKG LBBB no prior for 

comparison. Tentatively plan for heart catheterization tomorrow. Discussed with 

the patient.


2. Acute respiratory failure with CHF and possibly ongoing RAVINDER


3. DM2: insulin dependent


4. Accelerated HTN: better


5. Morbid obesity


6. Leukocytosis with UTI: on antibiotics per PCP


2 recent code blues with status post resuscitation





History of Present Illness


History of Present Illness


[id number]


Patient seen and examined


Chart reviewed


Discussed with RN


Patient seems encephalopathic


He is up in the chair with a wonder guarded on


He had me call his wife


It apparently was the wrong number and actually his own phone ring


He then proceeded to hold a conversation on the phone with no one on it








[id number]


Patient seen and examined


Discussed with RN


The patient seems a little short of breath and weak and a little confused today 

although he did tell me the year (it took a while)


Chart reviewed








[id number]


Patient seen and examined


Discussed with case management and his nurse


Chart reviewed


Plan is to hopefully discharge to LTAC today








[id number]


Patient seen and examined in the ICU


Full out his wife's FM [place] paperwork


Discussed with RN


Chart reviewed


Discussed with 


He apparently has been accepted at LTAC but awaiting insurance approval








[id number]


Patient seen and examined in the ICU


He remains intubated but is now off sedated with eyes open and nodding 

appropriately


His wife Ella is present


Chart reviewed


Discussed with RN








[id number]


Patient seen and examined in the ICU


Discussed with RN


Chart reviewed











Mr Alexander is a 74 yo M w/ PMHx hypertension, dyslipidemia, diabetes mellitus who

presented via EMS with complaint of shortness of breath on 3/13/202 that had 

been ongoign for 10 days. He called [phone] in attempts to go to [facility], was brought 

to [facility], found with O2 sat of 70s at room air and started on  BiPAP to 100% in a 

very short time.


Pulmonology, cardiology, ID consulted.





3/16: Intubation and CVC placed for code blue on way to cardiac catheterization


3/17: Intubated, sedated


3/18: Intubated, sedated


3/19: BP difficult to control, remains intubated


3/20: Still Intubated and sedated. COVID 19 Negative


3/21: Intubated, sedated. Failed 2 weans. K still low, replaced. Lasix GTT. Good

UOP





Dosed Daptom/micafungin changed to Meropenem 3/22





 3/21 Overnight with fevers TMax 102F. D/w cardiology to stay intubated and 

sedated until cath 3/24. Sodium improved. lasix GTT to stop, K 3.2, replaced IV 

central line protocol.





3/23 TO CATH LAB





32 MIN CC TIME





Vitals/I&O


Vitals/I&O:





                                   Vital Signs








  Date Time  Temp Pulse Resp B/P (MAP) Pulse Ox O2 Delivery O2 Flow Rate FiO2


 


3/29/20 11:00 97.5 78 18 136/65 (88) 95 Nasal Cannula 3.0 





 97.5       














                                    I & O   


 


 3/28/20 3/28/20 3/29/20





 15:00 23:00 07:00


 


Intake Total 200 ml  400 ml


 


Output Total 1000 ml  1000 ml


 


Balance -800 ml  -600 ml











Physical Exam


Physical Exam:


GENERAL: Sitting in the chair, awake in NAD 


HEENT:  OC/Op dry


NECK: Supple - no JVD


LUNGS:  Decreased breath sounds bases   


HEART:  S1, S2


ABDOMEN: Obese, soft, NT 


: Younger in place (3/28) 


EXTREMITIES:  Trace edema present. No cyanosis. 


SKIN: Warm to touch. No signs of rash


NEURO: Awake but encephalopathic


[facility] clean - 3/16


General:  Other (intubated.)


Heart:  Regular rate


Lungs:  Clear


Abdomen:  Normal bowel sounds


Extremities:  No cyanosis, Other (3+ bilateral LE pitting edema)


Skin:  No rashes, No breakdown, No significant lesion





Labs


Labs:





Laboratory Tests








Test


 3/28/20


16:33 3/28/20


20:15 3/29/20


07:36 3/29/20


11:57


 


Glucose (Fingerstick)


 91 mg/dL


(70-99) 86 mg/dL


(70-99) 72 mg/dL


(70-99) 147 mg/dL


(70-99)











Assessment and Plan


Assessmemt and Plan


Problems


Medical Problems:


(1) Acute respiratory distress


Status: Acute  





(2) CAP (community acquired pneumonia)


Status: Acute  





(3) CHF (congestive heart failure)


Status: Acute  





(4) Hypoxia


Status: Acute  





(5) SIRS (systemic inflammatory response syndrome)


Status: Acute  


AcuteStatus post cardiac catheter with new coronary stent


Severe encephalopathy 


1. Acute CHF with possible combined systolic/diastolic dysfunction. 

Significantly improved. Continuing present treatment.


2. NSTEMI: no CP but suspecting ischemia as culprit. EKG LBBB no prior for 

comparison. Tentatively plan for heart catheterization tomorrow. Discussed with 

the patient.


2. Acute respiratory failure with CHF and possibly ongoing RAVINDER


3. DM2: insulin dependent


4. Accelerated HTN: better


5. Morbid obesity


6. Leukocytosis with UTI: on antibiotics per PCP


7. 2 recent code blues with resuscitation








Plan


For now:


Neuro checks


Duo nebs


Await further subspecialist input


Home meds


DVT prophylaxis


Full code


We considered checking for coronavirus but he was turned down by the [place] 

[employer]


Discussed with RNs


I put orders in for discharge to LTAC later today if his insurance will approve





Comment


Review of Relevant


I have reviewed the following items nicole (where applicable) has been applied.


Medications:





Current Medications








 Medications


  (Trade)  Dose


 Ordered  Sig/Elisa


 Route


 PRN Reason  Start Time


 Stop Time Status Last Admin


Dose Admin


 


 Alteplase,


 Recombinant


  (Cathflo For


 Central Catheter


 Clearance)  1 mg  1X  ONCE


 INT CAT


   3/28/20 15:00


 3/28/20 15:01 DC 3/28/20 16:06




















MARY LARA III DO           Mar 29, 2020 13:09

## 2020-03-29 NOTE — PDOC
Infectious Disease Note


Subjective


Subjective


Some better but tired 


Denies SOA/cough/CP/F/C/N/V


3L O2





ROS


ROS


per HPI





Vital Sign


Vital Signs





Vital Signs








  Date Time  Temp Pulse Resp B/P (MAP) Pulse Ox O2 Delivery O2 Flow Rate FiO2


 


3/29/20 07:00 98.1 72 20 144/62 (89) 94 Nasal Cannula 3.0 





 98.1       











Physical Exam


PHYSICAL EXAM


GENERAL: Sitting in the chair, awake in NAD 


HEENT:  OC/Op dry


NECK: Supple - no JVD


LUNGS:  Decreased breath sounds bases   


HEART:  S1, S2


ABDOMEN: Obese, soft, NT 


: Younger in place (3/28) 


EXTREMITIES:  Trace edema present. No cyanosis. 


SKIN: Warm to touch. No signs of rash


NEURO: Awake, answers questions appropriately 


[facility] clean - 3/16





Labs


Lab





Laboratory Tests








Test


 3/28/20


10:30 3/28/20


11:47 3/28/20


12:20 3/28/20


12:45


 


Urine Collection Type Unknown    


 


Urine Color Yellow    


 


Urine Clarity Clear    


 


Urine pH 5.5 (<5.0-8.0)    


 


Urine Specific Gravity


 1.015


(1.000-1.030) 


 


 





 


Urine Protein


 Negative mg/dL


(NEG-TRACE) 


 


 





 


Urine Glucose (UA)


 Negative mg/dL


(NEG) 


 


 





 


Urine Ketones (Stick)


 Negative mg/dL


(NEG) 


 


 





 


Urine Blood Negative (NEG)    


 


Urine Nitrite Negative (NEG)    


 


Urine Bilirubin Negative (NEG)    


 


Urine Urobilinogen Dipstick


 1.0 mg/dL (0.2


mg/dL) 


 


 





 


Urine Leukocyte Esterase Negative (NEG)    


 


Urine RBC 0 /HPF (0-2)    


 


Urine WBC 1-4 /HPF (0-4)    


 


Urine Squamous Epithelial


Cells Few /LPF 


 


 


 





 


Urine Transitional Epithelial


Cells Few /LPF 


 


 


 





 


Urine Bacteria 0 /HPF (0-FEW)    


 


Urine Hyaline Casts Moderate /HPF    


 


Urine Mucus Marked /LPF    


 


Glucose (Fingerstick)


 


 142 mg/dL


(70-99) 


 





 


O2 Saturation   96 % (92-99)  


 


Arterial Blood pH


 


 


 7.48


(7.35-7.45) 





 


Arterial Blood pCO2 at


Patient Temp 


 


 40 mmHg


(35-46) 





 


Arterial Blood pO2 at Patient


Temp 


 


 89 mmHg


() 





 


Arterial Blood HCO3


 


 


 29 mmol/L


(21-28) 





 


Arterial Blood Base Excess


 


 


 5 mmol/L


(-3-3) 





 


FiO2     


 


White Blood Count


 


 


 


 13.7 x10^3/uL


(4.0-11.0)


 


Red Blood Count


 


 


 


 5.63 x10^6/uL


(4.30-5.70)


 


Hemoglobin


 


 


 


 15.6 g/dL


(13.0-17.5)


 


Hematocrit


 


 


 


 47.5 %


(39.0-53.0)


 


Mean Corpuscular Volume    84 fL () 


 


Mean Corpuscular Hemoglobin    28 pg (25-35) 


 


Mean Corpuscular Hemoglobin


Concent 


 


 


 33 g/dL


(31-37)


 


Red Cell Distribution Width


 


 


 


 14.0 %


(11.5-14.5)


 


Platelet Count


 


 


 


 348 x10^3/uL


(140-400)


 


Neutrophils (%) (Auto)    72 % (31-73) 


 


Lymphocytes (%) (Auto)    14 % (24-48) 


 


Monocytes (%) (Auto)    10 % (0-9) 


 


Eosinophils (%) (Auto)    3 % (0-3) 


 


Basophils (%) (Auto)    1 % (0-3) 


 


Neutrophils # (Auto)


 


 


 


 9.9 x10^3/uL


(1.8-7.7)


 


Lymphocytes # (Auto)


 


 


 


 1.9 x10^3/uL


(1.0-4.8)


 


Monocytes # (Auto)


 


 


 


 1.4 x10^3/uL


(0.0-1.1)


 


Eosinophils # (Auto)


 


 


 


 0.4 x10^3/uL


(0.0-0.7)


 


Basophils # (Auto)


 


 


 


 0.1 x10^3/uL


(0.0-0.2)


 


Sodium Level


 


 


 


 141 mmol/L


(136-145)


 


Potassium Level


 


 


 


 3.2 mmol/L


(3.5-5.1)


 


Chloride Level


 


 


 


 101 mmol/L


()


 


Carbon Dioxide Level


 


 


 


 33 mmol/L


(21-32)


 


Anion Gap    7 (6-14) 


 


Blood Urea Nitrogen


 


 


 


 25 mg/dL


(8-26)


 


Creatinine


 


 


 


 0.9 mg/dL


(0.7-1.3)


 


Estimated GFR


(Cockcroft-Gault) 


 


 


 82.7 





 


Glucose Level


 


 


 


 139 mg/dL


(70-99)


 


Calcium Level


 


 


 


 9.3 mg/dL


(8.5-10.1)


 


Test


 3/28/20


16:33 3/28/20


20:15 3/29/20


07:36 





 


Glucose (Fingerstick)


 91 mg/dL


(70-99) 86 mg/dL


(70-99) 72 mg/dL


(70-99) 











Micro





Microbiology


3/16/20 Blood Culture - Preliminary, Resulted


          NO GROWTH AFTER 4 DAYS





Objective


Assessment


Urinary retention s/p Younger placement 3/28. UA neg 


Leukocytosis - increased


Encehalopathy - now S/p Ativan, improving


S/p cardiac cath, 3/23


Fever - better - sputum - neg


Bradycardia - cardiology following 


Acute resp failure s/p extubation


    -COVID-19 NEG


NSTEMI


Pyuria UC 50-100K enterococcus 3/13, ? colonization


LUE trace warmth ? positioned on Left side with arm in sheets between body and 

bed - better


RICHELLE - better


Partial nephrectomy.


Hypernatremia 


CHF


DM2


Morbid obesity


Hypertension.





Plan


Plan of Care


Zyvox (3/27)


off zosyn (3/13-3/22) 


Probiotics


CBC in am


Cultures neg 


Maintain aspiration precautions


Supportive care


Attending Co-Sign


The patient was seen and interviewed as well as examined at the bedside. The 

chart was reviewed. The case was discussed. Agree with the plan of care.











CAROLYN KIM        Mar 29, 2020 09:27


CHRISTOPHER RICHARDSON MD               Mar 29, 2020 12:11

## 2020-03-29 NOTE — RAD
CHEST AP ONLY

 

History: Pulmonary congestion

 

Comparison: March 28, 2020

 

Findings:

Low lung volumes. Patchy bibasilar opacities. Unchanged heart size. No 

pneumothorax. Unchanged right-sided central line.

 

Impression: 

1.  Low lung volumes with patchy bibasilar opacities, increased within the

left lung base.

 

Electronically signed by: Parrish Cavanaugh DO (3/29/2020 9:33 AM) [id number]

## 2020-03-30 NOTE — RAD
CHEST AP ONLY

 

History: Pulmonary congestion

 

Comparison: March 29, 2020

 

Findings:

Single view of the chest is submitted. 

Cardiac silhouette is unchanged in size. There is no pneumothorax or 

significant dependent pleural fluid. Previously seen right venous catheter

is not seen. There is some patchy mild airspace opacity of the right lung 

base and also focus of airspace opacity mid right hemithorax as seen 

previously. There is improved aeration of the left lung base.

 

Impression: 

 

1.  There is improved aeration of the left lung base, some residual patchy

airspace opacity of the right lung base and mid right hemithorax.

 

Electronically signed by: Oscar Watson MD (3/30/2020 7:37 AM) [facility]

## 2020-03-30 NOTE — NUR
Phone call from Laurie Gomez at [facility] and they are not accepting new patients at 
this time per COVID-19.

## 2020-03-30 NOTE — PDOC
CASH DELAROSA APRN 3/30/20 1249:


CARDIO Progress Notes


Date and Time


Date of Service


3/30/20


Time of Evaluation


1210





Subjective


Subjective:  No Chest Pain, No shortness of breath, No Palpitations





Vitals


Vitals





Vital Signs








  Date Time  Temp Pulse Resp B/P (MAP) Pulse Ox O2 Delivery O2 Flow Rate FiO2


 


3/30/20 11:03 97.4 69 18 111/67 (82) 96 Nasal Cannula 3.0 





 97.4       








Weight


Weight [ ]





Input and Output


Intake and Output











Intake and Output 


 


 3/30/20





 07:00


 


Intake Total 540 ml


 


Balance 540 ml


 


 


 


Intake Oral 540 ml


 


# Voids 1











Laboratory


Labs





Laboratory Tests








Test


 3/29/20


16:38 3/29/20


21:01 3/30/20


03:45 3/30/20


07:17


 


Glucose (Fingerstick)


 136 mg/dL


(70-99) 148 mg/dL


(70-99) 


 140 mg/dL


(70-99)


 


White Blood Count


 


 


 10.1 x10^3/uL


(4.0-11.0) 





 


Red Blood Count


 


 


 5.56 x10^6/uL


(4.30-5.70) 





 


Hemoglobin


 


 


 15.5 g/dL


(13.0-17.5) 





 


Hematocrit


 


 


 47.3 %


(39.0-53.0) 





 


Mean Corpuscular Volume   85 fL ()  


 


Mean Corpuscular Hemoglobin   28 pg (25-35)  


 


Mean Corpuscular Hemoglobin


Concent 


 


 33 g/dL


(31-37) 





 


Red Cell Distribution Width


 


 


 13.8 %


(11.5-14.5) 





 


Platelet Count


 


 


 344 x10^3/uL


(140-400) 





 


Neutrophils (%) (Auto)   63 % (31-73)  


 


Lymphocytes (%) (Auto)   19 % (24-48)  


 


Monocytes (%) (Auto)   12 % (0-9)  


 


Eosinophils (%) (Auto)   5 % (0-3)  


 


Basophils (%) (Auto)   1 % (0-3)  


 


Neutrophils # (Auto)


 


 


 6.4 x10^3/uL


(1.8-7.7) 





 


Lymphocytes # (Auto)


 


 


 1.9 x10^3/uL


(1.0-4.8) 





 


Monocytes # (Auto)


 


 


 1.2 x10^3/uL


(0.0-1.1) 





 


Eosinophils # (Auto)


 


 


 0.5 x10^3/uL


(0.0-0.7) 





 


Basophils # (Auto)


 


 


 0.1 x10^3/uL


(0.0-0.2) 





 


Test


 3/30/20


12:01 


 


 





 


Glucose (Fingerstick)


 187 mg/dL


(70-99) 


 


 














Microbiology


Micro





Microbiology


3/22/20 Urine Culture - Final, Complete


          


3/22/20 Urine Culture Result 1 (CAR) - Final, Complete


          


3/22/20 - Final, Complete


          


3/22/20 - Final, Complete


          


3/22/20 - Final, Complete


          


3/22/20 Gram Stain Evaluation - Final, Complete


          


3/22/20 Sputum Culture - Final, Complete


          


3/22/20 Sputum Result 1 - Final, Complete


          


3/21/20 Blood Culture - Final, Complete


          NO GROWTH AFTER 5 DAYS





Physical Exam


HEENT:  Neck Supple W Full Motion


Chest:  Symmetric


LUNGS:  Other (diminished bases)


Heart:  S1S2, RRR (SR), other (distant heart tones)


Abdomen:  Soft N/T, Other (obese)


Extremities:  Other (trace-1+ bilateral LE edema )


Neurology:  alert, oriented, follow commands





Assessment


Assessment


1. Acute on chronic diastolic CHF; Echo with preserved LV systolic function. 

better compensated 


2. NSTEMI; peak 0.7. 


3. CAD; cath with severe two-vessel CAD. S/p PCI/LEANNE to the LAD.  of LCx.


4. Acute respiratory failure with CHF; extubated 3/25


5. Diabetes, II; as per IM


6. Hypertension; controlled 


7. Leukocytosis with UTI: on antibiotics per PCP


8. Hypokalemia: resolved


9. Encephalopathy: improved 








Recommendations





Continue secondary prevention including DAPT with ASA, Plavix, statin therapy. 


HF optimization with Lasix/zaroxylyn, ACEi


Okay to transfer to SNU


Follow up with Dr. Boyer scheduled.


Consider outpatient PCI of LCx  if symptomatic





BRANDIN BOYER MD 3/30/20 1518:


CARDIO Progress Notes


Assessment


Assessment


Patient seen and examined. Agree with NP's assessment and plan.


CAD s/p PCI/LEANNE to LAD, stable and chest pain-free. Continue DAPT.


Acute on chronic diastolic heart failure better compensated.


Okay for DC from cardiac standpoint.











CASH DELAROSA           Mar 30, 2020 12:49


BRANDIN BOYER MD           Mar 30, 2020 15:18

## 2020-03-30 NOTE — NUR
This SW following for discharge planning. This SW reviewed chart and spoke with RN. This SW 
called Mary at [facility] and they have accepted pt but don't have a bed. 
Spoke with Dr. Garza who stated pt could do SNU. This SW met with pt and spoke with pt's 
wife on the phone at [phone] per his request. This SW phoned and faxed pt's clinicals 
for SNU referrals to [facility] [phone], [phone], fax and [facility] [phone], 
[phone], fax at request of pt's wife. This SW to continue following for discharge 
planning

## 2020-03-30 NOTE — PDOC
Infectious Disease Note


Subjective


Subjective


feeling good





ROS


ROS


no n/v/d/sob





Vital Sign


Vital Signs





Vital Signs








  Date Time  Temp Pulse Resp B/P (MAP) Pulse Ox O2 Delivery O2 Flow Rate FiO2


 


3/30/20 09:18  77  120/93    


 


3/30/20 07:00 97.9  21  95 Nasal Cannula 3.0 





 97.9       











Physical Exam


PHYSICAL EXAM


GENERAL: Sitting in the chair, awake in NAD 


HEENT:  OC/Op dry


NECK: Supple - no JVD


LUNGS:  Decreased breath sounds bases   


HEART:  S1, S2


ABDOMEN: Obese, soft, NT 


: Younger in place (3/28) 


EXTREMITIES:  Trace edema present. No cyanosis. 


SKIN: Warm to touch. No signs of rash


NEURO: Awake but encephalopathic


[facility] clean - 3/16





Labs


Lab





Laboratory Tests








Test


 3/29/20


11:57 3/29/20


16:38 3/29/20


21:01 3/30/20


03:45


 


Glucose (Fingerstick)


 147 mg/dL


(70-99) 136 mg/dL


(70-99) 148 mg/dL


(70-99) 





 


White Blood Count


 


 


 


 10.1 x10^3/uL


(4.0-11.0)


 


Red Blood Count


 


 


 


 5.56 x10^6/uL


(4.30-5.70)


 


Hemoglobin


 


 


 


 15.5 g/dL


(13.0-17.5)


 


Hematocrit


 


 


 


 47.3 %


(39.0-53.0)


 


Mean Corpuscular Volume    85 fL () 


 


Mean Corpuscular Hemoglobin    28 pg (25-35) 


 


Mean Corpuscular Hemoglobin


Concent 


 


 


 33 g/dL


(31-37)


 


Red Cell Distribution Width


 


 


 


 13.8 %


(11.5-14.5)


 


Platelet Count


 


 


 


 344 x10^3/uL


(140-400)


 


Neutrophils (%) (Auto)    63 % (31-73) 


 


Lymphocytes (%) (Auto)    19 % (24-48) 


 


Monocytes (%) (Auto)    12 % (0-9) 


 


Eosinophils (%) (Auto)    5 % (0-3) 


 


Basophils (%) (Auto)    1 % (0-3) 


 


Neutrophils # (Auto)


 


 


 


 6.4 x10^3/uL


(1.8-7.7)


 


Lymphocytes # (Auto)


 


 


 


 1.9 x10^3/uL


(1.0-4.8)


 


Monocytes # (Auto)


 


 


 


 1.2 x10^3/uL


(0.0-1.1)


 


Eosinophils # (Auto)


 


 


 


 0.5 x10^3/uL


(0.0-0.7)


 


Basophils # (Auto)


 


 


 


 0.1 x10^3/uL


(0.0-0.2)


 


Test


 3/30/20


07:17 


 


 





 


Glucose (Fingerstick)


 140 mg/dL


(70-99) 


 


 











Micro





Microbiology


3/22/20 Urine Culture - Final, Complete


          


3/22/20 Urine Culture Result 1 (CAR) - Final, Complete


          


3/22/20 - Final, Resulted


          


3/22/20 - Final, Resulted


          


3/22/20 - Final, Resulted


          


3/22/20 - Preliminary, Resulted


          


3/22/20 - Preliminary, Resulted


          


3/22/20 - Preliminary, Resulted


          


3/22/20 Gram Stain Evaluation - Final, Resulted


          


3/22/20 Sputum Culture - Final, Resulted


          


3/22/20 Sputum Result 1 - Final, Resulted


          


3/21/20 Blood Culture - Final, Complete


          NO GROWTH AFTER 5 DAYS





Objective


Assessment


Urinary retention s/p Younger placement 3/28. UA neg 


Leukocytosis - increased


Encehalopathy - now S/p Ativan, improving


S/p cardiac cath, 3/23


Fever - better - sputum - neg


Bradycardia - cardiology following 


Acute resp failure s/p extubation


    -COVID-19 NEG


NSTEMI


Pyuria UC 50-100K enterococcus 3/13, ? colonization


LUE trace warmth ? positioned on Left side with arm in sheets between body and 

bed - better


RICHELLE - better


Partial nephrectomy.


Hypernatremia 


CHF


DM2


Morbid obesity


Hypertension.





Plan


Plan of Care


d/c Zyvox (3/27)


off zosyn (3/13-3/22) 


Probiotics


CBC in am


Cultures neg 


Maintain aspiration precautions


Supportive care











d/c to SNF











CHRISTOPHER RICHARDSON MD               Mar 30, 2020 10:50

## 2020-03-30 NOTE — PDOC
PULMONARY PROGRESS NOTES


Subjective


Extubated 3/26/2020.  


Patient now more short of air ready to go home


Vitals





Vital Signs








  Date Time  Temp Pulse Resp B/P (MAP) Pulse Ox O2 Delivery O2 Flow Rate FiO2


 


3/30/20 07:00 97.9 77 21 120/93 (102) 95 Nasal Cannula 3.0 





 97.9       








ROS:  No Nausea, No Chest Pain, No Abdominal Pain


General:  Alert, Oriented X4, No acute distress


HEENT:  Other


Lungs:  Clear


Cardiovascular:  S1, S2


Abdomen:  Soft, Non-tender, Other (obese )


Neuro Exam:  Oriented


Extremities:  No Edema


Skin:  Warm


Labs





Laboratory Tests








Test


 3/28/20


10:30 3/28/20


11:47 3/28/20


12:20 3/28/20


12:45


 


Urine Collection Type Unknown    


 


Urine Color Yellow    


 


Urine Clarity Clear    


 


Urine pH 5.5 (<5.0-8.0)    


 


Urine Specific Gravity


 1.015


(1.000-1.030) 


 


 





 


Urine Protein


 Negative mg/dL


(NEG-TRACE) 


 


 





 


Urine Glucose (UA)


 Negative mg/dL


(NEG) 


 


 





 


Urine Ketones (Stick)


 Negative mg/dL


(NEG) 


 


 





 


Urine Blood Negative (NEG)    


 


Urine Nitrite Negative (NEG)    


 


Urine Bilirubin Negative (NEG)    


 


Urine Urobilinogen Dipstick


 1.0 mg/dL (0.2


mg/dL) 


 


 





 


Urine Leukocyte Esterase Negative (NEG)    


 


Urine RBC 0 /HPF (0-2)    


 


Urine WBC 1-4 /HPF (0-4)    


 


Urine Squamous Epithelial


Cells Few /LPF 


 


 


 





 


Urine Transitional Epithelial


Cells Few /LPF 


 


 


 





 


Urine Bacteria 0 /HPF (0-FEW)    


 


Urine Hyaline Casts Moderate /HPF    


 


Urine Mucus Marked /LPF    


 


Glucose (Fingerstick)


 


 142 mg/dL


(70-99) 


 





 


O2 Saturation   96 % (92-99)  


 


Arterial Blood pH


 


 


 7.48


(7.35-7.45) 





 


Arterial Blood pCO2 at


Patient Temp 


 


 40 mmHg


(35-46) 





 


Arterial Blood pO2 at Patient


Temp 


 


 89 mmHg


() 





 


Arterial Blood HCO3


 


 


 29 mmol/L


(21-28) 





 


Arterial Blood Base Excess


 


 


 5 mmol/L


(-3-3) 





 


FiO2     


 


White Blood Count


 


 


 


 13.7 x10^3/uL


(4.0-11.0)


 


Red Blood Count


 


 


 


 5.63 x10^6/uL


(4.30-5.70)


 


Hemoglobin


 


 


 


 15.6 g/dL


(13.0-17.5)


 


Hematocrit


 


 


 


 47.5 %


(39.0-53.0)


 


Mean Corpuscular Volume    84 fL () 


 


Mean Corpuscular Hemoglobin    28 pg (25-35) 


 


Mean Corpuscular Hemoglobin


Concent 


 


 


 33 g/dL


(31-37)


 


Red Cell Distribution Width


 


 


 


 14.0 %


(11.5-14.5)


 


Platelet Count


 


 


 


 348 x10^3/uL


(140-400)


 


Neutrophils (%) (Auto)    72 % (31-73) 


 


Lymphocytes (%) (Auto)    14 % (24-48) 


 


Monocytes (%) (Auto)    10 % (0-9) 


 


Eosinophils (%) (Auto)    3 % (0-3) 


 


Basophils (%) (Auto)    1 % (0-3) 


 


Neutrophils # (Auto)


 


 


 


 9.9 x10^3/uL


(1.8-7.7)


 


Lymphocytes # (Auto)


 


 


 


 1.9 x10^3/uL


(1.0-4.8)


 


Monocytes # (Auto)


 


 


 


 1.4 x10^3/uL


(0.0-1.1)


 


Eosinophils # (Auto)


 


 


 


 0.4 x10^3/uL


(0.0-0.7)


 


Basophils # (Auto)


 


 


 


 0.1 x10^3/uL


(0.0-0.2)


 


Sodium Level


 


 


 


 141 mmol/L


(136-145)


 


Potassium Level


 


 


 


 3.2 mmol/L


(3.5-5.1)


 


Chloride Level


 


 


 


 101 mmol/L


()


 


Carbon Dioxide Level


 


 


 


 33 mmol/L


(21-32)


 


Anion Gap    7 (6-14) 


 


Blood Urea Nitrogen


 


 


 


 25 mg/dL


(8-26)


 


Creatinine


 


 


 


 0.9 mg/dL


(0.7-1.3)


 


Estimated GFR


(Cockcroft-Gault) 


 


 


 82.7 





 


Glucose Level


 


 


 


 139 mg/dL


(70-99)


 


Calcium Level


 


 


 


 9.3 mg/dL


(8.5-10.1)


 


Test


 3/28/20


16:33 3/28/20


20:15 3/29/20


07:36 3/29/20


11:57


 


Glucose (Fingerstick)


 91 mg/dL


(70-99) 86 mg/dL


(70-99) 72 mg/dL


(70-99) 147 mg/dL


(70-99)


 


Test


 3/29/20


16:38 3/29/20


21:01 3/30/20


03:45 3/30/20


07:17


 


Glucose (Fingerstick)


 136 mg/dL


(70-99) 148 mg/dL


(70-99) 


 140 mg/dL


(70-99)


 


White Blood Count


 


 


 10.1 x10^3/uL


(4.0-11.0) 





 


Red Blood Count


 


 


 5.56 x10^6/uL


(4.30-5.70) 





 


Hemoglobin


 


 


 15.5 g/dL


(13.0-17.5) 





 


Hematocrit


 


 


 47.3 %


(39.0-53.0) 





 


Mean Corpuscular Volume   85 fL ()  


 


Mean Corpuscular Hemoglobin   28 pg (25-35)  


 


Mean Corpuscular Hemoglobin


Concent 


 


 33 g/dL


(31-37) 





 


Red Cell Distribution Width


 


 


 13.8 %


(11.5-14.5) 





 


Platelet Count


 


 


 344 x10^3/uL


(140-400) 





 


Neutrophils (%) (Auto)   63 % (31-73)  


 


Lymphocytes (%) (Auto)   19 % (24-48)  


 


Monocytes (%) (Auto)   12 % (0-9)  


 


Eosinophils (%) (Auto)   5 % (0-3)  


 


Basophils (%) (Auto)   1 % (0-3)  


 


Neutrophils # (Auto)


 


 


 6.4 x10^3/uL


(1.8-7.7) 





 


Lymphocytes # (Auto)


 


 


 1.9 x10^3/uL


(1.0-4.8) 





 


Monocytes # (Auto)


 


 


 1.2 x10^3/uL


(0.0-1.1) 





 


Eosinophils # (Auto)


 


 


 0.5 x10^3/uL


(0.0-0.7) 





 


Basophils # (Auto)


 


 


 0.1 x10^3/uL


(0.0-0.2) 











Laboratory Tests








Test


 3/29/20


11:57 3/29/20


16:38 3/29/20


21:01 3/30/20


03:45


 


Glucose (Fingerstick)


 147 mg/dL


(70-99) 136 mg/dL


(70-99) 148 mg/dL


(70-99) 





 


White Blood Count


 


 


 


 10.1 x10^3/uL


(4.0-11.0)


 


Red Blood Count


 


 


 


 5.56 x10^6/uL


(4.30-5.70)


 


Hemoglobin


 


 


 


 15.5 g/dL


(13.0-17.5)


 


Hematocrit


 


 


 


 47.3 %


(39.0-53.0)


 


Mean Corpuscular Volume    85 fL () 


 


Mean Corpuscular Hemoglobin    28 pg (25-35) 


 


Mean Corpuscular Hemoglobin


Concent 


 


 


 33 g/dL


(31-37)


 


Red Cell Distribution Width


 


 


 


 13.8 %


(11.5-14.5)


 


Platelet Count


 


 


 


 344 x10^3/uL


(140-400)


 


Neutrophils (%) (Auto)    63 % (31-73) 


 


Lymphocytes (%) (Auto)    19 % (24-48) 


 


Monocytes (%) (Auto)    12 % (0-9) 


 


Eosinophils (%) (Auto)    5 % (0-3) 


 


Basophils (%) (Auto)    1 % (0-3) 


 


Neutrophils # (Auto)


 


 


 


 6.4 x10^3/uL


(1.8-7.7)


 


Lymphocytes # (Auto)


 


 


 


 1.9 x10^3/uL


(1.0-4.8)


 


Monocytes # (Auto)


 


 


 


 1.2 x10^3/uL


(0.0-1.1)


 


Eosinophils # (Auto)


 


 


 


 0.5 x10^3/uL


(0.0-0.7)


 


Basophils # (Auto)


 


 


 


 0.1 x10^3/uL


(0.0-0.2)


 


Test


 3/30/20


07:17 


 


 





 


Glucose (Fingerstick)


 140 mg/dL


(70-99) 


 


 











Medications





Active Scripts








 Medications  Dose


 Route/Sig


 Max Daily Dose Days Date Category


 


 Saw [place] (Saw


 [place])


 450 Mg Capsule  450 Mg


 PO DAILY


   3/13/20 Reported


 


 D3-50


  (Cholecalciferol


  (Vitamin D3))


 50,000 Unit


 Capsule  1,000 Unit


 PO DAILY


   3/13/20 Reported


 


 Emergen-C 500 mg


 Chewable Tab (Vit


 C/Ascorb


 Sod/Multivit-Min)


 500 Mg Tab.chew  500 Mg


 PO DAILY


   3/13/20 Reported


 


 Zoloft


  (Sertraline Hcl)


 50 Mg Tablet  50 Mg


 PO DAILY


   3/13/20 Reported


 


 Trazodone Hcl 50


 Mg Tablet  1 Tab


 PO QHS


   3/13/20 Reported


 


 Trulicity


  (Dulaglutide)


 0.75 Mg/0.5 Ml


 Pen.injctr  0.75 Mg


 SQ WEEKLY


   3/13/20 Reported


 


 Insulin Aspart


 100 Unit/1 Ml Vial  25 Unit


 SQ TIDWMEALS


   3/13/20 Reported


 


 Levemir (Insulin


 Detemir) 100


 Unit/1 Ml Vial  80 Unit


 SQ DAILY


   3/13/20 Reported


 


 Lasix


  (Furosemide) 20


 Mg Tablet  20 Mg


 PO BID


   3/13/20 Reported


 


 Flomax


  (Tamsulosin Hcl)


 0.4 Mg Cap.er.24h  0.4 Mg


 PO HS


   3/13/20 Reported


 


 Omeprazole 20 Mg


 Capsule.dr  20 Mg


 PO BID


   3/13/20 Reported


 


 Lisinopril 40 Mg


 Tablet  1 Tab


 PO DAILY


   3/13/20 Reported


 


 Atenolol 100 Mg


 Tablet  100 Mg


 PO BID


   3/13/20 Reported








Comments


Status post catheterization  successful PCI drug-eluting stent to the LAD








CXR- 3/27/2020-


IMPRESSION:


1. Improving infiltrates or edema.


2. Endotracheal tube was removed.





Impression


.


IMPRESSION:


1.  Acute hypercapnic and hypoxic respiratory failure sec to flash pulmonary 

edema, resolved


2  s/p cath with severe two-vessel CAD. S/p PCI/LEANNE to the LAD.  of 

LCx.Increase LV filling pressures


3.  Suspected obesity hypoventilation syndrome and obstructive sleep apnea, 

never been tested.


4.  CAD


5.  HYPOTENSION RESOLVED


6.  COVID NEGATIVE


7.  Possible recurrent UTI





Plan


.


Okay to discharge, will set up outpatient polysomnogram











ADALBERTO GAXIOLA MD              Mar 30, 2020 08:36

## 2020-03-30 NOTE — NUR
Spoke with PT who stated pt would be a good candidate for acute-rehab. Phoned and faxed 
referral to Clifford at [facility], [phone], [phone] (fax) per approval from patient.

## 2020-03-30 NOTE — PDOC
PROGRESS NOTES


Chief Complaint


Chief Complaint


Acute CHF with possible combined systolic/diastolic dysfunction. Significantly 

improved. Continuing present treatment.


NSTEMI - s/p LAD stent


Acute respiratory failure with CHF and possibly ongoing RAVINDER


DM2: insulin dependent


Accelerated HTN: better


Morbid obesity


Leukocytosis with UTI: on antibiotics


Cardiac arrest - s/p resuscitation





History of Present Illness


History of Present Illness


Mr Alexander is a 74 yo M w/ PMHx hypertension, dyslipidemia, diabetes mellitus who

presented via EMS with complaint of shortness of breath on 3/13/202 that had 

been ongoing for 10 days. He called 911 in attempts to go to [facility], was brought 

to [facility], found with O2 sat of 70s at room air and started on  BiPAP to 100% in a 

very short time.


Pulmonology, cardiology, ID consulted.





3/16: Intubation and CVC placed for code blue on way to cardiac catheterization


3/17: Intubated, sedated


3/18: Intubated, sedated


3/19: BP difficult to control, remains intubated


3/20: Still Intubated and sedated. COVID 19 Negative


3/21: Intubated, sedated. Failed 2 weans. K still low, replaced. Lasix GTT. Good

UOP. Overnight with fever 102F.


3/22: Dosed Daptom/micafungin changed to Meropenem


3/23: TO CATH LAB - PCI to LAD


3/24: Intubated, nephrology consulted


3/25: Intubated


3/26: Extubated


3/27-3/29: confused. Approved for LTAC, no beds available





Seen bedside today. More oriented. Has mansfield in place, asking to have it removed

. No SOB or cough. No CP.





Vitals


Vitals





Vital Signs








  Date Time  Temp Pulse Resp B/P (MAP) Pulse Ox O2 Delivery O2 Flow Rate FiO2


 


3/30/20 07:00 97.9 77 21 120/93 (102) 95 Nasal Cannula 3.0 





 97.9       











Physical Exam


Physical Exam


GENERAL: Sitting in the chair, awake in NAD 


HEENT:  OC/Op dry


NECK: Supple - no JVD


LUNGS:  Decreased breath sounds bases   


HEART:  S1, S2


ABDOMEN: Obese, soft, NT 


: Mansfield in place (3/28) 


EXTREMITIES:  Trace edema present. No cyanosis. 


SKIN: Warm to touch. No signs of rash


NEURO: Awake but encephalopathic


RIJ clean - 3/16


General:  Other (intubated.)


Heart:  Regular rate


Lungs:  Clear


Abdomen:  Normal bowel sounds


Extremities:  No cyanosis, Other (3+ bilateral LE pitting edema)


Skin:  No rashes, No breakdown, No significant lesion





Labs


LABS





Laboratory Tests








Test


 3/29/20


11:57 3/29/20


16:38 3/29/20


21:01 3/30/20


03:45


 


Glucose (Fingerstick)


 147 mg/dL


(70-99) 136 mg/dL


(70-99) 148 mg/dL


(70-99) 





 


White Blood Count


 


 


 


 10.1 x10^3/uL


(4.0-11.0)


 


Red Blood Count


 


 


 


 5.56 x10^6/uL


(4.30-5.70)


 


Hemoglobin


 


 


 


 15.5 g/dL


(13.0-17.5)


 


Hematocrit


 


 


 


 47.3 %


(39.0-53.0)


 


Mean Corpuscular Volume    85 fL () 


 


Mean Corpuscular Hemoglobin    28 pg (25-35) 


 


Mean Corpuscular Hemoglobin


Concent 


 


 


 33 g/dL


(31-37)


 


Red Cell Distribution Width


 


 


 


 13.8 %


(11.5-14.5)


 


Platelet Count


 


 


 


 344 x10^3/uL


(140-400)


 


Neutrophils (%) (Auto)    63 % (31-73) 


 


Lymphocytes (%) (Auto)    19 % (24-48) 


 


Monocytes (%) (Auto)    12 % (0-9) 


 


Eosinophils (%) (Auto)    5 % (0-3) 


 


Basophils (%) (Auto)    1 % (0-3) 


 


Neutrophils # (Auto)


 


 


 


 6.4 x10^3/uL


(1.8-7.7)


 


Lymphocytes # (Auto)


 


 


 


 1.9 x10^3/uL


(1.0-4.8)


 


Monocytes # (Auto)


 


 


 


 1.2 x10^3/uL


(0.0-1.1)


 


Eosinophils # (Auto)


 


 


 


 0.5 x10^3/uL


(0.0-0.7)


 


Basophils # (Auto)


 


 


 


 0.1 x10^3/uL


(0.0-0.2)


 


Test


 3/30/20


07:17 


 


 





 


Glucose (Fingerstick)


 140 mg/dL


(70-99) 


 


 














Assessment and Plan


Assessmemt and Plan


Problems


Medical Problems:


(1) Acute respiratory distress


Status: Acute  





(2) CAP (community acquired pneumonia)


Status: Acute  





(3) CHF (congestive heart failure)


Status: Acute  





(4) Hypoxia


Status: Acute  





(5) SIRS (systemic inflammatory response syndrome)


Status: Acute  











Comment


Review of Relevant


I have reviewed the following items nicole (where applicable) has been applied.


Labs





Laboratory Tests








Test


 3/28/20


10:30 3/28/20


11:47 3/28/20


12:20 3/28/20


12:45


 


Urine Collection Type Unknown    


 


Urine Color Yellow    


 


Urine Clarity Clear    


 


Urine pH 5.5 (<5.0-8.0)    


 


Urine Specific Gravity


 1.015


(1.000-1.030) 


 


 





 


Urine Protein


 Negative mg/dL


(NEG-TRACE) 


 


 





 


Urine Glucose (UA)


 Negative mg/dL


(NEG) 


 


 





 


Urine Ketones (Stick)


 Negative mg/dL


(NEG) 


 


 





 


Urine Blood Negative (NEG)    


 


Urine Nitrite Negative (NEG)    


 


Urine Bilirubin Negative (NEG)    


 


Urine Urobilinogen Dipstick


 1.0 mg/dL (0.2


mg/dL) 


 


 





 


Urine Leukocyte Esterase Negative (NEG)    


 


Urine RBC 0 /HPF (0-2)    


 


Urine WBC 1-4 /HPF (0-4)    


 


Urine Squamous Epithelial


Cells Few /LPF 


 


 


 





 


Urine Transitional Epithelial


Cells Few /LPF 


 


 


 





 


Urine Bacteria 0 /HPF (0-FEW)    


 


Urine Hyaline Casts Moderate /HPF    


 


Urine Mucus Marked /LPF    


 


Glucose (Fingerstick)


 


 142 mg/dL


(70-99) 


 





 


O2 Saturation   96 % (92-99)  


 


Arterial Blood pH


 


 


 7.48


(7.35-7.45) 





 


Arterial Blood pCO2 at


Patient Temp 


 


 40 mmHg


(35-46) 





 


Arterial Blood pO2 at Patient


Temp 


 


 89 mmHg


() 





 


Arterial Blood HCO3


 


 


 29 mmol/L


(21-28) 





 


Arterial Blood Base Excess


 


 


 5 mmol/L


(-3-3) 





 


FiO2     


 


White Blood Count


 


 


 


 13.7 x10^3/uL


(4.0-11.0)


 


Red Blood Count


 


 


 


 5.63 x10^6/uL


(4.30-5.70)


 


Hemoglobin


 


 


 


 15.6 g/dL


(13.0-17.5)


 


Hematocrit


 


 


 


 47.5 %


(39.0-53.0)


 


Mean Corpuscular Volume    84 fL () 


 


Mean Corpuscular Hemoglobin    28 pg (25-35) 


 


Mean Corpuscular Hemoglobin


Concent 


 


 


 33 g/dL


(31-37)


 


Red Cell Distribution Width


 


 


 


 14.0 %


(11.5-14.5)


 


Platelet Count


 


 


 


 348 x10^3/uL


(140-400)


 


Neutrophils (%) (Auto)    72 % (31-73) 


 


Lymphocytes (%) (Auto)    14 % (24-48) 


 


Monocytes (%) (Auto)    10 % (0-9) 


 


Eosinophils (%) (Auto)    3 % (0-3) 


 


Basophils (%) (Auto)    1 % (0-3) 


 


Neutrophils # (Auto)


 


 


 


 9.9 x10^3/uL


(1.8-7.7)


 


Lymphocytes # (Auto)


 


 


 


 1.9 x10^3/uL


(1.0-4.8)


 


Monocytes # (Auto)


 


 


 


 1.4 x10^3/uL


(0.0-1.1)


 


Eosinophils # (Auto)


 


 


 


 0.4 x10^3/uL


(0.0-0.7)


 


Basophils # (Auto)


 


 


 


 0.1 x10^3/uL


(0.0-0.2)


 


Sodium Level


 


 


 


 141 mmol/L


(136-145)


 


Potassium Level


 


 


 


 3.2 mmol/L


(3.5-5.1)


 


Chloride Level


 


 


 


 101 mmol/L


()


 


Carbon Dioxide Level


 


 


 


 33 mmol/L


(21-32)


 


Anion Gap    7 (6-14) 


 


Blood Urea Nitrogen


 


 


 


 25 mg/dL


(8-26)


 


Creatinine


 


 


 


 0.9 mg/dL


(0.7-1.3)


 


Estimated GFR


(Cockcroft-Gault) 


 


 


 82.7 





 


Glucose Level


 


 


 


 139 mg/dL


(70-99)


 


Calcium Level


 


 


 


 9.3 mg/dL


(8.5-10.1)


 


Test


 3/28/20


16:33 3/28/20


20:15 3/29/20


07:36 3/29/20


11:57


 


Glucose (Fingerstick)


 91 mg/dL


(70-99) 86 mg/dL


(70-99) 72 mg/dL


(70-99) 147 mg/dL


(70-99)


 


Test


 3/29/20


16:38 3/29/20


21:01 3/30/20


03:45 3/30/20


07:17


 


Glucose (Fingerstick)


 136 mg/dL


(70-99) 148 mg/dL


(70-99) 


 140 mg/dL


(70-99)


 


White Blood Count


 


 


 10.1 x10^3/uL


(4.0-11.0) 





 


Red Blood Count


 


 


 5.56 x10^6/uL


(4.30-5.70) 





 


Hemoglobin


 


 


 15.5 g/dL


(13.0-17.5) 





 


Hematocrit


 


 


 47.3 %


(39.0-53.0) 





 


Mean Corpuscular Volume   85 fL ()  


 


Mean Corpuscular Hemoglobin   28 pg (25-35)  


 


Mean Corpuscular Hemoglobin


Concent 


 


 33 g/dL


(31-37) 





 


Red Cell Distribution Width


 


 


 13.8 %


(11.5-14.5) 





 


Platelet Count


 


 


 344 x10^3/uL


(140-400) 





 


Neutrophils (%) (Auto)   63 % (31-73)  


 


Lymphocytes (%) (Auto)   19 % (24-48)  


 


Monocytes (%) (Auto)   12 % (0-9)  


 


Eosinophils (%) (Auto)   5 % (0-3)  


 


Basophils (%) (Auto)   1 % (0-3)  


 


Neutrophils # (Auto)


 


 


 6.4 x10^3/uL


(1.8-7.7) 





 


Lymphocytes # (Auto)


 


 


 1.9 x10^3/uL


(1.0-4.8) 





 


Monocytes # (Auto)


 


 


 1.2 x10^3/uL


(0.0-1.1) 





 


Eosinophils # (Auto)


 


 


 0.5 x10^3/uL


(0.0-0.7) 





 


Basophils # (Auto)


 


 


 0.1 x10^3/uL


(0.0-0.2) 











Laboratory Tests








Test


 3/29/20


11:57 3/29/20


16:38 3/29/20


21:01 3/30/20


03:45


 


Glucose (Fingerstick)


 147 mg/dL


(70-99) 136 mg/dL


(70-99) 148 mg/dL


(70-99) 





 


White Blood Count


 


 


 


 10.1 x10^3/uL


(4.0-11.0)


 


Red Blood Count


 


 


 


 5.56 x10^6/uL


(4.30-5.70)


 


Hemoglobin


 


 


 


 15.5 g/dL


(13.0-17.5)


 


Hematocrit


 


 


 


 47.3 %


(39.0-53.0)


 


Mean Corpuscular Volume    85 fL () 


 


Mean Corpuscular Hemoglobin    28 pg (25-35) 


 


Mean Corpuscular Hemoglobin


Concent 


 


 


 33 g/dL


(31-37)


 


Red Cell Distribution Width


 


 


 


 13.8 %


(11.5-14.5)


 


Platelet Count


 


 


 


 344 x10^3/uL


(140-400)


 


Neutrophils (%) (Auto)    63 % (31-73) 


 


Lymphocytes (%) (Auto)    19 % (24-48) 


 


Monocytes (%) (Auto)    12 % (0-9) 


 


Eosinophils (%) (Auto)    5 % (0-3) 


 


Basophils (%) (Auto)    1 % (0-3) 


 


Neutrophils # (Auto)


 


 


 


 6.4 x10^3/uL


(1.8-7.7)


 


Lymphocytes # (Auto)


 


 


 


 1.9 x10^3/uL


(1.0-4.8)


 


Monocytes # (Auto)


 


 


 


 1.2 x10^3/uL


(0.0-1.1)


 


Eosinophils # (Auto)


 


 


 


 0.5 x10^3/uL


(0.0-0.7)


 


Basophils # (Auto)


 


 


 


 0.1 x10^3/uL


(0.0-0.2)


 


Test


 3/30/20


07:17 


 


 





 


Glucose (Fingerstick)


 140 mg/dL


(70-99) 


 


 











Microbiology


3/22/20 Urine Culture - Final, Complete


          


3/22/20 Urine Culture Result 1 (CAR) - Final, Complete


          


3/22/20 - Final, Resulted


          


3/22/20 - Final, Resulted


          


3/22/20 - Final, Resulted


          


3/22/20 - Preliminary, Resulted


          


3/22/20 - Preliminary, Resulted


          


3/22/20 - Preliminary, Resulted


          


3/22/20 Gram Stain Evaluation - Final, Resulted


          


3/22/20 Sputum Culture - Final, Resulted


          


3/22/20 Sputum Result 1 - Final, Resulted


          


3/21/20 Blood Culture - Final, Complete


          NO GROWTH AFTER 5 DAYS


Medications





Current Medications


Albuterol/ Ipratropium (Duoneb) 3 ml 1X  ONCE NEB  Last administered on 

3/13/20at 09:07;  Start 3/13/20 at 09:15;  Stop 3/13/20 at 09:16;  Status DC


Methylprednisolone Sodium Succinate (SOLU-Medrol 125MG VIAL) 125 mg 1X  ONCE IV 

Last administered on 3/13/20at 09:27;  Start 3/13/20 at 09:15;  Stop 3/13/20 at 

09:16;  Status DC


Labetalol HCl (Normodyne Iv Push) 10 mg 1X  ONCE IVP ;  Start 3/13/20 at 09:15; 

Stop 3/13/20 at 09:12;  Status DC


Piperacillin Sod/ Tazobactam Sod 3.375 gm/Sodium Chloride 50 ml @  100 mls/hr 1X

 ONCE IV  Last administered on 3/13/20at 10:23;  Start 3/13/20 at 09:30;  Stop 

3/13/20 at 09:59;  Status DC


Vancomycin HCl 250 ml @  250 mls/hr 1X  ONCE IV ;  Start 3/13/20 at 09:30;  Stop

3/13/20 at 10:29;  Status UNV


Vancomycin HCl 2 gm/Sodium Chloride 500 ml @  250 mls/hr 1X  ONCE IV  Last 

administered on 3/13/20at 09:56;  Start 3/13/20 at 09:45;  Stop 3/13/20 at 

11:44;  Status DC


Sodium Chloride 1,000 ml @  100 mls/hr Q10H IV  Last administered on 3/13/20at 

11:53;  Start 3/13/20 at 10:51;  Stop 3/14/20 at 10:50;  Status DC


Furosemide (Lasix) 40 mg 1X  ONCE IVP  Last administered on 3/13/20at 14:08;  

Start 3/13/20 at 14:00;  Stop 3/13/20 at 14:01;  Status DC


Piperacillin Sod/ Tazobactam Sod 3.375 gm/Sodium Chloride 50 ml @  100 mls/hr 

Q6HRS IV  Last administered on 3/22/20at 06:19;  Start 3/13/20 at 18:00;  Stop 

3/22/20 at 14:55;  Status DC


Insulin Human Lispro (HumaLOG) 0-9 UNITS TIDWMEALS SQ  Last administered on 

3/16/20at 17:00;  Start 3/13/20 at 17:00;  Stop 3/16/20 at 22:21;  Status DC


Dextrose (Dextrose 50%-Water Syringe) 12.5 gm PRN Q15MIN  PRN IV SEE COMMENTS;  

Start 3/13/20 at 14:00


Atorvastatin Calcium (Lipitor) 40 mg QHS PO  Last administered on 3/29/20at 

20:14;  Start 3/13/20 at 21:00


Aspirin (Ecotrin) 81 mg DAILYWBKFT PO  Last administered on 3/25/20at 08:09;  

Start 3/14/20 at 08:00;  Stop 3/25/20 at 12:47;  Status DC


Aspirin (Ecotrin) 325 mg 1X  ONCE PO  Last administered on 3/13/20at 16:24;  

Start 3/13/20 at 16:00;  Stop 3/13/20 at 16:01;  Status DC


Furosemide (Lasix) 40 mg BID92 IVP  Last administered on 3/17/20at 10:11;  Start

3/14/20 at 09:00;  Stop 3/17/20 at 10:38;  Status DC


Heparin Sodium/ Dextrose 250 ml @ 0 mls/hr CONT  PRN IV PER PROTOCOL Last 

administered on 3/15/20at 20:31;  Start 3/13/20 at 16:15;  Stop 3/17/20 at 

13:51;  Status DC


Heparin Sodium (Porcine) (Heparin Sodium) 3,450 unit PRN Q6HRS  PRN IV FOR UFH 

LEVEL LESS THAN 0.2 Last administered on 3/13/20at 23:59;  Start 3/13/20 at 

16:15;  Stop 3/17/20 at 13:52;  Status DC


Info (Anti-Coagulation Monitoring By Pharmacy) 1 each PRN DAILY  PRN [facility] SEE 

COMMENTS Last administered on 3/16/20at 13:49;  Start 3/13/20 at 16:15;  Stop 

3/17/20 at 13:52;  Status DC


Lisinopril (Prinivil) 40 mg DAILY PO  Last administered on 3/16/20at 08:12;  

Start 3/14/20 at 09:00;  Stop 3/17/20 at 10:38;  Status DC


Metoprolol Tartrate (Lopressor) 50 mg BID PO ;  Start 3/13/20 at 21:00;  Status 

UNV


Atenolol (Tenormin) 100 mg DAILY PO  Last administered on 3/16/20at 08:15;  

Start 3/14/20 at 09:00;  Stop 3/16/20 at 13:45;  Status DC


Insulin Human Lispro (HumaLOG) 5 units 1X  ONCE SQ  Last administered on 3/1

3/20at 21:46;  Start 3/13/20 at 22:00;  Stop 3/13/20 at 22:01;  Status DC


Insulin Glargine (Lantus Syringe) 80 unit QHS SQ ;  Start 3/13/20 at 22:00;  

Stop 3/13/20 at 21:56;  Status DC


Insulin Glargine (Lantus Syringe) 80 unit DAILY SQ ;  Start 3/14/20 at 09:00;  

Status Cancel


Insulin Glargine (Lantus Syringe) 80 unit DAILY08 SQ  Last administered on 

3/28/20at 09:13;  Start 3/14/20 at 08:00


Lactobacillus Rhamnosus (Culturelle) 1 cap BID PO  Last administered on 

3/26/20at 08:47;  Start 3/14/20 at 21:00;  Stop 3/26/20 at 09:25;  Status DC


Sodium Chloride 1,000 ml @  75 mls/hr [id number] IV ;  Start 3/16/20 at 06:00;  

Stop 3/17/20 at 12:28;  Status DC


Amlodipine Besylate (Norvasc) 5 mg DAILY PO  Last administered on 3/16/20at 

08:13;  Start 3/15/20 at 16:00;  Stop 3/17/20 at 10:38;  Status DC


Iodixanol (Visipaque 320) 100 ml STK-MED ONCE .ROUTE ;  Start 3/16/20 at 07:56; 

Stop 3/16/20 at 07:56;  Status DC


Lidocaine HCl (Lidocaine 1% 20ml Vial) 20 ml STK-MED ONCE .ROUTE ;  Start 

3/16/20 at 07:56;  Stop 3/16/20 at 07:56;  Status DC


Heparin Sodium/ Sodium Chloride 1,500 ml @  As Directed STK-MED ONCE .ROUTE ;  

Start 3/16/20 at 07:56;  Stop 3/16/20 at 07:56;  Status DC


Fentanyl Citrate (Fentanyl 2ml Vial) 100 mcg STK-MED ONCE .ROUTE ;  Start 

3/16/20 at 09:03;  Stop 3/16/20 at 09:03;  Status DC


Midazolam HCl (Versed) 2 mg STK-MED ONCE .ROUTE ;  Start 3/16/20 at 09:03;  Stop

3/16/20 at 09:03;  Status DC


Heparin Sodium (Porcine) (Heparin Sodium) 10,000 unit STK-MED ONCE .ROUTE ;  

Start 3/16/20 at 09:39;  Stop 3/16/20 at 09:39;  Status DC


Verapamil HCl (Verapamil) 5 mg STK-MED ONCE .ROUTE ;  Start 3/16/20 at 09:39;  

Stop 3/16/20 at 09:39;  Status DC


Nitroglycerin/ Dextrose 250 ml @  As Directed STK-MED ONCE IV ;  Start 3/16/20 

at 09:39;  Stop 3/16/20 at 09:39;  Status DC


Nitroglycerin (Nitroglycerin) 200 mcg STK-MED ONCE .ROUTE ;  Start 3/16/20 at 

09:39;  Stop 3/16/20 at 09:39;  Status DC


Albuterol/ Ipratropium (Duoneb) 3 ml 1X  ONCE NEB  Last administered on 

3/16/20at 10:00;  Start 3/16/20 at 10:00;  Stop 3/16/20 at 10:01;  Status DC


Etomidate (Amidate) 20 mg STK-MED ONCE IV ;  Start 3/16/20 at 10:23;  Stop 

3/16/20 at 10:23;  Status DC


Succinylcholine Chloride (Anectine) 200 mg STK-MED ONCE .ROUTE ;  Start 3/16/20 

at 10:23;  Stop 3/16/20 at 10:23;  Status DC


Propofol 100 ml @  As Directed STK-MED ONCE IV ;  Start 3/16/20 at 10:35;  Stop 

3/16/20 at 10:35;  Status DC


Nitroglycerin (Nitroglycerin) 200 mcg 1X  ONCE IART  Last administered on 

3/16/20at 11:00;  Start 3/16/20 at 11:00;  Stop 3/16/20 at 11:01;  Status DC


Verapamil HCl (Verapamil) 2.5 mg 1X  ONCE IART ;  Start 3/16/20 at 11:00;  Stop 

3/16/20 at 11:01;  Status DC


Heparin Sodium (Porcine) (Heparin Sodium) 2,500 unit 1X  ONCE IART ;  Start 

3/16/20 at 11:00;  Stop 3/16/20 at 11:01;  Status DC


Heparin Sodium/ Sodium Chloride (HEPARIN for ARTERIAL LINE FLUSH) 1,000 unit 1X 

ONCE IART  Last administered on 3/16/20at 11:00;  Start 3/16/20 at 11:00;  Stop 

3/16/20 at 11:01;  Status DC


Heparin Sodium/ Sodium Chloride (HEPARIN for ARTERIAL LINE FLUSH) 1,000 unit 1X 

ONCE IART  Last administered on 3/16/20at 11:00;  Start 3/16/20 at 11:00;  Stop 

3/16/20 at 11:01;  Status DC


Midazolam HCl (Versed) 2 mg 1X  ONCE IV  Last administered on 3/16/20at 11:00;  

Start 3/16/20 at 11:00;  Stop 3/16/20 at 11:01;  Status DC


Fentanyl Citrate (Fentanyl 2ml Vial) 100 mcg 1X  ONCE IV  Last administered on 

3/16/20at 11:00;  Start 3/16/20 at 11:00;  Stop 3/16/20 at 11:01;  Status DC


Iodixanol (Visipaque 320) 100 ml 1X  ONCE IART ;  Start 3/16/20 at 11:00;  Stop 

3/16/20 at 11:01;  Status DC


Lidocaine HCl (Lidocaine 1% 20ml Vial) 20 ml 1X  ONCE INJ ;  Start 3/16/20 at 

11:00;  Stop 3/16/20 at 11:01;  Status DC


Norepinephrine Bitartrate 8 mg/ Dextrose 258 ml @  27.09 mls/ hr CONT  PRN IV 

PER PROTOCOL Last administered on 3/16/20at 12:55;  Start 3/16/20 at 11:15;  

Stop 3/26/20 at 21:16;  Status DC


Propofol 100 ml @  As Directed STK-MED ONCE IV ;  Start 3/16/20 at 12:39;  Stop 

3/16/20 at 12:40;  Status DC


Potassium Chloride/Water 100 ml @  100 mls/hr 1X  ONCE IV  Last administered on 

3/16/20at 13:06;  Start 3/16/20 at 13:00;  Stop 3/16/20 at 13:59;  Status DC


Midazolam HCl 50 mg/Sodium Chloride 50 ml @ 1 mls/hr CONT  PRN IV SEE I/O RECORD

Last administered on 3/20/20at 05:29;  Start 3/16/20 at 13:00;  Stop 3/26/20 at 

21:16;  Status DC


Magnesium Sulfate 50 ml @ 25 mls/hr 1X  ONCE IV  Last administered on 3/16/20at 

14:28;  Start 3/16/20 at 13:00;  Stop 3/16/20 at 14:59;  Status DC


Midazolam HCl 50 mg/Sodium Chloride 50 ml @ 1 mls/hr CONT  PRN IV SEE I/O 

RECORD;  Start 3/16/20 at 13:00;  Status UNV


Dobutamine HCl/ Dextrose 250 ml @  8.43 mls/hr CONT  PRN IV SEE I/O RECORD;  

Start 3/16/20 at 13:30;  Stop 3/27/20 at 11:16;  Status DC


Atenolol (Tenormin) 25 mg DAILY PO ;  Start 3/17/20 at 09:00;  Stop 3/17/20 at 

10:38;  Status DC


Metolazone (Zaroxolyn) 2.5 mg DAILY PO  Last administered on 3/29/20at 09:56;  

Start 3/17/20 at 09:00


Fentanyl Citrate 30 ml @ 0 mls/hr CONT  PRN IV SEE PROTOCOL Last administered on

3/21/20at 11:33;  Start 3/16/20 at 22:30;  Stop 3/27/20 at 11:15;  Status DC


Insulin Human Lispro (HumaLOG) 0-9 UNITS Q6HRS SQ  Last administered on 

3/26/20at 18:37;  Start 3/17/20 at 00:00;  Stop 3/28/20 at 16:47;  Status DC


Furosemide 100 mg/ Sodium Chloride 100 ml @ 5 mls/hr CONT  PRN IV SEE I/O RECORD

Last administered on 3/21/20at 15:25;  Start 3/17/20 at 11:00;  Stop 3/26/20 at 

10:01;  Status DC


Hydralazine HCl (Apresoline Inj) 10 mg PRN Q4HRS  PRN IVP ELEV BP, SEE COMMENTS,

1ST CHO Last administered on 3/25/20at 10:06;  Start 3/17/20 at 10:45


Verapamil HCl (Verapamil) 5 mg STK-MED ONCE .ROUTE ;  Start 3/16/20 at 10:00;  

Stop 3/17/20 at 13:11;  Status DC


Famotidine (Pepcid Vial) 20 mg BID IVP  Last administered on 3/27/20at 10:30;  

Start 3/17/20 at 21:00;  Stop 3/27/20 at 11:18;  Status DC


Linezolid/Dextrose 300 ml @  300 mls/hr Q12HR IV  Last administered on 3/18/20at

20:59;  Start 3/18/20 at 09:00;  Stop 3/19/20 at 08:46;  Status DC


Potassium Bicarbonate (Potassium Effervescent Tablet) 40 meq 1X  ONCE PO  Last 

administered on 3/18/20at 09:27;  Start 3/18/20 at 09:00;  Stop 3/18/20 at 

09:02;  Status DC


Potassium Chloride/Water 100 ml @  100 mls/hr 1X  ONCE IV  Last administered on 

3/19/20at 07:07;  Start 3/19/20 at 06:45;  Stop 3/19/20 at 07:44;  Status DC


Potassium Chloride/Water 100 ml @  100 mls/hr 1X  ONCE IV  Last administered on 

3/19/20at 08:31;  Start 3/19/20 at 09:00;  Stop 3/19/20 at 09:59;  Status DC


Potassium Chloride/Water 100 ml @  100 mls/hr 1X  ONCE IV  Last administered on 

3/19/20at 13:24;  Start 3/19/20 at 13:00;  Stop 3/19/20 at 13:59;  Status DC


Enalaprilat (Vasotec Inj) 2.5 mg PRN Q6HRS  PRN IVP HYPERTENSION, 2ND CHOICE 

Last administered on 3/27/20at 04:02;  Start 3/19/20 at 10:15


Amlodipine Besylate (Norvasc) 10 mg DAILY PO  Last administered on 3/29/20at 

09:57;  Start 3/19/20 at 12:00


Heparin Sodium (Porcine) (Heparin Sodium) 5,000 unit Q12HR SQ  Last administered

on 3/29/20at 20:15;  Start 3/19/20 at 14:00


Potassium Chloride/Water 100 ml @  100 mls/hr Q1H IV  Last administered on 

3/19/20at 19:26;  Start 3/19/20 at 16:00;  Stop 3/19/20 at 17:59;  Status DC


Propofol 100 ml @ 0 mls/hr CONT  PRN IV SEE PROTOCOL Last administered on 

3/25/20at 05:56;  Start 3/20/20 at 11:00;  Stop 3/26/20 at 21:16;  Status DC


Potassium Chloride/Water 100 ml @  100 mls/hr Q1HR IV  Last administered on 

3/20/20at 16:51;  Start 3/20/20 at 12:00;  Stop 3/20/20 at 17:59;  Status DC


Potassium Chloride/Water 100 ml @  100 mls/hr Q1H IV ;  Start 3/20/20 at 22:00; 

Stop 3/21/20 at 05:59;  Status Cancel


Potassium Chloride/Water 100 ml @  100 mls/hr Q1H IV  Last administered on 

3/21/20at 00:53;  Start 3/20/20 at 22:00;  Stop 3/21/20 at 01:59;  Status DC


Potassium Chloride/Water 100 ml @  100 mls/hr Q1H IV  Last administered on 

3/21/20at 09:34;  Start 3/21/20 at 07:00;  Stop 3/21/20 at 08:59;  Status DC


Nitroglycerin/ Dextrose 250 ml @  1.5 mls/hr CONT  PRN IV SEE I/O RECORD Last 

administered on 3/21/20at 21:30;  Start 3/21/20 at 10:00;  Stop 3/27/20 at 

11:16;  Status DC


Dexmedetomidine HCl 400 mcg/ Sodium Chloride 100 ml @ 0 mls/hr CONT  PRN IV 

SEDATION Last administered on 3/25/20at 11:13;  Start 3/21/20 at 14:45;  Stop 

3/27/20 at 11:14;  Status DC


Sodium Chloride 500 ml @  500 mls/hr 1X PRN  PRN IV SEE COMMENTS;  Start 3/21/20

at 14:45


Atropine Sulfate (ATROPINE 0.5mg SYRINGE) 0.5 mg PRN Q5MIN  PRN IV SEE COMMENTS;

 Start 3/21/20 at 14:45;  Stop 3/27/20 at 11:13;  Status DC


Potassium Chloride/Water 100 ml @  100 mls/hr Q1H IV  Last administered on 

3/21/20at 17:07;  Start 3/21/20 at 15:30;  Stop 3/21/20 at 17:29;  Status DC


Acetaminophen (Tylenol) 650 mg PRN Q6HRS  PRN PO MILD PAIN / TEMP Last 

administered on 3/22/20at 08:31;  Start 3/21/20 at 16:30


Potassium Chloride/Water 100 ml @  100 mls/hr Q1H IV  Last administered on 

3/22/20at 00:36;  Start 3/21/20 at 21:00;  Stop 3/21/20 at 22:59;  Status DC


Potassium Chloride/Water 100 ml @  100 mls/hr Q1H IV  Last administered on 

3/22/20at 09:42;  Start 3/22/20 at 08:00;  Stop 3/22/20 at 09:59;  Status DC


Sodium Chloride 1,000 ml @  100 mls/hr Q10H IV  Last administered on 3/24/20at 

04:09;  Start 3/22/20 at 23:55;  Stop 3/24/20 at 15:11;  Status DC


Daptomycin 620 mg/ Sodium Chloride 50 ml @  100 mls/hr Q24H IV  Last 

administered on 3/25/20at 15:25;  Start 3/22/20 at 15:00;  Stop 3/26/20 at 

09:35;  Status DC


Micafungin Sodium 100 mg/Dextrose 100 ml @  100 mls/hr Q24H IV  Last 

administered on 3/25/20at 15:26;  Start 3/22/20 at 15:00;  Stop 3/26/20 at 

09:38;  Status DC


Meropenem 500 mg/ Sodium Chloride 50 ml @  100 mls/hr Q6HRS IV  Last 

administered on 3/27/20at 06:25;  Start 3/22/20 at 16:00;  Stop 3/27/20 at 

10:19;  Status DC


Fentanyl Citrate (Fentanyl 2ml Vial) 100 mcg STK-MED ONCE .ROUTE ;  Start 3

/22/20 at 17:04;  Stop 3/22/20 at 17:04;  Status DC


Fentanyl Citrate (Fentanyl 2ml Vial) 50 mcg PRN Q2HR  PRN IVP PAIN Last admi

nistered on 3/29/20at 01:52;  Start 3/22/20 at 17:15


Potassium Chloride/Water 100 ml @  100 mls/hr 1X  ONCE IV  Last administered on 

3/23/20at 10:47;  Start 3/23/20 at 10:00;  Stop 3/23/20 at 10:59;  Status DC


Iodixanol (Visipaque 320) 100 ml STK-MED ONCE .ROUTE ;  Start 3/23/20 at 11:08; 

Stop 3/23/20 at 11:08;  Status DC


Lidocaine HCl (Lidocaine 1% 20ml Vial) 20 ml STK-MED ONCE .ROUTE ;  Start 

3/23/20 at 11:08;  Stop 3/23/20 at 11:08;  Status DC


Heparin Sodium/ Sodium Chloride 500 ml @  As Directed STK-MED ONCE .ROUTE ;  

Start 3/23/20 at 11:08;  Stop 3/23/20 at 11:09;  Status DC


Heparin Sodium/ Sodium Chloride (HEPARIN for ARTERIAL LINE FLUSH) 1,000 unit 1X 

ONCE IART  Last administered on 3/23/20at 11:30;  Start 3/23/20 at 11:30;  Stop 

3/23/20 at 11:33;  Status DC


Heparin Sodium/ Sodium Chloride (HEPARIN for ARTERIAL LINE FLUSH) 1,000 unit 1X 

ONCE IART  Last administered on 3/23/20at 11:30;  Start 3/23/20 at 11:30;  Stop 

3/23/20 at 11:33;  Status DC


Iodixanol (Visipaque 320) 100 ml 1X  ONCE IART  Last administered on 3/23/20at 

11:30;  Start 3/23/20 at 11:30;  Stop 3/23/20 at 11:33;  Status DC


Lidocaine HCl (Lidocaine 1% 20ml Vial) 20 ml 1X  ONCE INJ  Last administered on 

3/23/20at 11:30;  Start 3/23/20 at 11:30;  Stop 3/23/20 at 11:33;  Status DC


Info (CONTRAST GIVEN -- Rx MONITORING) 1 each PRN DAILY  PRN MC SEE COMMENTS;  

Start 3/23/20 at 11:45;  Stop 3/25/20 at 11:44;  Status DC


Fentanyl Citrate (Fentanyl 2ml Vial) 100 mcg STK-MED ONCE .ROUTE ;  Start 

3/23/20 at 11:56;  Stop 3/23/20 at 11:57;  Status DC


Fentanyl Citrate (Fentanyl 2ml Vial) 100 mcg 1X  ONCE IV  Last administered on 

3/23/20at 12:15;  Start 3/23/20 at 12:15;  Stop 3/23/20 at 12:16;  Status DC


Bivalirudin (Angiomax) 250 mg STK-MED ONCE IV ;  Start 3/23/20 at 12:00;  Stop 

3/23/20 at 12:02;  Status DC


Bivalirudin (Angiomax) 250 mg 1X  ONCE IV  Last administered on 3/23/20at 12:05;

 Start 3/23/20 at 12:15;  Stop 3/23/20 at 12:16;  Status DC


Iodixanol (Visipaque 320) 100 ml STK-MED ONCE .ROUTE ;  Start 3/23/20 at 12:20; 

Stop 3/23/20 at 12:21;  Status DC


Bivalirudin (Angiomax) 250 mg STK-MED ONCE IV ;  Start 3/23/20 at 12:21;  Stop 

3/23/20 at 12:21;  Status DC


Bivalirudin (Angiomax) 250 mg 1X  ONCE IV  Last administered on 3/23/20at 12:25;

 Start 3/23/20 at 12:30;  Stop 3/23/20 at 12:31;  Status DC


Nitroglycerin (Nitroglycerin) 200 mcg STK-MED ONCE .ROUTE ;  Start 3/23/20 at 

12:25;  Stop 3/23/20 at 12:25;  Status DC


Nitroglycerin (Nitroglycerin) 200 mcg 1X  ONCE IART  Last administered on 

3/23/20at 12:30;  Start 3/23/20 at 12:30;  Stop 3/23/20 at 12:31;  Status DC


Clopidogrel Bisulfate (Plavix) 600 mg 1X  ONCE PO  Last administered on 

3/23/20at 14:21;  Start 3/23/20 at 12:45;  Stop 3/23/20 at 12:49;  Status DC


Aspirin (Chacorta Aspirin) 325 mg 1X  ONCE PO  Last administered on 3/23/20at 

14:20;  Start 3/23/20 at 12:45;  Stop 3/23/20 at 12:49;  Status DC


Dopamine HCl/ Dextrose (DOPamine 400MG/ 250ML PREMIX) 400 mg STK-MED ONCE IV ;  

Start 3/16/20 at 12:00;  Stop 3/23/20 at 12:56;  Status DC


Epinephrine HCl (EPINEPHrine SYRINGE) 1 mg STK-MED ONCE .ROUTE ;  Start 3/16/20 

at 12:00;  Stop 3/23/20 at 12:56;  Status DC


Potassium Chloride/Water 100 ml @  100 mls/hr Q1H  PRN IV SEE ORDER COMMENTS;  

Start 3/23/20 at 14:00;  Stop 3/24/20 at 11:26;  Status DC


Potassium Chloride/Water 100 ml @  100 mls/hr Q1H  PRN IV SEE ORDER COMMENTS;  

Start 3/23/20 at 14:00;  Stop 3/24/20 at 11:26;  Status DC


Potassium Chloride (Klor-Con) 40 meq Q2H  PRN PO SEE ORDER COMMENTS;  Start 

3/23/20 at 14:00;  Stop 3/24/20 at 11:26;  Status DC


Magnesium Sulfate 100 ml @  50 mls/hr PRN DAILY  PRN IV SEE ORDER COMMENTS;  

Start 3/24/20 at 09:00;  Stop 3/24/20 at 11:26;  Status DC


Sodium Phosphate 15 mmol/Sodium Chloride 255 ml @  62.5 mls/hr 1X  PRN IV SEE 

ORDER COMMENTS;  Start 3/23/20 at 14:00;  Stop 3/24/20 at 11:26;  Status DC


Multi-Ingred Cream/Lotion/Oil/ Oint (Artificial Tears Eye Ointment) 1 julieth PRN 

Q1HR  PRN OU DRY EYE;  Start 3/24/20 at 10:30


Potassium Chloride/Water 100 ml @  100 mls/hr Q1H IV ;  Start 3/24/20 at 11:00; 

Stop 3/24/20 at 11:26;  Status DC


Potassium Chloride/Water 100 ml @  100 mls/hr 1X  ONCE IV  Last administered on 

3/24/20at 12:29;  Start 3/24/20 at 12:00;  Stop 3/24/20 at 12:59;  Status DC


Furosemide (Lasix) 40 mg 1X  ONCE IVP  Last administered on 3/24/20at 16:07;  

Start 3/24/20 at 15:00;  Stop 3/24/20 at 15:06;  Status DC


Potassium Chloride 40 meq/ Dextrose 1,020 ml @  75 mls/hr 1X  ONCE IV  Last 

administered on 3/24/20at 16:08;  Start 3/24/20 at 15:00;  Stop 3/25/20 at 

04:35;  Status DC


Potassium Chloride 40 meq/ Dextrose 1,020 ml @  75 mls/hr 1X  ONCE IV  Last admi

nistered on 3/25/20at 05:17;  Start 3/25/20 at 05:00;  Stop 3/25/20 at 18:35;  

Status DC


Clopidogrel Bisulfate (Plavix) 75 mg DAILYWBKFT PO  Last administered on 

3/29/20at 09:56;  Start 3/25/20 at 13:30


Aspirin (Children'S Aspirin) 81 mg DAILYWBKFT PO  Last administered on 3/29/20at

09:57;  Start 3/26/20 at 08:00


Lisinopril (Prinivil) 10 mg DAILY PO  Last administered on 3/26/20at 08:29;  

Start 3/25/20 at 16:30;  Stop 3/26/20 at 10:01;  Status DC


Prochlorperazine Edisylate (Compazine) 10 mg PRN Q8HRS  PRN IV NAUSEA/VOMITING 

Last administered on 3/27/20at 03:50;  Start 3/25/20 at 20:30


Potassium Chloride/Water 100 ml @  100 mls/hr Q1H IV  Last administered on 

3/26/20at 08:28;  Start 3/26/20 at 07:00;  Stop 3/26/20 at 08:59;  Status DC


Lisinopril (Prinivil) 20 mg DAILY PO  Last administered on 3/29/20at 09:56;  

Start 3/27/20 at 09:00


Metoprolol Tartrate (Lopressor) 12.5 mg BID PO ;  Start 3/26/20 at 10:00;  

Status Cancel


Furosemide (Lasix) 40 mg DAILY PO  Last administered on 3/29/20at 09:56;  Start 

3/26/20 at 10:00


Atenolol (Tenormin) 25 mg DAILY PO  Last administered on 3/29/20at 09:57;  Start

3/26/20 at 10:15


Potassium Chloride (Klor-Con) 40 meq 1X  ONCE PO  Last administered on 3/26/20at

11:48;  Start 3/26/20 at 11:45;  Stop 3/26/20 at 11:46;  Status DC


Lorazepam (Ativan Inj) 2 mg PRN Q2HRS  PRN IVP ANXIETY / AGITATION Last 

administered on 3/29/20at 00:13;  Start 3/26/20 at 21:15


Potassium Chloride (Klor-Con) 20 meq DAILYWBKFT PO  Last administered on 

3/29/20at 09:56;  Start 3/27/20 at 10:30


Linezolid/Dextrose 300 ml @  300 mls/hr Q12HR IV  Last administered on 3/29/20at

09:57;  Start 3/27/20 at 10:30;  Stop 3/29/20 at 16:12;  Status DC


Famotidine (Pepcid) 20 mg BID PO  Last administered on 3/29/20at 20:14;  Start 

3/27/20 at 21:00


Trimethoprim/ Sulfamethoxazole (Bactrim Ds) 1 tab BID PO ;  Start 3/28/20 at 

21:00;  Stop 3/28/20 at 11:07;  Status DC


Trimethoprim/ Sulfamethoxazole (Bactrim Ds) 1 tab 1X  ONCE PO ;  Start 3/28/20 

at 11:00;  Stop 3/28/20 at 11:01;  Status DC


Alteplase, Recombinant (Cathflo For Central Catheter Clearance) 1 mg 1X  ONCE 

INT CAT  Last administered on 3/28/20at 12:15;  Start 3/28/20 at 11:30;  Stop 

3/28/20 at 11:31;  Status DC


Alteplase, Recombinant 5 mg/ Sodium Chloride 30 ml @ 30 mls/hr 1X  ONCE IV ;  

Start 3/28/20 at 14:30;  Stop 3/28/20 at 15:29;  Status UNV


Alteplase, Recombinant (Cathflo For Central Catheter Clearance) 1 mg 1X  ONCE 

INT CAT  Last administered on 3/28/20at 16:06;  Start 3/28/20 at 15:00;  Stop 

3/28/20 at 15:01;  Status DC


Alteplase, Recombinant (Cathflo For Central Catheter Clearance) 1 mg 1X  ONCE 

INT CAT ;  Start 3/28/20 at 15:00;  Stop 3/28/20 at 15:01;  Status DC


Insulin Human Lispro (HumaLOG) 0-9 UNITS QIDACHS SQ ;  Start 3/28/20 at 16:45


Lactobacillus Rhamnosus (Culturelle) 1 cap BID PO  Last administered on 3/29

/20at 20:14;  Start 3/29/20 at 21:00


Linezolid (Zyvox) 600 mg BID PO  Last administered on 3/29/20at 20:14;  Start 

3/29/20 at 21:00





Active Scripts


Active


Reported


Saw [place] (Saw [place] Fruit) 450 Mg Capsule 450 Mg PO DAILY


D3-50 (Cholecalciferol (Vitamin D3)) 50,000 Unit Capsule 1,000 Unit PO DAILY


Emergen-C 500 mg Chewable Tab (Vit C/Ascorb Sod/Multivit-Min) 500 Mg Tab.chew 

500 Mg PO DAILY


Zoloft (Sertraline Hcl) 50 Mg Tablet 50 Mg PO DAILY


Trazodone Hcl 50 Mg Tablet 1 Tab PO QHS


Trulicity (Dulaglutide) 0.75 Mg/0.5 Ml Pen.injctr 0.75 Mg SQ WEEKLY


Insulin Aspart 100 Unit/1 Ml Vial 25 Unit SQ TIDWMEALS


Levemir (Insulin Detemir) 100 Unit/1 Ml Vial 80 Unit SQ DAILY


Lasix (Furosemide) 20 Mg Tablet 20 Mg PO BID


Flomax (Tamsulosin Hcl) 0.4 Mg Cap.er.24h 0.4 Mg PO HS


Omeprazole 20 Mg Capsule.dr 20 Mg PO BID


Lisinopril 40 Mg Tablet 1 Tab PO DAILY


Atenolol 100 Mg Tablet 100 Mg PO BID


Vitals/I & O





Vital Sign - Last 24 Hours








 3/29/20 3/29/20 3/29/20 3/29/20





 09:56 09:57 09:57 11:00


 


Temp    97.5





    97.5


 


Pulse 72 72 72 78


 


Resp    18


 


B/P (MAP) 144/62 144/62 144/62 136/65 (88)


 


Pulse Ox    95


 


O2 Delivery    Nasal Cannula


 


O2 Flow Rate    3.0


 


    





    





 3/29/20 3/29/20 3/29/20 3/29/20





 15:00 19:00 20:00 23:00


 


Temp 97.8 96.3  98.2





 97.8 96.3  98.2


 


Pulse 71 64  63


 


Resp 20 20  20


 


B/P (MAP) 126/71 (89) 126/44 (71)  141/81 (101)


 


Pulse Ox 95 91  92


 


O2 Delivery Nasal Cannula Nasal Cannula Nasal Cannula Nasal Cannula


 


O2 Flow Rate 3.0 3.0 3.0 3.0


 


    





    





 3/30/20 3/30/20  





 03:30 07:00  


 


Temp 97.5 97.9  





 97.5 97.9  


 


Pulse 71 77  


 


Resp 20 21  


 


B/P (MAP) 124/40 (68) 120/93 (102)  


 


Pulse Ox 92 95  


 


O2 Delivery Room Air Nasal Cannula  


 


O2 Flow Rate  3.0  














Intake and Output   


 


 3/29/20 3/29/20 3/30/20





 15:00 23:00 07:00


 


Intake Total 240 ml  300 ml


 


Balance 240 ml  300 ml

















LOWELL BOSS MD        Mar 30, 2020 09:23

## 2020-03-31 VITALS — DIASTOLIC BLOOD PRESSURE: 68 MMHG | SYSTOLIC BLOOD PRESSURE: 107 MMHG

## 2020-03-31 NOTE — NUR
RN NOTE

post void residual checked and patient had 750ml. patient assisted with urinal voided 75ml 
and another post void residual checked and it was 682. per protocol patient then was 
straight cath with 800ml output. dr brooks on the floor and notified at the time.

## 2020-03-31 NOTE — PDOC
PULMONARY PROGRESS NOTES


Subjective


Extubated 3/26/2020.  


doing well


Vitals





Vital Signs








  Date Time  Temp Pulse Resp B/P (MAP) Pulse Ox O2 Delivery O2 Flow Rate FiO2


 


3/31/20 10:59 97.8 76 19 105/71 (82) 94 Room Air  





 97.8       


 


3/30/20 15:00       3.0 








ROS:  No Nausea, No Chest Pain, No Abdominal Pain


General:  Alert, Oriented X4, No acute distress


HEENT:  Other


Lungs:  Clear


Cardiovascular:  S1, S2


Abdomen:  Soft, Non-tender, Other (obese )


Neuro Exam:  Oriented


Extremities:  No Edema


Skin:  Warm


Labs





Laboratory Tests








Test


 3/29/20


16:38 3/29/20


21:01 3/30/20


03:45 3/30/20


07:17


 


Glucose (Fingerstick)


 136 mg/dL


(70-99) 148 mg/dL


(70-99) 


 140 mg/dL


(70-99)


 


White Blood Count


 


 


 10.1 x10^3/uL


(4.0-11.0) 





 


Red Blood Count


 


 


 5.56 x10^6/uL


(4.30-5.70) 





 


Hemoglobin


 


 


 15.5 g/dL


(13.0-17.5) 





 


Hematocrit


 


 


 47.3 %


(39.0-53.0) 





 


Mean Corpuscular Volume   85 fL ()  


 


Mean Corpuscular Hemoglobin   28 pg (25-35)  


 


Mean Corpuscular Hemoglobin


Concent 


 


 33 g/dL


(31-37) 





 


Red Cell Distribution Width


 


 


 13.8 %


(11.5-14.5) 





 


Platelet Count


 


 


 344 x10^3/uL


(140-400) 





 


Neutrophils (%) (Auto)   63 % (31-73)  


 


Lymphocytes (%) (Auto)   19 % (24-48)  


 


Monocytes (%) (Auto)   12 % (0-9)  


 


Eosinophils (%) (Auto)   5 % (0-3)  


 


Basophils (%) (Auto)   1 % (0-3)  


 


Neutrophils # (Auto)


 


 


 6.4 x10^3/uL


(1.8-7.7) 





 


Lymphocytes # (Auto)


 


 


 1.9 x10^3/uL


(1.0-4.8) 





 


Monocytes # (Auto)


 


 


 1.2 x10^3/uL


(0.0-1.1) 





 


Eosinophils # (Auto)


 


 


 0.5 x10^3/uL


(0.0-0.7) 





 


Basophils # (Auto)


 


 


 0.1 x10^3/uL


(0.0-0.2) 





 


Test


 3/30/20


12:01 3/30/20


17:02 3/30/20


20:31 3/31/20


07:19


 


Glucose (Fingerstick)


 187 mg/dL


(70-99) 145 mg/dL


(70-99) 161 mg/dL


(70-99) 111 mg/dL


(70-99)


 


Test


 3/31/20


08:45 3/31/20


12:02 


 





 


Sodium Level


 137 mmol/L


(136-145) 


 


 





 


Potassium Level


 3.5 mmol/L


(3.5-5.1) 


 


 





 


Chloride Level


 97 mmol/L


() 


 


 





 


Carbon Dioxide Level


 33 mmol/L


(21-32) 


 


 





 


Anion Gap 7 (6-14)    


 


Blood Urea Nitrogen


 26 mg/dL


(8-26) 


 


 





 


Creatinine


 1.0 mg/dL


(0.7-1.3) 


 


 





 


Estimated GFR


(Cockcroft-Gault) 73.2 


 


 


 





 


Glucose Level


 146 mg/dL


(70-99) 


 


 





 


Calcium Level


 9.1 mg/dL


(8.5-10.1) 


 


 





 


Magnesium Level


 1.6 mg/dL


(1.8-2.4) 


 


 





 


Glucose (Fingerstick)


 


 175 mg/dL


(70-99) 


 











Laboratory Tests








Test


 3/30/20


17:02 3/30/20


20:31 3/31/20


07:19 3/31/20


08:45


 


Glucose (Fingerstick)


 145 mg/dL


(70-99) 161 mg/dL


(70-99) 111 mg/dL


(70-99) 





 


Sodium Level


 


 


 


 137 mmol/L


(136-145)


 


Potassium Level


 


 


 


 3.5 mmol/L


(3.5-5.1)


 


Chloride Level


 


 


 


 97 mmol/L


()


 


Carbon Dioxide Level


 


 


 


 33 mmol/L


(21-32)


 


Anion Gap    7 (6-14) 


 


Blood Urea Nitrogen


 


 


 


 26 mg/dL


(8-26)


 


Creatinine


 


 


 


 1.0 mg/dL


(0.7-1.3)


 


Estimated GFR


(Cockcroft-Gault) 


 


 


 73.2 





 


Glucose Level


 


 


 


 146 mg/dL


(70-99)


 


Calcium Level


 


 


 


 9.1 mg/dL


(8.5-10.1)


 


Magnesium Level


 


 


 


 1.6 mg/dL


(1.8-2.4)


 


Test


 3/31/20


12:02 


 


 





 


Glucose (Fingerstick)


 175 mg/dL


(70-99) 


 


 











Medications





Active Scripts








 Medications  Dose


 Route/Sig


 Max Daily Dose Days Date Category


 


 Saw [place] (Saw


 [place] Fruit)


 450 Mg Capsule  450 Mg


 PO DAILY


   3/13/20 Reported


 


 D3-50


  (Cholecalciferol


  (Vitamin D3))


 50,000 Unit


 Capsule  1,000 Unit


 PO DAILY


   3/13/20 Reported


 


 Emergen-C 500 mg


 Chewable Tab (Vit


 C/Ascorb


 Sod/Multivit-Min)


 500 Mg Tab.chew  500 Mg


 PO DAILY


   3/13/20 Reported


 


 Zoloft


  (Sertraline Hcl)


 50 Mg Tablet  50 Mg


 PO DAILY


   3/13/20 Reported


 


 Trazodone Hcl 50


 Mg Tablet  1 Tab


 PO QHS


   3/13/20 Reported


 


 Trulicity


  (Dulaglutide)


 0.75 Mg/0.5 Ml


 Pen.injctr  0.75 Mg


 SQ WEEKLY


   3/13/20 Reported


 


 Insulin Aspart


 100 Unit/1 Ml Vial  25 Unit


 SQ TIDWMEALS


   3/13/20 Reported


 


 Levemir (Insulin


 Detemir) 100


 Unit/1 Ml Vial  80 Unit


 SQ DAILY


   3/13/20 Reported


 


 Lasix


  (Furosemide) 20


 Mg Tablet  20 Mg


 PO BID


   3/13/20 Reported


 


 Flomax


  (Tamsulosin Hcl)


 0.4 Mg Cap.er.24h  0.4 Mg


 PO HS


   3/13/20 Reported


 


 Omeprazole 20 Mg


 Capsule.dr  20 Mg


 PO BID


   3/13/20 Reported


 


 Lisinopril 40 Mg


 Tablet  1 Tab


 PO DAILY


   3/13/20 Reported


 


 Atenolol 100 Mg


 Tablet  100 Mg


 PO BID


   3/13/20 Reported








Comments


Status post catheterization  successful PCI drug-eluting stent to the LAD








CXR- 3/27/2020-


IMPRESSION:


1. Improving infiltrates or edema.


2. Endotracheal tube was removed.





Impression


.


IMPRESSION:


1.  Acute hypercapnic and hypoxic respiratory failure sec to flash pulmonary 

edema, resolved


2  s/p cath with severe two-vessel CAD. S/p PCI/LEANNE to the LAD.  of 

LCx.Increase LV filling pressures


3.  Suspected obesity hypoventilation syndrome and obstructive sleep apnea, 

never been tested.


4.  CAD


5.  HYPOTENSION RESOLVED


6.  COVID NEGATIVE


7.  Possible recurrent UTI





Plan


.


Okay to discharge, will set up outpatient polysomnogram











PRIYANKA MCRAE MD                 Mar 31, 2020 14:10

## 2020-03-31 NOTE — NUR
Discharge Note:



TYLER SHARP [facility]



Discharge instructions and discharge home medications reviewed with Spouse and a copy given. 
All questions have been answered and understanding verbalized. 



The following instructions and handouts were given: fall prevention, clean intermittent 
cath, respiratory failure, stent placement and care after, pulmonary edema



Discontinued lines and drains: Peripheral IV intact.



Patient discharged to Home w/services with Spouse via Wheelchair

## 2020-03-31 NOTE — SNU/HH DC
DISCHARGE WITH HOME HEALTH


DISCHARGE INFORMATION:


Discharge Date:  Mar 31, 2020


Final Diagnosis:


Problems


Medical Problems:


(1) Acute respiratory distress


Status: Acute  





(2) CAP (community acquired pneumonia)


Status: Acute  





(3) CHF (congestive heart failure)


Status: Acute  





(4) Hypoxia


Status: Acute  





(5) SIRS (systemic inflammatory response syndrome)


Status: Acute  








Condition on Discharge:  Stable





CODE STATUS:


Code Status:  Full





HOME HEALTH:


Face to Face:


I certify this patient is under my care and that I, or a nurse practitioner or 

physician's assistant working with me, had a face to face encounter that meets 

the physician face to face encounter requirements with this patient on 

3/31/2020.


Medical Complications:  CHF, DM


Skilled Nursing For:  Assess/Skilled Observatio, Diabetic Care, Medication 

Management


RN For Eval/Treatment:  Yes


Physical Therapy For:  Evalulation/Treatment


Occupational Therapy For:  Evaluation/Treatment


Pt Meets Homebound Status:  Extreme weakness w/ amb., Frequent falls w/ injury





POST DISCHARGE ORDERS:


Activity Instructions for Disc:  Activity as tolerated


Weight Bearing Status after Di:  Full weight bearing


DIET AFTER DISCHARGE:  Cardiac





CHECKS AFTER DISCHARGE:


Checks after discharge:  Check blood press - daily, Check blood sugar, ac/hs





CERTIFICATION STATEMENT:


Certification Statement:


Certification Statement: Based on the above finding, I certify that this patient

is confined to the home and needs intermittent skilled nursing care, physical 

therapy and/or speech therapy, or continues to need occupational therapy.~ This 

patient is under my care, and I have initiated the establishment of the plan of 

care.~ This patient will be followed by myself or a community physician who will

periodically review the plan of care.


Home Meds


Active Scripts


Metolazone (METOLAZONE) 2.5 Mg Tablet, 2.5 MG PO DAILY for CHF for 30 Days, #30 

TAB


   Prov:LOWELL BOSS MD         3/31/20


Furosemide (FUROSEMIDE) 40 Mg Tablet, 40 MG PO DAILY for CHF for 30 Days, #30 

TAB


   Prov:LOWELL BOSS MD         3/31/20


Potassium Chloride (KLOR-CON M20) 20 Meq Tab.er.prt, 20 MEQ PO DAILYWBKFT for 

CHF for 30 Days, #30 TAB.SR


   Prov:LOWELL BOSS MD         3/31/20


Dulaglutide (Trulicity) 0.75 Mg/0.5 Ml Pen.injctr, 0.75 MG SQ WEEKLY for LOWER 

BLOOD SUGAR for 30 Days, #4 EACH


   Prov:LOWELL BOSS MD         3/31/20


Amlodipine Besylate (AMLODIPINE BESYLATE) 10 Mg Tablet, 10 MG PO DAILY for HTN 

for 30 Days, #30 TAB


   Prov:LOWELL BOSS MD         3/30/20


Atenolol (ATENOLOL) 25 Mg Tablet, 25 MG PO DAILY for HTN for 30 Days, #30 TAB


   Prov:LOWELL BOSS MD         3/30/20


Atorvastatin Calcium (ATORVASTATIN CALCIUM) 40 Mg Tablet, 40 MG PO QHS for 

cholesterol  for 30 Days, #30 TAB 2 Refills


   Prov:CASH DELAROSA         3/26/20


Clopidogrel Bisulfate (CLOPIDOGREL) 75 Mg Tablet, 75 MG PO DAILYWBKFT for 

coronary artery disease for 30 Days, #30 TAB 2 Refills


   Prov:CASH DELAROSA         3/26/20


Reported Medications


Saw [place] Fruit (SAW [place]) 450 Mg Capsule, 450 MG PO DAILY for FOR 

URINARY RETENTION, CAP


   3/13/20


Cholecalciferol (Vitamin D3) (D3-50) 50,000 Unit Capsule, 1000 UNIT PO DAILY for

VITAMIN, CAP


   3/13/20


Vit C/Ascorb Sod/Multivit-Min (Emergen-C 500 mg Chewable Tab) 500 Mg Tab.chew, 

500 MG PO DAILY for VITAMIN, TAB.CHEW


   3/13/20


Sertraline Hcl (ZOLOFT) 50 Mg Tablet, 50 MG PO DAILY for ANTI-DEPRESSANT, TAB 0 

Refills


   3/13/20


Trazodone Hcl (TRAZODONE HCL) 50 Mg Tablet, 1 TAB PO QHS for INSOMNIA, #30 TAB 1

Refill


   3/13/20


Insulin Detemir (LEVEMIR) 100 Unit/1 Ml Vial, 80 UNIT SQ DAILY for LOWER BLOOD 

GLUCOSE, VIAL


   3/13/20


Tamsulosin Hcl (FLOMAX) 0.4 Mg Cap.er.24h, 0.4 MG PO HS for HELP WITH URINATION,

TAB


   3/13/20


Omeprazole (OMEPRAZOLE) 20 Mg Capsule.dr, 20 MG PO BID for LOWER STOMACH ACID, 

CAP


   3/13/20


Lisinopril (LISINOPRIL) 40 Mg Tablet, 1 TAB PO DAILY for HTN, #30 TAB 5 Refills


   3/13/20


Discontinued Reported Medications


Insulin Aspart (Insulin Aspart) 100 Unit/1 Ml Vial, 25 UNIT SQ TIDWMEALS for 

LOWER BLOOD GLUCOSE, EACH


   3/13/20


Furosemide (LASIX) 20 Mg Tablet, 20 MG PO BID for DIURETIC, TAB


   3/13/20


Atenolol (ATENOLOL) 100 Mg Tablet, 100 MG PO BID for htn, TAB


   3/13/20











LOWELL BOSS MD        Mar 31, 2020 12:19

## 2020-03-31 NOTE — PDOC
Infectious Disease Note


Subjective


Subjective


feeling good





ROS


ROS


no n/v/d/sob





Vital Sign


Vital Signs





Vital Signs








  Date Time  Temp Pulse Resp B/P (MAP) Pulse Ox O2 Delivery O2 Flow Rate FiO2


 


3/31/20 08:15  88  139/81    


 


3/31/20 07:00 97.7  19  94 Room Air  





 97.7       


 


3/30/20 15:00       3.0 











Physical Exam


PHYSICAL EXAM


GENERAL: Sitting in the chair, awake in NAD 


HEENT:  OC/Op dry


NECK: Supple - no JVD


LUNGS:  Decreased breath sounds bases   


HEART:  S1, S2


ABDOMEN: Obese, soft, NT 


: Younger in place (3/28) 


EXTREMITIES:  Trace edema present. No cyanosis. 


SKIN: Warm to touch. No signs of rash


NEURO: Awake but encephalopathic


KARIS clean - 3/16





Labs


Lab





Laboratory Tests








Test


 3/30/20


12:01 3/30/20


17:02 3/30/20


20:31 3/31/20


07:19


 


Glucose (Fingerstick)


 187 mg/dL


(70-99) 145 mg/dL


(70-99) 161 mg/dL


(70-99) 111 mg/dL


(70-99)


 


Test


 3/31/20


08:45 


 


 





 


Sodium Level


 137 mmol/L


(136-145) 


 


 





 


Potassium Level


 3.5 mmol/L


(3.5-5.1) 


 


 





 


Chloride Level


 97 mmol/L


() 


 


 





 


Carbon Dioxide Level


 33 mmol/L


(21-32) 


 


 





 


Anion Gap 7 (6-14)    


 


Blood Urea Nitrogen


 26 mg/dL


(8-26) 


 


 





 


Creatinine


 1.0 mg/dL


(0.7-1.3) 


 


 





 


Estimated GFR


(Cockcroft-Gault) 73.2 


 


 


 





 


Glucose Level


 146 mg/dL


(70-99) 


 


 





 


Calcium Level


 9.1 mg/dL


(8.5-10.1) 


 


 





 


Magnesium Level


 1.6 mg/dL


(1.8-2.4) 


 


 











Micro





Microbiology


3/22/20 Urine Culture - Final, Complete


          


3/22/20 Urine Culture Result 1 (CAR) - Final, Complete


          


3/22/20 - Final, Resulted


          


3/22/20 - Final, Resulted


          


3/22/20 - Final, Resulted


          


3/22/20 - Preliminary, Resulted


          


3/22/20 - Preliminary, Resulted


          


3/22/20 - Preliminary, Resulted


          


3/22/20 Gram Stain Evaluation - Final, Resulted


          


3/22/20 Sputum Culture - Final, Resulted


          


3/22/20 Sputum Result 1 - Final, Resulted


          


3/21/20 Blood Culture - Final, Complete


          NO GROWTH AFTER 5 DAYS





Objective


Assessment


Urinary retention s/p Younger placement 3/28. UA neg 


Leukocytosis - increased


Encehalopathy - now S/p Ativan, improving


S/p cardiac cath, 3/23


Fever - better - sputum - neg


Bradycardia - cardiology following 


Acute resp failure s/p extubation


    -COVID-19 NEG


NSTEMI


Pyuria UC 50-100K enterococcus 3/13, ? colonization


LUE trace warmth ? positioned on Left side with arm in sheets between body and 

bed - better


RICHELLE - better


Partial nephrectomy.


Hypernatremia 


CHF


DM2


Morbid obesity


Hypertension.





Plan


Plan of Care





Probiotics


CBC in am


Cultures neg 


Maintain aspiration precautions


Supportive care





d/c CHRISTOPHER Salinas MD               Mar 31, 2020 10:58

## 2020-03-31 NOTE — PDOC
PROGRESS NOTES


Chief Complaint


Chief Complaint


Acute CHF with possible combined systolic/diastolic dysfunction. Significantly 

improved. Continuing present treatment.


NSTEMI - s/p LAD stent


Acute respiratory failure with CHF and possibly ongoing RAVINDER


DM2: insulin dependent


Accelerated HTN: better


Morbid obesity


Leukocytosis with UTI: on antibiotics


Cardiac arrest - s/p resuscitation





History of Present Illness


History of Present Illness


Mr Alexander is a 74 yo M w/ PMHx hypertension, dyslipidemia, diabetes mellitus who

presented via EMS with complaint of shortness of breath on 3/13/202 that had 

been ongoing for 10 days. He called [phone] in attempts to go to [facility], was brought 

to [facility], found with O2 sat of 70s at room air and started on  BiPAP to 100% in a 

very short time.


Pulmonology, cardiology, ID consulted.





3/16: Intubation and CVC placed for code blue on way to cardiac catheterization


3/17: Intubated, sedated


3/18: Intubated, sedated


3/19: BP difficult to control, remains intubated


3/20: Still Intubated and sedated. COVID 19 Negative


3/21: Intubated, sedated. Failed 2 weans. K still low, replaced. Lasix GTT. Good

UOP. Overnight with fever 102F.


3/22: Dosed Daptom/micafungin changed to Meropenem


3/23: TO CATH LAB - PCI to LAD


3/24: Intubated, nephrology consulted


3/25: Intubated


3/26: Extubated


3/27-3/29: confused. Approved for LTAC, no beds available


3/30: Seen bedside today. More oriented. Has mansfield in place, asking to have it 

removed. No SOB or cough. No CP.





Mansfield out, feeling much better. No CP or SOB. has walked 250meters in unit 

today.





Vitals


Vitals





Vital Signs








  Date Time  Temp Pulse Resp B/P (MAP) Pulse Ox O2 Delivery O2 Flow Rate FiO2


 


3/31/20 07:00 97.7 88 19 139/81 (100) 94 Room Air  





 97.7       


 


3/30/20 15:00       3.0 











Physical Exam


Physical Exam


GENERAL: Sitting in the chair, awake in NAD 


HEENT:  OC/Op dry


NECK: Supple - no JVD


LUNGS:  Decreased breath sounds bases   


HEART:  S1, S2


ABDOMEN: Obese, soft, NT 


: Mansfield in place (3/28) 


EXTREMITIES:  Trace edema present. No cyanosis. 


SKIN: Warm to touch. No signs of rash


NEURO: Awake but encephalopathic


RI clean - 3/16


General:  Other (intubated.)


Heart:  Regular rate


Lungs:  Clear


Abdomen:  Normal bowel sounds


Extremities:  No cyanosis, Other (3+ bilateral LE pitting edema)


Skin:  No rashes, No breakdown, No significant lesion





Labs


LABS





Laboratory Tests








Test


 3/30/20


12:01 3/30/20


17:02 3/30/20


20:31 3/31/20


07:19


 


Glucose (Fingerstick)


 187 mg/dL


(70-99) 145 mg/dL


(70-99) 161 mg/dL


(70-99) 111 mg/dL


(70-99)











Assessment and Plan


Assessmemt and Plan


Problems


Medical Problems:


(1) Acute respiratory distress


Status: Acute  





(2) CAP (community acquired pneumonia)


Status: Acute  





(3) CHF (congestive heart failure)


Status: Acute  





(4) Hypoxia


Status: Acute  





(5) SIRS (systemic inflammatory response syndrome)


Status: Acute  











Comment


Review of Relevant


I have reviewed the following items nicole (where applicable) has been applied.


Labs





Laboratory Tests








Test


 3/29/20


11:57 3/29/20


16:38 3/29/20


21:01 3/30/20


03:45


 


Glucose (Fingerstick)


 147 mg/dL


(70-99) 136 mg/dL


(70-99) 148 mg/dL


(70-99) 





 


White Blood Count


 


 


 


 10.1 x10^3/uL


(4.0-11.0)


 


Red Blood Count


 


 


 


 5.56 x10^6/uL


(4.30-5.70)


 


Hemoglobin


 


 


 


 15.5 g/dL


(13.0-17.5)


 


Hematocrit


 


 


 


 47.3 %


(39.0-53.0)


 


Mean Corpuscular Volume    85 fL () 


 


Mean Corpuscular Hemoglobin    28 pg (25-35) 


 


Mean Corpuscular Hemoglobin


Concent 


 


 


 33 g/dL


(31-37)


 


Red Cell Distribution Width


 


 


 


 13.8 %


(11.5-14.5)


 


Platelet Count


 


 


 


 344 x10^3/uL


(140-400)


 


Neutrophils (%) (Auto)    63 % (31-73) 


 


Lymphocytes (%) (Auto)    19 % (24-48) 


 


Monocytes (%) (Auto)    12 % (0-9) 


 


Eosinophils (%) (Auto)    5 % (0-3) 


 


Basophils (%) (Auto)    1 % (0-3) 


 


Neutrophils # (Auto)


 


 


 


 6.4 x10^3/uL


(1.8-7.7)


 


Lymphocytes # (Auto)


 


 


 


 1.9 x10^3/uL


(1.0-4.8)


 


Monocytes # (Auto)


 


 


 


 1.2 x10^3/uL


(0.0-1.1)


 


Eosinophils # (Auto)


 


 


 


 0.5 x10^3/uL


(0.0-0.7)


 


Basophils # (Auto)


 


 


 


 0.1 x10^3/uL


(0.0-0.2)


 


Test


 3/30/20


07:17 3/30/20


12:01 3/30/20


17:02 3/30/20


20:31


 


Glucose (Fingerstick)


 140 mg/dL


(70-99) 187 mg/dL


(70-99) 145 mg/dL


(70-99) 161 mg/dL


(70-99)


 


Test


 3/31/20


07:19 


 


 





 


Glucose (Fingerstick)


 111 mg/dL


(70-99) 


 


 











Laboratory Tests








Test


 3/30/20


12:01 3/30/20


17:02 3/30/20


20:31 3/31/20


07:19


 


Glucose (Fingerstick)


 187 mg/dL


(70-99) 145 mg/dL


(70-99) 161 mg/dL


(70-99) 111 mg/dL


(70-99)








Microbiology


3/22/20 Urine Culture - Final, Complete


          


3/22/20 Urine Culture Result 1 (CAR) - Final, Complete


          


3/22/20 - Final, Complete


          


3/22/20 - Final, Complete


          


3/22/20 - Final, Complete


          


3/22/20 Gram Stain Evaluation - Final, Complete


          


3/22/20 Sputum Culture - Final, Complete


          


3/22/20 Sputum Result 1 - Final, Complete


          


3/21/20 Blood Culture - Final, Complete


          NO GROWTH AFTER 5 DAYS


Medications





Current Medications


Albuterol/ Ipratropium (Duoneb) 3 ml 1X  ONCE NEB  Last administered on 

3/13/20at 09:07;  Start 3/13/20 at 09:15;  Stop 3/13/20 at 09:16;  Status DC


Methylprednisolone Sodium Succinate (SOLU-Medrol 125MG VIAL) 125 mg 1X  ONCE IV 

Last administered on 3/13/20at 09:27;  Start 3/13/20 at 09:15;  Stop 3/13/20 at 

09:16;  Status DC


Labetalol HCl (Normodyne Iv Push) 10 mg 1X  ONCE IVP ;  Start 3/13/20 at 09:15; 

Stop 3/13/20 at 09:12;  Status DC


Piperacillin Sod/ Tazobactam Sod 3.375 gm/Sodium Chloride 50 ml @  100 mls/hr 1X

 ONCE IV  Last administered on 3/13/20at 10:23;  Start 3/13/20 at 09:30;  Stop 

3/13/20 at 09:59;  Status DC


Vancomycin HCl 250 ml @  250 mls/hr 1X  ONCE IV ;  Start 3/13/20 at 09:30;  Stop

3/13/20 at 10:29;  Status UNV


Vancomycin HCl 2 gm/Sodium Chloride 500 ml @  250 mls/hr 1X  ONCE IV  Last 

administered on 3/13/20at 09:56;  Start 3/13/20 at 09:45;  Stop 3/13/20 at 

11:44;  Status DC


Sodium Chloride 1,000 ml @  100 mls/hr Q10H IV  Last administered on 3/13/20at 

11:53;  Start 3/13/20 at 10:51;  Stop 3/14/20 at 10:50;  Status DC


Furosemide (Lasix) 40 mg 1X  ONCE IVP  Last administered on 3/13/20at 14:08;  

Start 3/13/20 at 14:00;  Stop 3/13/20 at 14:01;  Status DC


Piperacillin Sod/ Tazobactam Sod 3.375 gm/Sodium Chloride 50 ml @  100 mls/hr 

Q6HRS IV  Last administered on 3/22/20at 06:19;  Start 3/13/20 at 18:00;  Stop 

3/22/20 at 14:55;  Status DC


Insulin Human Lispro (HumaLOG) 0-9 UNITS TIDWMEALS SQ  Last administered on 

3/16/20at 17:00;  Start 3/13/20 at 17:00;  Stop 3/16/20 at 22:21;  Status DC


Dextrose (Dextrose 50%-Water Syringe) 12.5 gm PRN Q15MIN  PRN IV SEE COMMENTS;  

Start 3/13/20 at 14:00


Atorvastatin Calcium (Lipitor) 40 mg QHS PO  Last administered on 3/30/20at 

22:25;  Start 3/13/20 at 21:00


Aspirin (Ecotrin) 81 mg DAILYWBKFT PO  Last administered on 3/25/20at 08:09;  

Start 3/14/20 at 08:00;  Stop 3/25/20 at 12:47;  Status DC


Aspirin (Ecotrin) 325 mg 1X  ONCE PO  Last administered on 3/13/20at 16:24;  

Start 3/13/20 at 16:00;  Stop 3/13/20 at 16:01;  Status DC


Furosemide (Lasix) 40 mg BID92 IVP  Last administered on 3/17/20at 10:11;  Start

3/14/20 at 09:00;  Stop 3/17/20 at 10:38;  Status DC


Heparin Sodium/ Dextrose 250 ml @ 0 mls/hr CONT  PRN IV PER PROTOCOL Last 

administered on 3/15/20at 20:31;  Start 3/13/20 at 16:15;  Stop 3/17/20 at 

13:51;  Status DC


Heparin Sodium (Porcine) (Heparin Sodium) 3,450 unit PRN Q6HRS  PRN IV FOR UFH 

LEVEL LESS THAN 0.2 Last administered on 3/13/20at 23:59;  Start 3/13/20 at 

16:15;  Stop 3/17/20 at 13:52;  Status DC


Info (Anti-Coagulation Monitoring By Pharmacy) 1 each PRN DAILY  PRN MC SEE 

COMMENTS Last administered on 3/16/20at 13:49;  Start 3/13/20 at 16:15;  Stop 

3/17/20 at 13:52;  Status DC


Lisinopril (Prinivil) 40 mg DAILY PO  Last administered on 3/16/20at 08:12;  

Start 3/14/20 at 09:00;  Stop 3/17/20 at 10:38;  Status DC


Metoprolol Tartrate (Lopressor) 50 mg BID PO ;  Start 3/13/20 at 21:00;  Status 

UNV


Atenolol (Tenormin) 100 mg DAILY PO  Last administered on 3/16/20at 08:15;  

Start 3/14/20 at 09:00;  Stop 3/16/20 at 13:45;  Status DC


Insulin Human Lispro (HumaLOG) 5 units 1X  ONCE SQ  Last administered on 

3/13/20at 21:46;  Start 3/13/20 at 22:00;  Stop 3/13/20 at 22:01;  Status DC


Insulin Glargine (Lantus Syringe) 80 unit QHS SQ ;  Start 3/13/20 at 22:00;  

Stop 3/13/20 at 21:56;  Status DC


Insulin Glargine (Lantus Syringe) 80 unit DAILY SQ ;  Start 3/14/20 at 09:00;  

Status Cancel


Insulin Glargine (Lantus Syringe) 80 unit DAILY08 SQ  Last administered on 

3/30/20at 09:32;  Start 3/14/20 at 08:00


Lactobacillus Rhamnosus (Culturelle) 1 cap BID PO  Last administered on 

3/26/20at 08:47;  Start 3/14/20 at 21:00;  Stop 3/26/20 at 09:25;  Status DC


Sodium Chloride 1,000 ml @  75 mls/hr [id number] IV ;  Start 3/16/20 at 06:00;  

Stop 3/17/20 at 12:28;  Status DC


Amlodipine Besylate (Norvasc) 5 mg DAILY PO  Last administered on 3/16/20at 

08:13;  Start 3/15/20 at 16:00;  Stop 3/17/20 at 10:38;  Status DC


Iodixanol (Visipaque 320) 100 ml STK-MED ONCE .ROUTE ;  Start 3/16/20 at 07:56; 

Stop 3/16/20 at 07:56;  Status DC


Lidocaine HCl (Lidocaine 1% 20ml Vial) 20 ml STK-MED ONCE .ROUTE ;  Start 

3/16/20 at 07:56;  Stop 3/16/20 at 07:56;  Status DC


Heparin Sodium/ Sodium Chloride 1,500 ml @  As Directed STK-MED ONCE .ROUTE ;  

Start 3/16/20 at 07:56;  Stop 3/16/20 at 07:56;  Status DC


Fentanyl Citrate (Fentanyl 2ml Vial) 100 mcg STK-MED ONCE .ROUTE ;  Start 

3/16/20 at 09:03;  Stop 3/16/20 at 09:03;  Status DC


Midazolam HCl (Versed) 2 mg STK-MED ONCE .ROUTE ;  Start 3/16/20 at 09:03;  Stop

3/16/20 at 09:03;  Status DC


Heparin Sodium (Porcine) (Heparin Sodium) 10,000 unit STK-MED ONCE .ROUTE ;  

Start 3/16/20 at 09:39;  Stop 3/16/20 at 09:39;  Status DC


Verapamil HCl (Verapamil) 5 mg STK-MED ONCE .ROUTE ;  Start 3/16/20 at 09:39;  

Stop 3/16/20 at 09:39;  Status DC


Nitroglycerin/ Dextrose 250 ml @  As Directed STK-MED ONCE IV ;  Start 3/16/20 

at 09:39;  Stop 3/16/20 at 09:39;  Status DC


Nitroglycerin (Nitroglycerin) 200 mcg STK-MED ONCE .ROUTE ;  Start 3/16/20 at 

09:39;  Stop 3/16/20 at 09:39;  Status DC


Albuterol/ Ipratropium (Duoneb) 3 ml 1X  ONCE NEB  Last administered on 

3/16/20at 10:00;  Start 3/16/20 at 10:00;  Stop 3/16/20 at 10:01;  Status DC


Etomidate (Amidate) 20 mg STK-MED ONCE IV ;  Start 3/16/20 at 10:23;  Stop 

3/16/20 at 10:23;  Status DC


Succinylcholine Chloride (Anectine) 200 mg STK-MED ONCE .ROUTE ;  Start 3/16/20 

at 10:23;  Stop 3/16/20 at 10:23;  Status DC


Propofol 100 ml @  As Directed STK-MED ONCE IV ;  Start 3/16/20 at 10:35;  Stop 

3/16/20 at 10:35;  Status DC


Nitroglycerin (Nitroglycerin) 200 mcg 1X  ONCE IART  Last administered on 

3/16/20at 11:00;  Start 3/16/20 at 11:00;  Stop 3/16/20 at 11:01;  Status DC


Verapamil HCl (Verapamil) 2.5 mg 1X  ONCE IART ;  Start 3/16/20 at 11:00;  Stop 

3/16/20 at 11:01;  Status DC


Heparin Sodium (Porcine) (Heparin Sodium) 2,500 unit 1X  ONCE IART ;  Start 

3/16/20 at 11:00;  Stop 3/16/20 at 11:01;  Status DC


Heparin Sodium/ Sodium Chloride (HEPARIN for ARTERIAL LINE FLUSH) 1,000 unit 1X 

ONCE IART  Last administered on 3/16/20at 11:00;  Start 3/16/20 at 11:00;  Stop 

3/16/20 at 11:01;  Status DC


Heparin Sodium/ Sodium Chloride (HEPARIN for ARTERIAL LINE FLUSH) 1,000 unit 1X 

ONCE IART  Last administered on 3/16/20at 11:00;  Start 3/16/20 at 11:00;  Stop 

3/16/20 at 11:01;  Status DC


Midazolam HCl (Versed) 2 mg 1X  ONCE IV  Last administered on 3/16/20at 11:00;  

Start 3/16/20 at 11:00;  Stop 3/16/20 at 11:01;  Status DC


Fentanyl Citrate (Fentanyl 2ml Vial) 100 mcg 1X  ONCE IV  Last administered on 

3/16/20at 11:00;  Start 3/16/20 at 11:00;  Stop 3/16/20 at 11:01;  Status DC


Iodixanol (Visipaque 320) 100 ml 1X  ONCE IART ;  Start 3/16/20 at 11:00;  Stop 

3/16/20 at 11:01;  Status DC


Lidocaine HCl (Lidocaine 1% 20ml Vial) 20 ml 1X  ONCE INJ ;  Start 3/16/20 at 

11:00;  Stop 3/16/20 at 11:01;  Status DC


Norepinephrine Bitartrate 8 mg/ Dextrose 258 ml @  27.09 mls/ hr CONT  PRN IV PE

R PROTOCOL Last administered on 3/16/20at 12:55;  Start 3/16/20 at 11:15;  Stop 

3/26/20 at 21:16;  Status DC


Propofol 100 ml @  As Directed STK-MED ONCE IV ;  Start 3/16/20 at 12:39;  Stop 

3/16/20 at 12:40;  Status DC


Potassium Chloride/Water 100 ml @  100 mls/hr 1X  ONCE IV  Last administered on 

3/16/20at 13:06;  Start 3/16/20 at 13:00;  Stop 3/16/20 at 13:59;  Status DC


Midazolam HCl 50 mg/Sodium Chloride 50 ml @ 1 mls/hr CONT  PRN IV SEE I/O RECORD

Last administered on 3/20/20at 05:29;  Start 3/16/20 at 13:00;  Stop 3/26/20 at 

21:16;  Status DC


Magnesium Sulfate 50 ml @ 25 mls/hr 1X  ONCE IV  Last administered on 3/16/20at 

14:28;  Start 3/16/20 at 13:00;  Stop 3/16/20 at 14:59;  Status DC


Midazolam HCl 50 mg/Sodium Chloride 50 ml @ 1 mls/hr CONT  PRN IV SEE I/O 

RECORD;  Start 3/16/20 at 13:00;  Status UNV


Dobutamine HCl/ Dextrose 250 ml @  8.43 mls/hr CONT  PRN IV SEE I/O RECORD;  

Start 3/16/20 at 13:30;  Stop 3/27/20 at 11:16;  Status DC


Atenolol (Tenormin) 25 mg DAILY PO ;  Start 3/17/20 at 09:00;  Stop 3/17/20 at 

10:38;  Status DC


Metolazone (Zaroxolyn) 2.5 mg DAILY PO  Last administered on 3/30/20at 09:15;  

Start 3/17/20 at 09:00


Fentanyl Citrate 30 ml @ 0 mls/hr CONT  PRN IV SEE PROTOCOL Last administered on

3/21/20at 11:33;  Start 3/16/20 at 22:30;  Stop 3/27/20 at 11:15;  Status DC


Insulin Human Lispro (HumaLOG) 0-9 UNITS Q6HRS SQ  Last administered on 

3/26/20at 18:37;  Start 3/17/20 at 00:00;  Stop 3/28/20 at 16:47;  Status DC


Furosemide 100 mg/ Sodium Chloride 100 ml @ 5 mls/hr CONT  PRN IV SEE I/O RECORD

Last administered on 3/21/20at 15:25;  Start 3/17/20 at 11:00;  Stop 3/26/20 at 

10:01;  Status DC


Hydralazine HCl (Apresoline Inj) 10 mg PRN Q4HRS  PRN IVP ELEV BP, SEE COMMENTS,

1ST CHO Last administered on 3/25/20at 10:06;  Start 3/17/20 at 10:45


Verapamil HCl (Verapamil) 5 mg STK-MED ONCE .ROUTE ;  Start 3/16/20 at 10:00;  

Stop 3/17/20 at 13:11;  Status DC


Famotidine (Pepcid Vial) 20 mg BID IVP  Last administered on 3/27/20at 10:30;  

Start 3/17/20 at 21:00;  Stop 3/27/20 at 11:18;  Status DC


Linezolid/Dextrose 300 ml @  300 mls/hr Q12HR IV  Last administered on 3/18/20at

20:59;  Start 3/18/20 at 09:00;  Stop 3/19/20 at 08:46;  Status DC


Potassium Bicarbonate (Potassium Effervescent Tablet) 40 meq 1X  ONCE PO  Last 

administered on 3/18/20at 09:27;  Start 3/18/20 at 09:00;  Stop 3/18/20 at 

09:02;  Status DC


Potassium Chloride/Water 100 ml @  100 mls/hr 1X  ONCE IV  Last administered on 

3/19/20at 07:07;  Start 3/19/20 at 06:45;  Stop 3/19/20 at 07:44;  Status DC


Potassium Chloride/Water 100 ml @  100 mls/hr 1X  ONCE IV  Last administered on 

3/19/20at 08:31;  Start 3/19/20 at 09:00;  Stop 3/19/20 at 09:59;  Status DC


Potassium Chloride/Water 100 ml @  100 mls/hr 1X  ONCE IV  Last administered on 

3/19/20at 13:24;  Start 3/19/20 at 13:00;  Stop 3/19/20 at 13:59;  Status DC


Enalaprilat (Vasotec Inj) 2.5 mg PRN Q6HRS  PRN IVP HYPERTENSION, 2ND CHOICE 

Last administered on 3/27/20at 04:02;  Start 3/19/20 at 10:15


Amlodipine Besylate (Norvasc) 10 mg DAILY PO  Last administered on 3/30/20at 

09:16;  Start 3/19/20 at 12:00


Heparin Sodium (Porcine) (Heparin Sodium) 5,000 unit Q12HR SQ  Last administered

on 3/30/20at 22:32;  Start 3/19/20 at 14:00


Potassium Chloride/Water 100 ml @  100 mls/hr Q1H IV  Last administered on 

3/19/20at 19:26;  Start 3/19/20 at 16:00;  Stop 3/19/20 at 17:59;  Status DC


Propofol 100 ml @ 0 mls/hr CONT  PRN IV SEE PROTOCOL Last administered on 

3/25/20at 05:56;  Start 3/20/20 at 11:00;  Stop 3/26/20 at 21:16;  Status DC


Potassium Chloride/Water 100 ml @  100 mls/hr Q1HR IV  Last administered on 

3/20/20at 16:51;  Start 3/20/20 at 12:00;  Stop 3/20/20 at 17:59;  Status DC


Potassium Chloride/Water 100 ml @  100 mls/hr Q1H IV ;  Start 3/20/20 at 22:00; 

Stop 3/21/20 at 05:59;  Status Cancel


Potassium Chloride/Water 100 ml @  100 mls/hr Q1H IV  Last administered on 

3/21/20at 00:53;  Start 3/20/20 at 22:00;  Stop 3/21/20 at 01:59;  Status DC


Potassium Chloride/Water 100 ml @  100 mls/hr Q1H IV  Last administered on 

3/21/20at 09:34;  Start 3/21/20 at 07:00;  Stop 3/21/20 at 08:59;  Status DC


Nitroglycerin/ Dextrose 250 ml @  1.5 mls/hr CONT  PRN IV SEE I/O RECORD Last 

administered on 3/21/20at 21:30;  Start 3/21/20 at 10:00;  Stop 3/27/20 at 

11:16;  Status DC


Dexmedetomidine HCl 400 mcg/ Sodium Chloride 100 ml @ 0 mls/hr CONT  PRN IV 

SEDATION Last administered on 3/25/20at 11:13;  Start 3/21/20 at 14:45;  Stop 

3/27/20 at 11:14;  Status DC


Sodium Chloride 500 ml @  500 mls/hr 1X PRN  PRN IV SEE COMMENTS;  Start 3/21/20

at 14:45


Atropine Sulfate (ATROPINE 0.5mg SYRINGE) 0.5 mg PRN Q5MIN  PRN IV SEE COMMENTS;

 Start 3/21/20 at 14:45;  Stop 3/27/20 at 11:13;  Status DC


Potassium Chloride/Water 100 ml @  100 mls/hr Q1H IV  Last administered on 

3/21/20at 17:07;  Start 3/21/20 at 15:30;  Stop 3/21/20 at 17:29;  Status DC


Acetaminophen (Tylenol) 650 mg PRN Q6HRS  PRN PO MILD PAIN / TEMP Last 

administered on 3/30/20at 09:12;  Start 3/21/20 at 16:30


Potassium Chloride/Water 100 ml @  100 mls/hr Q1H IV  Last administered on 

3/22/20at 00:36;  Start 3/21/20 at 21:00;  Stop 3/21/20 at 22:59;  Status DC


Potassium Chloride/Water 100 ml @  100 mls/hr Q1H IV  Last administered on 

3/22/20at 09:42;  Start 3/22/20 at 08:00;  Stop 3/22/20 at 09:59;  Status DC


Sodium Chloride 1,000 ml @  100 mls/hr Q10H IV  Last administered on 3/24/20at 

04:09;  Start 3/22/20 at 23:55;  Stop 3/24/20 at 15:11;  Status DC


Daptomycin 620 mg/ Sodium Chloride 50 ml @  100 mls/hr Q24H IV  Last 

administered on 3/25/20at 15:25;  Start 3/22/20 at 15:00;  Stop 3/26/20 at 

09:35;  Status DC


Micafungin Sodium 100 mg/Dextrose 100 ml @  100 mls/hr Q24H IV  Last a

dministered on 3/25/20at 15:26;  Start 3/22/20 at 15:00;  Stop 3/26/20 at 09:38;

 Status DC


Meropenem 500 mg/ Sodium Chloride 50 ml @  100 mls/hr Q6HRS IV  Last 

administered on 3/27/20at 06:25;  Start 3/22/20 at 16:00;  Stop 3/27/20 at 

10:19;  Status DC


Fentanyl Citrate (Fentanyl 2ml Vial) 100 mcg STK-MED ONCE .ROUTE ;  Start 

3/22/20 at 17:04;  Stop 3/22/20 at 17:04;  Status DC


Fentanyl Citrate (Fentanyl 2ml Vial) 50 mcg PRN Q2HR  PRN IVP PAIN Last 

administered on 3/29/20at 01:52;  Start 3/22/20 at 17:15


Potassium Chloride/Water 100 ml @  100 mls/hr 1X  ONCE IV  Last administered on 

3/23/20at 10:47;  Start 3/23/20 at 10:00;  Stop 3/23/20 at 10:59;  Status DC


Iodixanol (Visipaque 320) 100 ml STK-MED ONCE .ROUTE ;  Start 3/23/20 at 11:08; 

Stop 3/23/20 at 11:08;  Status DC


Lidocaine HCl (Lidocaine 1% 20ml Vial) 20 ml STK-MED ONCE .ROUTE ;  Start 

3/23/20 at 11:08;  Stop 3/23/20 at 11:08;  Status DC


Heparin Sodium/ Sodium Chloride 500 ml @  As Directed STK-MED ONCE .ROUTE ;  

Start 3/23/20 at 11:08;  Stop 3/23/20 at 11:09;  Status DC


Heparin Sodium/ Sodium Chloride (HEPARIN for ARTERIAL LINE FLUSH) 1,000 unit 1X 

ONCE IART  Last administered on 3/23/20at 11:30;  Start 3/23/20 at 11:30;  Stop 

3/23/20 at 11:33;  Status DC


Heparin Sodium/ Sodium Chloride (HEPARIN for ARTERIAL LINE FLUSH) 1,000 unit 1X 

ONCE IART  Last administered on 3/23/20at 11:30;  Start 3/23/20 at 11:30;  Stop 

3/23/20 at 11:33;  Status DC


Iodixanol (Visipaque 320) 100 ml 1X  ONCE IART  Last administered on 3/23/20at 

11:30;  Start 3/23/20 at 11:30;  Stop 3/23/20 at 11:33;  Status DC


Lidocaine HCl (Lidocaine 1% 20ml Vial) 20 ml 1X  ONCE INJ  Last administered on 

3/23/20at 11:30;  Start 3/23/20 at 11:30;  Stop 3/23/20 at 11:33;  Status DC


Info (CONTRAST GIVEN -- Rx MONITORING) 1 each PRN DAILY  PRN MC SEE COMMENTS;  

Start 3/23/20 at 11:45;  Stop 3/25/20 at 11:44;  Status DC


Fentanyl Citrate (Fentanyl 2ml Vial) 100 mcg STK-MED ONCE .ROUTE ;  Start 

3/23/20 at 11:56;  Stop 3/23/20 at 11:57;  Status DC


Fentanyl Citrate (Fentanyl 2ml Vial) 100 mcg 1X  ONCE IV  Last administered on 

3/23/20at 12:15;  Start 3/23/20 at 12:15;  Stop 3/23/20 at 12:16;  Status DC


Bivalirudin (Angiomax) 250 mg STK-MED ONCE IV ;  Start 3/23/20 at 12:00;  Stop 

3/23/20 at 12:02;  Status DC


Bivalirudin (Angiomax) 250 mg 1X  ONCE IV  Last administered on 3/23/20at 12:05;

 Start 3/23/20 at 12:15;  Stop 3/23/20 at 12:16;  Status DC


Iodixanol (Visipaque 320) 100 ml STK-MED ONCE .ROUTE ;  Start 3/23/20 at 12:20; 

Stop 3/23/20 at 12:21;  Status DC


Bivalirudin (Angiomax) 250 mg STK-MED ONCE IV ;  Start 3/23/20 at 12:21;  Stop 

3/23/20 at 12:21;  Status DC


Bivalirudin (Angiomax) 250 mg 1X  ONCE IV  Last administered on 3/23/20at 12:25;

 Start 3/23/20 at 12:30;  Stop 3/23/20 at 12:31;  Status DC


Nitroglycerin (Nitroglycerin) 200 mcg STK-MED ONCE .ROUTE ;  Start 3/23/20 at 

12:25;  Stop 3/23/20 at 12:25;  Status DC


Nitroglycerin (Nitroglycerin) 200 mcg 1X  ONCE IART  Last administered on 

3/23/20at 12:30;  Start 3/23/20 at 12:30;  Stop 3/23/20 at 12:31;  Status DC


Clopidogrel Bisulfate (Plavix) 600 mg 1X  ONCE PO  Last administered on 

3/23/20at 14:21;  Start 3/23/20 at 12:45;  Stop 3/23/20 at 12:49;  Status DC


Aspirin (Chacorta Aspirin) 325 mg 1X  ONCE PO  Last administered on 3/23/20at 

14:20;  Start 3/23/20 at 12:45;  Stop 3/23/20 at 12:49;  Status DC


Dopamine HCl/ Dextrose (DOPamine 400MG/ 250ML PREMIX) 400 mg STK-MED ONCE IV ;  

Start 3/16/20 at 12:00;  Stop 3/23/20 at 12:56;  Status DC


Epinephrine HCl (EPINEPHrine SYRINGE) 1 mg STK-MED ONCE .ROUTE ;  Start 3/16/20 

at 12:00;  Stop 3/23/20 at 12:56;  Status DC


Potassium Chloride/Water 100 ml @  100 mls/hr Q1H  PRN IV SEE ORDER COMMENTS;  

Start 3/23/20 at 14:00;  Stop 3/24/20 at 11:26;  Status DC


Potassium Chloride/Water 100 ml @  100 mls/hr Q1H  PRN IV SEE ORDER COMMENTS;  

Start 3/23/20 at 14:00;  Stop 3/24/20 at 11:26;  Status DC


Potassium Chloride (Klor-Con) 40 meq Q2H  PRN PO SEE ORDER COMMENTS;  Start 

3/23/20 at 14:00;  Stop 3/24/20 at 11:26;  Status DC


Magnesium Sulfate 100 ml @  50 mls/hr PRN DAILY  PRN IV SEE ORDER COMMENTS;  

Start 3/24/20 at 09:00;  Stop 3/24/20 at 11:26;  Status DC


Sodium Phosphate 15 mmol/Sodium Chloride 255 ml @  62.5 mls/hr 1X  PRN IV SEE 

ORDER COMMENTS;  Start 3/23/20 at 14:00;  Stop 3/24/20 at 11:26;  Status DC


Multi-Ingred Cream/Lotion/Oil/ Oint (Artificial Tears Eye Ointment) 1 julieth PRN 

Q1HR  PRN OU DRY EYE;  Start 3/24/20 at 10:30


Potassium Chloride/Water 100 ml @  100 mls/hr Q1H IV ;  Start 3/24/20 at 11:00; 

Stop 3/24/20 at 11:26;  Status DC


Potassium Chloride/Water 100 ml @  100 mls/hr 1X  ONCE IV  Last administered on 

3/24/20at 12:29;  Start 3/24/20 at 12:00;  Stop 3/24/20 at 12:59;  Status DC


Furosemide (Lasix) 40 mg 1X  ONCE IVP  Last administered on 3/24/20at 16:07;  

Start 3/24/20 at 15:00;  Stop 3/24/20 at 15:06;  Status DC


Potassium Chloride 40 meq/ Dextrose 1,020 ml @  75 mls/hr 1X  ONCE IV  Last 

administered on 3/24/20at 16:08;  Start 3/24/20 at 15:00;  Stop 3/25/20 at 

04:35;  Status DC


Potassium Chloride 40 meq/ Dextrose 1,020 ml @  75 mls/hr 1X  ONCE IV  Last 

administered on 3/25/20at 05:17;  Start 3/25/20 at 05:00;  Stop 3/25/20 at 

18:35;  Status DC


Clopidogrel Bisulfate (Plavix) 75 mg DAILYWBKFT PO  Last administered on 

3/30/20at 09:16;  Start 3/25/20 at 13:30


Aspirin (Children'S Aspirin) 81 mg DAILYWBKFT PO  Last administered on 3/30/20at

09:16;  Start 3/26/20 at 08:00


Lisinopril (Prinivil) 10 mg DAILY PO  Last administered on 3/26/20at 08:29;  

Start 3/25/20 at 16:30;  Stop 3/26/20 at 10:01;  Status DC


Prochlorperazine Edisylate (Compazine) 10 mg PRN Q8HRS  PRN IV NAUSEA/VOMITING 

Last administered on 3/27/20at 03:50;  Start 3/25/20 at 20:30


Potassium Chloride/Water 100 ml @  100 mls/hr Q1H IV  Last administered on 

3/26/20at 08:28;  Start 3/26/20 at 07:00;  Stop 3/26/20 at 08:59;  Status DC


Lisinopril (Prinivil) 20 mg DAILY PO  Last administered on 3/30/20at 09:16;  St

art 3/27/20 at 09:00


Metoprolol Tartrate (Lopressor) 12.5 mg BID PO ;  Start 3/26/20 at 10:00;  

Status Cancel


Furosemide (Lasix) 40 mg DAILY PO  Last administered on 3/30/20at 09:17;  Start 

3/26/20 at 10:00


Atenolol (Tenormin) 25 mg DAILY PO  Last administered on 3/30/20at 09:18;  Start

3/26/20 at 10:15


Potassium Chloride (Klor-Con) 40 meq 1X  ONCE PO  Last administered on 3/26/20at

11:48;  Start 3/26/20 at 11:45;  Stop 3/26/20 at 11:46;  Status DC


Lorazepam (Ativan Inj) 2 mg PRN Q2HRS  PRN IVP ANXIETY / AGITATION Last 

administered on 3/29/20at 00:13;  Start 3/26/20 at 21:15


Potassium Chloride (Klor-Con) 20 meq DAILYWBKFT PO  Last administered on 

3/30/20at 09:15;  Start 3/27/20 at 10:30


Linezolid/Dextrose 300 ml @  300 mls/hr Q12HR IV  Last administered on 3/29/20at

09:57;  Start 3/27/20 at 10:30;  Stop 3/29/20 at 16:12;  Status DC


Famotidine (Pepcid) 20 mg BID PO  Last administered on 3/30/20at 22:25;  Start 

3/27/20 at 21:00


Trimethoprim/ Sulfamethoxazole (Bactrim Ds) 1 tab BID PO ;  Start 3/28/20 at 

21:00;  Stop 3/28/20 at 11:07;  Status DC


Trimethoprim/ Sulfamethoxazole (Bactrim Ds) 1 tab 1X  ONCE PO ;  Start 3/28/20 

at 11:00;  Stop 3/28/20 at 11:01;  Status DC


Alteplase, Recombinant (Cathflo For Central Catheter Clearance) 1 mg 1X  ONCE 

INT CAT  Last administered on 3/28/20at 12:15;  Start 3/28/20 at 11:30;  Stop 

3/28/20 at 11:31;  Status DC


Alteplase, Recombinant 5 mg/ Sodium Chloride 30 ml @ 30 mls/hr 1X  ONCE IV ;  

Start 3/28/20 at 14:30;  Stop 3/28/20 at 15:29;  Status UNV


Alteplase, Recombinant (Cathflo For Central Catheter Clearance) 1 mg 1X  ONCE 

INT CAT  Last administered on 3/28/20at 16:06;  Start 3/28/20 at 15:00;  Stop 3/

28/20 at 15:01;  Status DC


Alteplase, Recombinant (Cathflo For Central Catheter Clearance) 1 mg 1X  ONCE 

INT CAT ;  Start 3/28/20 at 15:00;  Stop 3/28/20 at 15:01;  Status DC


Insulin Human Lispro (HumaLOG) 0-9 UNITS QIDACHS SQ  Last administered on 

3/30/20at 12:47;  Start 3/28/20 at 16:45


Lactobacillus Rhamnosus (Culturelle) 1 cap BID PO  Last administered on 

3/30/20at 22:25;  Start 3/29/20 at 21:00


Linezolid (Zyvox) 600 mg BID PO  Last administered on 3/30/20at 09:16;  Start 

3/29/20 at 21:00;  Stop 3/30/20 at 14:49;  Status DC





Active Scripts


Active


Amlodipine Besylate 10 Mg Tablet 10 Mg PO DAILY 30 Days


Atenolol 25 Mg Tablet 25 Mg PO DAILY 30 Days


Atorvastatin Calcium 40 Mg Tablet 40 Mg PO QHS 30 Days


Clopidogrel (Clopidogrel Bisulfate) 75 Mg Tablet 75 Mg PO DAILYWBKFT 30 Days


Reported


Saw [place] (Saw [place] Fruit) 450 Mg Capsule 450 Mg PO DAILY


D3-50 (Cholecalciferol (Vitamin D3)) 50,000 Unit Capsule 1,000 Unit PO DAILY


Emergen-C 500 mg Chewable Tab (Vit C/Ascorb Sod/Multivit-Min) 500 Mg Tab.chew 

500 Mg PO DAILY


Zoloft (Sertraline Hcl) 50 Mg Tablet 50 Mg PO DAILY


Trazodone Hcl 50 Mg Tablet 1 Tab PO QHS


Levemir (Insulin Detemir) 100 Unit/1 Ml Vial 80 Unit SQ DAILY


Flomax (Tamsulosin Hcl) 0.4 Mg Cap.er.24h 0.4 Mg PO HS


Omeprazole 20 Mg Capsule.dr 20 Mg PO BID


Lisinopril 40 Mg Tablet 1 Tab PO DAILY


Vitals/I & O





Vital Sign - Last 24 Hours








 3/30/20 3/30/20 3/30/20 3/30/20





 09:16 09:16 09:18 11:03


 


Temp    97.4





    97.4


 


Pulse 77 77 77 69


 


Resp    18


 


B/P (MAP) 120/93 120/93 120/93 111/67 (82)


 


Pulse Ox    96


 


O2 Delivery    Nasal Cannula


 


O2 Flow Rate    3.0


 


    





    





 3/30/20 3/30/20 3/30/20 3/30/20





 15:00 19:47 20:00 23:43


 


Temp 97.7 97.7  97.6





 97.7 97.7  97.6


 


Pulse 57 71  80


 


Resp 20 16  18


 


B/P (MAP) 108/99 (102) 131/72 (91)  120/78 (92)


 


Pulse Ox 93 93  94


 


O2 Delivery Nasal Cannula Room Air Room Air Room Air


 


O2 Flow Rate 3.0   


 


    





    





 3/31/20 3/31/20  





 03:53 07:00  


 


Temp 98.2 97.7  





 98.2 97.7  


 


Pulse 75 88  


 


Resp 16 19  


 


B/P (MAP) 126/76 (93) 139/81 (100)  


 


Pulse Ox 92 94  


 


O2 Delivery Room Air Room Air  














Intake and Output   


 


 3/30/20 3/30/20 3/31/20





 15:00 23:00 07:00


 


Output Total   400 ml


 


Balance   -400 ml

















LOWELL BOSS MD        Mar 31, 2020 08:16

## 2020-03-31 NOTE — PDOC3
Discharge Summary


Visit Information


Date of Admission:  Mar 13, 2020


Date of Discharge:  Mar 31, 2020


Admitting Diagnosis:  Acute coronary syndrome


Final Diagnosis


Problems


Medical Problems:


(1) Acute respiratory distress


Status: Acute  





(2) CAP (community acquired pneumonia)


Status: Acute  





(3) CHF (congestive heart failure)


Status: Acute  





(4) Hypoxia


Status: Acute  





(5) SIRS (systemic inflammatory response syndrome)


Status: Acute  











Brief Hospital Course


Allergies





                                    Allergies








Coded Allergies Type Severity Reaction Last Updated Verified


 


  chlorothiazide Allergy Intermediate  3/13/20 Yes


 


  metoprolol Allergy Intermediate  3/13/20 Yes








Vital Signs





Vital Signs








  Date Time  Temp Pulse Resp B/P (MAP) Pulse Ox O2 Delivery O2 Flow Rate FiO2


 


3/31/20 10:59 97.8 76 19 105/71 (82) 94 Room Air  





 97.8       


 


3/30/20 15:00       3.0 








Lab Results





Laboratory Tests








Test


 3/29/20


16:38 3/29/20


21:01 3/30/20


03:45 3/30/20


07:17


 


Glucose (Fingerstick)


 136 mg/dL


(70-99) 148 mg/dL


(70-99) 


 140 mg/dL


(70-99)


 


White Blood Count


 


 


 10.1 x10^3/uL


(4.0-11.0) 





 


Red Blood Count


 


 


 5.56 x10^6/uL


(4.30-5.70) 





 


Hemoglobin


 


 


 15.5 g/dL


(13.0-17.5) 





 


Hematocrit


 


 


 47.3 %


(39.0-53.0) 





 


Mean Corpuscular Volume   85 fL ()  


 


Mean Corpuscular Hemoglobin   28 pg (25-35)  


 


Mean Corpuscular Hemoglobin


Concent 


 


 33 g/dL


(31-37) 





 


Red Cell Distribution Width


 


 


 13.8 %


(11.5-14.5) 





 


Platelet Count


 


 


 344 x10^3/uL


(140-400) 





 


Neutrophils (%) (Auto)   63 % (31-73)  


 


Lymphocytes (%) (Auto)   19 % (24-48)  


 


Monocytes (%) (Auto)   12 % (0-9)  


 


Eosinophils (%) (Auto)   5 % (0-3)  


 


Basophils (%) (Auto)   1 % (0-3)  


 


Neutrophils # (Auto)


 


 


 6.4 x10^3/uL


(1.8-7.7) 





 


Lymphocytes # (Auto)


 


 


 1.9 x10^3/uL


(1.0-4.8) 





 


Monocytes # (Auto)


 


 


 1.2 x10^3/uL


(0.0-1.1) 





 


Eosinophils # (Auto)


 


 


 0.5 x10^3/uL


(0.0-0.7) 





 


Basophils # (Auto)


 


 


 0.1 x10^3/uL


(0.0-0.2) 





 


Test


 3/30/20


12:01 3/30/20


17:02 3/30/20


20:31 3/31/20


07:19


 


Glucose (Fingerstick)


 187 mg/dL


(70-99) 145 mg/dL


(70-99) 161 mg/dL


(70-99) 111 mg/dL


(70-99)


 


Test


 3/31/20


08:45 3/31/20


12:02 


 





 


Sodium Level


 137 mmol/L


(136-145) 


 


 





 


Potassium Level


 3.5 mmol/L


(3.5-5.1) 


 


 





 


Chloride Level


 97 mmol/L


() 


 


 





 


Carbon Dioxide Level


 33 mmol/L


(21-32) 


 


 





 


Anion Gap 7 (6-14)    


 


Blood Urea Nitrogen


 26 mg/dL


(8-26) 


 


 





 


Creatinine


 1.0 mg/dL


(0.7-1.3) 


 


 





 


Estimated GFR


(Cockcroft-Gault) 73.2 


 


 


 





 


Glucose Level


 146 mg/dL


(70-99) 


 


 





 


Calcium Level


 9.1 mg/dL


(8.5-10.1) 


 


 





 


Magnesium Level


 1.6 mg/dL


(1.8-2.4) 


 


 





 


Glucose (Fingerstick)


 


 175 mg/dL


(70-99) 


 











Laboratory Tests








Test


 3/30/20


17:02 3/30/20


20:31 3/31/20


07:19 3/31/20


08:45


 


Glucose (Fingerstick)


 145 mg/dL


(70-99) 161 mg/dL


(70-99) 111 mg/dL


(70-99) 





 


Sodium Level


 


 


 


 137 mmol/L


(136-145)


 


Potassium Level


 


 


 


 3.5 mmol/L


(3.5-5.1)


 


Chloride Level


 


 


 


 97 mmol/L


()


 


Carbon Dioxide Level


 


 


 


 33 mmol/L


(21-32)


 


Anion Gap    7 (6-14) 


 


Blood Urea Nitrogen


 


 


 


 26 mg/dL


(8-26)


 


Creatinine


 


 


 


 1.0 mg/dL


(0.7-1.3)


 


Estimated GFR


(Cockcroft-Gault) 


 


 


 73.2 





 


Glucose Level


 


 


 


 146 mg/dL


(70-99)


 


Calcium Level


 


 


 


 9.1 mg/dL


(8.5-10.1)


 


Magnesium Level


 


 


 


 1.6 mg/dL


(1.8-2.4)


 


Test


 3/31/20


12:02 


 


 





 


Glucose (Fingerstick)


 175 mg/dL


(70-99) 


 


 











Brief Hospital Course


Mr Alexander is a 76 yo M w/ PMHx hypertension, dyslipidemia, diabetes mellitus who

presented via EMS with complaint of shortness of breath on 3/13/202 that had 

been ongoing for 10 days. He called [phone] in attempts to go to [facility], was brought t

o [facility], found with O2 sat of 70s at room air and started on  BiPAP to 100% in a 

very short time.


Pulmonology, cardiology, ID consulted.





3/16: Intubation and CVC placed for code blue on way to cardiac catheterization


3/17: Intubated, sedated


3/18: Intubated, sedated


3/19: BP difficult to control, remains intubated


3/20: Still Intubated and sedated. COVID 19 Negative


3/21: Intubated, sedated. Failed 2 weans. K still low, replaced. Lasix GTT. Good

UOP. Overnight with fever 102F.


3/22: Dosed Daptom/micafungin changed to Meropenem


3/23: TO CATH LAB - PCI to LAD


3/24: Intubated, nephrology consulted


3/25: Intubated


3/26: Extubated


3/27-3/29: confused. Approved for LTAC, no beds available


3/30: Seen bedside today. More oriented. Has mansfield in place, asking to have it 

removed. No SOB or cough. No CP.





[place] out, feeling much better. No CP or SOB. has walked 250meters in unit 

today. Will go home with home health, needs cardiac rehab.





Acute CHF with diastolic dysfunction. Significantly improved. Continuing present

treatment.


NSTEMI - s/p LAD stent


Acute respiratory failure with CHF and possibly ongoing RAVINDER


DM2: insulin dependent


Accelerated HTN: better


Morbid obesity


Hypokalemia and hypomagnesemia - likely from lasix, zaroxyln, will need BMP 

weekly for 1 month, then monthly thereafter


Leukocytosis with UTI: off antibiotics. Had 14 days of antibiotics for 

enterococcus faecalis with repeat Urine culture negative. completed zyvox on 

3/29/2020


Cardiac arrest - s/p resuscitation


Urinary retention - with h/o BPH, nephrolithiasis and renal cell ca he continued

to have retention > 600cc and was instructed in straight catheterization





Greater than 30 minutes spent on d/c home with home health.





Discharge Information


Condition at Discharge:  Improved


Follow Up:  Weeks


Disposition/Orders:  D/C to Home w/ HH


Scheduled


Amlodipine Besylate (Amlodipine Besylate) 10 Mg Tablet, 10 MG PO DAILY for HTN 

for 30 Days, #30


   Prescribed by: LOWELL BOSS MD on 3/30/20 1544


Atenolol (Atenolol) 25 Mg Tablet, 25 MG PO DAILY for HTN for 30 Days, #30


   Prescribed by: LOWELL BOSS MD on 3/30/20 1544


Atorvastatin Calcium (Atorvastatin Calcium) 40 Mg Tablet, 40 MG PO QHS for 

cholesterol  for 30 Days, #30 Ref 2


   Prescribed by: DIONY HERNÁNDEZ on 3/26/20 1009


Cholecalciferol (Vitamin D3) (D3-50) 50,000 Unit Capsule, 1,000 UNIT PO DAILY 

for VITAMIN, (Reported)


   Entered as Reported by: Rios Pardo on 3/13/20 1550


   Last Taken: Unknown Dose on Unknown Date & Time     Last Action: HELD on 

3/19/20 1141 by MARY LARA


Clopidogrel Bisulfate (Clopidogrel) 75 Mg Tablet, 75 MG PO DAILYWBKFT for 

coronary artery disease for 30 Days, #30 Ref 2


   Prescribed by: DIONY HERNÁNDEZ on 3/26/20 1009


Dulaglutide (Trulicity) 0.75 Mg/0.5 Ml Pen.injctr, 0.75 MG SQ WEEKLY for LOWER 

BLOOD SUGAR for 30 Days, #4


   Prescribed by: LOWELL BOSS MD on 3/31/20 1215


Furosemide (Furosemide) 40 Mg Tablet, 40 MG PO DAILY for CHF for 30 Days, #30


   Prescribed by: LOWELL BOSS MD on 3/31/20 1215


Insulin Detemir (Levemir) 100 Unit/1 Ml Vial, 80 UNIT SQ DAILY for LOWER BLOOD 

GLUCOSE, (Reported)


   Entered as Reported by: Rios Pardo on 3/13/20 1550


   Last Taken: Unknown Dose on Unknown Date & Time     Last Action: Converted on

3/13/20 2140 by Delbert Martinez


Lisinopril (Lisinopril) 40 Mg Tablet, 1 TAB PO DAILY for HTN, #30 Ref 5 

(Reported)


   Entered as Reported by: Rios Pardo on 3/13/20 1550


   Last Taken: Unknown Dose on Unknown Date & Time     Last Action: Continued on

3/13/20 1613 by YANA CHA


Metolazone (Metolazone) 2.5 Mg Tablet, 2.5 MG PO DAILY for CHF for 30 Days, #30


   Prescribed by: LOWELL BOSS MD on 3/31/20 1215


Omeprazole (Omeprazole) 20 Mg Capsule.dr, 20 MG PO BID for LOWER STOMACH ACID, 

(Reported)


   Entered as Reported by: Rios Prado on 3/13/20 1550


   Last Taken: Unknown Dose on Unknown Date & Time     Last Action: HELD on 

3/19/20 1141 by NIAL CASTLE


Potassium Chloride (Klor-Con M20) 20 Meq Tab.er.prt, 20 MEQ PO DAILYWBKFT for 

CHF for 30 Days, #30


   Prescribed by: LOWELL BOSS MD on 3/31/20 1215


Saw [place] Fruit (Saw [place]) 450 Mg Capsule, 450 MG PO DAILY for FOR 

URINARY RETENTION, (Reported)


   Entered as Reported by: Rios Pardo on 3/13/20 1550


   Last Taken: Unknown Dose on Unknown Date & Time     Last Action: HELD on 

3/19/20 1141 by NIAL CASTLE


Sertraline Hcl (Zoloft) 50 Mg Tablet, 50 MG PO DAILY for ANTI-DEPRESSANT, Ref 0 

(Reported)


   Entered as Reported by: Rios Pardo on 3/13/20 1550


   Last Taken: Unknown Dose on Unknown Date & Time     Last Action: HELD on 

3/19/20 1141 by NIAL CASTLE


Tamsulosin Hcl (Flomax) 0.4 Mg Cap.er.24h, 0.4 MG PO HS for HELP WITH URINATION,

(Reported)


   Entered as Reported by: Rios Pardo on 3/13/20 1550


   Last Taken: Unknown Dose on Unknown Date & Time     Last Action: HELD on 

3/19/20 1141 by NIAL CASTLE


Trazodone Hcl (Trazodone Hcl) 50 Mg Tablet, 1 TAB PO QHS for INSOMNIA, #30 Ref 1

(Reported)


   Entered as Reported by: Rios Pardo on 3/13/20 1550


   Last Taken: Unknown Dose on Unknown Date & Time     Last Action: HELD on 

3/19/20 1141 by MARY LARA


Vit C/Ascorb Sod/Multivit-Min (Emergen-C 500 mg Chewable Tab) 500 Mg Tab.chew, 

500 MG PO DAILY for VITAMIN, (Reported)


   Entered as Reported by: Rios Pardo on 3/13/20 1550


   Last Taken: Unknown Dose on Unknown Date & Time     Last Action: HELD on 3/

19/20 1141 by MARY LARA





Discontinued Medications


Atenolol (Atenolol) 100 Mg Tablet, 100 MG PO BID for htn, (Reported)


   Entered as Reported by: Rios Pardo on 3/13/20 1550


   Last Taken: Unknown Dose on Unknown Date & Time     Last Action: HELD on 

3/19/20 1141 by MARY LARA


Furosemide (Lasix) 20 Mg Tablet, 20 MG PO BID for DIURETIC, (Reported)


   Entered as Reported by: Rios Pardo on 3/13/20 1550


   Last Taken: Unknown Dose on Unknown Date & Time     Last Action: HELD on 

3/19/20 1141 by MARY LARA


Insulin Aspart (Insulin Aspart) 100 Unit/1 Ml Vial, 25 UNIT SQ TIDWMEALS for 

LOWER BLOOD GLUCOSE, (Reported)


   Entered as Reported by: Rios Pardo on 3/13/20 1550


   Last Taken: Unknown Dose on Unknown Date & Time     Last Action: HELD on 

3/19/20 1141 by LOWELL ANDERSON MD        Mar 31, 2020 12:26

## 2021-05-11 ENCOUNTER — HOSPITAL ENCOUNTER (OUTPATIENT)
Dept: HOSPITAL 61 - ECHO | Age: 76
End: 2021-05-11
Attending: INTERNAL MEDICINE
Payer: COMMERCIAL

## 2021-05-11 DIAGNOSIS — I25.10: ICD-10-CM

## 2021-05-11 DIAGNOSIS — I51.7: Primary | ICD-10-CM

## 2021-05-11 PROCEDURE — 93306 TTE W/DOPPLER COMPLETE: CPT

## 2021-05-11 NOTE — CARD
MR#: [id number]

Account#: [id number]

Accession#: [id number]

Date of Study: 05/11/2021

Ordering Physician: BRANDIN GIBBONS, 

Referring Physician: BRANDIN GIBBONS, 

Tech: Merly Jacobs, [facility]





--------------- APPROVED REPORT --------------





EXAM: Two-dimensional and M-mode echocardiogram with Doppler and color Doppler.



Other Information 

Quality : AverageHR: 86bpm

Technically limited study due to  body habitus.



INDICATION

Cardiac Disease: CAD 



RISK FACTORS

Hypertension 

Hyperlipidemia

Diabetes



2D DIMENSIONS 

Left Atrium(2D)4.7 (1.6-4.0cm)IVSd1.5 (0.7-1.1cm)

Aortic Root(2D)3.8 (2.0-3.7cm)LVDd6.7 (3.9-5.9cm)

LVOT Diameter2.2 (1.8-2.4cm)PWd1.4 (0.7-1.1cm)

LVDs4.6 (2.5-4.0cm)FS (%) 30.9 %

.3 mlLVEF(%)57.3 (>50%)



Aortic Valve

AoV Peak [place].176.3cm/sAoV VTI33.1cm

AO Peak GR.12.4mmHgLVOT Peak [place].96.5cm/s

LVOT  VTI 17.74cmAO Mean GR.8mmHg

LUIS (VMAX)1.20rd1SCL   (VTI)1.95cm2

AI P 1/2 [id number]



Mitral Valve

MV E Ohimuxde77.4cm/sMV DECEL [id number]

MV A Jczkkhkg10.3cm/sMV E Mean Gr.3mmHg

MV [id number]/A  Ratio1.3

MVA (PHT)3.52cm2



TDI

E/Lateral E'7.7E/Medial E'13.9



Pulmonary Valve

PV Peak Rvnhlhpw327.2cm/sPV Peak Grad.5mmHg



 LEFT VENTRICLE 

The Left Ventricle is moderately dilated. There is moderate concentric left ventricular hypertrophy. 
The left ventricular systolic function is normal and the ejection fraction is low normal. EF 50% Sept
al motion suggestive of conduction defect. Otherwise, grossly normal wall motion. Transmitral Doppler
 flow pattern is Grade I-abnormal relaxation pattern.



 RIGHT VENTRICLE 

The right ventricle is normal size. There is normal right ventricular wall thickness. The right ventr
icular systolic function is normal.



 ATRIA 

The left atrium is mildly dilated. The right atrium size is normal. The interatrial septum is intact 
with no evidence for an atrial septal defect or patent foramen ovale as noted on 2-D or Doppler imagi
ng.



 AORTIC VALVE 

The aortic valve is grossly normal. Not well visualized. Doppler and Color Flow revealed trace aortic
 regurgitation. There is no significant aortic valvular stenosis. Calculated aortic valve area is 2.0
7 cm2 with maximum pressure gradient of 14 mmHg and mean pressure gradient of 10 mmHg.



 MITRAL VALVE 

The mitral valve is normal in structure and function. There is no evidence of mitral valve prolapse. 
There is no mitral valve stenosis. Doppler and Color-flow revealed trace mitral regurgitation.



 TRICUSPID VALVE 

The tricuspid valve is normal in structure and function. Doppler and Color Flow revealed no tricuspid
 valve regurgitation noted. There is no tricuspid valve stenosis.



 PULMONIC VALVE 

The pulmonic valve is not well visualized. Doppler and Color Flow revealed trace pulmonic valvular re
gurgitation. There is no pulmonic valvular stenosis.



 GREAT VESSELS 

The aortic root is normal in size. The IVC was not visualized.



 PERICARDIAL EFFUSION 

There is no evidence of significant pericardial effusion.



Critical Notification

Critical Value: No



<Conclusion>

The left ventricular systolic function is normal and the ejection fraction is low normal. EF 50%

Septal motion suggestive of conduction defect. Otherwise, grossly normal wall motion.



Signed by : Ramón Quezada, 

Electronically Approved : 05/11/2021 11:57:07

## 2022-09-16 NOTE — RAD
EXAM: CHEST 1 VIEW 

 

History: Pulmonary congestion 

 

COMPARISON: 3/24/2020

 

TECHNIQUE: Single portable radiograph of the chest

 

 

Findings/

impression:

 

Low lung volumes and technique accentuates heart size and pulmonary 

vascularity. The ET tube, feeding tube, right subclavian line are 

unchanged. Patchy bibasilar lung airspace opacities likely atelectasis or 

infiltrates slightly improved since prior exam.

 

Electronically signed by: Jordan Perea MD (3/25/2020 7:35 AM) [id number] Wound Care: Petrolatum